# Patient Record
Sex: FEMALE | Race: WHITE | URBAN - METROPOLITAN AREA
[De-identification: names, ages, dates, MRNs, and addresses within clinical notes are randomized per-mention and may not be internally consistent; named-entity substitution may affect disease eponyms.]

---

## 2018-08-03 ENCOUNTER — INPATIENT (INPATIENT)
Facility: HOSPITAL | Age: 78
LOS: 0 days | Discharge: HOME | End: 2018-08-04
Attending: INTERNAL MEDICINE | Admitting: INTERNAL MEDICINE
Payer: MEDICARE

## 2018-08-03 VITALS — WEIGHT: 225.09 LBS | HEIGHT: 65 IN

## 2018-08-03 DIAGNOSIS — Z90.49 ACQUIRED ABSENCE OF OTHER SPECIFIED PARTS OF DIGESTIVE TRACT: Chronic | ICD-10-CM

## 2018-08-03 DIAGNOSIS — Z98.84 BARIATRIC SURGERY STATUS: Chronic | ICD-10-CM

## 2018-08-03 DIAGNOSIS — Z96.652 PRESENCE OF LEFT ARTIFICIAL KNEE JOINT: Chronic | ICD-10-CM

## 2018-08-03 LAB
ALBUMIN SERPL ELPH-MCNC: 3.4 G/DL — SIGNIFICANT CHANGE UP (ref 3.3–5)
ALP SERPL-CCNC: 77 U/L — SIGNIFICANT CHANGE UP (ref 40–120)
ALT FLD-CCNC: 22 U/L — SIGNIFICANT CHANGE UP (ref 12–78)
ANION GAP SERPL CALC-SCNC: 7 MMOL/L — SIGNIFICANT CHANGE UP (ref 5–17)
APTT BLD: 30.5 SEC — SIGNIFICANT CHANGE UP (ref 27.5–37.4)
AST SERPL-CCNC: 20 U/L — SIGNIFICANT CHANGE UP (ref 15–37)
BASOPHILS # BLD AUTO: 0.02 K/UL — SIGNIFICANT CHANGE UP (ref 0–0.2)
BASOPHILS NFR BLD AUTO: 0.3 % — SIGNIFICANT CHANGE UP (ref 0–2)
BILIRUB SERPL-MCNC: 0.4 MG/DL — SIGNIFICANT CHANGE UP (ref 0.2–1.2)
BUN SERPL-MCNC: 16 MG/DL — SIGNIFICANT CHANGE UP (ref 7–23)
CALCIUM SERPL-MCNC: 8.8 MG/DL — SIGNIFICANT CHANGE UP (ref 8.5–10.1)
CHLORIDE SERPL-SCNC: 107 MMOL/L — SIGNIFICANT CHANGE UP (ref 96–108)
CO2 SERPL-SCNC: 27 MMOL/L — SIGNIFICANT CHANGE UP (ref 22–31)
CREAT SERPL-MCNC: 1.07 MG/DL — SIGNIFICANT CHANGE UP (ref 0.5–1.3)
EOSINOPHIL # BLD AUTO: 0.07 K/UL — SIGNIFICANT CHANGE UP (ref 0–0.5)
EOSINOPHIL NFR BLD AUTO: 1 % — SIGNIFICANT CHANGE UP (ref 0–6)
GLUCOSE SERPL-MCNC: 97 MG/DL — SIGNIFICANT CHANGE UP (ref 70–99)
HCT VFR BLD CALC: 30.7 % — LOW (ref 34.5–45)
HGB BLD-MCNC: 10.3 G/DL — LOW (ref 11.5–15.5)
IMM GRANULOCYTES NFR BLD AUTO: 0.7 % — SIGNIFICANT CHANGE UP (ref 0–1.5)
INR BLD: 1.03 RATIO — SIGNIFICANT CHANGE UP (ref 0.88–1.16)
LYMPHOCYTES # BLD AUTO: 1.27 K/UL — SIGNIFICANT CHANGE UP (ref 1–3.3)
LYMPHOCYTES # BLD AUTO: 19 % — SIGNIFICANT CHANGE UP (ref 13–44)
MCHC RBC-ENTMCNC: 32.9 PG — SIGNIFICANT CHANGE UP (ref 27–34)
MCHC RBC-ENTMCNC: 33.6 GM/DL — SIGNIFICANT CHANGE UP (ref 32–36)
MCV RBC AUTO: 98.1 FL — SIGNIFICANT CHANGE UP (ref 80–100)
MONOCYTES # BLD AUTO: 0.76 K/UL — SIGNIFICANT CHANGE UP (ref 0–0.9)
MONOCYTES NFR BLD AUTO: 11.3 % — SIGNIFICANT CHANGE UP (ref 2–14)
NEUTROPHILS # BLD AUTO: 4.53 K/UL — SIGNIFICANT CHANGE UP (ref 1.8–7.4)
NEUTROPHILS NFR BLD AUTO: 67.7 % — SIGNIFICANT CHANGE UP (ref 43–77)
NRBC # BLD: 0 /100 WBCS — SIGNIFICANT CHANGE UP (ref 0–0)
NT-PROBNP SERPL-SCNC: 223 PG/ML — SIGNIFICANT CHANGE UP (ref 0–450)
PLATELET # BLD AUTO: 150 K/UL — SIGNIFICANT CHANGE UP (ref 150–400)
POTASSIUM SERPL-MCNC: 3.7 MMOL/L — SIGNIFICANT CHANGE UP (ref 3.5–5.3)
POTASSIUM SERPL-SCNC: 3.7 MMOL/L — SIGNIFICANT CHANGE UP (ref 3.5–5.3)
PROT SERPL-MCNC: 6.9 GM/DL — SIGNIFICANT CHANGE UP (ref 6–8.3)
PROTHROM AB SERPL-ACNC: 11.1 SEC — SIGNIFICANT CHANGE UP (ref 9.8–12.7)
RBC # BLD: 3.13 M/UL — LOW (ref 3.8–5.2)
RBC # FLD: 14.2 % — SIGNIFICANT CHANGE UP (ref 10.3–14.5)
SODIUM SERPL-SCNC: 141 MMOL/L — SIGNIFICANT CHANGE UP (ref 135–145)
TROPONIN I SERPL-MCNC: <0.015 NG/ML — SIGNIFICANT CHANGE UP (ref 0.01–0.04)
WBC # BLD: 6.7 K/UL — SIGNIFICANT CHANGE UP (ref 3.8–10.5)
WBC # FLD AUTO: 6.7 K/UL — SIGNIFICANT CHANGE UP (ref 3.8–10.5)

## 2018-08-03 PROCEDURE — 99285 EMERGENCY DEPT VISIT HI MDM: CPT

## 2018-08-03 PROCEDURE — 73110 X-RAY EXAM OF WRIST: CPT | Mod: 26,LT

## 2018-08-03 PROCEDURE — 93010 ELECTROCARDIOGRAM REPORT: CPT

## 2018-08-03 PROCEDURE — 70450 CT HEAD/BRAIN W/O DYE: CPT | Mod: 26

## 2018-08-03 PROCEDURE — 71045 X-RAY EXAM CHEST 1 VIEW: CPT | Mod: 26

## 2018-08-03 PROCEDURE — 72170 X-RAY EXAM OF PELVIS: CPT | Mod: 26

## 2018-08-03 PROCEDURE — 72125 CT NECK SPINE W/O DYE: CPT | Mod: 26

## 2018-08-03 PROCEDURE — 93306 TTE W/DOPPLER COMPLETE: CPT | Mod: 26

## 2018-08-03 RX ORDER — QUETIAPINE FUMARATE 200 MG/1
50 TABLET, FILM COATED ORAL AT BEDTIME
Qty: 0 | Refills: 0 | Status: DISCONTINUED | OUTPATIENT
Start: 2018-08-03 | End: 2018-08-04

## 2018-08-03 RX ORDER — IBUPROFEN 200 MG
600 TABLET ORAL ONCE
Qty: 0 | Refills: 0 | Status: COMPLETED | OUTPATIENT
Start: 2018-08-03 | End: 2018-08-03

## 2018-08-03 RX ORDER — PANTOPRAZOLE SODIUM 20 MG/1
40 TABLET, DELAYED RELEASE ORAL
Qty: 0 | Refills: 0 | Status: DISCONTINUED | OUTPATIENT
Start: 2018-08-03 | End: 2018-08-04

## 2018-08-03 RX ORDER — VERAPAMIL HCL 240 MG
240 CAPSULE, EXTENDED RELEASE PELLETS 24 HR ORAL DAILY
Qty: 0 | Refills: 0 | Status: DISCONTINUED | OUTPATIENT
Start: 2018-08-03 | End: 2018-08-04

## 2018-08-03 RX ORDER — SENNA PLUS 8.6 MG/1
2 TABLET ORAL AT BEDTIME
Qty: 0 | Refills: 0 | Status: DISCONTINUED | OUTPATIENT
Start: 2018-08-03 | End: 2018-08-04

## 2018-08-03 RX ORDER — IBUPROFEN 200 MG
600 TABLET ORAL EVERY 6 HOURS
Qty: 0 | Refills: 0 | Status: DISCONTINUED | OUTPATIENT
Start: 2018-08-03 | End: 2018-08-04

## 2018-08-03 RX ORDER — LANOLIN ALCOHOL/MO/W.PET/CERES
5 CREAM (GRAM) TOPICAL AT BEDTIME
Qty: 0 | Refills: 0 | Status: DISCONTINUED | OUTPATIENT
Start: 2018-08-03 | End: 2018-08-04

## 2018-08-03 RX ORDER — ACETAMINOPHEN 500 MG
650 TABLET ORAL EVERY 6 HOURS
Qty: 0 | Refills: 0 | Status: DISCONTINUED | OUTPATIENT
Start: 2018-08-03 | End: 2018-08-04

## 2018-08-03 RX ORDER — OXYBUTYNIN CHLORIDE 5 MG
5 TABLET ORAL
Qty: 0 | Refills: 0 | Status: DISCONTINUED | OUTPATIENT
Start: 2018-08-03 | End: 2018-08-04

## 2018-08-03 RX ORDER — HEPARIN SODIUM 5000 [USP'U]/ML
5000 INJECTION INTRAVENOUS; SUBCUTANEOUS EVERY 8 HOURS
Qty: 0 | Refills: 0 | Status: DISCONTINUED | OUTPATIENT
Start: 2018-08-03 | End: 2018-08-04

## 2018-08-03 RX ORDER — ONDANSETRON 8 MG/1
4 TABLET, FILM COATED ORAL EVERY 6 HOURS
Qty: 0 | Refills: 0 | Status: DISCONTINUED | OUTPATIENT
Start: 2018-08-03 | End: 2018-08-04

## 2018-08-03 RX ORDER — LOSARTAN POTASSIUM 100 MG/1
25 TABLET, FILM COATED ORAL DAILY
Qty: 0 | Refills: 0 | Status: DISCONTINUED | OUTPATIENT
Start: 2018-08-03 | End: 2018-08-04

## 2018-08-03 RX ORDER — ATORVASTATIN CALCIUM 80 MG/1
20 TABLET, FILM COATED ORAL AT BEDTIME
Qty: 0 | Refills: 0 | Status: DISCONTINUED | OUTPATIENT
Start: 2018-08-03 | End: 2018-08-04

## 2018-08-03 RX ORDER — SODIUM CHLORIDE 9 MG/ML
250 INJECTION INTRAMUSCULAR; INTRAVENOUS; SUBCUTANEOUS ONCE
Qty: 0 | Refills: 0 | Status: COMPLETED | OUTPATIENT
Start: 2018-08-03 | End: 2018-08-03

## 2018-08-03 RX ORDER — HYDROCHLOROTHIAZIDE 25 MG
12.5 TABLET ORAL DAILY
Qty: 0 | Refills: 0 | Status: DISCONTINUED | OUTPATIENT
Start: 2018-08-03 | End: 2018-08-04

## 2018-08-03 RX ADMIN — HEPARIN SODIUM 5000 UNIT(S): 5000 INJECTION INTRAVENOUS; SUBCUTANEOUS at 22:05

## 2018-08-03 RX ADMIN — HEPARIN SODIUM 5000 UNIT(S): 5000 INJECTION INTRAVENOUS; SUBCUTANEOUS at 14:52

## 2018-08-03 RX ADMIN — ATORVASTATIN CALCIUM 20 MILLIGRAM(S): 80 TABLET, FILM COATED ORAL at 22:05

## 2018-08-03 RX ADMIN — SODIUM CHLORIDE 500 MILLILITER(S): 9 INJECTION INTRAMUSCULAR; INTRAVENOUS; SUBCUTANEOUS at 10:15

## 2018-08-03 RX ADMIN — Medication 5 MILLIGRAM(S): at 22:05

## 2018-08-03 RX ADMIN — Medication 600 MILLIGRAM(S): at 12:39

## 2018-08-03 RX ADMIN — SODIUM CHLORIDE 250 MILLILITER(S): 9 INJECTION INTRAMUSCULAR; INTRAVENOUS; SUBCUTANEOUS at 10:52

## 2018-08-03 RX ADMIN — QUETIAPINE FUMARATE 50 MILLIGRAM(S): 200 TABLET, FILM COATED ORAL at 22:05

## 2018-08-03 RX ADMIN — PANTOPRAZOLE SODIUM 40 MILLIGRAM(S): 20 TABLET, DELAYED RELEASE ORAL at 22:05

## 2018-08-03 RX ADMIN — Medication 5 MILLIGRAM(S): at 17:57

## 2018-08-03 RX ADMIN — Medication 600 MILLIGRAM(S): at 11:48

## 2018-08-03 NOTE — H&P ADULT - NSHPPHYSICALEXAM_GEN_ALL_CORE
Vital Signs Last 24 Hrs  T(C): 36.7 (03 Aug 2018 09:36), Max: 36.7 (03 Aug 2018 09:36)  T(F): 98 (03 Aug 2018 09:36), Max: 98 (03 Aug 2018 09:36)  HR: 78 (03 Aug 2018 09:36) (78 - 78)  BP: 153/79 (03 Aug 2018 09:36) (153/79 - 153/79)  BP(mean): --  RR: 18 (03 Aug 2018 09:36) (18 - 18)  SpO2: 98% (03 Aug 2018 09:36) (98% - 98%)    PHYSICAL EXAM:    Constitutional: NAD, awake and alert, well-developed  HEENT: PERR, EOMI, Normal Hearing, MMM  Neck: Soft and supple  Respiratory: Breath sounds are clear bilaterally, No wheezing, rales or rhonchi  Cardiovascular: S1 and S2, regular rate and rhythm, no Murmurs, gallops or rubs  Gastrointestinal: Bowel Sounds present, soft, nontender, nondistended, no guarding, no rebound  Extremities: No peripheral edema  Neurological: A/O x 3, no focal deficits in my limited exam  Skin: bruises b/l knees, left arm.

## 2018-08-03 NOTE — ED ADULT NURSE NOTE - OBJECTIVE STATEMENT
pt states left wrist pain s/p fall. found by  on the ground, per  he heard a thump and found her on the ground approx 2 seconds later. pt states she hit the left side of her head. denies CP, palpitations, SOB or diff breathing.

## 2018-08-03 NOTE — ED ADULT NURSE NOTE - NSIMPLEMENTINTERV_GEN_ALL_ED
Implemented All Fall with Harm Risk Interventions:  [place] to call system. Call bell, personal items and telephone within reach. Instruct patient to call for assistance. Room bathroom lighting operational. Non-slip footwear when patient is off stretcher. Physically safe environment: no spills, clutter or unnecessary equipment. Stretcher in lowest position, wheels locked, appropriate side rails in place. Provide visual cue, wrist band, yellow gown, etc. Monitor gait and stability. Monitor for mental status changes and reorient to person, place, and time. Review medications for side effects contributing to fall risk. Reinforce activity limits and safety measures with patient and family. Provide visual clues: red socks.

## 2018-08-03 NOTE — H&P ADULT - HISTORY OF PRESENT ILLNESS
77 year old woman with PMHx of GERD, HTN, HLD, mood disorder nos, presents with episode of syncope.  Per pt history this morning she was reaching up to turn off ceiling fan when all of a sudden she blacked out.  Her  found her on the floor.  Pt does not recall events of fall as she blacked out.  She states this has happened once in the past.  She admits to having episodes of dizziness with sudden movement.  No changes to meds recently.  Complaining of left wrist pain.  Denies fever, had some nausea but now resolved, no vomiting, abd pain or changes in urination.    In ED: Found to have wrist fx on left.

## 2018-08-03 NOTE — ED ADULT NURSE NOTE - CHIEF COMPLAINT QUOTE
pt fell at 0100 , pt looked up to shut off a fan and then was suddenly on the floor. Pt + LOC for less than 30 seconds. Pt doesn't recall the fall but has ecchymosis on the left side of the face, ecchymosis on left knee and pain in left wrist

## 2018-08-03 NOTE — ED PROVIDER NOTE - CARE PLAN
Principal Discharge DX:	Syncope and collapse  Secondary Diagnosis:	Other closed intra-articular fracture of distal end of right radius, initial encounter

## 2018-08-03 NOTE — ED ADULT NURSE NOTE - CHPI ED NUR SYMPTOMS NEG
no tingling/no confusion/no fever/no weakness/no abrasion/no bleeding/no numbness/no vomiting/no deformity

## 2018-08-03 NOTE — ED PROVIDER NOTE - OBJECTIVE STATEMENT
78 y/o female with a PMHx of  presents to the ED concerning possible wrist fx. Pt states she thinks she fell. As per pt's , he heard her fall and when he went to see what had happened he found her conscious. Pt walks with a cane at baseline. +LOC +wrist pain. Denies CP, palpitations. Pt states this has happened before once, years ago. Pt states that the last time this happened she was standing outside the car door and there was ice and she doesn't remember what happened after that. Non-smoker. 76 y/o female with a PMHx of GERD, HTN, HLD presents to the ED concerning possible wrist fx. Pt states she thinks she fell. As per pt's , he heard her fall and when he went to see what had happened he found her conscious. Pt walks with a cane at baseline. She states that she was turning off her fans and when she turned around to turn the second fan off she passed out and fell. +syncope +wrist pain. Denies CP, palpitations. Pt states this has happened before once, years ago. Pt states that the last time this happened she was standing outside the car door and there was ice and she doesn't remember what happened after that. Non-smoker.

## 2018-08-03 NOTE — H&P ADULT - PMH
GERD (gastroesophageal reflux disease)    HLD (hyperlipidemia)    HTN (hypertension)    Mood disorder

## 2018-08-03 NOTE — H&P ADULT - NSHPLABSRESULTS_GEN_ALL_CORE
CARDIAC MARKERS ( 03 Aug 2018 10:59 )  <0.015 ng/mL / x     / x     / x     / x                                10.3   6.70  )-----------( 150      ( 03 Aug 2018 09:39 )             30.7     08-03    141  |  107  |  16  ----------------------------<  97  3.7   |  27  |  1.07    Ca    8.8      03 Aug 2018 09:39    TPro  6.9  /  Alb  3.4  /  TBili  0.4  /  DBili  x   /  AST  20  /  ALT  22  /  AlkPhos  77  08-03    CAPILLARY BLOOD GLUCOSE        LIVER FUNCTIONS - ( 03 Aug 2018 09:39 )  Alb: 3.4 g/dL / Pro: 6.9 gm/dL / ALK PHOS: 77 U/L / ALT: 22 U/L / AST: 20 U/L / GGT: x           PT/INR - ( 03 Aug 2018 09:39 )   PT: 11.1 sec;   INR: 1.03 ratio         PTT - ( 03 Aug 2018 09:39 )  PTT:30.5 sec      < from: CT Head No Cont (08.03.18 @ 10:00) >      IMPRESSION:    No acute intracranial pathology or hemorrhage. No acute calvarial   fracture.    < end of copied text >    < from: CT Cervical Spine No Cont (08.03.18 @ 10:33) >      IMPRESSION:   No vertebral fracture is recognized.          < end of copied text >

## 2018-08-03 NOTE — H&P ADULT - ASSESSMENT
77 year old woman who presents with syncope and fall.    Fall: Secondary to syncope: Unclear etiology - cardiac vs neuro.  -monitor on tele  -echo eval  -orthostatics  -CT head and cervical spine without acute pathology  -trop, bnp neg.    -AM folate, b12, TSH, UA  -cardio and neuro eval  -PT    Wrist fx:  -ortho consult  -pain management    Mood d/o:  -resume seroquel.  -we do not have viibryd 40mg here, pt will need to take own med.      HTN/HLD:  -resume home meds losartan, verapamil, hctz, statin    Anemia:  -monitor     DVT ppx:  -heparin subc

## 2018-08-03 NOTE — ED PROVIDER NOTE - CLINICAL SIGNS
Balbina calling states patient has been having periods where her blood sugar drops, usually in the morning after breakfast, and she sweats profusely, becomes very weak to the point of incontinence, and is very weak for the rest of the day.    Balbina states they can bring her back with crackers, glucose tablets and peanut butter; this happens 2-3/week for last 4-5 weeks.    Balbina states patient has Diabetic Retinopathy.    Balbina requests appointment with Dr White as soon as possible.    's first available is 8/28/17; Balbina requests earlier appointment.    Please return call to discuss.      
Patient's daughter calling with concern of low blood sugars.    States \"Dr. White knows about this and said to call if getting worse\".    Does not have glucometer, so they have not checked an actual reading, unsure of BP/P too.    Daughter states symptomatic in morning: shaky, sweats, slight confusion, if really \"bad\" patient incontinent of urine.    States will eat balanced diet and ~ 2 hours after eating same symptoms, corrected with glucose tabs, crackers, milk, etc.    Patient will ONLY see you, scheduled 7/25/17. Anything in meantime?      
Patient's daughter updated on below.    Agreed to checking BS and BP/pulse.    States  has BP cuff and will monitor.     Diabetic supplies sent to pharmacy.    UA and labs ordered. Will have completed prior to appt.    If worsening, agreed to another provider or ER,verbalized understanding of all above.  
Very important to get a meter and record what her BS is during these episodes to confirm cause. And check BP too and pulse if can. Check UA as well as CBC, CMP in meantime and if worsens, recommend immediate eval (w other provider if I full or ER if confused)  
Brudzinski's sign negative/Kernig's sign negative

## 2018-08-03 NOTE — ED PROVIDER NOTE - PHYSICAL EXAMINATION
No posterior cspine ttp.  No midline C/T/LS ttp.  No facial ttp.  No chest wall ttp.  No hip instability or ttp/FROM/nv intact, moves all extremities with no pain, except L wrist TTP, decreased ROM strongf rp no scafoid TTP.

## 2018-08-03 NOTE — ED PROVIDER NOTE - GASTROINTESTINAL, MLM
Abdomen soft, non-tender, no guarding. Abdomen soft, non-tender, no guarding. No rebound, no masses.

## 2018-08-04 ENCOUNTER — TRANSCRIPTION ENCOUNTER (OUTPATIENT)
Age: 78
End: 2018-08-04

## 2018-08-04 VITALS
OXYGEN SATURATION: 92 % | HEART RATE: 104 BPM | SYSTOLIC BLOOD PRESSURE: 147 MMHG | TEMPERATURE: 98 F | DIASTOLIC BLOOD PRESSURE: 80 MMHG | RESPIRATION RATE: 16 BRPM

## 2018-08-04 DIAGNOSIS — R55 SYNCOPE AND COLLAPSE: ICD-10-CM

## 2018-08-04 LAB
FOLATE SERPL-MCNC: >20 NG/ML — SIGNIFICANT CHANGE UP
TSH SERPL-MCNC: 1.9 UU/ML — SIGNIFICANT CHANGE UP (ref 0.34–4.82)
VIT B12 SERPL-MCNC: 543 PG/ML — SIGNIFICANT CHANGE UP (ref 232–1245)

## 2018-08-04 PROCEDURE — 99222 1ST HOSP IP/OBS MODERATE 55: CPT

## 2018-08-04 RX ADMIN — Medication 5 MILLIGRAM(S): at 06:10

## 2018-08-04 RX ADMIN — HEPARIN SODIUM 5000 UNIT(S): 5000 INJECTION INTRAVENOUS; SUBCUTANEOUS at 06:10

## 2018-08-04 RX ADMIN — Medication 240 MILLIGRAM(S): at 06:10

## 2018-08-04 RX ADMIN — Medication 12.5 MILLIGRAM(S): at 06:10

## 2018-08-04 RX ADMIN — LOSARTAN POTASSIUM 25 MILLIGRAM(S): 100 TABLET, FILM COATED ORAL at 06:10

## 2018-08-04 RX ADMIN — HEPARIN SODIUM 5000 UNIT(S): 5000 INJECTION INTRAVENOUS; SUBCUTANEOUS at 13:26

## 2018-08-04 NOTE — DISCHARGE NOTE ADULT - MEDICATION SUMMARY - MEDICATIONS TO TAKE
I will START or STAY ON the medications listed below when I get home from the hospital:    losartan 25 mg oral tablet  -- 1 tab(s) by mouth once a day  -- Indication: For Hypertension    verapamil 240 mg/24 hours oral capsule, extended release  -- 1 cap(s) by mouth once a day  -- Indication: For Hypertension    Viibryd 40 mg oral tablet  -- 1 tab(s) by mouth once a day  -- Indication: For resume home med    atorvastatin 20 mg oral tablet  -- 1 tab(s) by mouth once a day  -- Indication: For resume home med    SEROquel 50 mg oral tablet  -- 1 tab(s) by mouth once a day (at bedtime)  -- Indication: For resume home med    raNITIdine 150 mg oral capsule  -- 1 cap(s) by mouth 2 times a day  -- Indication: For resume home med    tolterodine 2 mg oral capsule, extended release  -- 1 cap(s) by mouth once a day  -- Indication: For resume home med

## 2018-08-04 NOTE — PROGRESS NOTE ADULT - ASSESSMENT
Syncope- unclear etiology so far.  2 D echo did not reveal any siginificant cardiac abnormalities that could cause the syncope.  No evidence of orthostasis.  f/u with Dr Crawley as outpt.     Stress test  2015- no ischemia.  HTN -continue current meds.    Hyperlipidemia- continue statin.    Other medical issues- Management per primary team.   Thank you for allowing me to participate in the care of this patient. Please feel free to contact me with any questions.

## 2018-08-04 NOTE — DISCHARGE NOTE ADULT - CARE PROVIDER_API CALL
Ned Mann (MD), Internal Medicine  [address] [place] [address]  Phone: [phone]  Fax: [phone]    your neurologist and psychiatrist,   Phone: (   )    -  Fax: (   )    -    Michael Crowe), Orthopaedic Surgery; Surgery of the Hand  [address] [place] [address]  Phone: [phone]  Fax: [phone]

## 2018-08-04 NOTE — CONSULT NOTE ADULT - SUBJECTIVE AND OBJECTIVE BOX
77 yr old F presents to ED s/p syncopal episode at home earlier today with complaints of Left wrist pain. Ortho consult placed for recommendations on her left wrist.   Notes pain over the ulnar side. No previous wrist injuries. Using it without difficulty. Able to move wrist with only subtle pain. No parethesias. Otherwise well. No other injuries at the time of the fall. No other areas of pain.    PAST MEDICAL & SURGICAL HISTORY:  Mood disorder  HLD (hyperlipidemia)  HTN (hypertension)  GERD (gastroesophageal reflux disease)  History of total knee replacement, left  History of cholecystectomy  H/O gastric bypass    MEDICATIONS  (STANDING):  atorvastatin 20 milliGRAM(s) Oral at bedtime  heparin  Injectable 5000 Unit(s) SubCutaneous every 8 hours  hydrochlorothiazide 12.5 milliGRAM(s) Oral daily  losartan 25 milliGRAM(s) Oral daily  oxybutynin 5 milliGRAM(s) Oral two times a day  pantoprazole    Tablet 40 milliGRAM(s) Oral before breakfast  QUEtiapine 50 milliGRAM(s) Oral at bedtime  verapamil  milliGRAM(s) Oral daily    Allergies    Fosamax (Unknown)  Originally Entered as [Moderate Rash] reaction to [SURGICAL TAPE] (Unknown)  Vitamin K (Other (Severe))    Intolerances                            10.3   6.70  )-----------( 150      ( 03 Aug 2018 09:39 )             30.7     03 Aug 2018 09:39    141    |  107    |  16     ----------------------------<  97     3.7     |  27     |  1.07     Ca    8.8        03 Aug 2018 09:39    TPro  6.9    /  Alb  3.4    /  TBili  0.4    /  DBili  x      /  AST  20     /  ALT  22     /  AlkPhos  77     03 Aug 2018 09:39    PT/INR - ( 03 Aug 2018 09:39 )   PT: 11.1 sec;   INR: 1.03 ratio         PTT - ( 03 Aug 2018 09:39 )  PTT:30.5 sec  Vital Signs Last 24 Hrs  T(C): 36.7 (08-03-18 @ 09:36), Max: 36.7 (08-03-18 @ 09:36)  T(F): 98 (08-03-18 @ 09:36), Max: 98 (08-03-18 @ 09:36)  HR: 72 (08-03-18 @ 14:50) (72 - 78)  BP: 155/75 (08-03-18 @ 14:50) (153/79 - 155/75)  BP(mean): --  RR: 18 (08-03-18 @ 14:50) (18 - 18)  SpO2: 98% (08-03-18 @ 14:50) (98% - 98%)    Imaging: XR L Wrist: Negative for fracture per radiology.   Appears to be a potential triquetral fracture, non displaced.     Physical Exam  General: NAD, Alert, Awake and oriented  LUE: No open areas.  Faint volar eccyhmosis over distal radius.  TTP over the ulnar aspect of carpal row.   No focal TTP about the distal radius on exam.   Able to make a fist without pain.  Radial, median, ulnar, and musculocutaneous nerve function intact.   SILT  2+ radial pulse  Brisk capillary refill.
Patient is a 77y old  Female who presents with a chief complaint of Syncope/Fall.     HPI:  77 year old woman with PMHx of GERD, HTN, HLD, mood disorder nos, presents with episode of syncope.  Per pt history this morning she was reaching up to turn off ceiling fan when all of a sudden she blacked out.  Her  found her on the floor.  Pt does not recall events of fall as she blacked out.  She states this has happened once in the past.  She admits to having episodes of dizziness with sudden movement.  No changes to meds recently.  Complaining of left wrist pain.  Denies fever, had some nausea but now resolved, no vomiting, abd pain or changes in urination.    In ED: Found to have wrist fx on left.     no CP or pressure.  This has been the only syncope since long time.    SHe had one many yrs ago.        PAST MEDICAL & SURGICAL HISTORY:  Mood disorder  HLD (hyperlipidemia)  HTN (hypertension)  GERD (gastroesophageal reflux disease)  History of total knee replacement, left  History of cholecystectomy  H/O gastric bypass      MEDICATIONS  (STANDING):  atorvastatin 20 milliGRAM(s) Oral at bedtime  heparin  Injectable 5000 Unit(s) SubCutaneous every 8 hours  hydrochlorothiazide 12.5 milliGRAM(s) Oral daily  losartan 25 milliGRAM(s) Oral daily  oxybutynin 5 milliGRAM(s) Oral two times a day  pantoprazole    Tablet 40 milliGRAM(s) Oral before breakfast  QUEtiapine 50 milliGRAM(s) Oral at bedtime  verapamil  milliGRAM(s) Oral daily    MEDICATIONS  (PRN):  acetaminophen   Tablet 650 milliGRAM(s) Oral every 6 hours PRN For Temp greater than 38.5 C (101.3 F)  acetaminophen   Tablet. 650 milliGRAM(s) Oral every 6 hours PRN Mild Pain (1 - 3)  ibuprofen  Tablet 600 milliGRAM(s) Oral every 6 hours PRN moderate pain  ondansetron Injectable 4 milliGRAM(s) IV Push every 6 hours PRN Nausea  senna 2 Tablet(s) Oral at bedtime PRN Constipation      FAMILY HISTORY:  No pertinent family history in first degree relatives      SOCIAL HISTORY:  .no smoking in recent past    REVIEW OF SYSTEMS:  CONSTITUTIONAL:    No fatigue, malaise, lethargy.  No fever or chills.  HEENT:  Eyes:  No visual changes.     ENT:  No epistaxis.  No sinus pain.    RESPIRATORY:  No cough.  No wheeze.  No hemoptysis.  No shortness of breath.  CARDIOVASCULAR:  No chest pains.  No palpitations. No shortness of breath, No orthopnea or PND. c/o synocpe  GASTROINTESTINAL:  No abdominal pain.  No nausea or vomiting.    GENITOURINARY:    No hematuria.    MUSCULOSKELETAL:  No musculoskeletal pain.  No joint swelling.  No arthritis.  NEUROLOGICAL:  No tingling or numbness or weakness.  PSYCHIATRIC:  No confusion  SKIN:  No rashes.    ENDOCRINE:  No unexplained weight loss.  No polydipsia.   HEMATOLOGIC:  No anemia.  No prolonged or excessive bleeding.   ALLERGIC AND IMMUNOLOGIC:  No pruritus.          Vital Signs Last 24 Hrs  T(C): 36.7 (03 Aug 2018 09:36), Max: 36.7 (03 Aug 2018 09:36)  T(F): 98 (03 Aug 2018 09:36), Max: 98 (03 Aug 2018 09:36)  HR: 72 (03 Aug 2018 14:50) (72 - 78)  BP: 155/75 (03 Aug 2018 14:50) (153/79 - 155/75)  BP(mean): --  RR: 18 (03 Aug 2018 14:50) (18 - 18)  SpO2: 98% (03 Aug 2018 14:50) (98% - 98%)    PHYSICAL EXAM-    Constitutional: The patient appears to be normal, well developed, well nourished and alert and oriented to time, place and person. The patient does not appear acutely ill.     Head: Head is normocephalic and atraumatic.      Neck: No jugular venous distention. No audible carotid bruits. There are strong carotid pulses bilaterally. No JVD.     Cardiovascular: Regular rate and rhythm without S3, S4. No murmurs or rubs are appreciated.      Respiratory: Breath sounds are normal. No rales. No wheezing.    Abdomen: Soft, nontender, nondistended with positive bowel sounds.      Extremity: No tenderness. No  pitting edema     Neurologic: The patient is alert and oriented.      Skin: No rash, no obvious lesions noted.      Psychiatric: The patient appears to be emotionally stable.      INTERPRETATION OF TELEMETRY: sinus ryhtm, PACS    ECG: Sinus rythm , l axis, no ST T changes.  Q waves in iii and avf    I&O's Detail      LABS:                        10.3   6.70  )-----------( 150      ( 03 Aug 2018 09:39 )             30.7     08-03    141  |  107  |  16  ----------------------------<  97  3.7   |  27  |  1.07    Ca    8.8      03 Aug 2018 09:39    TPro  6.9  /  Alb  3.4  /  TBili  0.4  /  DBili  x   /  AST  20  /  ALT  22  /  AlkPhos  77  08-03    CARDIAC MARKERS ( 03 Aug 2018 14:24 )  <0.015 ng/mL / x     / x     / x     / x      CARDIAC MARKERS ( 03 Aug 2018 10:59 )  <0.015 ng/mL / x     / x     / x     / x      CARDIAC MARKERS ( 03 Aug 2018 09:39 )  <0.015 ng/mL / x     / x     / x     / x          PT/INR - ( 03 Aug 2018 09:39 )   PT: 11.1 sec;   INR: 1.03 ratio         PTT - ( 03 Aug 2018 09:39 )  PTT:30.5 sec    I&O's Summary    BNPSerum Pro-Brain Natriuretic Peptide: 223 pg/mL (08-03 @ 09:39)    RADIOLOGY & ADDITIONAL STUDIES:
Neurology Consult requested by:   Patient is a 77y old  Female who presents with a chief complaint of Syncope/Fall (03 Aug 2018 12:26)     HPI:  77 year old woman with PMHx of GERD, HTN, HLD, mood disorder nos, presents with episode of syncope.  Per pt history this morning she was reaching up to turn off ceiling fan when all of a sudden she blacked out. found herself on the floor, does not recall falling.  Her  found her on the floor.   She states this has happened once in the past after an ankle injury years ago.  No recent illness, intermittent stabbing headaches, right temple. Complaining of left wrist pain.  Denies fever, had some nausea but now resolved, no vomiting, abd pain or changes in urination.    In ED: Found to have wrist fx on left. (03 Aug 2018 12:26)      PAST MEDICAL & SURGICAL HISTORY:  Mood disorder  HLD (hyperlipidemia)  HTN (hypertension)  GERD (gastroesophageal reflux disease)  History of total knee replacement, left  History of cholecystectomy  H/O gastric bypass    FAMILY HISTORY:  No pertinent family history in first degree relatives    Social Hx:  Nonsmoker, no drug or alcohol use  Medications and Allergies ReviewedMEDICATIONS  (STANDING):  atorvastatin 20 milliGRAM(s) Oral at bedtime  heparin  Injectable 5000 Unit(s) SubCutaneous every 8 hours  hydrochlorothiazide 12.5 milliGRAM(s) Oral daily  losartan 25 milliGRAM(s) Oral daily  melatonin 5 milliGRAM(s) Oral at bedtime  oxybutynin 5 milliGRAM(s) Oral two times a day  pantoprazole    Tablet 40 milliGRAM(s) Oral before breakfast  QUEtiapine 50 milliGRAM(s) Oral at bedtime  verapamil  milliGRAM(s) Oral daily     ROS: Pertinent positives in HPI, all other ROS were reviewed and are negative.      Examination:   Vital Signs Last 24 Hrs  T(C): 36.8 (04 Aug 2018 05:29), Max: 36.8 (03 Aug 2018 16:55)  T(F): 98.2 (04 Aug 2018 05:29), Max: 98.3 (03 Aug 2018 16:55)  HR: 68 (04 Aug 2018 05:29) (68 - 77)  BP: 129/59 (04 Aug 2018 05:29) (105/59 - 155/75)  BP(mean): --  RR: 16 (04 Aug 2018 05:29) (16 - 18)  SpO2: 96% (04 Aug 2018 05:29) (96% - 100%)  General: Cooperative, NAD   NECK: supple, no masses  ENT: Normal hearing   Vascular : no carotid bruits,   Lungs: CTAB  Chest: RRR, no murmurs  Extremities: nontender, no edema  Musculoskeletal: no adventitious movements, + left knee stiffness  Skin: no rash    Neurological Examination:  NIHSS:  MS: AOx3. Appropriately interactive, normal affect. Speech fluent w/o paraphasic error, repetition, naming intact   CN: VFFTC, PERLL, EOMI, V1-3 sensation intact, face symmetric, hearing intact, palate elevates symmetrically, tongue midline, SCM equal bilaterally  Motor: normal bulk and tone, no tremor, rigidity or bradykinesia.  5/5 all over   Sens: Intact to light touch.    Reflexes: 2/4 all over, downgoing toes b/l  Coord:  No dysmetria, KAYLEE intact   Gait: Cannot test    Labs: Reviewed  Imaging:   < from: CT Head No Cont (08.03.18 @ 10:00) >  FINDINGS:      There is no loss of the gray-white matter distinction to indicate acute   territorial infarct. No acute intracranial hemorrhage.    There is no hydrocephalus, mass effect or midline shift.    Nonspecific CSF filled empty sella, likely second to.    No extra-axial collection.    The visualized orbits are unremarkable.    The calvarium is intact.    The visualized paranasal sinuses and mastoid air cells are clear.    IMPRESSION:    No acute intracranial pathology or hemorrhage. No acute calvarial   fracture.    < end of copied text >

## 2018-08-04 NOTE — DISCHARGE NOTE ADULT - HOSPITAL COURSE
Reason for Admission: Syncope/Fall	  History of Present Illness: 	  77 year old woman with PMHx of GERD, HTN, HLD, mood disorder nos, presents with episode of syncope.  Per pt history this morning she was reaching up to turn off ceiling fan when all of a sudden she blacked out.  Her  found her on the floor.  Pt does not recall events of fall as she blacked out.  She states this has happened once in the past.  She admits to having episodes of dizziness with sudden movement.  No changes to meds recently.  Complaining of left wrist pain.  Denies fever, had some nausea but now resolved, no vomiting, abd pain or changes in urination.    In ED: Found to have Triquetral fx.    8/4: Pt seen by ortho, wrist splinted.  Pt feeling well, ruled out for ACS, arrythmias, CVA and other acute neuro and cardiac abnormalities.  Pt doing well with PT.  She is HD stable, afebrile, no fever, chills, n, v.  She needs close outpatient follow up for adjustment of her meds.        REVIEW OF SYSTEMS: All other review of systems is negative unless indicated above.      Physical Exam:  Vital Signs Last 24 Hrs T(C): 36.9 (04 Aug 2018 10:20), Max: 36.9 (04 Aug 2018 10:20) T(F): 98.5 (04 Aug 2018 10:20), Max: 98.5 (04 Aug 2018 10:20) HR: 74 (04 Aug 2018 10:20) (68 - 77) BP: 104/66 (04 Aug 2018 10:20) (104/66 - 141/70) BP(mean): -- RR: 16 (04 Aug 2018 10:20) (16 - 16) SpO2: 96% (04 Aug 2018 10:20) (96% - 100%)   PHYSICAL EXAM:  Constitutional: NAD, awake and alert, well-developed  HEENT: PERR, EOMI, Normal Hearing, MMM  Neck: Soft and supple  Respiratory: Breath sounds are clear bilaterally, No wheezing, rales or rhonchi  Cardiovascular: S1 and S2, regular rate and rhythm, no Murmurs, gallops or rubs  Gastrointestinal: Bowel Sounds present, soft, nontender, nondistended, no guarding, no rebound  Extremities: No peripheral edema  Neurological: A/O x 3, no focal deficits in my limited exam Skin: bruises b/l knees, left arm.	    meds/labs: Reviewed.  Assessment and Plan:  77 year old woman who presents with syncope and fall.    Fall: Secondary to syncope: Unclear etiology: possible BPPV vs dehydration/dizziness/hypotension.  Told pt to stop HCTZ.  No tele events. Echo unremarkable. CT head and cervical spine without acute pathology. trop, bnp neg.  Pt is not orthostatic. TSH WNL.  No infectious process.  Per neuro not CVA episode. Will need outpatient pcp follow up.  PT eval pt doing well.    Left Triquetral Fx: resume splint and follow up with dr. Crowe.    Mood d/o: resume seroquel, viibryd 40mg.  Needs close outpatient follow up with neurologist and psychiatrist.  Pt made aware.     HTN/HLD: BP borderline low, told pt to stop HCTZ and continue rest of meds.  She will need close outpatient monitoring of BP. c/w  losartan, verapamil, statin    Anemia: outpatient monitoring.    Attending Statement: 40 minutes spent on total encounter

## 2018-08-04 NOTE — PROGRESS NOTE ADULT - SUBJECTIVE AND OBJECTIVE BOX
Patient is a 77y old  Female who presents with a chief complaint of Syncope/Fall.     HPI:  77 year old woman with PMHx of GERD, HTN, HLD, mood disorder nos, presents with episode of syncope.  Per pt history this morning she was reaching up to turn off ceiling fan when all of a sudden she blacked out.  Her  found her on the floor.  Pt does not recall events of fall as she blacked out.  She states this has happened once in the past.  She admits to having episodes of dizziness with sudden movement.  No changes to meds recently.  Complaining of left wrist pain.  Denies fever, had some nausea but now resolved, no vomiting, abd pain or changes in urination.    In ED: Found to have wrist fx on left.     no CP or pressure.  This has been the only syncope since long time.    SHe had one many yrs ago.    8/4- pt since admission denies any more dizziness or presyncope with ambulation. No overnight events.         PAST MEDICAL & SURGICAL HISTORY:  Mood disorder  HLD (hyperlipidemia)  HTN (hypertension)  GERD (gastroesophageal reflux disease)  History of total knee replacement, left  History of cholecystectomy  H/O gastric bypass      MEDICATIONS  (STANDING):  atorvastatin 20 milliGRAM(s) Oral at bedtime  heparin  Injectable 5000 Unit(s) SubCutaneous every 8 hours  hydrochlorothiazide 12.5 milliGRAM(s) Oral daily  losartan 25 milliGRAM(s) Oral daily  oxybutynin 5 milliGRAM(s) Oral two times a day  pantoprazole    Tablet 40 milliGRAM(s) Oral before breakfast  QUEtiapine 50 milliGRAM(s) Oral at bedtime  verapamil  milliGRAM(s) Oral daily    MEDICATIONS  (PRN):  acetaminophen   Tablet 650 milliGRAM(s) Oral every 6 hours PRN For Temp greater than 38.5 C (101.3 F)  acetaminophen   Tablet. 650 milliGRAM(s) Oral every 6 hours PRN Mild Pain (1 - 3)  ibuprofen  Tablet 600 milliGRAM(s) Oral every 6 hours PRN moderate pain  ondansetron Injectable 4 milliGRAM(s) IV Push every 6 hours PRN Nausea  senna 2 Tablet(s) Oral at bedtime PRN Constipation      FAMILY HISTORY:  No pertinent family history in first degree relatives      SOCIAL HISTORY:  .no smoking in recent past    REVIEW OF SYSTEMS:  CONSTITUTIONAL:    No fatigue, malaise, lethargy.  No fever or chills.  HEENT:  Eyes:  No visual changes.     ENT:  No epistaxis.  No sinus pain.    RESPIRATORY:  No cough.  No wheeze.  No hemoptysis.  No shortness of breath.  CARDIOVASCULAR:  No chest pains.  No palpitations. No shortness of breath, No orthopnea or PND. c/o synocpe  GASTROINTESTINAL:  No abdominal pain.  No nausea or vomiting.    GENITOURINARY:    No hematuria.    MUSCULOSKELETAL:  No musculoskeletal pain.  No joint swelling.  No arthritis.  NEUROLOGICAL:  No tingling or numbness or weakness.  PSYCHIATRIC:  No confusion  SKIN:  No rashes.    ENDOCRINE:  No unexplained weight loss.  No polydipsia.   HEMATOLOGIC:  No anemia.  No prolonged or excessive bleeding.   ALLERGIC AND IMMUNOLOGIC:  No pruritus.          Vital Signs Last 24 Hrs  T(C): 36.7 (03 Aug 2018 09:36), Max: 36.7 (03 Aug 2018 09:36)  T(F): 98 (03 Aug 2018 09:36), Max: 98 (03 Aug 2018 09:36)  HR: 72 (03 Aug 2018 14:50) (72 - 78)  BP: 155/75 (03 Aug 2018 14:50) (153/79 - 155/75)  BP(mean): --  RR: 18 (03 Aug 2018 14:50) (18 - 18)  SpO2: 98% (03 Aug 2018 14:50) (98% - 98%)    PHYSICAL EXAM-    Constitutional: The patient appears to be normal, well developed, well nourished and alert and oriented to time, place and person. The patient does not appear acutely ill.     Head: Head is normocephalic and atraumatic.      Neck: No jugular venous distention. No audible carotid bruits. There are strong carotid pulses bilaterally. No JVD.     Cardiovascular: Regular rate and rhythm without S3, S4. No murmurs or rubs are appreciated.      Respiratory: Breath sounds are normal. No rales. No wheezing.    Abdomen: Soft, nontender, nondistended with positive bowel sounds.      Extremity: No tenderness. No  pitting edema     Neurologic: The patient is alert and oriented.      Skin: No rash, no obvious lesions noted.      Psychiatric: The patient appears to be emotionally stable.      INTERPRETATION OF TELEMETRY: sinus ryhtm, PACS    ECG: Sinus rythm , l axis, no ST T changes.  Q waves in iii and avf    I&O's Detail      LABS:                        10.3   6.70  )-----------( 150      ( 03 Aug 2018 09:39 )             30.7     08-03    141  |  107  |  16  ----------------------------<  97  3.7   |  27  |  1.07    Ca    8.8      03 Aug 2018 09:39    TPro  6.9  /  Alb  3.4  /  TBili  0.4  /  DBili  x   /  AST  20  /  ALT  22  /  AlkPhos  77  08-03    CARDIAC MARKERS ( 03 Aug 2018 14:24 )  <0.015 ng/mL / x     / x     / x     / x      CARDIAC MARKERS ( 03 Aug 2018 10:59 )  <0.015 ng/mL / x     / x     / x     / x      CARDIAC MARKERS ( 03 Aug 2018 09:39 )  <0.015 ng/mL / x     / x     / x     / x          PT/INR - ( 03 Aug 2018 09:39 )   PT: 11.1 sec;   INR: 1.03 ratio         PTT - ( 03 Aug 2018 09:39 )  PTT:30.5 sec    I&O's Summary    BNPSerum Pro-Brain Natriuretic Peptide: 223 pg/mL (08-03 @ 09:39)    RADIOLOGY & ADDITIONAL STUDIES:  < from: Transthoracic Echocardiogram (08.03.18 @ 13:46) >     EXAM:  2D ECHOCARDIOGRAM AD         PROCEDURE DATE:  08/03/2018        INTERPRETATION:  Transthoracic Echocardiography Report (TTE)     Demographics     Patient name        MARIAELENA ERVIN   Age           77 year(s)     Med Rec #           [id number]         Gender        Female     Account #           [id number]           Date of Birth 1940     Interpreting        Igor Garcia     Room Number   0003   Physician     Referring Physician JENISE HEAD       Sonographer   Yvette Lopes                                                   CHELSEY     Date of study       08/03/2018 01:32                       PM     Height              64.96 in          Weight        227.08 pounds    Type of Study:     TTE procedure: 2D echocardiogram AD     BP: 120/70 mmHg     Study Location: EDTechnical Quality: Fair    Indications   1) R55 - Syncope and collapse    M-Mode Measurements (cm)     LVEDd: 5.06 cm            LVESd: 2.93 cm   IVSEd: 1.13 cm   LVPWd: 1.35 cm            AO Root Dimension: 3.4cm                             ACS: 1.9 cm                             LA: 4.7 cm    Doppler Measurements:                                    MV Peak E-Wave: 86.4 cm/s   TR Velocity:231 cm/s           MV Peak A-Wave: 127 cm/s   TR Gradient:21.3444 mmHg       MV E/A Ratio: 0.68 %   Estimated RAP:10 mmHg          MV Peak Gradient: 2.99 mmHg   RVSP:34 mmHg     Findings     Mitral Valve   Fibrocalcific changes noted to the mitral valve leaflets with preserved   leaflet excursion.   Mild mitral annular calcification is present.   Mild (1+) mitral regurgitation is present.   EA reversal of the mitral inflow consistent with reduced compliance of   the   left ventricle.     Aortic Valve   Fibrocalcific changes noted to the Aortic valve leaflets with preserved   leaflet excursion.     Tricuspid Valve   Normal appearing tricuspid valve structure and function. Trace tricuspid   valve regurgitation is present.     Pulmonic Valve   Normal appearing pulmonic valve structure and function.     Left Atrium   The left atrium is mildly dilated.     Left Ventricle   The left ventricle is normal in size, wall thickness, wall motion and   contractility.   Estimated left ventricular ejection fraction is 55-60 %.     Right Atrium   Normal appearing right atrium.     Right Ventricle   Normal appearing right ventricle structure and function.     Pericardial Effusion   No evidence of pericardial effusion.     Pleural Effusion   No evidence of pleural effusion.     Impression     Summary     The left ventricle is normal in size, wall thickness, wall motion and   contractility.   Estimated left ventricular ejection fraction is 55-60 %.   Fibrocalcific changes noted to the Aortic valve leaflets with preserved   leaflet excursion.   Fibrocalcific changes noted to the mitral valve leaflets with preserved   leaflet excursion.   Mild mitral annular calcification is present.   Mild (1+) mitral regurgitation is present.   EA reversal of the mitral inflow consistent with reduced compliance of   the   left ventricle.     Signature     ----------------------------------------------------------------   Electronically signed by Che GarciaInterpreting physician)   on 08/03/2018 08:05 PM   ----------------------------------------------------------------    < end of copied text >

## 2018-08-04 NOTE — DISCHARGE NOTE ADULT - PROVIDER TOKENS
TOKEN:'[id number]',FREE:[LAST:[your neurologist and psychiatrist],PHONE:[(   )    -],FAX:[(   )    -]],TOKEN:'[id number]'

## 2018-08-04 NOTE — DISCHARGE NOTE ADULT - PLAN OF CARE
resolution of symptoms. Stop your hydrochlorothiazide.  Follow up with your PCP within 1 week for BP evaluation and adjustment of your meds.  follow up with your neurologist for adjustment of your neuro meds.  Your symptoms could be related to low blood pressure, and or vertigo.  You need close outpatient follow up. splint and rest follow up with dr. marshall in 1 week.

## 2018-08-04 NOTE — DISCHARGE NOTE ADULT - PATIENT PORTAL LINK FT
You can access the [facility] Patient Portal, offered by [facility], by registering with the following website: [email or web address]

## 2018-08-04 NOTE — CONSULT NOTE ADULT - ASSESSMENT
77F with L Triquetral Fx s/p syncopal episode/fall    No operative intervention required  No other acute orthopedic intervention required  Patient placed into splint   Instructed to be NWB L UE  Elevation/ice prn  FU with Dr. Crowe in office in 7-10 days for continued management
Syncope- unclear etiology so far.  Check 2 D echo and orthostatic Bp readings.  Pt admits good po intake in recent past.  no arrythmias on tele so far.    Stress test one yr ago reportedly normal by pt.    HTN -continue current meds.    Hyperlipidemia- continue statin.    Other medical issues- Management per primary team.   Thank you for allowing me to participate in the care of this patient. Please feel free to contact me with any questions.
77 year old woman s/p syncopal event, non focal exam, Ct of head showed no acute changes. Doubt seizure, ischemic event, r/o cardiac cause.

## 2018-08-04 NOTE — PHYSICAL THERAPY INITIAL EVALUATION ADULT - GENERAL OBSERVATIONS, REHAB EVAL
Patient received in bathroom, ambulating with quad cane. L wrist wrapped in ace.  Patient c/o pain in L wrist at 4/10.

## 2018-08-04 NOTE — DISCHARGE NOTE ADULT - CARE PLAN
Principal Discharge DX:	Syncope and collapse  Goal:	resolution of symptoms.  Assessment and plan of treatment:	Stop your hydrochlorothiazide.  Follow up with your PCP within 1 week for BP evaluation and adjustment of your meds.  follow up with your neurologist for adjustment of your neuro meds.  Your symptoms could be related to low blood pressure, and or vertigo.  You need close outpatient follow up.  Secondary Diagnosis:	Wrist fracture  Goal:	splint and rest  Assessment and plan of treatment:	follow up with dr. marshall in 1 week.

## 2018-08-04 NOTE — PHYSICAL THERAPY INITIAL EVALUATION ADULT - STANDING BALANCE: DYNAMIC, REHAB EVAL
w/ QC.  Advised to use RW for maximal safety when able to WB through L wrist.  Platform RW not needed at this time./good minus

## 2018-08-04 NOTE — PHYSICAL THERAPY INITIAL EVALUATION ADULT - PERTINENT HX OF CURRENT PROBLEM, REHAB EVAL
76 yo F admitted  with episode of syncope.  Per pt history this morning she was reaching up to turn off ceiling fan when all of a sudden she blacked out. found herself on the floor, does not recall falling.  Complaining of left wrist pain.  X-rays(+) for L Triquetral Fx

## 2018-08-04 NOTE — PHYSICAL THERAPY INITIAL EVALUATION ADULT - ACTIVE RANGE OF MOTION EXAMINATION, REHAB EVAL
L wrist not testeed/bilateral upper extremity Active ROM was WFL (within functional limits)/bilateral  lower extremity Active ROM was WFL (within functional limits)

## 2018-08-09 DIAGNOSIS — I10 ESSENTIAL (PRIMARY) HYPERTENSION: ICD-10-CM

## 2018-08-09 DIAGNOSIS — D64.9 ANEMIA, UNSPECIFIED: ICD-10-CM

## 2018-08-09 DIAGNOSIS — S62.112A DISPLACED FRACTURE OF TRIQUETRUM [CUNEIFORM] BONE, LEFT WRIST, INITIAL ENCOUNTER FOR CLOSED FRACTURE: ICD-10-CM

## 2018-08-09 DIAGNOSIS — E78.5 HYPERLIPIDEMIA, UNSPECIFIED: ICD-10-CM

## 2018-08-09 DIAGNOSIS — F39 UNSPECIFIED MOOD [AFFECTIVE] DISORDER: ICD-10-CM

## 2018-08-09 DIAGNOSIS — Z79.899 OTHER LONG TERM (CURRENT) DRUG THERAPY: ICD-10-CM
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
## 2018-08-09 DIAGNOSIS — R55 SYNCOPE AND COLLAPSE: ICD-10-CM

## 2018-08-09 DIAGNOSIS — Z88.8 ALLERGY STATUS TO OTHER DRUGS, MEDICAMENTS AND BIOLOGICAL SUBSTANCES: ICD-10-CM
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
## 2018-08-09 DIAGNOSIS — K21.9 GASTRO-ESOPHAGEAL REFLUX DISEASE WITHOUT ESOPHAGITIS: ICD-10-CM

## 2018-08-17 ENCOUNTER — TRANSCRIPTION ENCOUNTER (OUTPATIENT)
Age: 78
End: 2018-08-17

## 2019-04-16 ENCOUNTER — TRANSCRIPTION ENCOUNTER (OUTPATIENT)
Age: 79
End: 2019-04-16

## 2020-08-31 ENCOUNTER — INPATIENT (INPATIENT)
Facility: HOSPITAL | Age: 80
LOS: 1 days | Discharge: NURSING, REHAB OR LONG-TERM CARE | DRG: 552 | End: 2020-09-02
Attending: HOSPITALIST | Admitting: HOSPITALIST
Payer: MEDICARE

## 2020-08-31 VITALS
HEIGHT: 68 IN | WEIGHT: 220.02 LBS | RESPIRATION RATE: 20 BRPM | SYSTOLIC BLOOD PRESSURE: 136 MMHG | DIASTOLIC BLOOD PRESSURE: 69 MMHG | TEMPERATURE: 98 F | HEART RATE: 65 BPM

## 2020-08-31 DIAGNOSIS — G95.20 UNSPECIFIED CORD COMPRESSION: ICD-10-CM
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
## 2020-08-31 DIAGNOSIS — Z98.84 BARIATRIC SURGERY STATUS: Chronic | ICD-10-CM

## 2020-08-31 DIAGNOSIS — Z90.49 ACQUIRED ABSENCE OF OTHER SPECIFIED PARTS OF DIGESTIVE TRACT: Chronic | ICD-10-CM

## 2020-08-31 DIAGNOSIS — Z88.8 ALLERGY STATUS TO OTHER DRUGS, MEDICAMENTS AND BIOLOGICAL SUBSTANCES: ICD-10-CM

## 2020-08-31 DIAGNOSIS — Z96.652 PRESENCE OF LEFT ARTIFICIAL KNEE JOINT: Chronic | ICD-10-CM

## 2020-08-31 PROBLEM — E78.5 HYPERLIPIDEMIA, UNSPECIFIED: Chronic | Status: ACTIVE | Noted: 2018-08-03

## 2020-08-31 PROBLEM — K21.9 GASTRO-ESOPHAGEAL REFLUX DISEASE WITHOUT ESOPHAGITIS: Chronic | Status: ACTIVE | Noted: 2018-08-03

## 2020-08-31 PROBLEM — I10 ESSENTIAL (PRIMARY) HYPERTENSION: Chronic | Status: ACTIVE | Noted: 2018-08-03

## 2020-08-31 PROBLEM — F39 UNSPECIFIED MOOD [AFFECTIVE] DISORDER: Chronic | Status: ACTIVE | Noted: 2018-08-03

## 2020-08-31 LAB
ALBUMIN SERPL ELPH-MCNC: 3.7 G/DL — SIGNIFICANT CHANGE UP (ref 3.3–5)
ALP SERPL-CCNC: 77 U/L — SIGNIFICANT CHANGE UP (ref 40–120)
ALT FLD-CCNC: 16 U/L — SIGNIFICANT CHANGE UP (ref 12–78)
ANION GAP SERPL CALC-SCNC: 6 MMOL/L — SIGNIFICANT CHANGE UP (ref 5–17)
AST SERPL-CCNC: 19 U/L — SIGNIFICANT CHANGE UP (ref 15–37)
BASOPHILS # BLD AUTO: 0.02 K/UL — SIGNIFICANT CHANGE UP (ref 0–0.2)
BASOPHILS NFR BLD AUTO: 0.3 % — SIGNIFICANT CHANGE UP (ref 0–2)
BILIRUB SERPL-MCNC: 0.5 MG/DL — SIGNIFICANT CHANGE UP (ref 0.2–1.2)
BUN SERPL-MCNC: 12 MG/DL — SIGNIFICANT CHANGE UP (ref 7–23)
CALCIUM SERPL-MCNC: 8.9 MG/DL — SIGNIFICANT CHANGE UP (ref 8.5–10.1)
CHLORIDE SERPL-SCNC: 107 MMOL/L — SIGNIFICANT CHANGE UP (ref 96–108)
CK SERPL-CCNC: 119 U/L — SIGNIFICANT CHANGE UP (ref 26–192)
CO2 SERPL-SCNC: 27 MMOL/L — SIGNIFICANT CHANGE UP (ref 22–31)
CREAT SERPL-MCNC: 0.99 MG/DL — SIGNIFICANT CHANGE UP (ref 0.5–1.3)
EOSINOPHIL # BLD AUTO: 0.08 K/UL — SIGNIFICANT CHANGE UP (ref 0–0.5)
EOSINOPHIL NFR BLD AUTO: 1.2 % — SIGNIFICANT CHANGE UP (ref 0–6)
GLUCOSE SERPL-MCNC: 91 MG/DL — SIGNIFICANT CHANGE UP (ref 70–99)
HCT VFR BLD CALC: 33.4 % — LOW (ref 34.5–45)
HGB BLD-MCNC: 10.7 G/DL — LOW (ref 11.5–15.5)
IMM GRANULOCYTES NFR BLD AUTO: 0.5 % — SIGNIFICANT CHANGE UP (ref 0–1.5)
LYMPHOCYTES # BLD AUTO: 0.7 K/UL — LOW (ref 1–3.3)
LYMPHOCYTES # BLD AUTO: 10.7 % — LOW (ref 13–44)
MAGNESIUM SERPL-MCNC: 2.1 MG/DL — SIGNIFICANT CHANGE UP (ref 1.6–2.6)
MCHC RBC-ENTMCNC: 30 PG — SIGNIFICANT CHANGE UP (ref 27–34)
MCHC RBC-ENTMCNC: 32 GM/DL — SIGNIFICANT CHANGE UP (ref 32–36)
MCV RBC AUTO: 93.6 FL — SIGNIFICANT CHANGE UP (ref 80–100)
MONOCYTES # BLD AUTO: 0.41 K/UL — SIGNIFICANT CHANGE UP (ref 0–0.9)
MONOCYTES NFR BLD AUTO: 6.3 % — SIGNIFICANT CHANGE UP (ref 2–14)
NEUTROPHILS # BLD AUTO: 5.28 K/UL — SIGNIFICANT CHANGE UP (ref 1.8–7.4)
NEUTROPHILS NFR BLD AUTO: 81 % — HIGH (ref 43–77)
PLATELET # BLD AUTO: 125 K/UL — LOW (ref 150–400)
POTASSIUM SERPL-MCNC: 4.5 MMOL/L — SIGNIFICANT CHANGE UP (ref 3.5–5.3)
POTASSIUM SERPL-SCNC: 4.5 MMOL/L — SIGNIFICANT CHANGE UP (ref 3.5–5.3)
PROT SERPL-MCNC: 7.3 GM/DL — SIGNIFICANT CHANGE UP (ref 6–8.3)
RBC # BLD: 3.57 M/UL — LOW (ref 3.8–5.2)
RBC # FLD: 14.3 % — SIGNIFICANT CHANGE UP (ref 10.3–14.5)
SARS-COV-2 RNA SPEC QL NAA+PROBE: SIGNIFICANT CHANGE UP
SODIUM SERPL-SCNC: 140 MMOL/L — SIGNIFICANT CHANGE UP (ref 135–145)
WBC # BLD: 6.52 K/UL — SIGNIFICANT CHANGE UP (ref 3.8–10.5)
WBC # FLD AUTO: 6.52 K/UL — SIGNIFICANT CHANGE UP (ref 3.8–10.5)

## 2020-08-31 PROCEDURE — 97116 GAIT TRAINING THERAPY: CPT | Mod: GP

## 2020-08-31 PROCEDURE — 85610 PROTHROMBIN TIME: CPT

## 2020-08-31 PROCEDURE — 72125 CT NECK SPINE W/O DYE: CPT | Mod: 26

## 2020-08-31 PROCEDURE — 86901 BLOOD TYPING SEROLOGIC RH(D): CPT

## 2020-08-31 PROCEDURE — 86900 BLOOD TYPING SEROLOGIC ABO: CPT

## 2020-08-31 PROCEDURE — 86850 RBC ANTIBODY SCREEN: CPT

## 2020-08-31 PROCEDURE — 97162 PT EVAL MOD COMPLEX 30 MIN: CPT | Mod: GP

## 2020-08-31 PROCEDURE — 36415 COLL VENOUS BLD VENIPUNCTURE: CPT

## 2020-08-31 PROCEDURE — 93010 ELECTROCARDIOGRAM REPORT: CPT

## 2020-08-31 PROCEDURE — 70450 CT HEAD/BRAIN W/O DYE: CPT | Mod: 26

## 2020-08-31 PROCEDURE — 85730 THROMBOPLASTIN TIME PARTIAL: CPT

## 2020-08-31 PROCEDURE — 99223 1ST HOSP IP/OBS HIGH 75: CPT

## 2020-08-31 RX ORDER — TOLTERODINE TARTRATE 1 MG/1
1 TABLET, FILM COATED ORAL
Qty: 0 | Refills: 0 | DISCHARGE

## 2020-08-31 RX ORDER — OXYBUTYNIN CHLORIDE 5 MG
10 TABLET ORAL
Refills: 0 | Status: DISCONTINUED | OUTPATIENT
Start: 2020-08-31 | End: 2020-09-02

## 2020-08-31 RX ORDER — RANITIDINE HYDROCHLORIDE 150 MG/1
1 TABLET, FILM COATED ORAL
Qty: 0 | Refills: 0 | DISCHARGE

## 2020-08-31 RX ORDER — FOLIC ACID 0.8 MG
1 TABLET ORAL DAILY
Refills: 0 | Status: DISCONTINUED | OUTPATIENT
Start: 2020-08-31 | End: 2020-09-02

## 2020-08-31 RX ORDER — LOSARTAN POTASSIUM 100 MG/1
1 TABLET, FILM COATED ORAL
Qty: 0 | Refills: 0 | DISCHARGE

## 2020-08-31 RX ORDER — VERAPAMIL HCL 240 MG
240 CAPSULE, EXTENDED RELEASE PELLETS 24 HR ORAL DAILY
Refills: 0 | Status: DISCONTINUED | OUTPATIENT
Start: 2020-08-31 | End: 2020-09-02

## 2020-08-31 RX ORDER — LOSARTAN POTASSIUM 100 MG/1
25 TABLET, FILM COATED ORAL DAILY
Refills: 0 | Status: DISCONTINUED | OUTPATIENT
Start: 2020-08-31 | End: 2020-09-02

## 2020-08-31 RX ORDER — TRAZODONE HCL 50 MG
200 TABLET ORAL AT BEDTIME
Refills: 0 | Status: DISCONTINUED | OUTPATIENT
Start: 2020-08-31 | End: 2020-09-02

## 2020-08-31 RX ORDER — BUPROPION HYDROCHLORIDE 150 MG/1
300 TABLET, EXTENDED RELEASE ORAL DAILY
Refills: 0 | Status: DISCONTINUED | OUTPATIENT
Start: 2020-08-31 | End: 2020-09-02

## 2020-08-31 RX ORDER — ATORVASTATIN CALCIUM 80 MG/1
20 TABLET, FILM COATED ORAL AT BEDTIME
Refills: 0 | Status: DISCONTINUED | OUTPATIENT
Start: 2020-08-31 | End: 2020-09-02

## 2020-08-31 RX ORDER — HEPARIN SODIUM 5000 [USP'U]/ML
5000 INJECTION INTRAVENOUS; SUBCUTANEOUS EVERY 12 HOURS
Refills: 0 | Status: DISCONTINUED | OUTPATIENT
Start: 2020-08-31 | End: 2020-09-02

## 2020-08-31 RX ORDER — PANTOPRAZOLE SODIUM 20 MG/1
40 TABLET, DELAYED RELEASE ORAL
Refills: 0 | Status: DISCONTINUED | OUTPATIENT
Start: 2020-08-31 | End: 2020-09-02

## 2020-08-31 RX ORDER — QUETIAPINE FUMARATE 200 MG/1
1 TABLET, FILM COATED ORAL
Qty: 0 | Refills: 0 | DISCHARGE

## 2020-08-31 RX ORDER — ACETAMINOPHEN 500 MG
650 TABLET ORAL EVERY 6 HOURS
Refills: 0 | Status: DISCONTINUED | OUTPATIENT
Start: 2020-08-31 | End: 2020-09-02

## 2020-08-31 RX ADMIN — ATORVASTATIN CALCIUM 20 MILLIGRAM(S): 80 TABLET, FILM COATED ORAL at 22:45

## 2020-08-31 RX ADMIN — Medication 200 MILLIGRAM(S): at 22:46

## 2020-08-31 RX ADMIN — HEPARIN SODIUM 5000 UNIT(S): 5000 INJECTION INTRAVENOUS; SUBCUTANEOUS at 22:45

## 2020-08-31 RX ADMIN — Medication 10 MILLIGRAM(S): at 22:46

## 2020-08-31 NOTE — ED ADULT TRIAGE NOTE - CHIEF COMPLAINT QUOTE
pt p/w c/o neck pain s/p fall in while trying to get in to the tub.  Pt c/o neck pain s/p fall.  Neuro alert called as per MD Enrique.  Pt denies chest pain, extremity pain, back pain, abdominal pain, ams.  pt at baseline mental status upon arrival.

## 2020-08-31 NOTE — ED ADULT NURSE NOTE - CAS DISCH ACCOMP BY
po fluids offered/wait time explained/warm blanket provided/plan of care explained/darkened lights/repositioned Transport

## 2020-08-31 NOTE — ED PROVIDER NOTE - NS ED ROS FT
Review of Systems:  	•	CONSTITUTIONAL: no fever  	•	SKIN: no rash  	•	RESPIRATORY: no shortness of breath  	•	CARDIAC: no chest pain  	•	GI:  no abd pain, no nausea, no vomiting, no diarrhea  	•	GENITO-URINARY:  no dysuria  	•	MUSCULOSKELETAL:  +neck and back pain  	•	NEUROLOGIC: no weakness +lightheadedness, numbness on left fingertips  	•	ALLERGY: no rhinorrhea  	•	PSYSCHIATRIC: appropriate concern about symptoms

## 2020-08-31 NOTE — ED PROVIDER NOTE - OBJECTIVE STATEMENT
Pertinent HPI/PMH/PSH/FHx/SHx and Review of Systems contained within  HPI: 79 year old female Patient p/w CC neck and back pain s/p fall  x today, new onset. Today around 10:30-11am, pt slipped and fell getting out of her bath tub. Pt hit the back of her head and her back on the fall. No LOC. Pt now c/o neck and back pain, pain is a dull ache 8/10, that has been constant since the fall. No dizziness before the fall but states now she feels some lightheadedness Pt also c/o some numbness/tingling from her left wrist to her left fingertips, which has been happening since before her fall today. Pt does have a hx of carpal tunnel surgery, states that she has had intermittent numbness in her fingertips since the surgery. PCP: Pooja  PMH/PSH relevant for: GERD, HTN, HLD, Mood disorder, Chronic neck pain, Chronic neuropathy LLE and left hand  ROS negative for: fever, Chest pain, SOB, Nausea, vomiting, diarrhea, abdominal pain, dysuria    FamilyHx and SocialHx not otherwise contributory Pertinent HPI/PMH/PSH/FHx/SHx and Review of Systems contained within  HPI: 79 year old female Patient p/w CC neck  pain s/p slip & fall. has chronic neck pain but now worse. has chronic L hand numbness which pt attributes to carpal tunnel syndrome. Today around 10:30-11am, pt slipped and fell getting out of her bath tub & wasn't using her walker. Pt hit the back of her head and her back on the fall. No LOC. Pt now c/o neck and back pain, pain is a dull ache 8/10, that has been constant since the fall. No dizziness before the fall but states now she feels some lightheadedness Pt does have a hx of carpal tunnel surgery, states that she has had intermittent numbness in her fingertips since the surgery. PCP: Pooja  PMH/PSH relevant for: GERD, HTN, HLD, Mood disorder, Chronic neck pain, Chronic neuropathy LLE and left hand  ROS negative for: fever, Chest pain, SOB, Nausea, vomiting, diarrhea, abdominal pain, dysuria    FamilyHx and SocialHx not otherwise contributory

## 2020-08-31 NOTE — ED PROVIDER NOTE - PHYSICAL EXAMINATION
*GEN: No acute distress, well appearing   *HEAD: Normocephalic, Atraumatic  *EYES/NOSE: b/l Pupils symmetric & Reactive to ligth, EOMI b/l  *THROAT: airway patent, moist mucous membranes  *NECK: Neck supple  *PULMONARY: No Respiratory distress, symmetric b/l chest rise  *CARDIAC: s1s2, regular rhythm   *ABDOMEN:  Non Tender, Non Distended, soft, no guarding, no rebound, no masses   *BACK: no CVA tenderness, No midline vertebral tenderness to palpation   *EXTREMITIES: symmetric pulses, 2+ DP & radial pulses, no cyanosis, no edema   *SKIN: no rash, no bruising   *NEUROLOGIC: CN 2-12 intact, normal finger to nose & heel to shin; no dysdiadochokinesis; equal & normal strength & sensation in b/l UE/LE; full active & passive ROM in all extremities,  no pronator drift, normal patellar reflex, normal gait, romberg sign negative, unsteady gait with walker  *PSYCH: appropriate concern about symptoms, pleasant

## 2020-08-31 NOTE — ED PROVIDER NOTE - PROGRESS NOTE DETAILS
jj: paged Dr Aguilar of spine sx jj: will admit pt to dr quiros, sign out pt to dr mack pending labs & neurosx consult d/w , spine surgeon on call, no acute findings on ct cervical spine per his review of images, and no change from prior ct cervical spine. no NUS issue. MD REY

## 2020-08-31 NOTE — ED PROVIDER NOTE - CLINICAL SUMMARY MEDICAL DECISION MAKING FREE TEXT BOX
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 Will get spine evaluation and admission for PT/rehab, pt lives alone with 82 year old

## 2020-08-31 NOTE — H&P ADULT - NSICDXPASTMEDICALHX_GEN_ALL_CORE_FT
PAST MEDICAL HISTORY:  GERD (gastroesophageal reflux disease)     HLD (hyperlipidemia)     HTN (hypertension)     Mood disorder

## 2020-08-31 NOTE — H&P ADULT - NSICDXPASTSURGICALHX_GEN_ALL_CORE_FT
PAST SURGICAL HISTORY:  H/O gastric bypass     History of cholecystectomy     History of total knee replacement, left

## 2020-08-31 NOTE — H&P ADULT - NSHPPHYSICALEXAM_GEN_ALL_CORE
PHYSICAL EXAM:    General: elderly female in no acute distress  Eyes: PERRLA, EOMI; conjunctiva and sclera clear  Head: Normocephalic; atraumatic  ENMT: No nasal discharge; airway clear  Neck: in C-collar  Respiratory: No wheezes, rales or rhonchi  Cardiovascular: S1, S2 reg  Gastrointestinal: Soft abd, NT, + BSs  Genitourinary: No costovertebral angle tenderness  Extremities: No clubbing, cyanosis or edema  Vascular: Peripheral pulses palpable 2+ bilaterally  Neurological: Alert and oriented x4  Skin: Warm and dry.   Musculoskeletal: Normal tone, without deformities  Psychiatric: Cooperative and appropriate

## 2020-08-31 NOTE — H&P ADULT - HISTORY OF PRESENT ILLNESS
79 y.o. female with PMHx of HTN, HLD, Clark's esophagus/GERD, Mood Dx, hx of syncope p/w mechanical fall this am. Pt had diarrhea this am as she is on liquid diet in preparation for colonoscopy this Wednesday At the moment c/o discomfort with neck collar and mild HA.

## 2020-08-31 NOTE — H&P ADULT - ASSESSMENT
79 y.o. female with PMHx of HTN, HLD, Clark's esophagus/GERD, Mood Dx, hx of syncope p/w mechanical fall this am. Pt had diarrhea this am as she is on liquid diet in preparation for colonoscopy this Wednesday At the moment c/o discomfort with neck collar and mild HA.    1. Mechanical fall with new C-spine cord impingement - NS eval, doubt any surgical intervention unless progressing    2. HTN -cont current meds    3. HLD - statin    4. Clark's esophagus/GERD - on PPI    5. Mood Dx - cont home meds    6. VTE proph - UFH    SW and PT eval, ? disposition  Ambulates with walker at home.

## 2020-08-31 NOTE — H&P ADULT - NSHPREVIEWOFSYSTEMS_GEN_ALL_CORE
loose BM this am, constipation prior, weakness, ambulates with walker  LLE numbness due to neuropathy - chronic

## 2020-08-31 NOTE — ED PROVIDER NOTE - CARE PLAN
Principal Discharge DX:	Spinal cord compression  Secondary Diagnosis:	Arthritis of neck  Secondary Diagnosis:	Unsteady gait when walking

## 2020-09-01 LAB
SARS-COV-2 IGG SERPL QL IA: NEGATIVE — SIGNIFICANT CHANGE UP
SARS-COV-2 IGM SERPL IA-ACNC: 0.08 INDEX — SIGNIFICANT CHANGE UP

## 2020-09-01 PROCEDURE — 99233 SBSQ HOSP IP/OBS HIGH 50: CPT

## 2020-09-01 RX ADMIN — BUPROPION HYDROCHLORIDE 300 MILLIGRAM(S): 150 TABLET, EXTENDED RELEASE ORAL at 10:13

## 2020-09-01 RX ADMIN — HEPARIN SODIUM 5000 UNIT(S): 5000 INJECTION INTRAVENOUS; SUBCUTANEOUS at 10:13

## 2020-09-01 RX ADMIN — Medication 1 MILLIGRAM(S): at 10:13

## 2020-09-01 RX ADMIN — Medication 10 MILLIGRAM(S): at 10:13

## 2020-09-01 RX ADMIN — ATORVASTATIN CALCIUM 20 MILLIGRAM(S): 80 TABLET, FILM COATED ORAL at 21:23

## 2020-09-01 RX ADMIN — LOSARTAN POTASSIUM 25 MILLIGRAM(S): 100 TABLET, FILM COATED ORAL at 10:13

## 2020-09-01 RX ADMIN — Medication 10 MILLIGRAM(S): at 21:23

## 2020-09-01 RX ADMIN — PANTOPRAZOLE SODIUM 40 MILLIGRAM(S): 20 TABLET, DELAYED RELEASE ORAL at 06:34

## 2020-09-01 RX ADMIN — Medication 200 MILLIGRAM(S): at 21:23

## 2020-09-01 RX ADMIN — Medication 240 MILLIGRAM(S): at 10:13

## 2020-09-01 RX ADMIN — HEPARIN SODIUM 5000 UNIT(S): 5000 INJECTION INTRAVENOUS; SUBCUTANEOUS at 21:23

## 2020-09-01 NOTE — PHYSICAL THERAPY INITIAL EVALUATION ADULT - PERTINENT HX OF CURRENT PROBLEM, REHAB EVAL
Pt p/w mechanical fall, fell in the bath tub, Pt had diarrhea yesterday as she is on liquid diet in preparation for colonoscopy this Wednesday , feels weak, ortho spine input noted. c-collar removed, CT neck: Multilevel degeneration and spondylosis at C5-C6 through C7-T1 with narrowing of the LEFT C5-6 facet osteophytic hypertrophy. Degenerative cord impingement is seen at C5-6 and C6-7.

## 2020-09-01 NOTE — CONSULT NOTE ADULT - ASSESSMENT
A/P: neck pain s/p fall - stable  d/w Dr. Proctor - CT images reviewed remotely by both Dr. Richmond and Dr. Aguilar and compared directly to prior images from 2018  1. Multilevel degenerative changes with minimal stenosis - no indication for surgical intervention  2. May discontinue cervical collar  3. Cleared from spine standpoint for PT/WBAT, pain control as needed  4. Please recontact if needed

## 2020-09-01 NOTE — PHYSICAL THERAPY INITIAL EVALUATION ADULT - CRITERIA FOR SKILLED THERAPEUTIC INTERVENTIONS
functional limitations in following categories/anticipated equipment needs at discharge/predicted duration of therapy intervention/risk reduction/prevention/impairments found/rehab potential/therapy frequency/anticipated discharge recommendation

## 2020-09-01 NOTE — PHYSICAL THERAPY INITIAL EVALUATION ADULT - LEVEL OF INDEPENDENCE: SIT/STAND, REHAB EVAL
I called and spoke with Candance, Rashmi's mother.  I informed her that her tacrolimus level was 9.9, goal 8-10.  No medication changes at this time.  Next level will be drawn at her clinic visit next week.    Candance also discussed the feeding clinic that they have been researching.  I reassured her that Rashmi is doing well and the feeding clinic is not an urgent matter.  She indicated that this wasn't a good time for their family to pursue the feeding clinic.  Plan to discuss more during her clinic visit next week.  
moderate assist (50% patients effort)

## 2020-09-01 NOTE — CONSULT NOTE ADULT - SUBJECTIVE AND OBJECTIVE BOX
[facility]                                                           Orthopedic Spine Consultation    CHIEF COMPLAINT: neck pain s/p fall    HPI: Pleasant 78 yo female, s/p reported syncopal fall. Consult done this morning at 0940. Patient states this am she slipped getting out of tub and fell striking her head and neck. 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 Currently wearing a cervical collar for precaution. Reported diarrhea as on liquid diet in prep for colonoscopy scheduled for Wednesday. She denies neck or back pain this morning.    Pain (0-10): 0    PAST MEDICAL & SURGICAL HISTORY:  Mood disorder  HLD (hyperlipidemia)  HTN (hypertension)  GERD (gastroesophageal reflux disease)  History of total knee replacement, left  History of cholecystectomy  H/O gastric bypass      MEDICATIONS  (STANDING):  atorvastatin 20 milliGRAM(s) Oral at bedtime  buPROPion XL . 300 milliGRAM(s) Oral daily  folic acid 1 milliGRAM(s) Oral daily  heparin   Injectable 5000 Unit(s) SubCutaneous every 12 hours  losartan 25 milliGRAM(s) Oral daily  oxybutynin 10 milliGRAM(s) Oral two times a day  pantoprazole    Tablet 40 milliGRAM(s) Oral before breakfast  traZODone 200 milliGRAM(s) Oral at bedtime  verapamil  milliGRAM(s) Oral daily    MEDICATIONS  (PRN):  acetaminophen   Tablet .. 650 milliGRAM(s) Oral every 6 hours PRN Mild Pain (1 - 3)      Allergies    Fosamax (Unknown)  Originally Entered as [Moderate Rash] reaction to [SURGICAL TAPE] (Unknown)  Vitamin K (Other (Severe))    Intolerances        FAMILY HISTORY:  No pertinent family history in first degree relatives      SOCIAL HISTORY:    REVIEW OF SYSTEMS:    CONSTITUTIONAL: No fever, weight loss, or fatigue  HEENT: Normal extraoccular movements,   NECK: See HPI  RESPIRATORY: No cough, wheezing, chills or hemoptysis; No shortness of breath  CARDIOVASCULAR: No chest pain, palpitations, dizziness, or leg swelling  GASTROINTESTINAL: No abdominal or epigastric pain. No nausea, vomiting, or hematemesis; No diarrhea or constipation. No melena or hematochezia.  GENITOURINARY: No dysuria, frequency, hematuria, or incontinence  NEUROLOGICAL: See HPI  SKIN: No itching, burning, rashes, or lesions   LYMPH NODES: No enlarged glands  ENDOCRINE: No heat or cold intolerance; No hair loss  MUSCULOSKELETAL: See HPI  PSYCHIATRIC: No depression, anxiety, mood swings, or difficulty sleeping  HEME/LYMPH: No easy bruising, or bleeding gums      Vital Signs Last 24 Hrs  T(C): 36.7 (01 Sep 2020 07:58), Max: 36.9 (31 Aug 2020 19:38)  T(F): 98.1 (01 Sep 2020 07:58), Max: 98.5 (31 Aug 2020 19:38)  HR: 56 (01 Sep 2020 07:58) (56 - 65)  BP: 130/51 (01 Sep 2020 07:58) (130/51 - 141/64)  BP(mean): 96 (31 Aug 2020 17:55) (96 - 96)  RR: 18 (01 Sep 2020 07:58) (17 - 20)  SpO2: 98% (01 Sep 2020 07:58) (97% - 100%)  I&O's Detail      LABS:                        10.7   6.52  )-----------( 125      ( 31 Aug 2020 14:05 )             33.4     08-31    140  |  107  |  12  ----------------------------<  91  4.5   |  27  |  0.99    Ca    8.9      31 Aug 2020 14:05  Mg     2.1     08-31    TPro  7.3  /  Alb  3.7  /  TBili  0.5  /  DBili  x   /  AST  19  /  ALT  16  /  AlkPhos  77  08-31    PT/INR - ( 31 Aug 2020 14:58 )   PT: 12.5 sec;   INR: 1.08 ratio         PTT - ( 31 Aug 2020 14:58 )  PTT:33.0 sec      RADIOLOGY & ADDITIONAL STUDIES:    EXAM:  CT CERVICAL SPINE                          EXAM:  CT BRAIN                            PROCEDURE DATE:  08/31/2020          INTERPRETATION:  CT head without IV contrast    CLINICAL INFORMATION:  Fall   Intracranial hemorrhage.    TECHNIQUE: Contiguous axial 5 mm sections were obtained through the head. Sagittal and coronal 2-D reformatted images were also obtained.   This scan was performed using automatic exposure control (radiation dose reduction software) to obtain a diagnostic image quality scan with patient dose as low as reasonably achievable.    FINDINGS:   CT dated 8/3/18 available for review.    The brain demonstrates mild periventricular white matter ischemia.   No acute cerebral cortical infarct is seen.  No intracranial hemorrhage is found.  No mass effect is found in the brain.    The ventricles, sulci and basal cisterns are enlarged to a degree appropriate for the patient's age.    The orbits are unremarkable.  The paranasal sinuses are clear.  The nasal cavity appears intact.  The nasopharynx is symmetric.  The central skull base, petrous temporal bones and calvarium remain intact.      IMPRESSION:   Mild periventricular white matter ischemia.        CT cervical spine without IV contrast    CLINICAL INFORMATION: Fracture, trauma, neck pain.  Neck pain, spinal stenosis, spondylosis. fall head trauma    TECHNIQUE:  Contiguous axial 2.0 mm sections were obtained through the cervical spine using a single helical acquisition.  Additional 2 mm sagittal and coronal reformatted reconstructions of the spine were obtained.  These additional reformatted images were used to evaluate the spine for alignment, vertebral fractures and the integrity of the posterior elements.   This scan was performed using automatic exposure control (radiation dose reduction software) to obtain a diagnostic image quality scan with patient dose as low as reasonably achievable.    FINDINGS:   CT dated 08/03/2018 available for review    Cervical vertebral body heights are maintained. No vertebral fracture is seen. No destructive bone lesion is found.  Alignment is preserved.  Facet joints appear intact and aligned.    Cervical intervertebral disc spaces again demonstrate disc degeneration and spondylosis at C5-C6 through C7-T1 with loss of disc height and associated degenerative endplate changes. There is narrowing of the LEFT C5-6 facet osteophytic hypertrophy. Degenerative cord impingement is seen at C5-6 and C6-7.    The skull base appears intact.  No neck mass is recognized.  Paraspinal soft tissues appear intact. Visualized lymph nodes appear to be within physiologic size limits.      IMPRESSION:   No vertebral fracture is recognized.  Multilevel degeneration and spondylosis at C5-C6 through C7-T1 with narrowing of the LEFT C5-6 facet osteophytic hypertrophy. Degenerative cord impingement is seen at C5-6 and C6-7.        YUN MNOREAL M.D., ATTENDING RADIOLOGIST  This document has been electronically signed. Aug 31 2020  1:27PM        PHYSICAL EXAM:  Constitutional: NAD, well-groomed, well-developed  HEENT: PERRLA, EOMI, Normal Hearing, MMM  Respiratory: CTAB  Gastrointestinal: BS+, soft, NT/ND  Extremities:   Vascular: 2+ peripheral pulses  Psychiatric: Normal mood, normal affect  Skin: No rashes  Spinal: no tenderness on palpation of spinous processes or paraspinal muscles cervical, thoracic, or lumbar  Cervical ROM: wnl  Lumbar ROM: wnl  Neurological: A/O x 3              Sensation: [ ] intact to light touch  [x] decreased: b/l LEs, left fingers         Motor exam: [x]          [x] Upper extremity    Delt      Bicp       Tri      Wrist ext  Wrst Flex       Digit Ext Digit Flex                               R         5/5       5/5        5/5       5/5          5/5            5/5       5/5          5/5                               L          5/5       5/5        5/5       5/5          5/5            5/5       5/5          5/5         [x] Lower ext.     Hip Flx    Quad   Hamstrg     TA       EHL      GS                          R        5/5        5/5        5/5          5/5      5/5      5/5                              L         5/5        5/5        5/5          5/5      5/5      5/5                                                            [x] Vascular: intact           Tension Signs: none          Long Tract Findings: none

## 2020-09-01 NOTE — PHYSICAL THERAPY INITIAL EVALUATION ADULT - DIAGNOSIS, PT EVAL
S/p Mechanical fall at home, Degenerative cord impingement is seen at C5-6 and C6-7. Ortho spine input noted. c-collar removed, Colonoscopy planned for 1/10

## 2020-09-01 NOTE — PROGRESS NOTE ADULT - SUBJECTIVE AND OBJECTIVE BOX
Outpatient Providers	Dr. Mann       History of Present Illness:  Reason for Admission: Mechanical fall  History of Present Illness:   79 y.o. female with PMHx of HTN, HLD, Clark's esophagus/GERD, Mood Dx, hx of syncope p/w mechanical fall this am. Pt had diarrhea this am as she is on liquid diet in preparation for colonoscopy this Wednesday At the moment c/o discomfort with neck collar and mild HA.    9/1 - denies neck pain, numbness, weakness after collar was removed.     	  ROS:   All 10 systems reviewed and found to be negative with the exception of what has been described above.    Vital Signs Last 24 Hrs  T(C): 36.7 (01 Sep 2020 07:58), Max: 36.9 (31 Aug 2020 19:38)  T(F): 98.1 (01 Sep 2020 07:58), Max: 98.5 (31 Aug 2020 19:38)  HR: 56 (01 Sep 2020 07:58) (56 - 65)  BP: 130/51 (01 Sep 2020 07:58) (130/51 - 141/64)  BP(mean): 96 (31 Aug 2020 17:55) (96 - 96)  RR: 18 (01 Sep 2020 07:58) (17 - 18)  SpO2: 98% (01 Sep 2020 07:58) (97% - 100%)    GEN: lying in bed, NAD  HEENT:   NC/AT, pupils equal and reactive, EOMI  CV:  +S1, +S2, RRR  RESP:   lungs clear to auscultation bilaterally, no wheeze, rales, rhonchi   BREAST:  not examined  GI:  abdomen soft, non-tender, non-distended, normoactive BS  RECTAL:  not examined  :  not examined  MSK:   normal muscle tone  EXT:  no edema  NEURO:  AAOX3, no focal neurological deficits  SKIN:  no rashes                              10.7   6.52  )-----------( 125      ( 31 Aug 2020 14:05 )             33.4     08-31    140  |  107  |  12  ----------------------------<  91  4.5   |  27  |  0.99    Ca    8.9      31 Aug 2020 14:05  Mg     2.1     08-31    TPro  7.3  /  Alb  3.7  /  TBili  0.5  /  DBili  x   /  AST  19  /  ALT  16  /  AlkPhos  77  08-31    CARDIAC MARKERS ( 31 Aug 2020 14:05 )  x     / x     / 119 U/L / x     / x          LIVER FUNCTIONS - ( 31 Aug 2020 14:05 )  Alb: 3.7 g/dL / Pro: 7.3 gm/dL / ALK PHOS: 77 U/L / ALT: 16 U/L / AST: 19 U/L / GGT: x           PT/INR - ( 31 Aug 2020 14:58 )   PT: 12.5 sec;   INR: 1.08 ratio         PTT - ( 31 Aug 2020 14:58 )  PTT:33.0 sec    Radiology:   CT:    31-Aug-2020 13:18, CT Head No Cont  CT Head No Cont: EXAM:  CT CERVICAL SPINE                        	  	EXAM:  CT BRAIN                        	  	  	PROCEDURE DATE:  08/31/2020    	  	  	  	INTERPRETATION:  CT head without IV contrast  	  	CLINICAL INFORMATION:  Fall   Intracranial hemorrhage.  	  	TECHNIQUE: Contiguous axial 5 mm sections were obtained through the head. Sagittal and coronal 2-D reformatted images were also obtained.   This scan was performed using automatic exposure control (radiation dose reduction software) to obtain a diagnostic image quality scan with patient dose as low as reasonably achievable.  	  	FINDINGS:   CT dated 8/3/18 available for review.  	  	The brain demonstrates mild periventricular white matter ischemia.   No acute cerebral cortical infarct is seen.  No intracranial hemorrhage is found.  No mass effect is found in the brain.  	  	The ventricles, sulci and basal cisterns are enlarged to a degree appropriate for the patient's age.  	  	The orbits are unremarkable.  The paranasal sinuses are clear.  The nasal cavity appears intact.  The nasopharynx is symmetric.  The central skull base, petrous temporal bones and calvarium remain intact.  	  	  	IMPRESSION:   Mild periventricular white matter ischemia.  	  	  	  	CT cervical spine without IV contrast  	  	CLINICAL INFORMATION:Fracture, trauma, neck pain.  Neck pain, spinal stenosis, spondylosis. fall head trauma  	  	TECHNIQUE:  Contiguous axial 2.0 mm sections were obtained through the cervical spine using a single helical acquisition.  Additional 2 mm sagittal and coronalreformatted reconstructions of the spine were obtained.  These additional reformatted images were used to evaluate the spine for alignment, vertebral fractures and the integrity of the the posterior elements.   This scan was performed using automaticexposure control (radiation dose reduction software) to obtain a diagnostic image quality scan with patient dose as low as reasonably achievable.  	  	FINDINGS:   CT dated 08/03/2018 available for review  	  	Cervical vertebral body heights are maintained. No vertebral fracture is seen. No destructive bone lesion is found.  Alignment is preserved.  Facet joints appear intact and aligned.  	  	Cervical intervertebral disc spaces again demonstrate disc degeneration and spondylosis at C5-C6 through C7-T1 with loss of disc height and associated degenerative endplate changes. There is narrowing of the LEFT C5-6 facet osteophytic hypertrophy. Degenerative cord impingement is seen at C5-6 and C6-7.  	  	The skull base appears intact.  No neck mass is recognized.  Paraspinal soft tissues appear intact. Visualized lymph nodes appear to be within physiologic size limits.  	  	  	IMPRESSION:   No vertebral fracture is recognized.  Multilevel degeneration and spondylosis at C5-C6 through C7-T1 with narrowing ofthe LEFT C5-6 facet osteophytic hypertrophy. Degenerative cord impingement is seen at C5-6 and C6-7.  	  	  	  	  	  	  	  	  	  	  	YUN MONREAL M.D., ATTENDING RADIOLOGIST  	This document has been electronically signed. Aug 31 2020  1:27PM    31-Aug-2020 13:29, CT Cervical Spine No Cont  CT Cervical Spine No Cont: EXAM:  CT CERVICAL SPINE                        	  	EXAM:  CT BRAIN                        	  	  	PROCEDURE DATE:  08/31/2020    	  	  	  	INTERPRETATION:  CT head without IV contrast  	  	CLINICAL INFORMATION:  Fall   Intracranial hemorrhage.  	  	TECHNIQUE: Contiguous axial 5 mm sections were obtained through the head. Sagittal and coronal 2-D reformatted images were also obtained.   This scan was performed using automatic exposure control (radiation dose reduction software) to obtain a diagnostic image quality scan with patient dose as low as reasonably achievable.  	  	FINDINGS:   CT dated 8/3/18 available for review.  	  	The brain demonstrates mild periventricular white matter ischemia.   No acute cerebral cortical infarct is seen.  No intracranial hemorrhage is found.  No mass effect is found in the brain.  	  	The ventricles, sulci and basal cisterns are enlarged to a degree appropriate for the patient's age.  	  	The orbits are unremarkable.  The paranasal sinuses are clear.  The nasal cavity appears intact.  The nasopharynx is symmetric.  The central skull base, petrous temporal bones and calvarium remain intact.  	  	  	IMPRESSION:   Mild periventricular white matter ischemia.  	  	  	  	CT cervical spine without IV contrast  	  	CLINICAL INFORMATION:Fracture, trauma, neck pain.  Neck pain, spinal stenosis, spondylosis. fall head trauma  	  	TECHNIQUE:  Contiguous axial 2.0 mm sections were obtained through the cervical spine using a single helical acquisition.  Additional 2 mm sagittal and coronalreformatted reconstructions of the spine were obtained.  These additional reformatted images were used to evaluate the spine for alignment, vertebral fractures and the integrity of the the posterior elements.   This scan was performed using automaticexposure control (radiation dose reduction software) to obtain a diagnostic image quality scan with patient dose as low as reasonably achievable.  	  	FINDINGS:   CT dated 08/03/2018 available for review  	  	Cervical vertebral body heights are maintained. No vertebral fracture is seen. No destructive bone lesion is found.  Alignment is preserved.  Facet joints appear intact and aligned.  	  	Cervical intervertebral disc spaces again demonstrate disc degeneration and spondylosis at C5-C6 through C7-T1 with loss of disc height and associated degenerative endplate changes. There is narrowing of the LEFT C5-6 facet osteophytic hypertrophy. Degenerative cord impingement is seen at C5-6 and C6-7.  	  	The skull base appears intact.  No neck mass is recognized.  Paraspinal soft tissues appear intact. Visualized lymph nodes appear to be within physiologic size limits.  	  	  	IMPRESSION:   No vertebral fracture is recognized.  Multilevel degeneration and spondylosis at C5-C6 through C7-T1 with narrowing ofthe LEFT C5-6 facet osteophytic hypertrophy. Degenerative cord impingement is seen at C5-6 and C6-7.  	  	  	  	  	  	  	  	  	  	  	YUN MONREAL M.D., ATTENDING RADIOLOGIST  	This document has been electronically signed. Aug 31 2020  1:27PM  X-Ray, Fluoroscopy:    31-Aug-2020 13:18, CT Head No Cont  PACS Image: Image(s) Available    31-Aug-2020 13:29, CT Cervical Spine No Cont  PACS Image: Image(s) Available    Assessment and Plan:     79 y.o. female with PMHx of HTN, HLD, Clark's esophagus/GERD, Mood Dx, hx of syncope p/w mechanical fall this am. Pt had diarrhea this am as she is on liquid diet in preparation for colonoscopy this Wednesday At the moment c/o discomfort with neck collar and mild HA.    1. Mechanical fall with new C-spine cord impingement   - ortho spine input noted. c-collar removed  - no need for nsx eval at this time as pt cleared by ortho spine  - PT eval     2. HTN -cont current meds    3. HLD - statin    4. Clark's esophagus/GERD - on PPI    5. Mood Dx - cont home meds    6. VTE proph - UFH    SW and PT eval, ? disposition  Ambulates with walker at home. may need rehab

## 2020-09-02 ENCOUNTER — TRANSCRIPTION ENCOUNTER (OUTPATIENT)
Age: 80
End: 2020-09-02

## 2020-09-02 VITALS
HEART RATE: 68 BPM | DIASTOLIC BLOOD PRESSURE: 75 MMHG | SYSTOLIC BLOOD PRESSURE: 125 MMHG | TEMPERATURE: 98 F | RESPIRATION RATE: 18 BRPM | OXYGEN SATURATION: 100 %

## 2020-09-02 PROCEDURE — 99239 HOSP IP/OBS DSCHRG MGMT >30: CPT

## 2020-09-02 RX ORDER — ACETAMINOPHEN 500 MG
2 TABLET ORAL
Qty: 0 | Refills: 0 | DISCHARGE
Start: 2020-09-02

## 2020-09-02 RX ORDER — BUPROPION HYDROCHLORIDE 150 MG/1
100 TABLET, EXTENDED RELEASE ORAL DAILY
Refills: 0 | Status: DISCONTINUED | OUTPATIENT
Start: 2020-09-03 | End: 2020-09-02

## 2020-09-02 RX ORDER — VERAPAMIL HCL 240 MG
1 CAPSULE, EXTENDED RELEASE PELLETS 24 HR ORAL
Qty: 0 | Refills: 0 | DISCHARGE

## 2020-09-02 RX ORDER — METOPROLOL SUCCINATE AND HYDROCHLOROTHIAZIDE 100; 12.5 MG/1; MG/1
1 TABLET, FILM COATED ORAL
Qty: 0 | Refills: 0 | DISCHARGE

## 2020-09-02 RX ADMIN — PANTOPRAZOLE SODIUM 40 MILLIGRAM(S): 20 TABLET, DELAYED RELEASE ORAL at 05:44

## 2020-09-02 RX ADMIN — Medication 240 MILLIGRAM(S): at 10:03

## 2020-09-02 RX ADMIN — HEPARIN SODIUM 5000 UNIT(S): 5000 INJECTION INTRAVENOUS; SUBCUTANEOUS at 10:02

## 2020-09-02 RX ADMIN — BUPROPION HYDROCHLORIDE 300 MILLIGRAM(S): 150 TABLET, EXTENDED RELEASE ORAL at 10:03

## 2020-09-02 RX ADMIN — Medication 1 MILLIGRAM(S): at 10:03

## 2020-09-02 RX ADMIN — LOSARTAN POTASSIUM 25 MILLIGRAM(S): 100 TABLET, FILM COATED ORAL at 10:03

## 2020-09-02 RX ADMIN — Medication 10 MILLIGRAM(S): at 10:03

## 2020-09-02 NOTE — DISCHARGE NOTE NURSING/CASE MANAGEMENT/SOCIAL WORK - PATIENT PORTAL LINK FT
You can access the FollowMyHealth Patient Portal offered by [facility] by registering at the following website: [email or web address]. By joining [facility]’s FollowMyHealth portal, you will also be able to view your health information using other applications (apps) compatible with our system.

## 2020-09-02 NOTE — DISCHARGE NOTE PROVIDER - NSDCMRMEDTOKEN_GEN_ALL_CORE_FT
atorvastatin 20 mg oral tablet: 1 tab(s) orally once a day  buPROPion 100 mg oral tablet: 1 tab(s) orally once a day  Dutoprol 25 mg-12.5 mg oral tablet, extended release: 1 tab(s) orally once a day  folic acid 1 mg oral tablet: 1 tab(s) orally once a day  omeprazole 20 mg oral delayed release capsule: 1 cap(s) orally once a day  Rexulti 2 mg oral tablet: 1 tab(s) orally once a day  tolterodine 2 mg oral capsule, extended release: 2 cap(s) orally 2 times a day  traZODone 100 mg oral tablet: 2 tab(s) orally once a day (at bedtime)  ***Patient states they also take viibryd 40 mg daily***  valsartan 40 mg oral tablet: 1 tab(s) orally once a day  verapamil 240 mg/24 hours oral capsule, extended release: 1 cap(s) orally once a day  verapamil 240 mg/24 hours oral tablet, extended release: 1 tab(s) orally once a day  Viibryd 40 mg oral tablet: 1 tab(s) orally once a day acetaminophen 325 mg oral tablet: 2 tab(s) orally every 6 hours, As needed, Mild Pain (1 - 3)  atorvastatin 20 mg oral tablet: 1 tab(s) orally once a day  buPROPion 100 mg oral tablet: 1 tab(s) orally once a day  Dutoprol 25 mg-12.5 mg oral tablet, extended release: 1 tab(s) orally once a day  folic acid 1 mg oral tablet: 1 tab(s) orally once a day  omeprazole 20 mg oral delayed release capsule: 1 cap(s) orally once a day  Rexulti 2 mg oral tablet: 1 tab(s) orally once a day  tolterodine 2 mg oral capsule, extended release: 2 cap(s) orally 2 times a day  traZODone 100 mg oral tablet: 2 tab(s) orally once a day (at bedtime)  ***Patient states they also take viibryd 40 mg daily***  valsartan 40 mg oral tablet: 1 tab(s) orally once a day  verapamil 240 mg/24 hours oral capsule, extended release: 1 cap(s) orally once a day  verapamil 240 mg/24 hours oral tablet, extended release: 1 tab(s) orally once a day  Viibryd 40 mg oral tablet: 1 tab(s) orally once a day acetaminophen 325 mg oral tablet: 2 tab(s) orally every 6 hours, As needed, Mild Pain (1 - 3)  atorvastatin 20 mg oral tablet: 1 tab(s) orally once a day  buPROPion 100 mg oral tablet: 1 tab(s) orally once a day  folic acid 1 mg oral tablet: 1 tab(s) orally once a day  omeprazole 20 mg oral delayed release capsule: 1 cap(s) orally once a day  Rexulti 2 mg oral tablet: 1 tab(s) orally once a day  tolterodine 2 mg oral capsule, extended release: 2 cap(s) orally 2 times a day  traZODone 100 mg oral tablet: 2 tab(s) orally once a day (at bedtime)  ***Patient states they also take viibryd 40 mg daily***  valsartan 40 mg oral tablet: 1 tab(s) orally once a day  verapamil 240 mg/24 hours oral capsule, extended release: 1 cap(s) orally once a day  Viibryd 40 mg oral tablet: 1 tab(s) orally once a day

## 2020-09-02 NOTE — DISCHARGE NOTE PROVIDER - CARE PROVIDER_API CALL
Ned Mann)  Internal Medicine  [address] [place] [address]  Phone: [phone]  Fax: [phone]  Follow Up Time:     Olegario Aguilar  ORTHOPAEDIC SURGERY  [address] [place] [address]  Phone: [phone]  Fax: [phone]  Follow Up Time:

## 2020-09-02 NOTE — DISCHARGE NOTE PROVIDER - HOSPITAL COURSE
79 y.o. female with PMHx of HTN, HLD, Clark's esophagus/GERD, Mood Dx, hx of syncope p/w mechanical fall this am. Pt had diarrhea this am as she is on liquid diet in preparation for colonoscopy this Wednesday.        CT scan showing multilevel degenerative changes with minimal stenosis - no indication for surgical intervention as per ortho spine. Patient's c-collar removed 9/1, and cleared from spine standpoint for PT. Patient stable for discharge to rehab facility. Outpatient follow up with PCP and ortho.        1. Mechanical fall with new C-spine cord impingement     - ortho spine input noted. c-collar removed    - no need for nsx eval at this time as pt cleared by ortho spine    - PT eval - rehab recommended        2. HTN -cont current meds        3. HLD - statin        4. Clark's esophagus/GERD - on PPI        5. Mood Dx - cont home meds        6. VTE proph - UFH 79 y.o. female with PMHx of HTN, HLD, Clark's esophagus/GERD, Mood Dx, hx of syncope p/w mechanical fall this am. Pt had diarrhea this am as she is on liquid diet in preparation for colonoscopy this Wednesday.        CT scan showing multilevel degenerative changes with minimal stenosis - no indication for surgical intervention as per ortho spine. Patient's c-collar removed 9/1, and cleared from spine standpoint for PT. Patient stable for discharge to rehab facility. Outpatient follow up with PCP and ortho.        1. Mechanical fall with new C-spine cord impingement     - ortho spine input noted. c-collar removed    - no need for nsx eval at this time as pt cleared by ortho spine    - PT eval - rehab recommended        2. HTN -cont current meds        3. HLD - statin        4. Clark's esophagus/GERD - on PPI        5. Mood Dx - cont home meds            patient seen and examined with PA student Dee Cornell was physically present for the key portions of the evaluation and management (E/M) service provided.  I agree with the above history, physical, and plan which I have reviewed and edited where appropriate.    - stable for dc to rehab today.  neck pain has resolved and no new complaints.        total time sepnt 35 mins 79 y.o. female with PMHx of HTN, HLD, Clark's esophagus/GERD, Mood Dx, hx of syncope p/w mechanical fall this am. Pt had diarrhea this am as she is on liquid diet in preparation for colonoscopy this Wednesday.        CT scan showing multilevel degenerative changes with minimal stenosis - no indication for surgical intervention as per ortho spine. Patient's c-collar removed 9/1, and cleared from spine standpoint for PT. Patient stable for discharge to rehab facility. Outpatient follow up with PCP and ortho.        1. Mechanical fall with new C-spine cord impingement     - ortho spine input noted. c-collar removed    - no need for nsx eval at this time as pt cleared by ortho spine    - PT eval - rehab recommended        2. HTN - will continue ARB and verapamil upon dc.  given low normal BP will hold off dutoprol upon discharge.   resume at rehab if bP increases         3. HLD - statin        4. Clark's esophagus/GERD - on PPI        5. Mood Dx - cont home meds            patient seen and examined with PA student Dee Cornell was physically present for the key portions of the evaluation and management (E/M) service provided.  I agree with the above history, physical, and plan which I have reviewed and edited where appropriate.    - stable for dc to rehab today.  neck pain has resolved and no new complaints.        total time sepnt 35 mins

## 2020-09-02 NOTE — DISCHARGE NOTE PROVIDER - NSDCPNSUBOBJ_GEN_ALL_CORE
79 y.o. female with PMHx of HTN, HLD, Clark's esophagus/GERD, Mood Dx, hx of syncope p/w mechanical fall this am. Pt had diarrhea this am as she is on liquid diet in preparation for colonoscopy this Wednesday At the moment c/o discomfort with neck collar and mild HA.        9/2 - denies neck pain, feeling well        	    ROS:   All 10 systems reviewed and found to be negative with the exception of what has been described above.        ICU Vital Signs Last 24 Hrs    T(C): 36.4 (02 Sep 2020 08:07), Max: 36.8 (01 Sep 2020 23:50)    T(F): 97.5 (02 Sep 2020 08:07), Max: 98.3 (01 Sep 2020 23:50)    HR: 68 (02 Sep 2020 08:07) (61 - 68)    BP: 125/75 (02 Sep 2020 08:07) (102/55 - 126/72)    BP(mean): --    ABP: --    ABP(mean): --    RR: 18 (02 Sep 2020 08:07) (17 - 18)    SpO2: 100% (02 Sep 2020 08:07) (97% - 100%)            GEN: lying in bed, NAD    HEENT:   NC/AT, pupils equal and reactive, EOMI    CV:  +S1, +S2, RRR    RESP:   lungs clear to auscultation bilaterally, no wheeze, rales, rhonchi     BREAST:  not examined    GI:  abdomen soft, non-tender, non-distended, normoactive BS    RECTAL:  not examined    :  not examined    MSK:   normal muscle tone    EXT:  no edema    NEURO:  AAOX3, no focal neurological deficits    SKIN:  no rashes            < from: CT Head No Cont (08.31.20 @ 13:18) >        IMPRESSION:   Mild periventricular white matter ischemia.        < end of copied text >        < from: CT Cervical Spine No Cont (08.31.20 @ 13:29) >        IMPRESSION:   No vertebral fracture is recognized.  Multilevel degeneration and spondylosis at C5-C6 through C7-T1 with narrowing ofthe LEFT C5-6 facet osteophytic hypertrophy. Degenerative cord impingement is seen at C5-6 and C6-7.        < end of copied text >                                            10.7     6.52  )-----------( 125      ( 31 Aug 2020 14:05 )               33.4         08-31        140  |  107  |  12    ----------------------------<  91    4.5   |  27  |  0.99        Ca    8.9      31 Aug 2020 14:05    Mg     2.1     08-31        TPro  7.3  /  Alb  3.7  /  TBili  0.5  /  DBili  x   /  AST  19  /  ALT  16  /  AlkPhos  77  08-31        CARDIAC MARKERS ( 31 Aug 2020 14:05 )    x     / x     / 119 U/L / x     / x                LIVER FUNCTIONS - ( 31 Aug 2020 14:05 )    Alb: 3.7 g/dL / Pro: 7.3 gm/dL / ALK PHOS: 77 U/L / ALT: 16 U/L / AST: 19 U/L / GGT: x               PT/INR - ( 31 Aug 2020 14:58 )   PT: 12.5 sec;   INR: 1.08 ratio               PTT - ( 31 Aug 2020 14:58 )  PTT:33.0 sec        		    	    	    	        Assessment and Plan:         79 y.o. female with PMHx of HTN, HLD, Clark's esophagus/GERD, Mood Dx, hx of syncope p/w mechanical fall this am. Pt had diarrhea this am as she is on liquid diet in preparation for colonoscopy this Wednesday At the moment c/o discomfort with neck collar and mild HA.        1. Mechanical fall with new C-spine cord impingement     - ortho spine input noted. c-collar removed    - no need for nsx eval at this time as pt cleared by ortho spine    - PT eval - rehab recommended        2. HTN -cont current meds        3. HLD - statin        4. Clark's esophagus/GERD - on PPI        5. Mood Dx - cont home meds        6. VTE proph - UFH 79 y.o. female with PMHx of HTN, HLD, Clark's esophagus/GERD, Mood Dx, hx of syncope p/w mechanical fall this am. Pt had diarrhea this am as she is on liquid diet in preparation for colonoscopy this Wednesday At the moment c/o discomfort with neck collar and mild HA.        9/2 - denies neck pain, feeling well        	    ROS:   All 10 systems reviewed and found to be negative with the exception of what has been described above.        ICU Vital Signs Last 24 Hrs    T(C): 36.4 (02 Sep 2020 08:07), Max: 36.8 (01 Sep 2020 23:50)    T(F): 97.5 (02 Sep 2020 08:07), Max: 98.3 (01 Sep 2020 23:50)    HR: 68 (02 Sep 2020 08:07) (61 - 68)    BP: 125/75 (02 Sep 2020 08:07) (102/55 - 126/72)    BP(mean): --    ABP: --    ABP(mean): --    RR: 18 (02 Sep 2020 08:07) (17 - 18)    SpO2: 100% (02 Sep 2020 08:07) (97% - 100%)            GEN: lying in bed, NAD    HEENT:   NC/AT, pupils equal and reactive, EOMI    CV:  +S1, +S2, RRR    RESP:   lungs clear to auscultation bilaterally, no wheeze, rales, rhonchi     BREAST:  not examined    GI:  abdomen soft, non-tender, non-distended, normoactive BS    RECTAL:  not examined    :  not examined    MSK:   normal muscle tone    EXT:  no edema    NEURO:  AAOX3, no focal neurological deficits    SKIN:  no rashes            < from: CT Head No Cont (08.31.20 @ 13:18) >        IMPRESSION:   Mild periventricular white matter ischemia.        < end of copied text >        < from: CT Cervical Spine No Cont (08.31.20 @ 13:29) >        IMPRESSION:   No vertebral fracture is recognized.  Multilevel degeneration and spondylosis at C5-C6 through C7-T1 with narrowing ofthe LEFT C5-6 facet osteophytic hypertrophy. Degenerative cord impingement is seen at C5-6 and C6-7.        < end of copied text >                                            10.7     6.52  )-----------( 125      ( 31 Aug 2020 14:05 )               33.4         08-31        140  |  107  |  12    ----------------------------<  91    4.5   |  27  |  0.99        Ca    8.9      31 Aug 2020 14:05    Mg     2.1     08-31        TPro  7.3  /  Alb  3.7  /  TBili  0.5  /  DBili  x   /  AST  19  /  ALT  16  /  AlkPhos  77  08-31        CARDIAC MARKERS ( 31 Aug 2020 14:05 )    x     / x     / 119 U/L / x     / x                LIVER FUNCTIONS - ( 31 Aug 2020 14:05 )    Alb: 3.7 g/dL / Pro: 7.3 gm/dL / ALK PHOS: 77 U/L / ALT: 16 U/L / AST: 19 U/L / GGT: x               PT/INR - ( 31 Aug 2020 14:58 )   PT: 12.5 sec;   INR: 1.08 ratio               PTT - ( 31 Aug 2020 14:58 )  PTT:33.0 sec        		    	    	    	        Assessment and Plan:         79 y.o. female with PMHx of HTN, HLD, Clark's esophagus/GERD, Mood Dx, hx of syncope p/w mechanical fall this am. Pt had diarrhea this am as she is on liquid diet in preparation for colonoscopy this Wednesday At the moment c/o discomfort with neck collar and mild HA.        1. Mechanical fall with new C-spine cord impingement     - ortho spine input noted. c-collar removed    - no need for nsx eval at this time as pt cleared by ortho spine    - PT eval - rehab recommended        2. HTN -cont current meds        3. HLD - statin        4. Clark's esophagus/GERD - on PPI        5. Mood Dx - cont home meds        6. VTE proph - [facility]        patient seen and examined with PA student Dee Cornell was physically present for the key portions of the evaluation and management (E/M) service provided.  I agree with the above history, physical, and plan which I have reviewed and edited where appropriate.    - see dc summary for details

## 2020-09-02 NOTE — DISCHARGE NOTE PROVIDER - NSDCCPCAREPLAN_GEN_ALL_CORE_FT
PRINCIPAL DISCHARGE DIAGNOSIS  Diagnosis: Spinal cord compression  Assessment and Plan of Treatment: Discharge to rehab center. F/u outpatient ortho.      SECONDARY DISCHARGE DIAGNOSES  Diagnosis: Unsteady gait when walking  Assessment and Plan of Treatment: Discharge to rehab center for PT.

## 2020-09-10 DIAGNOSIS — I10 ESSENTIAL (PRIMARY) HYPERTENSION: ICD-10-CM

## 2020-09-10 DIAGNOSIS — Z98.84 BARIATRIC SURGERY STATUS: ICD-10-CM

## 2020-09-10 DIAGNOSIS — F39 UNSPECIFIED MOOD [AFFECTIVE] DISORDER: ICD-10-CM
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
## 2020-09-10 DIAGNOSIS — G62.9 POLYNEUROPATHY, UNSPECIFIED: ICD-10-CM

## 2020-09-10 DIAGNOSIS — K22.70 BARRETT'S ESOPHAGUS WITHOUT DYSPLASIA: ICD-10-CM

## 2020-09-10 DIAGNOSIS — R26.81 UNSTEADINESS ON FEET: ICD-10-CM

## 2020-09-10 DIAGNOSIS — G56.02 CARPAL TUNNEL SYNDROME, LEFT UPPER LIMB: ICD-10-CM

## 2020-09-10 DIAGNOSIS — M47.12 OTHER SPONDYLOSIS WITH MYELOPATHY, CERVICAL REGION: ICD-10-CM

## 2020-09-10 DIAGNOSIS — Z90.49 ACQUIRED ABSENCE OF OTHER SPECIFIED PARTS OF DIGESTIVE TRACT: ICD-10-CM

## 2020-09-10 DIAGNOSIS — Z88.8 ALLERGY STATUS TO OTHER DRUGS, MEDICAMENTS AND BIOLOGICAL SUBSTANCES: ICD-10-CM
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
## 2020-09-10 DIAGNOSIS — E78.5 HYPERLIPIDEMIA, UNSPECIFIED: ICD-10-CM

## 2020-09-10 DIAGNOSIS — M50.022 CERVICAL DISC DISORDER AT C5-C6 LEVEL WITH MYELOPATHY: ICD-10-CM
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
## 2020-09-10 DIAGNOSIS — Z96.652 PRESENCE OF LEFT ARTIFICIAL KNEE JOINT: ICD-10-CM

## 2020-09-10 DIAGNOSIS — K21.9 GASTRO-ESOPHAGEAL REFLUX DISEASE WITHOUT ESOPHAGITIS: ICD-10-CM

## 2021-01-12 ENCOUNTER — EMERGENCY (EMERGENCY)
Facility: HOSPITAL | Age: 81
LOS: 0 days | Discharge: HOME | End: 2021-01-12
Attending: EMERGENCY MEDICINE
Payer: MEDICARE

## 2021-01-12 VITALS
RESPIRATION RATE: 18 BRPM | SYSTOLIC BLOOD PRESSURE: 106 MMHG | OXYGEN SATURATION: 100 % | DIASTOLIC BLOOD PRESSURE: 50 MMHG | HEART RATE: 60 BPM | TEMPERATURE: 98 F

## 2021-01-12 VITALS
OXYGEN SATURATION: 100 % | SYSTOLIC BLOOD PRESSURE: 134 MMHG | DIASTOLIC BLOOD PRESSURE: 64 MMHG | WEIGHT: 210.1 LBS | TEMPERATURE: 98 F | RESPIRATION RATE: 20 BRPM | HEIGHT: 68 IN | HEART RATE: 56 BPM

## 2021-01-12 DIAGNOSIS — K21.9 GASTRO-ESOPHAGEAL REFLUX DISEASE WITHOUT ESOPHAGITIS: ICD-10-CM

## 2021-01-12 DIAGNOSIS — Z91.048 OTHER NONMEDICINAL SUBSTANCE ALLERGY STATUS: ICD-10-CM

## 2021-01-12 DIAGNOSIS — E78.5 HYPERLIPIDEMIA, UNSPECIFIED: ICD-10-CM

## 2021-01-12 DIAGNOSIS — R44.1 VISUAL HALLUCINATIONS: ICD-10-CM

## 2021-01-12 DIAGNOSIS — Z79.899 OTHER LONG TERM (CURRENT) DRUG THERAPY: ICD-10-CM

## 2021-01-12 DIAGNOSIS — Z90.49 ACQUIRED ABSENCE OF OTHER SPECIFIED PARTS OF DIGESTIVE TRACT: Chronic | ICD-10-CM

## 2021-01-12 DIAGNOSIS — Z96.652 PRESENCE OF LEFT ARTIFICIAL KNEE JOINT: ICD-10-CM

## 2021-01-12 DIAGNOSIS — R40.4 TRANSIENT ALTERATION OF AWARENESS: ICD-10-CM

## 2021-01-12 DIAGNOSIS — Z98.84 BARIATRIC SURGERY STATUS: ICD-10-CM

## 2021-01-12 DIAGNOSIS — F39 UNSPECIFIED MOOD [AFFECTIVE] DISORDER: ICD-10-CM

## 2021-01-12 DIAGNOSIS — Z90.49 ACQUIRED ABSENCE OF OTHER SPECIFIED PARTS OF DIGESTIVE TRACT: ICD-10-CM

## 2021-01-12 DIAGNOSIS — Z98.84 BARIATRIC SURGERY STATUS: Chronic | ICD-10-CM

## 2021-01-12 DIAGNOSIS — I10 ESSENTIAL (PRIMARY) HYPERTENSION: ICD-10-CM

## 2021-01-12 DIAGNOSIS — Z96.652 PRESENCE OF LEFT ARTIFICIAL KNEE JOINT: Chronic | ICD-10-CM

## 2021-01-12 DIAGNOSIS — Z88.8 ALLERGY STATUS TO OTHER DRUGS, MEDICAMENTS AND BIOLOGICAL SUBSTANCES STATUS: ICD-10-CM

## 2021-01-12 LAB
ALBUMIN SERPL ELPH-MCNC: 3.8 G/DL — SIGNIFICANT CHANGE UP (ref 3.3–5)
ALP SERPL-CCNC: 78 U/L — SIGNIFICANT CHANGE UP (ref 40–120)
ALT FLD-CCNC: 16 U/L — SIGNIFICANT CHANGE UP (ref 12–78)
ANION GAP SERPL CALC-SCNC: 6 MMOL/L — SIGNIFICANT CHANGE UP (ref 5–17)
APPEARANCE UR: CLEAR — SIGNIFICANT CHANGE UP
APTT BLD: 31.4 SEC — SIGNIFICANT CHANGE UP (ref 27.5–35.5)
AST SERPL-CCNC: 22 U/L — SIGNIFICANT CHANGE UP (ref 15–37)
BASOPHILS # BLD AUTO: 0.03 K/UL — SIGNIFICANT CHANGE UP (ref 0–0.2)
BASOPHILS NFR BLD AUTO: 0.6 % — SIGNIFICANT CHANGE UP (ref 0–2)
BILIRUB SERPL-MCNC: 0.5 MG/DL — SIGNIFICANT CHANGE UP (ref 0.2–1.2)
BILIRUB UR-MCNC: NEGATIVE — SIGNIFICANT CHANGE UP
BUN SERPL-MCNC: 13 MG/DL — SIGNIFICANT CHANGE UP (ref 7–23)
CALCIUM SERPL-MCNC: 9.2 MG/DL — SIGNIFICANT CHANGE UP (ref 8.5–10.1)
CHLORIDE SERPL-SCNC: 106 MMOL/L — SIGNIFICANT CHANGE UP (ref 96–108)
CO2 SERPL-SCNC: 28 MMOL/L — SIGNIFICANT CHANGE UP (ref 22–31)
COLOR SPEC: YELLOW — SIGNIFICANT CHANGE UP
CREAT SERPL-MCNC: 1.01 MG/DL — SIGNIFICANT CHANGE UP (ref 0.5–1.3)
DIFF PNL FLD: NEGATIVE — SIGNIFICANT CHANGE UP
EOSINOPHIL # BLD AUTO: 0.09 K/UL — SIGNIFICANT CHANGE UP (ref 0–0.5)
EOSINOPHIL NFR BLD AUTO: 1.8 % — SIGNIFICANT CHANGE UP (ref 0–6)
ETHANOL SERPL-MCNC: <10 MG/DL — SIGNIFICANT CHANGE UP (ref 0–10)
GLUCOSE SERPL-MCNC: 76 MG/DL — SIGNIFICANT CHANGE UP (ref 70–99)
GLUCOSE UR QL: NEGATIVE MG/DL — SIGNIFICANT CHANGE UP
HCT VFR BLD CALC: 33.4 % — LOW (ref 34.5–45)
HGB BLD-MCNC: 10.3 G/DL — LOW (ref 11.5–15.5)
IMM GRANULOCYTES NFR BLD AUTO: 0.4 % — SIGNIFICANT CHANGE UP (ref 0–1.5)
INR BLD: 1.07 RATIO — SIGNIFICANT CHANGE UP (ref 0.88–1.16)
KETONES UR-MCNC: NEGATIVE — SIGNIFICANT CHANGE UP
LEUKOCYTE ESTERASE UR-ACNC: NEGATIVE — SIGNIFICANT CHANGE UP
LYMPHOCYTES # BLD AUTO: 1.41 K/UL — SIGNIFICANT CHANGE UP (ref 1–3.3)
LYMPHOCYTES # BLD AUTO: 28.5 % — SIGNIFICANT CHANGE UP (ref 13–44)
MCHC RBC-ENTMCNC: 29 PG — SIGNIFICANT CHANGE UP (ref 27–34)
MCHC RBC-ENTMCNC: 30.8 GM/DL — LOW (ref 32–36)
MCV RBC AUTO: 94.1 FL — SIGNIFICANT CHANGE UP (ref 80–100)
MONOCYTES # BLD AUTO: 0.46 K/UL — SIGNIFICANT CHANGE UP (ref 0–0.9)
MONOCYTES NFR BLD AUTO: 9.3 % — SIGNIFICANT CHANGE UP (ref 2–14)
NEUTROPHILS # BLD AUTO: 2.94 K/UL — SIGNIFICANT CHANGE UP (ref 1.8–7.4)
NEUTROPHILS NFR BLD AUTO: 59.4 % — SIGNIFICANT CHANGE UP (ref 43–77)
NITRITE UR-MCNC: NEGATIVE — SIGNIFICANT CHANGE UP
PH UR: 6 — SIGNIFICANT CHANGE UP (ref 5–8)
PLATELET # BLD AUTO: 163 K/UL — SIGNIFICANT CHANGE UP (ref 150–400)
POTASSIUM SERPL-MCNC: 3.8 MMOL/L — SIGNIFICANT CHANGE UP (ref 3.5–5.3)
POTASSIUM SERPL-SCNC: 3.8 MMOL/L — SIGNIFICANT CHANGE UP (ref 3.5–5.3)
PROT SERPL-MCNC: 7.2 GM/DL — SIGNIFICANT CHANGE UP (ref 6–8.3)
PROT UR-MCNC: NEGATIVE MG/DL — SIGNIFICANT CHANGE UP
PROTHROM AB SERPL-ACNC: 12.4 SEC — SIGNIFICANT CHANGE UP (ref 10.6–13.6)
RBC # BLD: 3.55 M/UL — LOW (ref 3.8–5.2)
RBC # FLD: 14.5 % — SIGNIFICANT CHANGE UP (ref 10.3–14.5)
SODIUM SERPL-SCNC: 140 MMOL/L — SIGNIFICANT CHANGE UP (ref 135–145)
SP GR SPEC: 1.01 — SIGNIFICANT CHANGE UP (ref 1.01–1.02)
TROPONIN I SERPL-MCNC: <0.015 NG/ML — SIGNIFICANT CHANGE UP (ref 0.01–0.04)
UROBILINOGEN FLD QL: 1 MG/DL
WBC # BLD: 4.95 K/UL — SIGNIFICANT CHANGE UP (ref 3.8–10.5)
WBC # FLD AUTO: 4.95 K/UL — SIGNIFICANT CHANGE UP (ref 3.8–10.5)

## 2021-01-12 PROCEDURE — 84484 ASSAY OF TROPONIN QUANT: CPT

## 2021-01-12 PROCEDURE — 80307 DRUG TEST PRSMV CHEM ANLYZR: CPT

## 2021-01-12 PROCEDURE — 93010 ELECTROCARDIOGRAM REPORT: CPT

## 2021-01-12 PROCEDURE — 99284 EMERGENCY DEPT VISIT MOD MDM: CPT | Mod: 25

## 2021-01-12 PROCEDURE — 36415 COLL VENOUS BLD VENIPUNCTURE: CPT

## 2021-01-12 PROCEDURE — 85610 PROTHROMBIN TIME: CPT

## 2021-01-12 PROCEDURE — 93005 ELECTROCARDIOGRAM TRACING: CPT

## 2021-01-12 PROCEDURE — 80053 COMPREHEN METABOLIC PANEL: CPT

## 2021-01-12 PROCEDURE — 99285 EMERGENCY DEPT VISIT HI MDM: CPT

## 2021-01-12 PROCEDURE — 81003 URINALYSIS AUTO W/O SCOPE: CPT

## 2021-01-12 PROCEDURE — 85730 THROMBOPLASTIN TIME PARTIAL: CPT

## 2021-01-12 PROCEDURE — 70450 CT HEAD/BRAIN W/O DYE: CPT

## 2021-01-12 PROCEDURE — 85025 COMPLETE CBC W/AUTO DIFF WBC: CPT

## 2021-01-12 PROCEDURE — 70450 CT HEAD/BRAIN W/O DYE: CPT | Mod: 26

## 2021-01-12 NOTE — ED ADULT NURSE REASSESSMENT NOTE - NS ED NURSE REASSESS COMMENT FT1
ED MD Medel spoke to family and family aware of POC to dc and follow up with outpatient neuro and PCP, pt. states she wants to be dc. VSS, ED MD spoke with  and approved dc.

## 2021-01-12 NOTE — ED ADULT NURSE NOTE - HPI (INCLUDE ILLNESS QUALITY, SEVERITY, DURATION, TIMING, CONTEXT, MODIFYING FACTORS, ASSOCIATED SIGNS AND SYMPTOMS)
patient states that she has been having hallucinations each of the past two nights. pt denies alcohol or illicit drug use. Alert and oriented x3. Denies fevers, headaches. no SI/HI

## 2021-01-12 NOTE — ED PROVIDER NOTE - NSFOLLOWUPINSTRUCTIONS_ED_ALL_ED_FT
Please return should you develop hallucinations, fevers, pain or numbness or weakness.  Please follow up with your neurologist promptly for re-evaluation.

## 2021-01-12 NOTE — ED PROVIDER NOTE - CLINICAL SUMMARY MEDICAL DECISION MAKING FREE TEXT BOX
Pt totally lucid and oriented on my examination.  Denies fevers or chills.  labs unremarkable.  Infectious w/u unremarkable.  no focal deficits to suspect CVA.  Most consistent with delirium given this is reportedly waxing and waning, but no e/o UTI at this time.  Very unlikely to be psychiatric given no prior history and given patient's advanced age.  No depressive symptoms presently.  Dispo pending labs, imaging and reassessment.

## 2021-01-12 NOTE — ED PROVIDER NOTE - PATIENT PORTAL LINK FT
You can access the FollowMyHealth Patient Portal offered by [facility] by registering at the following website: [email or web address]. By joining [employer]’s FollowMyHealth portal, you will also be able to view your health information using other applications (apps) compatible with our system.

## 2021-01-12 NOTE — ED PROVIDER NOTE - OBJECTIVE STATEMENT
80 y F pmh of HTN, HL, Gerd presenting with hallucinations worsening over last few weeks per patient reports.  Pt states she occasionally sees things that aren't there.  No prior psychiatric history.  No SI/HI.  Told to come to ER for eval by Dr. Ventura, patient's neurologist.  Denies f/c/n/v/d/abd pain/dysuria.  No active hallucinations presently.  Lives at home with .

## 2021-01-12 NOTE — ED PROVIDER NOTE - PROGRESS NOTE DETAILS
Discussed case with Dr. Chapin of neurology and he states patient stable for outpatient f/u.  CT unremarkable.  UA unremarkable.  CXR unremarkable.  Pt lucid and completely oriented throughout entirety of ED stay.  AVSS.  Would like to go home.  I discussed case with patient's  who is comfortable with d/c home in light of increase in covid cases.  Pt has had symptoms ongoing for weeks and does have some underlying dementia.  Pt denies any symptoms presently.  Again, discussed inpatient vs outpatient f/u and patient comfortable with outpatient f/u.  Understands risks.  Understands indications to return.  D/c home with strict return precautions and prompt outpatient f/u.

## 2021-01-12 NOTE — ED ADULT TRIAGE NOTE - CHIEF COMPLAINT QUOTE
Sent in by Dr. Mann for altered mental status for 2 days.  States she feels fine but is seeing things then they disappear.  Alert and oriented x3.  Denies fevers, headaches.

## 2021-12-21 NOTE — H&P ADULT - NSHPREVIEWOFSYSTEMS_GEN_ALL_CORE
REVIEW OF SYSTEMS: All other review of systems is negative unless indicated above.
[facility]  Cardiology  [address], [address]  [place] [address]  Phone: [phone]  Fax: [phone]

## 2021-12-23 NOTE — ED ADULT NURSE NOTE - NSFALLRSKPASTHIST_ED_ALL_ED
Jorge Pal  GI referral  Received: Today  Esdras Camacho Nurse Msg Pool  Cc: P Gastro Consult/Referral Msg Pool    FYI   A Service To Gastroenterology order was placed for this patient. Our attempts to reach the patient by phone have been unsuccessful. A letter was mailed to patient's home address.       Thanks,   GI Insurance Rep/Scheduling Team        yes

## 2022-08-08 ENCOUNTER — EMERGENCY (EMERGENCY)
Facility: HOSPITAL | Age: 82
LOS: 0 days | Discharge: HOME | End: 2022-08-08
Attending: EMERGENCY MEDICINE
Payer: MEDICARE

## 2022-08-08 VITALS
HEART RATE: 78 BPM | TEMPERATURE: 98 F | SYSTOLIC BLOOD PRESSURE: 138 MMHG | RESPIRATION RATE: 18 BRPM | OXYGEN SATURATION: 94 % | WEIGHT: 214.95 LBS | DIASTOLIC BLOOD PRESSURE: 74 MMHG | HEIGHT: 68 IN

## 2022-08-08 VITALS
HEART RATE: 75 BPM | TEMPERATURE: 98 F | OXYGEN SATURATION: 100 % | DIASTOLIC BLOOD PRESSURE: 65 MMHG | RESPIRATION RATE: 16 BRPM | SYSTOLIC BLOOD PRESSURE: 135 MMHG

## 2022-08-08 DIAGNOSIS — F39 UNSPECIFIED MOOD [AFFECTIVE] DISORDER: ICD-10-CM

## 2022-08-08 DIAGNOSIS — Z90.49 ACQUIRED ABSENCE OF OTHER SPECIFIED PARTS OF DIGESTIVE TRACT: Chronic | ICD-10-CM

## 2022-08-08 DIAGNOSIS — Z90.49 ACQUIRED ABSENCE OF OTHER SPECIFIED PARTS OF DIGESTIVE TRACT: ICD-10-CM

## 2022-08-08 DIAGNOSIS — S60.221A CONTUSION OF RIGHT HAND, INITIAL ENCOUNTER: ICD-10-CM
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
## 2022-08-08 DIAGNOSIS — Z98.84 BARIATRIC SURGERY STATUS: Chronic | ICD-10-CM

## 2022-08-08 DIAGNOSIS — K21.9 GASTRO-ESOPHAGEAL REFLUX DISEASE WITHOUT ESOPHAGITIS: ICD-10-CM
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
## 2022-08-08 DIAGNOSIS — S00.83XA CONTUSION OF OTHER PART OF HEAD, INITIAL ENCOUNTER: ICD-10-CM

## 2022-08-08 DIAGNOSIS — I10 ESSENTIAL (PRIMARY) HYPERTENSION: ICD-10-CM

## 2022-08-08 DIAGNOSIS — Z98.84 BARIATRIC SURGERY STATUS: ICD-10-CM

## 2022-08-08 DIAGNOSIS — Z91.048 OTHER NONMEDICINAL SUBSTANCE ALLERGY STATUS: ICD-10-CM

## 2022-08-08 DIAGNOSIS — Z96.652 PRESENCE OF LEFT ARTIFICIAL KNEE JOINT: ICD-10-CM

## 2022-08-08 DIAGNOSIS — Z88.8 ALLERGY STATUS TO OTHER DRUGS, MEDICAMENTS AND BIOLOGICAL SUBSTANCES STATUS: ICD-10-CM

## 2022-08-08 DIAGNOSIS — S42.202A UNSPECIFIED FRACTURE OF UPPER END OF LEFT HUMERUS, INITIAL ENCOUNTER FOR CLOSED FRACTURE: ICD-10-CM

## 2022-08-08 DIAGNOSIS — M79.621 PAIN IN RIGHT UPPER ARM: ICD-10-CM

## 2022-08-08 DIAGNOSIS — S62.612A DISPLACED FRACTURE OF PROXIMAL PHALANX OF RIGHT MIDDLE FINGER, INITIAL ENCOUNTER FOR CLOSED FRACTURE: ICD-10-CM

## 2022-08-08 DIAGNOSIS — Z96.652 PRESENCE OF LEFT ARTIFICIAL KNEE JOINT: Chronic | ICD-10-CM

## 2022-08-08 DIAGNOSIS — E78.5 HYPERLIPIDEMIA, UNSPECIFIED: ICD-10-CM

## 2022-08-08 DIAGNOSIS — M25.512 PAIN IN LEFT SHOULDER: ICD-10-CM

## 2022-08-08 DIAGNOSIS — S09.90XA UNSPECIFIED INJURY OF HEAD, INITIAL ENCOUNTER: ICD-10-CM

## 2022-08-08 LAB
ALBUMIN SERPL ELPH-MCNC: 3.7 G/DL — SIGNIFICANT CHANGE UP (ref 3.3–5)
ALP SERPL-CCNC: 86 U/L — SIGNIFICANT CHANGE UP (ref 40–120)
ALT FLD-CCNC: 12 U/L — SIGNIFICANT CHANGE UP (ref 12–78)
ANION GAP SERPL CALC-SCNC: 5 MMOL/L — SIGNIFICANT CHANGE UP (ref 5–17)
APTT BLD: 31.9 SEC — SIGNIFICANT CHANGE UP (ref 27.5–35.5)
AST SERPL-CCNC: 16 U/L — SIGNIFICANT CHANGE UP (ref 15–37)
BASOPHILS # BLD AUTO: 0.03 K/UL — SIGNIFICANT CHANGE UP (ref 0–0.2)
BASOPHILS NFR BLD AUTO: 0.3 % — SIGNIFICANT CHANGE UP (ref 0–2)
BILIRUB SERPL-MCNC: 0.4 MG/DL — SIGNIFICANT CHANGE UP (ref 0.2–1.2)
BUN SERPL-MCNC: 14 MG/DL — SIGNIFICANT CHANGE UP (ref 7–23)
CALCIUM SERPL-MCNC: 9.2 MG/DL — SIGNIFICANT CHANGE UP (ref 8.5–10.1)
CHLORIDE SERPL-SCNC: 109 MMOL/L — HIGH (ref 96–108)
CO2 SERPL-SCNC: 27 MMOL/L — SIGNIFICANT CHANGE UP (ref 22–31)
CREAT SERPL-MCNC: 1.01 MG/DL — SIGNIFICANT CHANGE UP (ref 0.5–1.3)
EGFR: 57 ML/MIN/1.73M2 — LOW
EOSINOPHIL # BLD AUTO: 0.05 K/UL — SIGNIFICANT CHANGE UP (ref 0–0.5)
EOSINOPHIL NFR BLD AUTO: 0.4 % — SIGNIFICANT CHANGE UP (ref 0–6)
GLUCOSE SERPL-MCNC: 111 MG/DL — HIGH (ref 70–99)
HCT VFR BLD CALC: 31.8 % — LOW (ref 34.5–45)
HGB BLD-MCNC: 10 G/DL — LOW (ref 11.5–15.5)
IMM GRANULOCYTES NFR BLD AUTO: 1 % — SIGNIFICANT CHANGE UP (ref 0–1.5)
INR BLD: 1.09 RATIO — SIGNIFICANT CHANGE UP (ref 0.88–1.16)
LYMPHOCYTES # BLD AUTO: 0.91 K/UL — LOW (ref 1–3.3)
LYMPHOCYTES # BLD AUTO: 8.1 % — LOW (ref 13–44)
MCHC RBC-ENTMCNC: 29.6 PG — SIGNIFICANT CHANGE UP (ref 27–34)
MCHC RBC-ENTMCNC: 31.4 GM/DL — LOW (ref 32–36)
MCV RBC AUTO: 94.1 FL — SIGNIFICANT CHANGE UP (ref 80–100)
MONOCYTES # BLD AUTO: 0.51 K/UL — SIGNIFICANT CHANGE UP (ref 0–0.9)
MONOCYTES NFR BLD AUTO: 4.5 % — SIGNIFICANT CHANGE UP (ref 2–14)
NEUTROPHILS # BLD AUTO: 9.68 K/UL — HIGH (ref 1.8–7.4)
NEUTROPHILS NFR BLD AUTO: 85.7 % — HIGH (ref 43–77)
PLATELET # BLD AUTO: 189 K/UL — SIGNIFICANT CHANGE UP (ref 150–400)
POTASSIUM SERPL-MCNC: 4.2 MMOL/L — SIGNIFICANT CHANGE UP (ref 3.5–5.3)
POTASSIUM SERPL-SCNC: 4.2 MMOL/L — SIGNIFICANT CHANGE UP (ref 3.5–5.3)
PROT SERPL-MCNC: 6.9 GM/DL — SIGNIFICANT CHANGE UP (ref 6–8.3)
PROTHROM AB SERPL-ACNC: 12.7 SEC — SIGNIFICANT CHANGE UP (ref 10.5–13.4)
RBC # BLD: 3.38 M/UL — LOW (ref 3.8–5.2)
RBC # FLD: 15.1 % — HIGH (ref 10.3–14.5)
SODIUM SERPL-SCNC: 141 MMOL/L — SIGNIFICANT CHANGE UP (ref 135–145)
TROPONIN I, HIGH SENSITIVITY RESULT: 4.1 NG/L — SIGNIFICANT CHANGE UP
WBC # BLD: 11.29 K/UL — HIGH (ref 3.8–10.5)
WBC # FLD AUTO: 11.29 K/UL — HIGH (ref 3.8–10.5)

## 2022-08-08 PROCEDURE — 73030 X-RAY EXAM OF SHOULDER: CPT | Mod: LT

## 2022-08-08 PROCEDURE — 85025 COMPLETE CBC W/AUTO DIFF WBC: CPT

## 2022-08-08 PROCEDURE — 73060 X-RAY EXAM OF HUMERUS: CPT | Mod: 26,LT

## 2022-08-08 PROCEDURE — 72170 X-RAY EXAM OF PELVIS: CPT | Mod: 26

## 2022-08-08 PROCEDURE — 99284 EMERGENCY DEPT VISIT MOD MDM: CPT

## 2022-08-08 PROCEDURE — 85610 PROTHROMBIN TIME: CPT

## 2022-08-08 PROCEDURE — 70450 CT HEAD/BRAIN W/O DYE: CPT | Mod: 26,MA

## 2022-08-08 PROCEDURE — 99284 EMERGENCY DEPT VISIT MOD MDM: CPT | Mod: 25

## 2022-08-08 PROCEDURE — 73030 X-RAY EXAM OF SHOULDER: CPT | Mod: 26,LT,76

## 2022-08-08 PROCEDURE — 84484 ASSAY OF TROPONIN QUANT: CPT

## 2022-08-08 PROCEDURE — 73130 X-RAY EXAM OF HAND: CPT | Mod: RT

## 2022-08-08 PROCEDURE — 72170 X-RAY EXAM OF PELVIS: CPT

## 2022-08-08 PROCEDURE — 85730 THROMBOPLASTIN TIME PARTIAL: CPT

## 2022-08-08 PROCEDURE — 72125 CT NECK SPINE W/O DYE: CPT | Mod: MA

## 2022-08-08 PROCEDURE — 80053 COMPREHEN METABOLIC PANEL: CPT

## 2022-08-08 PROCEDURE — 70450 CT HEAD/BRAIN W/O DYE: CPT | Mod: MA

## 2022-08-08 PROCEDURE — 36415 COLL VENOUS BLD VENIPUNCTURE: CPT

## 2022-08-08 PROCEDURE — 73060 X-RAY EXAM OF HUMERUS: CPT | Mod: LT

## 2022-08-08 PROCEDURE — 73130 X-RAY EXAM OF HAND: CPT | Mod: 26,RT

## 2022-08-08 PROCEDURE — 72125 CT NECK SPINE W/O DYE: CPT | Mod: 26,MA

## 2022-08-08 NOTE — CONSULT NOTE ADULT - SUBJECTIVE AND OBJECTIVE BOX
Orthopedic Hand    Patient is a 81yFemale RHD who presents to  ED w/ a c/o of L shoulder pain after MF at home. Patient was not using walker and fell over while playing with pet, denies preceding CP/SOB/headache/confusion/dizziness/palitations/weakness/fatigue. Endorses Head trauma w/o LOC. States ability to walk immediately following the injury. Denies any numbness or tingling. Denies having any other pain elsewhere. History of L proximal humerus fx in the past treated by Dr Martinez conservatively. Hx of L TKA. No other orthopedic concerns at this time.    GERD (gastroesophageal reflux disease)    HTN (hypertension)    HLD (hyperlipidemia)    Mood disorder            Fosamax (Unknown)  Originally Entered as [Moderate Rash] reaction to [SURGICAL TAPE] (Unknown)  Vitamin K (Other (Severe))      PHYSICAL EXAM:  T(C): 36.9 (08-08-22 @ 10:44), Max: 36.9 (08-08-22 @ 10:44)  HR: 78 (08-08-22 @ 10:44) (78 - 78)  BP: 138/74 (08-08-22 @ 10:44) (138/74 - 138/74)  RR: 18 (08-08-22 @ 10:44) (18 - 18)  SpO2: 94% (08-08-22 @ 10:44) (94% - 94%)    Gen: NAD, Resting comfortably    LEFT Upper Extremity:   Skin intact, no erythema, ecchymosis, edema, or gross deformity  TTP over the bony prominences of the proximal humerus  Painless passive/active ROM of the elbow/wrist/hand/fingers  C5-T1 SILT  Motor grossly intact throughout axillary/musculocutaneous/radial/median/ulnar/AIN/PIN nerves  + radial pulse  Compartments soft and compressible    RIGHT Upper Extremity:   Ecchymosis at the base of 2nd and 3rd digit, TTP in this area  Pain w/ passive/active ROM of the 2nd and 3rd MCP  C5-T1 SILT  Motor grossly intact throughout axillary/musculocutaneous/radial/median/ulnar/AIN/PIN nerves  + radial pulse  Compartments soft and compressible      Secondary Survey:   No TTP over bony prominences, SILT, palpable pulses, full/painless A/PROM, compartments soft. No TTP over spinous processes or paraspinal muscles at C/T/L spine. No palpable step off. No other injuries or complaints.      Procedure:  Sling LUE  A well padded finger splint was applied to the 3rd digit with miguel ángel taping to the 2nd digit.       A/P: 81F w/ L proximal humerus fx and R hand 3rd digit proximal phalanx base fx      Images show located mildly displaced prox humerus fx L shoulder and mildly displaced fx at base of the R hand 3rd digit proximal phlanx  No immediate need for ortho surgical interventionAnalgesia prn  NWB BUE   Ice and elevate as tolerated  Orthopedically stable for discharge w/ plan to FU outpatient w/ Dr Pacheco, call office for apt.

## 2022-08-08 NOTE — ED PROVIDER NOTE - CHPI ED SYMPTOMS POS
+ecchymosis R forehead, +R shoulder pain +ecchymosis R forehead, +R shoulder pain, +R arm pain +ecchymosis R forehead, +L shoulder pain, +R arm pain/PAIN

## 2022-08-08 NOTE — ED ADULT NURSE NOTE - OBJECTIVE STATEMENT
Pt presents to ER BIBA from home for evaluation s/p witnessed mechanical fall. no LOC. endorses pain to R shoulder and arm, ecchymosis noted to R forehead. pt denies anticoagulants. no focal neuro deficits. pt brought directly to CT scan.

## 2022-08-08 NOTE — ED PROVIDER NOTE - SKIN, MLM
Skin normal color for race, warm, dry and intact. No evidence of rash. Skin normal color for race, warm, dry and intact. No evidence of rash.  +Ecchymosis to left forehead & R hand especially digits 3 and 4

## 2022-08-08 NOTE — ED PROVIDER NOTE - CARE PROVIDER_API CALL
Alcides Pacheco)  Orthopaedic Surgery; Surgery of the Hand  [address] [place] [address]  Phone: [phone]  Fax: [phone]  Follow Up Time: 1-3 Days

## 2022-08-08 NOTE — ED PROVIDER NOTE - CARE PLAN
1 Principal Discharge DX:	Closed fracture of proximal end of left humerus, initial encounter  Secondary Diagnosis:	Head injury, closed, without LOC, initial encounter  Secondary Diagnosis:	Contusion of right hand, initial encounter

## 2022-08-08 NOTE — ED PROVIDER NOTE - MUSCULOSKELETAL, MLM
Spine appears normal, supple w/o pain pelvis non tend, stable, pt cant  L shoulder because of pain pos tender L shoulder proximal L arm moves other 3 extremities b/l hips non tend LUE distal motor sensory intact R hand mild ecchymosis especially digits 3 and 4.. Spine appears normal, supple w/o pain.  Pelvis nontender, stable.  Pt can't move L shoulder because of pain, + tender L shoulder & proximal L arm;  moves other 3 extremities well. B/L hips nontender.  LUE distal motor/sensory intact; R hand mild ecchymosis especially digits 3 and 4 with + mild TTP, digits move well

## 2022-08-08 NOTE — ED PROVIDER NOTE - NEUROLOGICAL, MLM
Alert and oriented, no focal deficits, no motor or sensory deficits. Alert and oriented, no focal deficits, no motor or sensory deficits.  CNs intact, normal speech

## 2022-08-08 NOTE — ED ADULT TRIAGE NOTE - CHIEF COMPLAINT QUOTE
Pt brought in by ambulance s/p witnessed fall at home. . Pt complains of right shoulder and arm pain. + ecchymosis to right forehead. No LOC. No AC . Neuro alert called at 1043 by Dr. Ramirez.

## 2022-08-08 NOTE — ED PROVIDER NOTE - PATIENT PORTAL LINK FT
You can access the FollowMyHealth Patient Portal offered by [facility] by registering at the following website: [email or web address]. By joining [employer]’s FollowMyHealth portal, you will also be able to view your health information using other applications (apps) compatible with our system.

## 2022-08-08 NOTE — ED ADULT NURSE NOTE - NSIMPLEMENTINTERV_GEN_ALL_ED
Implemented All Fall with Harm Risk Interventions:  [place] to call system. Call bell, personal items and telephone within reach. Instruct patient to call for assistance. Room bathroom lighting operational. Non-slip footwear when patient is off stretcher. Physically safe environment: no spills, clutter or unnecessary equipment. Stretcher in lowest position, wheels locked, appropriate side rails in place. Provide visual cue, wrist band, yellow gown, etc. Monitor gait and stability. Monitor for mental status changes and reorient to person, place, and time. Review medications for side effects contributing to fall risk. Reinforce activity limits and safety measures with patient and family. Provide visual clues: red socks.

## 2022-08-08 NOTE — ED PROVIDER NOTE - ENMT, MLM
Airway patent, Nasal mucosa clear. Mouth with slightly dry mucosa. Throat has no vesicles, no oropharyngeal exudates and uvula is midline, no clinical evidence of facial injury, billingsley sounds negative. Airway patent, Nasal mucosa clear. Mouth with slightly dry mucosa. Throat has no vesicles, no oropharyngeal exudates and uvula is midline, no clinical evidence of facial injury, billingsley's negative.

## 2022-08-08 NOTE — ED PROVIDER NOTE - CARDIAC, MLM
Normal rate, regular rhythm.  Heart sounds S1, S2.  No murmurs, rubs or gallops. Normal rate, regular rhythm.  Heart sounds S1, S2.  No murmurs, rubs or gallops.  Normal radial pulses.

## 2022-08-08 NOTE — ED PROVIDER NOTE - CONSTITUTIONAL, MLM
normal... femaleElderly white, awake, alert, oriented to person, place, time/situation and in no apparent distress. Elderly white female, awake, alert, oriented to person, place, time/situation and in no apparent distress.

## 2022-08-08 NOTE — ED PROVIDER NOTE - PROGRESS NOTE DETAILS
JUNIOR Ramirez MD:  Case signed over to Dr. Randolph: f/u Hand Ortho consult, expect D/C home for outpt Ortho management. JUNIOR Ramirez MD:  Dr. Pacheco aware of ED consult. ortho cleared pt for outpt follow up with Junior.

## 2022-08-08 NOTE — ED PROVIDER NOTE - OBJECTIVE STATEMENT
82 y/o female with a PMHx of presents to the ED brought in by ambulance s/p witnessed fall at home. Pt leaned over to separate cats fighting when she fell forward hit head on floor Pt complains of right shoulder and arm pain. +ecchymosis to right forehead. No LOC, no neck pain, no HA. Neuro alert called at 1043. PCP: Dr. Mazariegos. 82 y/o female with a PMHx of HTN, HLD, GERD,presents to the ED brought in by ambulance s/p witnessed fall at home. Pt leaned over to separate cats fighting when she lost balance & fell forward hitting her head upon the floor Pt complains of left shoulder and upper arm pain. +ecchymosis to left forehead. No LOC, no neck pain, no HA. Neuro alert called at 1043. PCP: Dr. Miranda.

## 2022-08-08 NOTE — ED PROVIDER NOTE - NSFOLLOWUPINSTRUCTIONS_ED_ALL_ED_FT
No weight bearing with arms.     See Junior in the Office.     Seek immediate medical assistance for any new or worsening symptoms. If you have issues obtaining follow up, please call: [phone] or [phone]  to obtain a doctor or specialist who takes your insurance in your area.     Use a sling for comfort.

## 2022-08-08 NOTE — ED PROVIDER NOTE - EYES, MLM
Clear bilaterally, pupils equal, round and reactive to light, negative raccons Clear bilaterally, pupils equal, round and reactive to light, negative raccoons

## 2022-08-08 NOTE — ED PROVIDER NOTE - CLINICAL SUMMARY MEDICAL DECISION MAKING FREE TEXT BOX
80 y/o female with a PMHx of presents to the ED brought in by ambulance s/p witnessed fall at home. Pt leaned over to separate cats fighting when she fell forward hit head on floor Pt complains of right shoulder and arm pain. +ecchymosis to right forehead.  Neuro intact, + L forehead ccchymosis, + TTP L shoulder/prox L upper arm with poor AROM d/t pain; + ecchymosis R digits #3,4.    Plan: Neuro Alert: CT head, c-spine, XRs pelvis, L shoulder/humerus, R hand.  Ortho consult.  Observe, reassess. 82 y/o female presents to the ED brought in by ambulance s/p witnessed fall at home. Pt leaned over to separate cats fighting when she fell lost balance & fell forward hitting head upon floor.  Pt complains of left shoulder and arm pain. +ecchymosis to left forehead.  Neuro intact, + TTP L shoulder/prox L upper arm with poor AROM d/t pain; + ecchymosis R digits #3,4.    Plan: Neuro Alert: CT head, c-spine, XRs pelvis, L shoulder/humerus, R hand.  Ortho consult.  Observe, reassess.

## 2022-09-09 NOTE — ED ADULT TRIAGE NOTE - NS ED NOTE AC HIGH RISK COUNTRIES
Lab Results   Component Value Date    HGBA1C 9 1 (H) 07/16/2022       No results for input(s): POCGLU in the last 72 hours      Blood Sugar Average: Last 72 hrs:       -continue home Lantus 20 units q h s   -continue lispro 5 units with meals  -sliding scale insulin and Accu-Cheks with meals and at bedtime  -carb controlled diet level 2 No
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
## 2023-02-06 NOTE — PATIENT PROFILE ADULT - NSASFALLWHENOCCURRED_GEN_A_NUR
[FreeTextEntry1] : F/u  exam is performed. Recommend  to do a blood test today, further management will depend on the blood test results\par HTN is well controlled, recommend  to continue  labetalol, olmesartan/amlodipine/hctz\par Hyperlipidemia continue lipitor, further management will depend on the blood test results\par F/u with GI, screening colonoscopy\par Do a thyroid u/s to f/u on a thyroid nodule\par Continue vitamin D. \par Bring report  of the mammogram\par  RTC to f/u in 2 wks. Patient is verbalized understanding\par 
this admission

## 2023-06-09 DIAGNOSIS — F41.9 ANXIETY DISORDER, UNSPECIFIED: ICD-10-CM

## 2023-06-09 DIAGNOSIS — F32.A ANXIETY DISORDER, UNSPECIFIED: ICD-10-CM

## 2023-06-09 DIAGNOSIS — E78.5 HYPERLIPIDEMIA, UNSPECIFIED: ICD-10-CM

## 2023-06-09 DIAGNOSIS — I10 ESSENTIAL (PRIMARY) HYPERTENSION: ICD-10-CM

## 2023-06-09 DIAGNOSIS — G20 PARKINSON'S DISEASE: ICD-10-CM

## 2023-06-14 ENCOUNTER — OUTPATIENT (OUTPATIENT)
Dept: OUTPATIENT SERVICES | Facility: HOSPITAL | Age: 83
LOS: 1 days | Discharge: HOME | End: 2023-06-14

## 2023-06-14 DIAGNOSIS — Z96.652 PRESENCE OF LEFT ARTIFICIAL KNEE JOINT: Chronic | ICD-10-CM

## 2023-06-14 DIAGNOSIS — D64.9 ANEMIA, UNSPECIFIED: ICD-10-CM

## 2023-06-14 DIAGNOSIS — Z98.84 BARIATRIC SURGERY STATUS: Chronic | ICD-10-CM

## 2023-06-14 DIAGNOSIS — Z90.49 ACQUIRED ABSENCE OF OTHER SPECIFIED PARTS OF DIGESTIVE TRACT: Chronic | ICD-10-CM

## 2023-06-15 ENCOUNTER — RESULT REVIEW (OUTPATIENT)
Age: 83
End: 2023-06-15

## 2023-06-15 ENCOUNTER — APPOINTMENT (OUTPATIENT)
Dept: HEMATOLOGY ONCOLOGY | Facility: CLINIC | Age: 83
End: 2023-06-15
Payer: MEDICARE

## 2023-06-15 VITALS
HEIGHT: 62.6 IN | RESPIRATION RATE: 18 BRPM | HEART RATE: 68 BPM | BODY MASS INDEX: 33.91 KG/M2 | OXYGEN SATURATION: 97 % | SYSTOLIC BLOOD PRESSURE: 100 MMHG | DIASTOLIC BLOOD PRESSURE: 70 MMHG | TEMPERATURE: 97.7 F | WEIGHT: 189 LBS

## 2023-06-15 DIAGNOSIS — Z86.39 PERSONAL HISTORY OF OTHER ENDOCRINE, NUTRITIONAL AND METABOLIC DISEASE: ICD-10-CM

## 2023-06-15 DIAGNOSIS — Z82.49 FAMILY HISTORY OF ISCHEMIC HEART DISEASE AND OTHER DISEASES OF THE CIRCULATORY SYSTEM: ICD-10-CM

## 2023-06-15 DIAGNOSIS — Z78.9 OTHER SPECIFIED HEALTH STATUS: ICD-10-CM

## 2023-06-15 DIAGNOSIS — Z82.3 FAMILY HISTORY OF STROKE: ICD-10-CM

## 2023-06-15 DIAGNOSIS — Z82.0 FAMILY HISTORY OF EPILEPSY AND OTHER DISEASES OF THE NERVOUS SYSTEM: ICD-10-CM

## 2023-06-15 DIAGNOSIS — Z91.81 HISTORY OF FALLING: ICD-10-CM

## 2023-06-15 DIAGNOSIS — O26.20 PREGNANCY CARE FOR PATIENT WITH RECURRENT PREGNANCY LOSS, UNSPECIFIED TRIMESTER: ICD-10-CM

## 2023-06-15 DIAGNOSIS — Z99.89 DEPENDENCE ON OTHER ENABLING MACHINES AND DEVICES: ICD-10-CM

## 2023-06-15 DIAGNOSIS — K22.70 BARRETT'S ESOPHAGUS W/OUT DYSPLASIA: ICD-10-CM

## 2023-06-15 DIAGNOSIS — Z82.5 FAMILY HISTORY OF ASTHMA AND OTHER CHRONIC LOWER RESPIRATORY DISEASES: ICD-10-CM

## 2023-06-15 DIAGNOSIS — S42.302A UNSPECIFIED FRACTURE OF SHAFT OF HUMERUS, LEFT ARM, INITIAL ENCOUNTER FOR CLOSED FRACTURE: ICD-10-CM

## 2023-06-15 DIAGNOSIS — J38.1 POLYP OF VOCAL CORD AND LARYNX: ICD-10-CM

## 2023-06-15 DIAGNOSIS — Z87.891 PERSONAL HISTORY OF NICOTINE DEPENDENCE: ICD-10-CM

## 2023-06-15 DIAGNOSIS — M19.90 UNSPECIFIED OSTEOARTHRITIS, UNSPECIFIED SITE: ICD-10-CM

## 2023-06-15 DIAGNOSIS — S82.839A OTHER FRACTURE OF UPPER AND LOWER END OF UNSPECIFIED FIBULA, INITIAL ENCOUNTER FOR CLOSED FRACTURE: ICD-10-CM

## 2023-06-15 DIAGNOSIS — Z87.442 PERSONAL HISTORY OF URINARY CALCULI: ICD-10-CM

## 2023-06-15 LAB
BASOPHILS # BLD AUTO: 0.04 K/UL — SIGNIFICANT CHANGE UP (ref 0–0.2)
BASOPHILS NFR BLD AUTO: 0.4 % — SIGNIFICANT CHANGE UP (ref 0–2)
EOSINOPHIL # BLD AUTO: 0.13 K/UL — SIGNIFICANT CHANGE UP (ref 0–0.5)
EOSINOPHIL NFR BLD AUTO: 1.4 % — SIGNIFICANT CHANGE UP (ref 0–6)
ERYTHROCYTE [SEDIMENTATION RATE] IN BLOOD: 12 MM/HR — SIGNIFICANT CHANGE UP (ref 0–20)
FERRITIN SERPL-MCNC: 34 NG/ML — SIGNIFICANT CHANGE UP (ref 15–150)
FOLATE SERPL-MCNC: 7.9 NG/ML — SIGNIFICANT CHANGE UP
HAPTOGLOB SERPL-MCNC: 251 MG/DL — HIGH (ref 34–200)
HCT VFR BLD CALC: 33.3 % — LOW (ref 34.5–45)
HGB BLD-MCNC: 10.6 G/DL — LOW (ref 11.5–15.5)
IGA FLD-MCNC: 187 MG/DL — SIGNIFICANT CHANGE UP (ref 84–499)
IGG FLD-MCNC: 852 MG/DL — SIGNIFICANT CHANGE UP (ref 610–1660)
IGM SERPL-MCNC: 180 MG/DL — SIGNIFICANT CHANGE UP (ref 35–242)
IMM GRANULOCYTES NFR BLD AUTO: 0.6 % — SIGNIFICANT CHANGE UP (ref 0–0.9)
IRON SATN MFR SERPL: 15 % — SIGNIFICANT CHANGE UP (ref 14–50)
IRON SATN MFR SERPL: 44 UG/DL — SIGNIFICANT CHANGE UP (ref 30–160)
KAPPA LC SER QL IFE: 3.42 MG/DL — HIGH (ref 0.33–1.94)
KAPPA/LAMBDA FREE LIGHT CHAIN RATIO, SERUM: 1.67 RATIO — HIGH (ref 0.26–1.65)
LAMBDA LC SER QL IFE: 2.05 MG/DL — SIGNIFICANT CHANGE UP (ref 0.57–2.63)
LYMPHOCYTES # BLD AUTO: 1.47 K/UL — SIGNIFICANT CHANGE UP (ref 1–3.3)
LYMPHOCYTES # BLD AUTO: 15.5 % — SIGNIFICANT CHANGE UP (ref 13–44)
MCHC RBC-ENTMCNC: 30 PG — SIGNIFICANT CHANGE UP (ref 27–34)
MCHC RBC-ENTMCNC: 31.8 GM/DL — LOW (ref 32–36)
MCV RBC AUTO: 94.3 FL — SIGNIFICANT CHANGE UP (ref 80–100)
MONOCYTES # BLD AUTO: 0.65 K/UL — SIGNIFICANT CHANGE UP (ref 0–0.9)
MONOCYTES NFR BLD AUTO: 6.8 % — SIGNIFICANT CHANGE UP (ref 2–14)
NEUTROPHILS # BLD AUTO: 7.14 K/UL — SIGNIFICANT CHANGE UP (ref 1.8–7.4)
NEUTROPHILS NFR BLD AUTO: 75.3 % — SIGNIFICANT CHANGE UP (ref 43–77)
NRBC # BLD: 0 /100 WBCS — SIGNIFICANT CHANGE UP (ref 0–0)
PLATELET # BLD AUTO: 212 K/UL — SIGNIFICANT CHANGE UP (ref 150–400)
PROT SERPL-MCNC: 6.8 G/DL — SIGNIFICANT CHANGE UP (ref 6–8.3)
PROT SERPL-MCNC: 6.8 G/DL — SIGNIFICANT CHANGE UP (ref 6–8.3)
RBC # BLD: 3.53 M/UL — LOW (ref 3.8–5.2)
RBC # FLD: 14.6 % — HIGH (ref 10.3–14.5)
TIBC SERPL-MCNC: 286 UG/DL — SIGNIFICANT CHANGE UP (ref 220–430)
UIBC SERPL-MCNC: 242 UG/DL — SIGNIFICANT CHANGE UP (ref 110–370)
VIT B12 SERPL-MCNC: 341 PG/ML — SIGNIFICANT CHANGE UP (ref 232–1245)
WBC # BLD: 9.49 K/UL — SIGNIFICANT CHANGE UP (ref 3.8–10.5)
WBC # FLD AUTO: 9.49 K/UL — SIGNIFICANT CHANGE UP (ref 3.8–10.5)

## 2023-06-15 PROCEDURE — 99204 OFFICE O/P NEW MOD 45 MIN: CPT

## 2023-06-15 RX ORDER — LEVOTHYROXINE SODIUM 0.05 MG/1
50 TABLET ORAL
Refills: 0 | Status: ACTIVE | COMMUNITY

## 2023-06-15 RX ORDER — CARBIDOPA AND LEVODOPA 25; 100 MG/1; MG/1
25-100 TABLET ORAL 4 TIMES DAILY
Refills: 0 | Status: ACTIVE | COMMUNITY

## 2023-06-15 RX ORDER — BREXPIPRAZOLE 3 MG/1
3 TABLET ORAL
Refills: 0 | Status: ACTIVE | COMMUNITY

## 2023-06-15 RX ORDER — ATORVASTATIN CALCIUM 40 MG/1
40 TABLET, FILM COATED ORAL
Refills: 0 | Status: ACTIVE | COMMUNITY

## 2023-06-15 RX ORDER — VERAPAMIL HYDROCHLORIDE 240 MG/1
240 CAPSULE, DELAYED RELEASE PELLETS ORAL
Refills: 0 | Status: ACTIVE | COMMUNITY

## 2023-06-15 RX ORDER — GUAIFENESIN 1200 MG/1
TABLET, EXTENDED RELEASE ORAL
Refills: 0 | Status: ACTIVE | COMMUNITY

## 2023-06-15 RX ORDER — CLONAZEPAM 0.5 MG/1
0.5 TABLET ORAL
Refills: 0 | Status: ACTIVE | COMMUNITY

## 2023-06-15 RX ORDER — OMEPRAZOLE 20 MG/1
20 CAPSULE, DELAYED RELEASE ORAL
Refills: 0 | Status: ACTIVE | COMMUNITY

## 2023-06-15 RX ORDER — VILAZODONE HYDROCHLORIDE 40 MG/1
40 TABLET, FILM COATED ORAL
Refills: 0 | Status: ACTIVE | COMMUNITY

## 2023-06-15 RX ORDER — LOSARTAN POTASSIUM 25 MG/1
25 TABLET, FILM COATED ORAL
Refills: 0 | Status: ACTIVE | COMMUNITY

## 2023-06-15 RX ORDER — HYDROCHLOROTHIAZIDE 12.5 MG/1
12.5 TABLET ORAL
Refills: 0 | Status: ACTIVE | COMMUNITY

## 2023-06-15 RX ORDER — BUPROPION HYDROCHLORIDE 100 MG/1
100 TABLET, FILM COATED, EXTENDED RELEASE ORAL
Refills: 0 | Status: ACTIVE | COMMUNITY

## 2023-06-15 RX ORDER — DONEPEZIL HYDROCHLORIDE 10 MG/1
10 TABLET ORAL
Refills: 0 | Status: ACTIVE | COMMUNITY

## 2023-06-15 RX ORDER — METOPROLOL SUCCINATE 25 MG/1
25 TABLET, EXTENDED RELEASE ORAL
Refills: 0 | Status: ACTIVE | COMMUNITY

## 2023-06-15 RX ORDER — FERROUS SULFATE 137(45) MG
142 (45 FE) TABLET, EXTENDED RELEASE ORAL
Refills: 0 | Status: DISCONTINUED | COMMUNITY
End: 2023-06-15

## 2023-06-15 RX ORDER — UBIDECARENONE/VIT E ACET 100MG-5
CAPSULE ORAL
Refills: 0 | Status: ACTIVE | COMMUNITY

## 2023-06-15 RX ORDER — TOLTERODINE TARTARATE 2 MG/1
2 TABLET ORAL
Refills: 0 | Status: ACTIVE | COMMUNITY

## 2023-06-15 NOTE — PHYSICAL EXAM
[Normal] : affect appropriate [de-identified] : Elderly, overweight woman, frail appearing [de-identified] : anicteric [de-identified] : weakness, decreased ROM [de-identified] : scattered bruises, no rashes [de-identified] : bradykinesia, else grossly normal

## 2023-06-15 NOTE — ASSESSMENT
[FreeTextEntry1] : Patient is an 82 y.o. with a multifactorial chronic anemia. \par Baseline Hgb was in 10 range, was referred to our office as a recent result in May was down to 8.4gm/dl. \par Today she is back to her baseline range with Hgb of 10.6.  \par Not sure if she really had a worsening of her anemia in May or if that was a lab error, however at least she is back to her baseline now. \par Her anemia is multifactorial with components of iron deficiency, B12 deficiency, anemia of CRI, and anemia of chronic disease. \par 1. Iron deficiency - Iron stores are not terrible, but she may benefit from having her Ferritin closer to 100 and TSAT >20%.  Since she cannot tolerate PO iron we discussed option of trying IV iron x 1 dose.  They would like to think about this. \par 2. B12 deficiency - advised to start on OTC methylcobalamin at least 1000mcg SL.  \par 3. 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 \par 4. Anemia of chronic disease - component of anemia can be attributed to inflammation from her arthritis and other chronic diseases. \par 5. For completeness sake will check MM labs and a haptoglobin level to r/o hemolysis. \par 6. GI eval - patient's last colonoscopy was ~ 2017 but prep was poor.  Was rescheduled to have done Fall 2020 but while taking prep she got dehydrated and had a fall - colonoscopy was canceled.  No plans to reschedule at this time. Was seeing. \par Will call patient once all labs result and review plan.  At this point they are leaning towards taking B12 and repeating labs in 3 months.  If Hgb worse then try IV iron at that time. \par \par Idalia Miranda, PCP\par  Dr. Cox (GI)\par

## 2023-06-15 NOTE — RESULTS/DATA
[FreeTextEntry1] : WBC: 9.49,  Hgb: 10.6,  Hct: 33.3,  MCV: 94.3,  Plts: 212\par Ferritin, TSAT, ESR, B12, Folate, SPEP, SIFE, EPO, Hapto: pending

## 2023-06-15 NOTE — HISTORY OF PRESENT ILLNESS
[de-identified] : JONNATHAN FERRELL is an 82 y.o. F with a PMH significant for Anxiety/Depression, Parkinsons, HTN, HLD, GERD, gastric bypass, and CRI, who has been referred to our office for an evaluation and management of an anemia. \par \par 3/16/22 - B12: 206\par 1/19/23 - WBC: 7.2,  Hgb: 10.4,  Hct: 32.1,  MCV: 92,  Plts: 219,  creat: 1.19,  serum iron: 44\par 5/24/23 - WBC: 5.6,  Hgb:   8.4,  Hct: 25.9,  MCV: 95,  Plts: 192,  creat: 1.22,  Ferritin: 47,  TSAT: 12%,  serum iron: 34\par Calculated creat clear = 54.89\par \par Review of labs in [facility] show that patient has had a chronic anemia with Hgb in 10 range since 8/2018.  Last iron studies 1/10/18 - Ferritin: 21,  TSAT: 16%\par \par Reports after labs results in May she was stated on oral iron daily  -  reports he only gave it to her for a few days as she did not tolerate as it - it gave her diarrhea. \par Overall the patient does admit to feeling more tired since the beginning of this year.  Has been napping more.  \par Was not aware that B12 level was ever low - not taking any pills or has ever gotten any injections for this. 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  Feels like a tight stocking on - up to knee. \par She did have a blood transfusion after giving birth to her son Vincent ~ 60 years ago after a vaginal delivery.  \par Patient has had 7 pregnancies - 3 miscarriages, 4 children.  States she bruises very easily. \par Has no other bleeding or clotting issues, and no family hx of bleeding or clotting disorders.

## 2023-06-15 NOTE — REVIEW OF SYSTEMS
[Patient Intake Form Reviewed] : Patient intake form was reviewed [Fatigue] : fatigue [Palpitations] : palpitations [Lower Ext Edema] : lower extremity edema [Diarrhea: Grade 0] : Diarrhea: Grade 0 [Muscle Weakness] : muscle weakness [Easy Bruising] : a tendency for easy bruising [Difficulty Walking] : difficulty walking [Negative] : Allergic/Immunologic [FreeTextEntry4] : Hoarseness [FreeTextEntry7] : GERD, indigestion [FreeTextEntry8] : dribbling [FreeTextEntry9] : Unsteady gait, Hx frequent falls

## 2023-06-16 DIAGNOSIS — Z94.84 STEM CELLS TRANSPLANT STATUS: ICD-10-CM

## 2023-06-16 DIAGNOSIS — E53.8 DEFICIENCY OF OTHER SPECIFIED B GROUP VITAMINS: ICD-10-CM

## 2023-06-16 DIAGNOSIS — D50.9 IRON DEFICIENCY ANEMIA, UNSPECIFIED: ICD-10-CM

## 2023-06-16 DIAGNOSIS — N18.31 CHRONIC KIDNEY DISEASE, STAGE 3A: ICD-10-CM

## 2023-06-16 LAB — EPO SERPL-MCNC: 14.7 MIU/ML — SIGNIFICANT CHANGE UP (ref 2.6–18.5)

## 2023-06-17 LAB
% ALBUMIN: 58.1 % — SIGNIFICANT CHANGE UP
% ALPHA 1: 4.6 % — SIGNIFICANT CHANGE UP
% ALPHA 2: 11.8 % — SIGNIFICANT CHANGE UP
% BETA: 11.5 % — SIGNIFICANT CHANGE UP
% GAMMA: 14 % — SIGNIFICANT CHANGE UP
ALBUMIN SERPL ELPH-MCNC: 4 G/DL — SIGNIFICANT CHANGE UP (ref 3.6–5.5)
ALBUMIN/GLOB SERPL ELPH: 1.4 RATIO — SIGNIFICANT CHANGE UP
ALPHA1 GLOB SERPL ELPH-MCNC: 0.3 G/DL — SIGNIFICANT CHANGE UP (ref 0.1–0.4)
ALPHA2 GLOB SERPL ELPH-MCNC: 0.8 G/DL — SIGNIFICANT CHANGE UP (ref 0.5–1)
B-GLOBULIN SERPL ELPH-MCNC: 0.8 G/DL — SIGNIFICANT CHANGE UP (ref 0.5–1)
GAMMA GLOBULIN: 1 G/DL — SIGNIFICANT CHANGE UP (ref 0.6–1.6)
INTERPRETATION SERPL IFE-IMP: SIGNIFICANT CHANGE UP
PROT PATTERN SERPL ELPH-IMP: SIGNIFICANT CHANGE UP

## 2023-06-19 ENCOUNTER — NON-APPOINTMENT (OUTPATIENT)
Age: 83
End: 2023-06-19

## 2023-09-12 ENCOUNTER — OUTPATIENT (OUTPATIENT)
Dept: OUTPATIENT SERVICES | Facility: HOSPITAL | Age: 83
LOS: 1 days | Discharge: HOME | End: 2023-09-12

## 2023-09-12 DIAGNOSIS — Z96.652 PRESENCE OF LEFT ARTIFICIAL KNEE JOINT: Chronic | ICD-10-CM

## 2023-09-12 DIAGNOSIS — Z90.49 ACQUIRED ABSENCE OF OTHER SPECIFIED PARTS OF DIGESTIVE TRACT: Chronic | ICD-10-CM

## 2023-09-12 DIAGNOSIS — D64.9 ANEMIA, UNSPECIFIED: ICD-10-CM

## 2023-09-12 DIAGNOSIS — Z98.84 BARIATRIC SURGERY STATUS: Chronic | ICD-10-CM

## 2023-09-14 ENCOUNTER — APPOINTMENT (OUTPATIENT)
Dept: HEMATOLOGY ONCOLOGY | Facility: CLINIC | Age: 83
End: 2023-09-14

## 2023-10-03 ENCOUNTER — APPOINTMENT (OUTPATIENT)
Dept: HEMATOLOGY ONCOLOGY | Facility: CLINIC | Age: 83
End: 2023-10-03
Payer: MEDICARE

## 2023-10-03 ENCOUNTER — RESULT REVIEW (OUTPATIENT)
Age: 83
End: 2023-10-03

## 2023-10-03 VITALS
RESPIRATION RATE: 18 BRPM | SYSTOLIC BLOOD PRESSURE: 103 MMHG | BODY MASS INDEX: 31.4 KG/M2 | DIASTOLIC BLOOD PRESSURE: 64 MMHG | WEIGHT: 175 LBS | HEART RATE: 89 BPM | OXYGEN SATURATION: 97 % | TEMPERATURE: 97.8 F

## 2023-10-03 DIAGNOSIS — N18.31 CHRONIC KIDNEY DISEASE, STAGE 3A: ICD-10-CM

## 2023-10-03 DIAGNOSIS — E53.8 DEFICIENCY OF OTHER SPECIFIED B GROUP VITAMINS: ICD-10-CM

## 2023-10-03 DIAGNOSIS — Z98.84 BARIATRIC SURGERY STATUS: ICD-10-CM

## 2023-10-03 DIAGNOSIS — D50.9 IRON DEFICIENCY ANEMIA, UNSPECIFIED: ICD-10-CM

## 2023-10-03 LAB
BASOPHILS # BLD AUTO: 0.03 K/UL — SIGNIFICANT CHANGE UP (ref 0–0.2)
BASOPHILS NFR BLD AUTO: 0.4 % — SIGNIFICANT CHANGE UP (ref 0–2)
EOSINOPHIL # BLD AUTO: 0.05 K/UL — SIGNIFICANT CHANGE UP (ref 0–0.5)
EOSINOPHIL NFR BLD AUTO: 0.6 % — SIGNIFICANT CHANGE UP (ref 0–6)
HCT VFR BLD CALC: 28.4 % — LOW (ref 34.5–45)
HGB BLD-MCNC: 8.8 G/DL — LOW (ref 11.5–15.5)
IMM GRANULOCYTES NFR BLD AUTO: 0.7 % — SIGNIFICANT CHANGE UP (ref 0–0.9)
LYMPHOCYTES # BLD AUTO: 1.69 K/UL — SIGNIFICANT CHANGE UP (ref 1–3.3)
LYMPHOCYTES # BLD AUTO: 19.8 % — SIGNIFICANT CHANGE UP (ref 13–44)
MCHC RBC-ENTMCNC: 30.2 PG — SIGNIFICANT CHANGE UP (ref 27–34)
MCHC RBC-ENTMCNC: 31 GM/DL — LOW (ref 32–36)
MCV RBC AUTO: 97.6 FL — SIGNIFICANT CHANGE UP (ref 80–100)
MONOCYTES # BLD AUTO: 0.5 K/UL — SIGNIFICANT CHANGE UP (ref 0–0.9)
MONOCYTES NFR BLD AUTO: 5.9 % — SIGNIFICANT CHANGE UP (ref 2–14)
NEUTROPHILS # BLD AUTO: 6.19 K/UL — SIGNIFICANT CHANGE UP (ref 1.8–7.4)
NEUTROPHILS NFR BLD AUTO: 72.6 % — SIGNIFICANT CHANGE UP (ref 43–77)
NRBC # BLD: 0 /100 WBCS — SIGNIFICANT CHANGE UP (ref 0–0)
PLATELET # BLD AUTO: 235 K/UL — SIGNIFICANT CHANGE UP (ref 150–400)
RBC # BLD: 2.91 M/UL — LOW (ref 3.8–5.2)
RBC # FLD: 15.6 % — HIGH (ref 10.3–14.5)
WBC # BLD: 8.52 K/UL — SIGNIFICANT CHANGE UP (ref 3.8–10.5)
WBC # FLD AUTO: 8.52 K/UL — SIGNIFICANT CHANGE UP (ref 3.8–10.5)

## 2023-10-03 PROCEDURE — 99213 OFFICE O/P EST LOW 20 MIN: CPT

## 2023-10-04 LAB
ERYTHROCYTE [SEDIMENTATION RATE] IN BLOOD: 31 MM/HR — HIGH (ref 0–20)
FERRITIN SERPL-MCNC: 64 NG/ML — SIGNIFICANT CHANGE UP (ref 13–330)
FOLATE SERPL-MCNC: >20 NG/ML — SIGNIFICANT CHANGE UP
IRON SATN MFR SERPL: 12 % — LOW (ref 14–50)
IRON SATN MFR SERPL: 30 UG/DL — SIGNIFICANT CHANGE UP (ref 30–160)
TIBC SERPL-MCNC: 243 UG/DL — SIGNIFICANT CHANGE UP (ref 220–430)
UIBC SERPL-MCNC: 213 UG/DL — SIGNIFICANT CHANGE UP (ref 110–370)
VIT B12 SERPL-MCNC: >2000 PG/ML — HIGH (ref 232–1245)

## 2023-10-09 ENCOUNTER — APPOINTMENT (OUTPATIENT)
Dept: INFUSION THERAPY | Facility: CLINIC | Age: 83
End: 2023-10-09

## 2023-10-09 VITALS
HEART RATE: 90 BPM | BODY MASS INDEX: 31.98 KG/M2 | RESPIRATION RATE: 17 BRPM | WEIGHT: 178.25 LBS | OXYGEN SATURATION: 97 % | TEMPERATURE: 98.3 F | SYSTOLIC BLOOD PRESSURE: 115 MMHG | DIASTOLIC BLOOD PRESSURE: 75 MMHG

## 2023-11-09 ENCOUNTER — APPOINTMENT (OUTPATIENT)
Dept: HEMATOLOGY ONCOLOGY | Facility: CLINIC | Age: 83
End: 2023-11-09
Payer: MEDICARE

## 2023-11-09 ENCOUNTER — RESULT REVIEW (OUTPATIENT)
Age: 83
End: 2023-11-09

## 2023-11-09 VITALS
DIASTOLIC BLOOD PRESSURE: 69 MMHG | HEART RATE: 86 BPM | RESPIRATION RATE: 18 BRPM | SYSTOLIC BLOOD PRESSURE: 101 MMHG | OXYGEN SATURATION: 98 %

## 2023-11-09 DIAGNOSIS — D64.9 ANEMIA, UNSPECIFIED: ICD-10-CM

## 2023-11-09 DIAGNOSIS — Z98.84 BARIATRIC SURGERY STATUS: ICD-10-CM

## 2023-11-09 DIAGNOSIS — D50.9 IRON DEFICIENCY ANEMIA, UNSPECIFIED: ICD-10-CM

## 2023-11-09 DIAGNOSIS — R63.4 ABNORMAL WEIGHT LOSS: ICD-10-CM

## 2023-11-09 DIAGNOSIS — N18.31 CHRONIC KIDNEY DISEASE, STAGE 3A: ICD-10-CM

## 2023-11-09 DIAGNOSIS — K21.9 GASTRO-ESOPHAGEAL REFLUX DISEASE W/OUT ESOPHAGITIS: ICD-10-CM

## 2023-11-09 LAB
BASOPHILS # BLD AUTO: 0.03 K/UL — SIGNIFICANT CHANGE UP (ref 0–0.2)
BASOPHILS # BLD AUTO: 0.03 K/UL — SIGNIFICANT CHANGE UP (ref 0–0.2)
BASOPHILS NFR BLD AUTO: 0.4 % — SIGNIFICANT CHANGE UP (ref 0–2)
BASOPHILS NFR BLD AUTO: 0.4 % — SIGNIFICANT CHANGE UP (ref 0–2)
EOSINOPHIL # BLD AUTO: 0.07 K/UL — SIGNIFICANT CHANGE UP (ref 0–0.5)
EOSINOPHIL # BLD AUTO: 0.07 K/UL — SIGNIFICANT CHANGE UP (ref 0–0.5)
EOSINOPHIL NFR BLD AUTO: 0.9 % — SIGNIFICANT CHANGE UP (ref 0–6)
EOSINOPHIL NFR BLD AUTO: 0.9 % — SIGNIFICANT CHANGE UP (ref 0–6)
HCT VFR BLD CALC: 29.3 % — LOW (ref 34.5–45)
HCT VFR BLD CALC: 29.3 % — LOW (ref 34.5–45)
HGB BLD-MCNC: 9.3 G/DL — LOW (ref 11.5–15.5)
HGB BLD-MCNC: 9.3 G/DL — LOW (ref 11.5–15.5)
IMM GRANULOCYTES NFR BLD AUTO: 0.8 % — SIGNIFICANT CHANGE UP (ref 0–0.9)
IMM GRANULOCYTES NFR BLD AUTO: 0.8 % — SIGNIFICANT CHANGE UP (ref 0–0.9)
LYMPHOCYTES # BLD AUTO: 1.99 K/UL — SIGNIFICANT CHANGE UP (ref 1–3.3)
LYMPHOCYTES # BLD AUTO: 1.99 K/UL — SIGNIFICANT CHANGE UP (ref 1–3.3)
LYMPHOCYTES # BLD AUTO: 25.8 % — SIGNIFICANT CHANGE UP (ref 13–44)
LYMPHOCYTES # BLD AUTO: 25.8 % — SIGNIFICANT CHANGE UP (ref 13–44)
MCHC RBC-ENTMCNC: 31.2 PG — SIGNIFICANT CHANGE UP (ref 27–34)
MCHC RBC-ENTMCNC: 31.2 PG — SIGNIFICANT CHANGE UP (ref 27–34)
MCHC RBC-ENTMCNC: 31.7 GM/DL — LOW (ref 32–36)
MCHC RBC-ENTMCNC: 31.7 GM/DL — LOW (ref 32–36)
MCV RBC AUTO: 98.3 FL — SIGNIFICANT CHANGE UP (ref 80–100)
MCV RBC AUTO: 98.3 FL — SIGNIFICANT CHANGE UP (ref 80–100)
MONOCYTES # BLD AUTO: 0.55 K/UL — SIGNIFICANT CHANGE UP (ref 0–0.9)
MONOCYTES # BLD AUTO: 0.55 K/UL — SIGNIFICANT CHANGE UP (ref 0–0.9)
MONOCYTES NFR BLD AUTO: 7.1 % — SIGNIFICANT CHANGE UP (ref 2–14)
MONOCYTES NFR BLD AUTO: 7.1 % — SIGNIFICANT CHANGE UP (ref 2–14)
NEUTROPHILS # BLD AUTO: 5.01 K/UL — SIGNIFICANT CHANGE UP (ref 1.8–7.4)
NEUTROPHILS # BLD AUTO: 5.01 K/UL — SIGNIFICANT CHANGE UP (ref 1.8–7.4)
NEUTROPHILS NFR BLD AUTO: 65 % — SIGNIFICANT CHANGE UP (ref 43–77)
NEUTROPHILS NFR BLD AUTO: 65 % — SIGNIFICANT CHANGE UP (ref 43–77)
NRBC # BLD: 0 /100 WBCS — SIGNIFICANT CHANGE UP (ref 0–0)
NRBC # BLD: 0 /100 WBCS — SIGNIFICANT CHANGE UP (ref 0–0)
PLATELET # BLD AUTO: 245 K/UL — SIGNIFICANT CHANGE UP (ref 150–400)
PLATELET # BLD AUTO: 245 K/UL — SIGNIFICANT CHANGE UP (ref 150–400)
RBC # BLD: 2.98 M/UL — LOW (ref 3.8–5.2)
RBC # BLD: 2.98 M/UL — LOW (ref 3.8–5.2)
RBC # FLD: 16.2 % — HIGH (ref 10.3–14.5)
RBC # FLD: 16.2 % — HIGH (ref 10.3–14.5)
WBC # BLD: 7.71 K/UL — SIGNIFICANT CHANGE UP (ref 3.8–10.5)
WBC # BLD: 7.71 K/UL — SIGNIFICANT CHANGE UP (ref 3.8–10.5)
WBC # FLD AUTO: 7.71 K/UL — SIGNIFICANT CHANGE UP (ref 3.8–10.5)
WBC # FLD AUTO: 7.71 K/UL — SIGNIFICANT CHANGE UP (ref 3.8–10.5)

## 2023-11-09 PROCEDURE — 99214 OFFICE O/P EST MOD 30 MIN: CPT

## 2023-11-10 PROBLEM — D64.9 ANEMIA: Status: ACTIVE | Noted: 2023-06-09

## 2023-11-10 PROBLEM — Z98.84 H/O GASTRIC BYPASS: Status: ACTIVE | Noted: 2023-06-15

## 2023-11-10 PROBLEM — R63.4 WEIGHT LOSS: Status: ACTIVE | Noted: 2023-11-09

## 2023-11-10 PROBLEM — D50.9 IRON DEFICIENCY ANEMIA: Status: ACTIVE | Noted: 2023-06-09

## 2023-11-10 PROBLEM — N18.31 STAGE 3A CHRONIC KIDNEY DISEASE: Status: ACTIVE | Noted: 2023-06-09

## 2023-11-10 PROBLEM — K21.9 GERD (GASTROESOPHAGEAL REFLUX DISEASE): Status: ACTIVE | Noted: 2023-06-09

## 2023-11-10 LAB
ERYTHROCYTE [SEDIMENTATION RATE] IN BLOOD: 35 MM/HR — HIGH (ref 0–20)
ERYTHROCYTE [SEDIMENTATION RATE] IN BLOOD: 35 MM/HR — HIGH (ref 0–20)
FERRITIN SERPL-MCNC: 239 NG/ML — SIGNIFICANT CHANGE UP (ref 13–330)
FERRITIN SERPL-MCNC: 239 NG/ML — SIGNIFICANT CHANGE UP (ref 13–330)
FOLATE SERPL-MCNC: >20 NG/ML — SIGNIFICANT CHANGE UP
FOLATE SERPL-MCNC: >20 NG/ML — SIGNIFICANT CHANGE UP
IRON SATN MFR SERPL: 16 % — SIGNIFICANT CHANGE UP (ref 14–50)
IRON SATN MFR SERPL: 16 % — SIGNIFICANT CHANGE UP (ref 14–50)
IRON SATN MFR SERPL: 27 UG/DL — LOW (ref 30–160)
IRON SATN MFR SERPL: 27 UG/DL — LOW (ref 30–160)
TIBC SERPL-MCNC: 171 UG/DL — LOW (ref 220–430)
TIBC SERPL-MCNC: 171 UG/DL — LOW (ref 220–430)
UIBC SERPL-MCNC: 144 UG/DL — SIGNIFICANT CHANGE UP (ref 110–370)
UIBC SERPL-MCNC: 144 UG/DL — SIGNIFICANT CHANGE UP (ref 110–370)
VIT B12 SERPL-MCNC: >2000 PG/ML — HIGH (ref 232–1245)
VIT B12 SERPL-MCNC: >2000 PG/ML — HIGH (ref 232–1245)

## 2023-11-13 DIAGNOSIS — R63.4 ABNORMAL WEIGHT LOSS: ICD-10-CM

## 2023-11-13 DIAGNOSIS — K21.9 GASTRO-ESOPHAGEAL REFLUX DISEASE WITHOUT ESOPHAGITIS: ICD-10-CM

## 2023-11-21 ENCOUNTER — OUTPATIENT (OUTPATIENT)
Dept: OUTPATIENT SERVICES | Facility: HOSPITAL | Age: 83
LOS: 1 days | End: 2023-11-21
Payer: MEDICARE

## 2023-11-21 ENCOUNTER — APPOINTMENT (OUTPATIENT)
Dept: CT IMAGING | Facility: CLINIC | Age: 83
End: 2023-11-21
Payer: MEDICARE

## 2023-11-21 DIAGNOSIS — Z90.49 ACQUIRED ABSENCE OF OTHER SPECIFIED PARTS OF DIGESTIVE TRACT: Chronic | ICD-10-CM

## 2023-11-21 DIAGNOSIS — Z96.652 PRESENCE OF LEFT ARTIFICIAL KNEE JOINT: Chronic | ICD-10-CM

## 2023-11-21 DIAGNOSIS — Z98.84 BARIATRIC SURGERY STATUS: Chronic | ICD-10-CM

## 2023-11-21 DIAGNOSIS — D64.9 ANEMIA, UNSPECIFIED: ICD-10-CM

## 2023-11-21 DIAGNOSIS — R63.4 ABNORMAL WEIGHT LOSS: ICD-10-CM

## 2023-11-21 PROCEDURE — 71250 CT THORAX DX C-: CPT | Mod: 26,MH

## 2023-11-21 PROCEDURE — 71250 CT THORAX DX C-: CPT

## 2023-11-21 PROCEDURE — 74176 CT ABD & PELVIS W/O CONTRAST: CPT | Mod: 26,MH

## 2023-11-21 PROCEDURE — 74176 CT ABD & PELVIS W/O CONTRAST: CPT

## 2023-11-22 ENCOUNTER — NON-APPOINTMENT (OUTPATIENT)
Age: 83
End: 2023-11-22

## 2023-11-22 ENCOUNTER — OUTPATIENT (OUTPATIENT)
Dept: OUTPATIENT SERVICES | Facility: HOSPITAL | Age: 83
LOS: 1 days | Discharge: HOME | End: 2023-11-22

## 2023-11-22 DIAGNOSIS — Z90.49 ACQUIRED ABSENCE OF OTHER SPECIFIED PARTS OF DIGESTIVE TRACT: Chronic | ICD-10-CM

## 2023-11-22 DIAGNOSIS — Z98.84 BARIATRIC SURGERY STATUS: Chronic | ICD-10-CM

## 2023-11-22 DIAGNOSIS — D64.9 ANEMIA, UNSPECIFIED: ICD-10-CM

## 2023-11-22 DIAGNOSIS — Z96.652 PRESENCE OF LEFT ARTIFICIAL KNEE JOINT: Chronic | ICD-10-CM

## 2023-11-24 ENCOUNTER — INPATIENT (INPATIENT)
Facility: HOSPITAL | Age: 83
LOS: 21 days | Discharge: NURSING, REHAB OR LONG-TERM CARE | DRG: 826 | End: 2023-12-16
Attending: FAMILY MEDICINE | Admitting: HOSPITALIST
Payer: MEDICARE

## 2023-11-24 VITALS
RESPIRATION RATE: 18 BRPM | SYSTOLIC BLOOD PRESSURE: 114 MMHG | HEART RATE: 79 BPM | HEIGHT: 65 IN | DIASTOLIC BLOOD PRESSURE: 68 MMHG | OXYGEN SATURATION: 96 % | TEMPERATURE: 98 F | WEIGHT: 181 LBS

## 2023-11-24 DIAGNOSIS — Z96.652 PRESENCE OF LEFT ARTIFICIAL KNEE JOINT: Chronic | ICD-10-CM

## 2023-11-24 DIAGNOSIS — Z90.49 ACQUIRED ABSENCE OF OTHER SPECIFIED PARTS OF DIGESTIVE TRACT: Chronic | ICD-10-CM

## 2023-11-24 DIAGNOSIS — Z98.84 BARIATRIC SURGERY STATUS: Chronic | ICD-10-CM

## 2023-11-24 LAB
ALBUMIN SERPL ELPH-MCNC: 3.1 G/DL — LOW (ref 3.3–5)
ALP SERPL-CCNC: 130 U/L — HIGH (ref 40–120)
ALP SERPL-CCNC: 130 U/L — HIGH (ref 40–120)
ALP SERPL-CCNC: 131 U/L — HIGH (ref 40–120)
ALP SERPL-CCNC: 131 U/L — HIGH (ref 40–120)
ALT FLD-CCNC: 17 U/L — SIGNIFICANT CHANGE UP (ref 12–78)
ALT FLD-CCNC: 17 U/L — SIGNIFICANT CHANGE UP (ref 12–78)
ALT FLD-CCNC: 18 U/L — SIGNIFICANT CHANGE UP (ref 12–78)
ALT FLD-CCNC: 18 U/L — SIGNIFICANT CHANGE UP (ref 12–78)
ANION GAP SERPL CALC-SCNC: 5 MMOL/L — SIGNIFICANT CHANGE UP (ref 5–17)
ANION GAP SERPL CALC-SCNC: 5 MMOL/L — SIGNIFICANT CHANGE UP (ref 5–17)
APTT BLD: 27.4 SEC — SIGNIFICANT CHANGE UP (ref 24.5–35.6)
APTT BLD: 27.4 SEC — SIGNIFICANT CHANGE UP (ref 24.5–35.6)
AST SERPL-CCNC: 20 U/L — SIGNIFICANT CHANGE UP (ref 15–37)
BASOPHILS # BLD AUTO: 0.02 K/UL — SIGNIFICANT CHANGE UP (ref 0–0.2)
BASOPHILS # BLD AUTO: 0.02 K/UL — SIGNIFICANT CHANGE UP (ref 0–0.2)
BASOPHILS NFR BLD AUTO: 0.3 % — SIGNIFICANT CHANGE UP (ref 0–2)
BASOPHILS NFR BLD AUTO: 0.3 % — SIGNIFICANT CHANGE UP (ref 0–2)
BILIRUB DIRECT SERPL-MCNC: 0.4 MG/DL — HIGH (ref 0–0.3)
BILIRUB DIRECT SERPL-MCNC: 0.4 MG/DL — HIGH (ref 0–0.3)
BILIRUB INDIRECT FLD-MCNC: 0.3 MG/DL — SIGNIFICANT CHANGE UP (ref 0.2–1)
BILIRUB INDIRECT FLD-MCNC: 0.3 MG/DL — SIGNIFICANT CHANGE UP (ref 0.2–1)
BILIRUB SERPL-MCNC: 0.7 MG/DL — SIGNIFICANT CHANGE UP (ref 0.2–1.2)
BLD GP AB SCN SERPL QL: SIGNIFICANT CHANGE UP
BLD GP AB SCN SERPL QL: SIGNIFICANT CHANGE UP
BUN SERPL-MCNC: 22 MG/DL — SIGNIFICANT CHANGE UP (ref 7–23)
BUN SERPL-MCNC: 22 MG/DL — SIGNIFICANT CHANGE UP (ref 7–23)
CALCIUM SERPL-MCNC: 9.7 MG/DL — SIGNIFICANT CHANGE UP (ref 8.5–10.1)
CALCIUM SERPL-MCNC: 9.7 MG/DL — SIGNIFICANT CHANGE UP (ref 8.5–10.1)
CHLORIDE SERPL-SCNC: 107 MMOL/L — SIGNIFICANT CHANGE UP (ref 96–108)
CHLORIDE SERPL-SCNC: 107 MMOL/L — SIGNIFICANT CHANGE UP (ref 96–108)
CO2 SERPL-SCNC: 31 MMOL/L — SIGNIFICANT CHANGE UP (ref 22–31)
CO2 SERPL-SCNC: 31 MMOL/L — SIGNIFICANT CHANGE UP (ref 22–31)
CREAT SERPL-MCNC: 1.15 MG/DL — SIGNIFICANT CHANGE UP (ref 0.5–1.3)
CREAT SERPL-MCNC: 1.15 MG/DL — SIGNIFICANT CHANGE UP (ref 0.5–1.3)
EGFR: 48 ML/MIN/1.73M2 — LOW
EGFR: 48 ML/MIN/1.73M2 — LOW
EOSINOPHIL # BLD AUTO: 0.05 K/UL — SIGNIFICANT CHANGE UP (ref 0–0.5)
EOSINOPHIL # BLD AUTO: 0.05 K/UL — SIGNIFICANT CHANGE UP (ref 0–0.5)
EOSINOPHIL NFR BLD AUTO: 0.6 % — SIGNIFICANT CHANGE UP (ref 0–6)
EOSINOPHIL NFR BLD AUTO: 0.6 % — SIGNIFICANT CHANGE UP (ref 0–6)
ERYTHROCYTE [SEDIMENTATION RATE] IN BLOOD: 43 MM/HR — HIGH (ref 0–20)
ERYTHROCYTE [SEDIMENTATION RATE] IN BLOOD: 43 MM/HR — HIGH (ref 0–20)
GLUCOSE SERPL-MCNC: 94 MG/DL — SIGNIFICANT CHANGE UP (ref 70–99)
GLUCOSE SERPL-MCNC: 94 MG/DL — SIGNIFICANT CHANGE UP (ref 70–99)
HCT VFR BLD CALC: 29.7 % — LOW (ref 34.5–45)
HCT VFR BLD CALC: 29.7 % — LOW (ref 34.5–45)
HGB BLD-MCNC: 9.2 G/DL — LOW (ref 11.5–15.5)
HGB BLD-MCNC: 9.2 G/DL — LOW (ref 11.5–15.5)
IMM GRANULOCYTES NFR BLD AUTO: 0.9 % — SIGNIFICANT CHANGE UP (ref 0–0.9)
IMM GRANULOCYTES NFR BLD AUTO: 0.9 % — SIGNIFICANT CHANGE UP (ref 0–0.9)
INR BLD: 1 RATIO — SIGNIFICANT CHANGE UP (ref 0.85–1.18)
INR BLD: 1 RATIO — SIGNIFICANT CHANGE UP (ref 0.85–1.18)
INR BLD: 1.03 RATIO — SIGNIFICANT CHANGE UP (ref 0.85–1.18)
INR BLD: 1.03 RATIO — SIGNIFICANT CHANGE UP (ref 0.85–1.18)
LDH SERPL L TO P-CCNC: 234 U/L — SIGNIFICANT CHANGE UP (ref 84–241)
LDH SERPL L TO P-CCNC: 234 U/L — SIGNIFICANT CHANGE UP (ref 84–241)
LYMPHOCYTES # BLD AUTO: 1.57 K/UL — SIGNIFICANT CHANGE UP (ref 1–3.3)
LYMPHOCYTES # BLD AUTO: 1.57 K/UL — SIGNIFICANT CHANGE UP (ref 1–3.3)
LYMPHOCYTES # BLD AUTO: 20.4 % — SIGNIFICANT CHANGE UP (ref 13–44)
LYMPHOCYTES # BLD AUTO: 20.4 % — SIGNIFICANT CHANGE UP (ref 13–44)
MCHC RBC-ENTMCNC: 30.7 PG — SIGNIFICANT CHANGE UP (ref 27–34)
MCHC RBC-ENTMCNC: 30.7 PG — SIGNIFICANT CHANGE UP (ref 27–34)
MCHC RBC-ENTMCNC: 31 GM/DL — LOW (ref 32–36)
MCHC RBC-ENTMCNC: 31 GM/DL — LOW (ref 32–36)
MCV RBC AUTO: 99 FL — SIGNIFICANT CHANGE UP (ref 80–100)
MCV RBC AUTO: 99 FL — SIGNIFICANT CHANGE UP (ref 80–100)
MONOCYTES # BLD AUTO: 0.63 K/UL — SIGNIFICANT CHANGE UP (ref 0–0.9)
MONOCYTES # BLD AUTO: 0.63 K/UL — SIGNIFICANT CHANGE UP (ref 0–0.9)
MONOCYTES NFR BLD AUTO: 8.2 % — SIGNIFICANT CHANGE UP (ref 2–14)
MONOCYTES NFR BLD AUTO: 8.2 % — SIGNIFICANT CHANGE UP (ref 2–14)
NEUTROPHILS # BLD AUTO: 5.36 K/UL — SIGNIFICANT CHANGE UP (ref 1.8–7.4)
NEUTROPHILS # BLD AUTO: 5.36 K/UL — SIGNIFICANT CHANGE UP (ref 1.8–7.4)
NEUTROPHILS NFR BLD AUTO: 69.6 % — SIGNIFICANT CHANGE UP (ref 43–77)
NEUTROPHILS NFR BLD AUTO: 69.6 % — SIGNIFICANT CHANGE UP (ref 43–77)
PHOSPHATE SERPL-MCNC: 3.1 MG/DL — SIGNIFICANT CHANGE UP (ref 2.5–4.5)
PHOSPHATE SERPL-MCNC: 3.1 MG/DL — SIGNIFICANT CHANGE UP (ref 2.5–4.5)
PLATELET # BLD AUTO: 225 K/UL — SIGNIFICANT CHANGE UP (ref 150–400)
PLATELET # BLD AUTO: 225 K/UL — SIGNIFICANT CHANGE UP (ref 150–400)
POTASSIUM SERPL-MCNC: 3.9 MMOL/L — SIGNIFICANT CHANGE UP (ref 3.5–5.3)
POTASSIUM SERPL-MCNC: 3.9 MMOL/L — SIGNIFICANT CHANGE UP (ref 3.5–5.3)
POTASSIUM SERPL-SCNC: 3.9 MMOL/L — SIGNIFICANT CHANGE UP (ref 3.5–5.3)
POTASSIUM SERPL-SCNC: 3.9 MMOL/L — SIGNIFICANT CHANGE UP (ref 3.5–5.3)
PROT SERPL-MCNC: 6.6 GM/DL — SIGNIFICANT CHANGE UP (ref 6–8.3)
PROT SERPL-MCNC: 6.6 GM/DL — SIGNIFICANT CHANGE UP (ref 6–8.3)
PROT SERPL-MCNC: 6.7 G/DL — SIGNIFICANT CHANGE UP (ref 6–8.3)
PROT SERPL-MCNC: 6.7 G/DL — SIGNIFICANT CHANGE UP (ref 6–8.3)
PROT SERPL-MCNC: 6.7 GM/DL — SIGNIFICANT CHANGE UP (ref 6–8.3)
PROT SERPL-MCNC: 6.7 GM/DL — SIGNIFICANT CHANGE UP (ref 6–8.3)
PROTHROM AB SERPL-ACNC: 11.3 SEC — SIGNIFICANT CHANGE UP (ref 9.5–13)
PROTHROM AB SERPL-ACNC: 11.3 SEC — SIGNIFICANT CHANGE UP (ref 9.5–13)
PROTHROM AB SERPL-ACNC: 11.6 SEC — SIGNIFICANT CHANGE UP (ref 9.5–13)
PROTHROM AB SERPL-ACNC: 11.6 SEC — SIGNIFICANT CHANGE UP (ref 9.5–13)
RBC # BLD: 3 M/UL — LOW (ref 3.8–5.2)
RBC # BLD: 3 M/UL — LOW (ref 3.8–5.2)
RBC # FLD: 16 % — HIGH (ref 10.3–14.5)
RBC # FLD: 16 % — HIGH (ref 10.3–14.5)
SODIUM SERPL-SCNC: 143 MMOL/L — SIGNIFICANT CHANGE UP (ref 135–145)
SODIUM SERPL-SCNC: 143 MMOL/L — SIGNIFICANT CHANGE UP (ref 135–145)
WBC # BLD: 7.7 K/UL — SIGNIFICANT CHANGE UP (ref 3.8–10.5)
WBC # BLD: 7.7 K/UL — SIGNIFICANT CHANGE UP (ref 3.8–10.5)
WBC # FLD AUTO: 7.7 K/UL — SIGNIFICANT CHANGE UP (ref 3.8–10.5)
WBC # FLD AUTO: 7.7 K/UL — SIGNIFICANT CHANGE UP (ref 3.8–10.5)

## 2023-11-24 PROCEDURE — 82378 CARCINOEMBRYONIC ANTIGEN: CPT

## 2023-11-24 PROCEDURE — 70450 CT HEAD/BRAIN W/O DYE: CPT | Mod: 26,MA

## 2023-11-24 PROCEDURE — 99285 EMERGENCY DEPT VISIT HI MDM: CPT | Mod: FS

## 2023-11-24 PROCEDURE — 80048 BASIC METABOLIC PNL TOTAL CA: CPT

## 2023-11-24 PROCEDURE — 83540 ASSAY OF IRON: CPT

## 2023-11-24 PROCEDURE — 20220 BONE BIOPSY TROCAR/NDL SUPFC: CPT

## 2023-11-24 PROCEDURE — 86300 IMMUNOASSAY TUMOR CA 15-3: CPT

## 2023-11-24 PROCEDURE — 85610 PROTHROMBIN TIME: CPT

## 2023-11-24 PROCEDURE — 82728 ASSAY OF FERRITIN: CPT

## 2023-11-24 PROCEDURE — 76000 FLUOROSCOPY <1 HR PHYS/QHP: CPT

## 2023-11-24 PROCEDURE — 83550 IRON BINDING TEST: CPT

## 2023-11-24 PROCEDURE — 84100 ASSAY OF PHOSPHORUS: CPT

## 2023-11-24 PROCEDURE — 92526 ORAL FUNCTION THERAPY: CPT | Mod: GN

## 2023-11-24 PROCEDURE — 84443 ASSAY THYROID STIM HORMONE: CPT

## 2023-11-24 PROCEDURE — 86901 BLOOD TYPING SEROLOGIC RH(D): CPT

## 2023-11-24 PROCEDURE — 86334 IMMUNOFIX E-PHORESIS SERUM: CPT

## 2023-11-24 PROCEDURE — 97163 PT EVAL HIGH COMPLEX 45 MIN: CPT | Mod: GP

## 2023-11-24 PROCEDURE — 84080 ASSAY ALKALINE PHOSPHATASES: CPT

## 2023-11-24 PROCEDURE — 36430 TRANSFUSION BLD/BLD COMPNT: CPT

## 2023-11-24 PROCEDURE — 72158 MRI LUMBAR SPINE W/O & W/DYE: CPT | Mod: MA

## 2023-11-24 PROCEDURE — 94640 AIRWAY INHALATION TREATMENT: CPT

## 2023-11-24 PROCEDURE — 88311 DECALCIFY TISSUE: CPT

## 2023-11-24 PROCEDURE — C1713: CPT

## 2023-11-24 PROCEDURE — 88307 TISSUE EXAM BY PATHOLOGIST: CPT

## 2023-11-24 PROCEDURE — 86891 AUTOLOGOUS BLOOD OP SALVAGE: CPT

## 2023-11-24 PROCEDURE — 82962 GLUCOSE BLOOD TEST: CPT

## 2023-11-24 PROCEDURE — 92523 SPEECH SOUND LANG COMPREHEN: CPT | Mod: GN

## 2023-11-24 PROCEDURE — 92610 EVALUATE SWALLOWING FUNCTION: CPT | Mod: GN

## 2023-11-24 PROCEDURE — 76942 ECHO GUIDE FOR BIOPSY: CPT

## 2023-11-24 PROCEDURE — P9016: CPT

## 2023-11-24 PROCEDURE — 71045 X-RAY EXAM CHEST 1 VIEW: CPT

## 2023-11-24 PROCEDURE — 83735 ASSAY OF MAGNESIUM: CPT

## 2023-11-24 PROCEDURE — 86900 BLOOD TYPING SEROLOGIC ABO: CPT

## 2023-11-24 PROCEDURE — 72070 X-RAY EXAM THORAC SPINE 2VWS: CPT

## 2023-11-24 PROCEDURE — 81001 URINALYSIS AUTO W/SCOPE: CPT

## 2023-11-24 PROCEDURE — 88341 IMHCHEM/IMCYTCHM EA ADD ANTB: CPT

## 2023-11-24 PROCEDURE — 97162 PT EVAL MOD COMPLEX 30 MIN: CPT | Mod: GP

## 2023-11-24 PROCEDURE — 85730 THROMBOPLASTIN TIME PARTIAL: CPT

## 2023-11-24 PROCEDURE — 72100 X-RAY EXAM L-S SPINE 2/3 VWS: CPT

## 2023-11-24 PROCEDURE — 80053 COMPREHEN METABOLIC PANEL: CPT

## 2023-11-24 PROCEDURE — 88342 IMHCHEM/IMCYTCHM 1ST ANTB: CPT

## 2023-11-24 PROCEDURE — 97116 GAIT TRAINING THERAPY: CPT | Mod: GP

## 2023-11-24 PROCEDURE — 84155 ASSAY OF PROTEIN SERUM: CPT

## 2023-11-24 PROCEDURE — 92507 TX SP LANG VOICE COMM INDIV: CPT | Mod: GN

## 2023-11-24 PROCEDURE — 72156 MRI NECK SPINE W/O & W/DYE: CPT | Mod: MA

## 2023-11-24 PROCEDURE — 93010 ELECTROCARDIOGRAM REPORT: CPT

## 2023-11-24 PROCEDURE — 86301 IMMUNOASSAY TUMOR CA 19-9: CPT

## 2023-11-24 PROCEDURE — 97530 THERAPEUTIC ACTIVITIES: CPT | Mod: GP

## 2023-11-24 PROCEDURE — 36415 COLL VENOUS BLD VENIPUNCTURE: CPT

## 2023-11-24 PROCEDURE — 85025 COMPLETE CBC W/AUTO DIFF WBC: CPT

## 2023-11-24 PROCEDURE — 86923 COMPATIBILITY TEST ELECTRIC: CPT

## 2023-11-24 PROCEDURE — 88172 CYTP DX EVAL FNA 1ST EA SITE: CPT

## 2023-11-24 PROCEDURE — 87086 URINE CULTURE/COLONY COUNT: CPT

## 2023-11-24 PROCEDURE — C1889: CPT

## 2023-11-24 PROCEDURE — 76536 US EXAM OF HEAD AND NECK: CPT

## 2023-11-24 PROCEDURE — 70553 MRI BRAIN STEM W/O & W/DYE: CPT

## 2023-11-24 PROCEDURE — 97110 THERAPEUTIC EXERCISES: CPT | Mod: GP

## 2023-11-24 PROCEDURE — 86850 RBC ANTIBODY SCREEN: CPT

## 2023-11-24 PROCEDURE — A9579: CPT

## 2023-11-24 PROCEDURE — 93005 ELECTROCARDIOGRAM TRACING: CPT

## 2023-11-24 PROCEDURE — 72157 MRI CHEST SPINE W/O & W/DYE: CPT | Mod: MA

## 2023-11-24 PROCEDURE — 94760 N-INVAS EAR/PLS OXIMETRY 1: CPT

## 2023-11-24 PROCEDURE — 88305 TISSUE EXAM BY PATHOLOGIST: CPT

## 2023-11-24 PROCEDURE — 85027 COMPLETE CBC AUTOMATED: CPT

## 2023-11-24 RX ORDER — ACETAMINOPHEN 500 MG
650 TABLET ORAL EVERY 6 HOURS
Refills: 0 | Status: DISCONTINUED | OUTPATIENT
Start: 2023-11-24 | End: 2023-12-16

## 2023-11-24 NOTE — ED ADULT TRIAGE NOTE - GLASGOW COMA SCALE: SCORE, MLM
“You can access the Follow[employer] Patient Portal, offered by [facility], by registering with the following website: [email or web address]”
15

## 2023-11-24 NOTE — ED PROVIDER NOTE - NS ED ATTENDING STATEMENT MOD
This was a shared visit with the GWENDOLYN. I reviewed and verified the documentation and independently performed the documented:

## 2023-11-24 NOTE — ED ADULT TRIAGE NOTE - CHIEF COMPLAINT QUOTE
[facility] facility for abnormal ct. pt c/o back pain. denies cp/sob/n/v/d/fever/chills. pt seen for iron infusion. pmh: cadiac defibrillator, hypotension, anemia. pt poor historian.

## 2023-11-24 NOTE — ED PROVIDER NOTE - OBJECTIVE STATEMENT
82 y/o F with a PMhx of anemia presents to the ED had op w/u for wors anemia with weakness. CT and litic legions in spine with epidural extension inc weakness, difficulty ambulating, and PCP sent pt to ED for further evaluation. Pt reports weight loss over months denies any sort of active bleeding no blood in stool. Dneies CP, or sob. c/o lower back pain. Some numbness and tingling to LLE. Denies bowl or bladder incontinece or numbness consistent with saddle anesthesia. 84 y/o F with a PMhx of anemia, GERD, HTN, HLD, mood disorder, gastric bypass, and cholecystectomy presents to the ED SIB MD c/o weakness. The pt had outpatient w/u for worsening anemia with weakness. CT and XR showing lytic legions in spine with epidural extensions leading to increased weakness and difficulty ambulating. PCP sent pt to ED for further evaluation. Pt reports weight loss over months. Denies any sort of active bleeding no blood in stool. Denies CP, or SOB. C/o lower back pain. Some numbness and tingling to LLE. Denies bowl or bladder incontinence. Denies numbness consistent with saddle anesthesia.

## 2023-11-24 NOTE — ED PROVIDER NOTE - CLINICAL SUMMARY MEDICAL DECISION MAKING FREE TEXT BOX
Patient with back pain times months with recent imaging showing lytic lesions will pan scan for cancer workup patient also with failure to thrive will check labs plan for admission for possible placement. Patient with back pain times months with recent imaging showing lytic lesions and fractures; will consult spine. patient also with failure to thrive will check labs plan for admission for possible placement.

## 2023-11-24 NOTE — ED PROVIDER NOTE - PHYSICAL EXAMINATION
GEN - NAD; Chronically ill-appearing  HEAD - NC/AT    EYES - EOMI, , no scleral icterus  ENT -   mucous membranes  Dry no discharge  PULM - CTA b/l,  symmetric breath sounds  COR -  RRR, S1 S2, no murmurs  ABD - ND, NT, soft, no guarding, no rebound, no masses    EXTREMS -no edema, no deformity, warm and well perfused   SKIN -Pale appearing  NEUROLOGIC - alert, sensation nl, motor 5/5 RUE/LUE/RLE/LLE

## 2023-11-24 NOTE — ED PROVIDER NOTE - PROGRESS NOTE DETAILS
Claudia Lee for attending Dr. Briceño: case d/w Dr. Ceballos of spine. Will have resident come see pt for spine evaluation. Ortho resident evaluated pt in the ED.  Ordered MRI of cervical, thoracic and lumbar spine for further evaluation.  Hgb 9.2, Hct 29.7.  Albumin 3.1, Alk phos 131, otherwise labs normal.  Discussed case with Dr. Chapin for medical admission.  Will get Head CT.  Zaida Milton PA-C

## 2023-11-24 NOTE — CONSULT NOTE ADULT - SUBJECTIVE AND OBJECTIVE BOX
Patient is a 83y Female who presents c/o chronic lower back pain. Orthopedics was consulted secondary to abnormal CT findings of lytic lesions along the spine. Patient was accompanied by  to help elicit clinical history. Patient has been progressively decreasing in functional status for the past year. Patient denies any other pain elsewhere. Patient has been using a walker to walk for the past two months, but as of last week, patient has been only able to stand with assistance without ability to walk. Patient's history is unclear, but endorses recent diagnosis of Parkinsons, along with chronic peripheral neuropathy. Denies any HS/LOC. Denies pain/injury elsewhere. Denies any new numbness/tingling/paresthesias/weakness. Denies bowel/bladder incontinence. Denies fevers/chills. No other complaints at this time.    Allergies    Originally Entered as [Moderate Rash] reaction to [SURGICAL TAPE] (Unknown)  Vitamin K (Other (Severe))  Fosamax (Unknown)    Intolerances        PAST MEDICAL & SURGICAL HISTORY:  GERD (gastroesophageal reflux disease)    HTN (hypertension)    HLD (hyperlipidemia)    Mood disorder    H/O gastric bypass    History of cholecystectomy    History of total knee replacement, left                          9.2    7.70  )-----------( 225      ( 24 Nov 2023 19:54 )             29.7   PT/INR - ( 24 Nov 2023 19:54 )   PT: 11.6 sec;   INR: 1.03 ratio      PTT - ( 24 Nov 2023 19:54 )  PTT:27.4 sec      Vital Signs Last 24 Hrs  T(C): 36.5 (11-24-23 @ 18:03), Max: 36.5 (11-24-23 @ 18:03)  T(F): 97.7 (11-24-23 @ 18:03), Max: 97.7 (11-24-23 @ 18:03)  HR: 79 (11-24-23 @ 18:03) (79 - 79)  BP: 114/68 (11-24-23 @ 18:03) (114/68 - 114/68)  BP(mean): 80 (11-24-23 @ 18:03) (80 - 80)  RR: 18 (11-24-23 @ 18:03) (18 - 18)  SpO2: 96% (11-24-23 @ 18:03) (96% - 96%)    Physical Exam:  Gen: NAD  Spine:  Skin intact  No gross deformity  No midline TTP C/T/L/S spine  No bony step offs  No paraspinal muscle ttp/hypertonicity   Negative Straight leg raise  Negative clonus  Negative babinski  Negative argueta  + rectal tone  No saddle anesthesia    Motor:                   C5                C6              C7               C8           T1   R            5/5                5/5            5/5             5/5          5/5  L             5/5               5/5             5/5             5/5          5/5                L2             L3             L4               L5            S1  R         4/5           4/5          4/5             3/5           3/5  L          4/5          4/5           4/5             3/5           3/5    Sensory:            C5         C6         C7      C8       T1        (0=absent, 1=impaired, 2=normal, NT=not testable)  R         2            2           2        2         2  L          2            2           2        2         2               L2          L3         L4      L5       S1         (0=absent, 1=impaired, 2=normal, NT=not testable)  R         2            1           1       1        1  L          2            1           1       1         1    Imaging:   CT scan reveals multiple lytic osseous lesions highly suspicious of metastatic disease. Lesions in L5 and T11 have epidural extension. Pathological fracture of T11. Multiple hepatic lesions seen.     A/P: 83y Female with lower back pain  MR C/T/L Spine ordered  Tumor workup  Pain control  WBAT with assistive devices as needed  FU Labs/imaging  SCDs  Plan to be discussed with Dr. Ceballos, will adjust accordingly Patient is a 83y Female who presents c/o chronic lower back pain. Orthopedics was consulted secondary to abnormal CT findings of lytic lesions along the spine. Patient was accompanied by  to help elicit clinical history. Patient has been progressively decreasing in functional status for the past year. Patient denies any other pain elsewhere. Patient has been using a walker to walk for the past two months, but as of last week, patient has been only able to stand with assistance without ability to walk. Patient's history is unclear, but endorses recent diagnosis of Parkinsons, along with chronic peripheral neuropathy. Denies any HS/LOC. Denies pain/injury elsewhere. Denies any new numbness/tingling/paresthesias/weakness. Denies bowel/bladder incontinence. Denies fevers/chills. No other complaints at this time.    Allergies    Originally Entered as [Moderate Rash] reaction to [SURGICAL TAPE] (Unknown)  Vitamin K (Other (Severe))  Fosamax (Unknown)    Intolerances        PAST MEDICAL & SURGICAL HISTORY:  GERD (gastroesophageal reflux disease)    HTN (hypertension)    HLD (hyperlipidemia)    Mood disorder    H/O gastric bypass    History of cholecystectomy    History of total knee replacement, left                          9.2    7.70  )-----------( 225      ( 24 Nov 2023 19:54 )             29.7   PT/INR - ( 24 Nov 2023 19:54 )   PT: 11.6 sec;   INR: 1.03 ratio      PTT - ( 24 Nov 2023 19:54 )  PTT:27.4 sec      Vital Signs Last 24 Hrs  T(C): 36.5 (11-24-23 @ 18:03), Max: 36.5 (11-24-23 @ 18:03)  T(F): 97.7 (11-24-23 @ 18:03), Max: 97.7 (11-24-23 @ 18:03)  HR: 79 (11-24-23 @ 18:03) (79 - 79)  BP: 114/68 (11-24-23 @ 18:03) (114/68 - 114/68)  BP(mean): 80 (11-24-23 @ 18:03) (80 - 80)  RR: 18 (11-24-23 @ 18:03) (18 - 18)  SpO2: 96% (11-24-23 @ 18:03) (96% - 96%)    Physical Exam:  Gen: NAD  Spine:  Skin intact  No gross deformity  No midline TTP C/T/L/S spine  No bony step offs  No paraspinal muscle ttp/hypertonicity   Negative Straight leg raise  Negative clonus  Negative babinski  Negative argueta  + rectal tone  No saddle anesthesia    Motor:                   C5                C6              C7               C8           T1   R            5/5                5/5            5/5             5/5          5/5  L             5/5               5/5             5/5             5/5          5/5                L2             L3             L4               L5            S1  R         4/5           4/5          4/5             3/5           3/5  L          4/5          4/5           4/5             3/5           3/5    Sensory:            C5         C6         C7      C8       T1        (0=absent, 1=impaired, 2=normal, NT=not testable)  R         2            2           2        2         2  L          2            2           2        2         2               L2          L3         L4      L5       S1         (0=absent, 1=impaired, 2=normal, NT=not testable)  R         2            1           1       1        1  L          2            1           1       1         1    Imaging:   CT scan reveals multiple lytic osseous lesions highly suspicious of metastatic disease. Lesions in L5 and T11 have epidural extension. Pathological fracture of T11. Multiple hepatic lesions seen.     A/P: 83y Female with lower back pain  MR C/T/L Spine ordered  Tumor workup  Pain control  WBAT with assistive devices as needed, Recommend TLSO brace  Spine precautions  FU Labs/imaging  SCDs  Plan discussed with Dr. Ceballos, who agrees with above Patient is a 83y Female who presents c/o chronic lower back pain. Orthopedics was consulted secondary to abnormal CT findings of lytic lesions along the spine. Patient was accompanied by  to help elicit clinical history. Patient has been progressively decreasing in functional status for the past year. Patient denies any other pain elsewhere. Patient has been using a walker to walk for the past two months, but as of last week, patient has been only able to stand with assistance without ability to walk. Patient's history is unclear, but endorses recent diagnosis of Parkinsons, along with chronic peripheral neuropathy. Denies any HS/LOC. Denies pain/injury elsewhere. Denies any new numbness/tingling/paresthesias/weakness. Denies bowel/bladder incontinence. Denies fevers/chills. No other complaints at this time.    Allergies    Originally Entered as [Moderate Rash] reaction to [SURGICAL TAPE] (Unknown)  Vitamin K (Other (Severe))  Fosamax (Unknown)    Intolerances        PAST MEDICAL & SURGICAL HISTORY:  GERD (gastroesophageal reflux disease)    HTN (hypertension)    HLD (hyperlipidemia)    Mood disorder    H/O gastric bypass    History of cholecystectomy    History of total knee replacement, left                          9.2    7.70  )-----------( 225      ( 24 Nov 2023 19:54 )             29.7   PT/INR - ( 24 Nov 2023 19:54 )   PT: 11.6 sec;   INR: 1.03 ratio      PTT - ( 24 Nov 2023 19:54 )  PTT:27.4 sec      Vital Signs Last 24 Hrs  T(C): 36.5 (11-24-23 @ 18:03), Max: 36.5 (11-24-23 @ 18:03)  T(F): 97.7 (11-24-23 @ 18:03), Max: 97.7 (11-24-23 @ 18:03)  HR: 79 (11-24-23 @ 18:03) (79 - 79)  BP: 114/68 (11-24-23 @ 18:03) (114/68 - 114/68)  BP(mean): 80 (11-24-23 @ 18:03) (80 - 80)  RR: 18 (11-24-23 @ 18:03) (18 - 18)  SpO2: 96% (11-24-23 @ 18:03) (96% - 96%)    Physical Exam:  Gen: NAD  Spine:  Skin intact  No gross deformity  No midline TTP C/T/L/S spine  No bony step offs  No paraspinal muscle ttp/hypertonicity   Negative Straight leg raise  Negative clonus  Negative babinski  Negative argueta  + rectal tone  No saddle anesthesia    Motor:                   C5                C6              C7               C8           T1   R            5/5                5/5            5/5             5/5          5/5  L             5/5               5/5             5/5             5/5          5/5                L2             L3             L4               L5            S1  R         4/5           4/5          4/5             3/5           4/5  L          4/5          4/5           4/5             3/5           4/5    Sensory:            C5         C6         C7      C8       T1        (0=absent, 1=impaired, 2=normal, NT=not testable)  R         2            2           2        2         2  L          2            2           2        2         2               L2          L3         L4      L5       S1         (0=absent, 1=impaired, 2=normal, NT=not testable)  R         2            1           1       1        1  L          2            1           1       1         1    Imaging:   CT scan reveals multiple lytic osseous lesions highly suspicious of metastatic disease. Lesions in L5 and T11 have epidural extension. Pathological fracture of T11. Multiple hepatic lesions seen.     A/P: 83y Female with lower back pain  MR C/T/L Spine ordered  Tumor workup  Pain control  WBAT with assistive devices as needed, Recommend TLSO brace  Spine precautions  FU Labs/imaging  SCDs  Plan discussed with Dr. Ceballos, who agrees with above

## 2023-11-24 NOTE — ED PROVIDER NOTE - CARE PLAN
Principal Discharge DX:	Adult failure to thrive  Secondary Diagnosis:	Back pain  Secondary Diagnosis:	Pain from bone metastases  Secondary Diagnosis:	Anemia  Secondary Diagnosis:	Weakness  Secondary Diagnosis:	Unable to ambulate   1

## 2023-11-25 DIAGNOSIS — R62.7 ADULT FAILURE TO THRIVE: ICD-10-CM

## 2023-11-25 LAB
ALBUMIN SERPL ELPH-MCNC: 2.8 G/DL — LOW (ref 3.3–5)
ALBUMIN SERPL ELPH-MCNC: 2.8 G/DL — LOW (ref 3.3–5)
ALP SERPL-CCNC: 123 U/L — HIGH (ref 40–120)
ALP SERPL-CCNC: 123 U/L — HIGH (ref 40–120)
ALT FLD-CCNC: <6 U/L — LOW (ref 12–78)
ALT FLD-CCNC: <6 U/L — LOW (ref 12–78)
ANION GAP SERPL CALC-SCNC: 7 MMOL/L — SIGNIFICANT CHANGE UP (ref 5–17)
ANION GAP SERPL CALC-SCNC: 7 MMOL/L — SIGNIFICANT CHANGE UP (ref 5–17)
APPEARANCE UR: ABNORMAL
APPEARANCE UR: ABNORMAL
APTT BLD: 31.3 SEC — SIGNIFICANT CHANGE UP (ref 24.5–35.6)
APTT BLD: 31.3 SEC — SIGNIFICANT CHANGE UP (ref 24.5–35.6)
AST SERPL-CCNC: 20 U/L — SIGNIFICANT CHANGE UP (ref 15–37)
AST SERPL-CCNC: 20 U/L — SIGNIFICANT CHANGE UP (ref 15–37)
BACTERIA # UR AUTO: ABNORMAL /HPF
BACTERIA # UR AUTO: ABNORMAL /HPF
BASOPHILS # BLD AUTO: 0.02 K/UL — SIGNIFICANT CHANGE UP (ref 0–0.2)
BASOPHILS # BLD AUTO: 0.02 K/UL — SIGNIFICANT CHANGE UP (ref 0–0.2)
BASOPHILS NFR BLD AUTO: 0.3 % — SIGNIFICANT CHANGE UP (ref 0–2)
BASOPHILS NFR BLD AUTO: 0.3 % — SIGNIFICANT CHANGE UP (ref 0–2)
BILIRUB SERPL-MCNC: 0.6 MG/DL — SIGNIFICANT CHANGE UP (ref 0.2–1.2)
BILIRUB SERPL-MCNC: 0.6 MG/DL — SIGNIFICANT CHANGE UP (ref 0.2–1.2)
BILIRUB UR-MCNC: NEGATIVE — SIGNIFICANT CHANGE UP
BILIRUB UR-MCNC: NEGATIVE — SIGNIFICANT CHANGE UP
BUN SERPL-MCNC: 20 MG/DL — SIGNIFICANT CHANGE UP (ref 7–23)
BUN SERPL-MCNC: 20 MG/DL — SIGNIFICANT CHANGE UP (ref 7–23)
CALCIUM SERPL-MCNC: 9.4 MG/DL — SIGNIFICANT CHANGE UP (ref 8.5–10.1)
CALCIUM SERPL-MCNC: 9.4 MG/DL — SIGNIFICANT CHANGE UP (ref 8.5–10.1)
CAST: 2 /LPF — SIGNIFICANT CHANGE UP (ref 0–4)
CAST: 2 /LPF — SIGNIFICANT CHANGE UP (ref 0–4)
CHLORIDE SERPL-SCNC: 108 MMOL/L — SIGNIFICANT CHANGE UP (ref 96–108)
CHLORIDE SERPL-SCNC: 108 MMOL/L — SIGNIFICANT CHANGE UP (ref 96–108)
CO2 SERPL-SCNC: 28 MMOL/L — SIGNIFICANT CHANGE UP (ref 22–31)
CO2 SERPL-SCNC: 28 MMOL/L — SIGNIFICANT CHANGE UP (ref 22–31)
COLOR SPEC: YELLOW — SIGNIFICANT CHANGE UP
COLOR SPEC: YELLOW — SIGNIFICANT CHANGE UP
CREAT SERPL-MCNC: 0.86 MG/DL — SIGNIFICANT CHANGE UP (ref 0.5–1.3)
CREAT SERPL-MCNC: 0.86 MG/DL — SIGNIFICANT CHANGE UP (ref 0.5–1.3)
DIFF PNL FLD: ABNORMAL
DIFF PNL FLD: ABNORMAL
EGFR: 68 ML/MIN/1.73M2 — SIGNIFICANT CHANGE UP
EGFR: 68 ML/MIN/1.73M2 — SIGNIFICANT CHANGE UP
EOSINOPHIL # BLD AUTO: 0.06 K/UL — SIGNIFICANT CHANGE UP (ref 0–0.5)
EOSINOPHIL # BLD AUTO: 0.06 K/UL — SIGNIFICANT CHANGE UP (ref 0–0.5)
EOSINOPHIL NFR BLD AUTO: 0.8 % — SIGNIFICANT CHANGE UP (ref 0–6)
EOSINOPHIL NFR BLD AUTO: 0.8 % — SIGNIFICANT CHANGE UP (ref 0–6)
GLUCOSE SERPL-MCNC: 78 MG/DL — SIGNIFICANT CHANGE UP (ref 70–99)
GLUCOSE SERPL-MCNC: 78 MG/DL — SIGNIFICANT CHANGE UP (ref 70–99)
GLUCOSE UR QL: NEGATIVE MG/DL — SIGNIFICANT CHANGE UP
GLUCOSE UR QL: NEGATIVE MG/DL — SIGNIFICANT CHANGE UP
HCT VFR BLD CALC: 26.9 % — LOW (ref 34.5–45)
HCT VFR BLD CALC: 26.9 % — LOW (ref 34.5–45)
HGB BLD-MCNC: 8.5 G/DL — LOW (ref 11.5–15.5)
HGB BLD-MCNC: 8.5 G/DL — LOW (ref 11.5–15.5)
IMM GRANULOCYTES NFR BLD AUTO: 0.9 % — SIGNIFICANT CHANGE UP (ref 0–0.9)
IMM GRANULOCYTES NFR BLD AUTO: 0.9 % — SIGNIFICANT CHANGE UP (ref 0–0.9)
INR BLD: 1.02 RATIO — SIGNIFICANT CHANGE UP (ref 0.85–1.18)
INR BLD: 1.02 RATIO — SIGNIFICANT CHANGE UP (ref 0.85–1.18)
KETONES UR-MCNC: ABNORMAL MG/DL
KETONES UR-MCNC: ABNORMAL MG/DL
LEUKOCYTE ESTERASE UR-ACNC: ABNORMAL
LEUKOCYTE ESTERASE UR-ACNC: ABNORMAL
LYMPHOCYTES # BLD AUTO: 1.89 K/UL — SIGNIFICANT CHANGE UP (ref 1–3.3)
LYMPHOCYTES # BLD AUTO: 1.89 K/UL — SIGNIFICANT CHANGE UP (ref 1–3.3)
LYMPHOCYTES # BLD AUTO: 25 % — SIGNIFICANT CHANGE UP (ref 13–44)
LYMPHOCYTES # BLD AUTO: 25 % — SIGNIFICANT CHANGE UP (ref 13–44)
MCHC RBC-ENTMCNC: 30.7 PG — SIGNIFICANT CHANGE UP (ref 27–34)
MCHC RBC-ENTMCNC: 30.7 PG — SIGNIFICANT CHANGE UP (ref 27–34)
MCHC RBC-ENTMCNC: 31.6 GM/DL — LOW (ref 32–36)
MCHC RBC-ENTMCNC: 31.6 GM/DL — LOW (ref 32–36)
MCV RBC AUTO: 97.1 FL — SIGNIFICANT CHANGE UP (ref 80–100)
MCV RBC AUTO: 97.1 FL — SIGNIFICANT CHANGE UP (ref 80–100)
MONOCYTES # BLD AUTO: 0.57 K/UL — SIGNIFICANT CHANGE UP (ref 0–0.9)
MONOCYTES # BLD AUTO: 0.57 K/UL — SIGNIFICANT CHANGE UP (ref 0–0.9)
MONOCYTES NFR BLD AUTO: 7.5 % — SIGNIFICANT CHANGE UP (ref 2–14)
MONOCYTES NFR BLD AUTO: 7.5 % — SIGNIFICANT CHANGE UP (ref 2–14)
NEUTROPHILS # BLD AUTO: 4.95 K/UL — SIGNIFICANT CHANGE UP (ref 1.8–7.4)
NEUTROPHILS # BLD AUTO: 4.95 K/UL — SIGNIFICANT CHANGE UP (ref 1.8–7.4)
NEUTROPHILS NFR BLD AUTO: 65.5 % — SIGNIFICANT CHANGE UP (ref 43–77)
NEUTROPHILS NFR BLD AUTO: 65.5 % — SIGNIFICANT CHANGE UP (ref 43–77)
NITRITE UR-MCNC: NEGATIVE — SIGNIFICANT CHANGE UP
NITRITE UR-MCNC: NEGATIVE — SIGNIFICANT CHANGE UP
PH UR: 6.5 — SIGNIFICANT CHANGE UP (ref 5–8)
PH UR: 6.5 — SIGNIFICANT CHANGE UP (ref 5–8)
PLATELET # BLD AUTO: 226 K/UL — SIGNIFICANT CHANGE UP (ref 150–400)
PLATELET # BLD AUTO: 226 K/UL — SIGNIFICANT CHANGE UP (ref 150–400)
POTASSIUM SERPL-MCNC: 3.7 MMOL/L — SIGNIFICANT CHANGE UP (ref 3.5–5.3)
POTASSIUM SERPL-MCNC: 3.7 MMOL/L — SIGNIFICANT CHANGE UP (ref 3.5–5.3)
POTASSIUM SERPL-SCNC: 3.7 MMOL/L — SIGNIFICANT CHANGE UP (ref 3.5–5.3)
POTASSIUM SERPL-SCNC: 3.7 MMOL/L — SIGNIFICANT CHANGE UP (ref 3.5–5.3)
PROT SERPL-MCNC: 6 GM/DL — SIGNIFICANT CHANGE UP (ref 6–8.3)
PROT SERPL-MCNC: 6 GM/DL — SIGNIFICANT CHANGE UP (ref 6–8.3)
PROT SERPL-MCNC: 6.1 G/DL — SIGNIFICANT CHANGE UP (ref 6–8.3)
PROT SERPL-MCNC: 6.1 G/DL — SIGNIFICANT CHANGE UP (ref 6–8.3)
PROT UR-MCNC: SIGNIFICANT CHANGE UP MG/DL
PROT UR-MCNC: SIGNIFICANT CHANGE UP MG/DL
PROTHROM AB SERPL-ACNC: 11.5 SEC — SIGNIFICANT CHANGE UP (ref 9.5–13)
PROTHROM AB SERPL-ACNC: 11.5 SEC — SIGNIFICANT CHANGE UP (ref 9.5–13)
RBC # BLD: 2.77 M/UL — LOW (ref 3.8–5.2)
RBC # BLD: 2.77 M/UL — LOW (ref 3.8–5.2)
RBC # FLD: 15.6 % — HIGH (ref 10.3–14.5)
RBC # FLD: 15.6 % — HIGH (ref 10.3–14.5)
RBC CASTS # UR COMP ASSIST: 15 /HPF — HIGH (ref 0–4)
RBC CASTS # UR COMP ASSIST: 15 /HPF — HIGH (ref 0–4)
SODIUM SERPL-SCNC: 143 MMOL/L — SIGNIFICANT CHANGE UP (ref 135–145)
SODIUM SERPL-SCNC: 143 MMOL/L — SIGNIFICANT CHANGE UP (ref 135–145)
SP GR SPEC: 1.02 — SIGNIFICANT CHANGE UP (ref 1–1.03)
SP GR SPEC: 1.02 — SIGNIFICANT CHANGE UP (ref 1–1.03)
SQUAMOUS # UR AUTO: >36 /HPF — HIGH (ref 0–5)
SQUAMOUS # UR AUTO: >36 /HPF — HIGH (ref 0–5)
UROBILINOGEN FLD QL: 1 MG/DL — SIGNIFICANT CHANGE UP (ref 0.2–1)
UROBILINOGEN FLD QL: 1 MG/DL — SIGNIFICANT CHANGE UP (ref 0.2–1)
WBC # BLD: 7.56 K/UL — SIGNIFICANT CHANGE UP (ref 3.8–10.5)
WBC # BLD: 7.56 K/UL — SIGNIFICANT CHANGE UP (ref 3.8–10.5)
WBC # FLD AUTO: 7.56 K/UL — SIGNIFICANT CHANGE UP (ref 3.8–10.5)
WBC # FLD AUTO: 7.56 K/UL — SIGNIFICANT CHANGE UP (ref 3.8–10.5)
WBC UR QL: 202 /HPF — HIGH (ref 0–5)
WBC UR QL: 202 /HPF — HIGH (ref 0–5)

## 2023-11-25 PROCEDURE — 72156 MRI NECK SPINE W/O & W/DYE: CPT | Mod: 26

## 2023-11-25 PROCEDURE — 99223 1ST HOSP IP/OBS HIGH 75: CPT

## 2023-11-25 PROCEDURE — 72158 MRI LUMBAR SPINE W/O & W/DYE: CPT | Mod: 26

## 2023-11-25 PROCEDURE — 72157 MRI CHEST SPINE W/O & W/DYE: CPT | Mod: 26

## 2023-11-25 RX ORDER — OMEPRAZOLE 10 MG/1
1 CAPSULE, DELAYED RELEASE ORAL
Refills: 0 | DISCHARGE

## 2023-11-25 RX ORDER — METOPROLOL TARTRATE 50 MG
1 TABLET ORAL
Refills: 0 | DISCHARGE

## 2023-11-25 RX ORDER — CEFTRIAXONE 500 MG/1
1000 INJECTION, POWDER, FOR SOLUTION INTRAMUSCULAR; INTRAVENOUS EVERY 24 HOURS
Refills: 0 | Status: DISCONTINUED | OUTPATIENT
Start: 2023-11-25 | End: 2023-11-26

## 2023-11-25 RX ORDER — BUPROPION HYDROCHLORIDE 150 MG/1
1 TABLET, EXTENDED RELEASE ORAL
Qty: 0 | Refills: 0 | DISCHARGE

## 2023-11-25 RX ORDER — FOLIC ACID 0.8 MG
1 TABLET ORAL
Qty: 0 | Refills: 0 | DISCHARGE

## 2023-11-25 RX ORDER — FERROUS SULFATE 325(65) MG
325 TABLET ORAL DAILY
Refills: 0 | Status: DISCONTINUED | OUTPATIENT
Start: 2023-11-25 | End: 2023-12-16

## 2023-11-25 RX ORDER — LEVOTHYROXINE SODIUM 125 MCG
50 TABLET ORAL DAILY
Refills: 0 | Status: DISCONTINUED | OUTPATIENT
Start: 2023-11-25 | End: 2023-12-16

## 2023-11-25 RX ORDER — FERROUS SULFATE 325(65) MG
1 TABLET ORAL
Refills: 0 | DISCHARGE

## 2023-11-25 RX ORDER — TOLTERODINE TARTRATE 1 MG/1
2 TABLET, FILM COATED ORAL
Qty: 0 | Refills: 0 | DISCHARGE

## 2023-11-25 RX ORDER — ATORVASTATIN CALCIUM 80 MG/1
40 TABLET, FILM COATED ORAL AT BEDTIME
Refills: 0 | Status: DISCONTINUED | OUTPATIENT
Start: 2023-11-25 | End: 2023-12-16

## 2023-11-25 RX ORDER — DONEPEZIL HYDROCHLORIDE 10 MG/1
10 TABLET, FILM COATED ORAL AT BEDTIME
Refills: 0 | Status: DISCONTINUED | OUTPATIENT
Start: 2023-11-25 | End: 2023-12-16

## 2023-11-25 RX ORDER — VILAZODONE HYDROCHLORIDE 20 MG/1
1 TABLET, FILM COATED ORAL
Qty: 0 | Refills: 0 | DISCHARGE

## 2023-11-25 RX ORDER — BUPROPION HYDROCHLORIDE 150 MG/1
100 TABLET, EXTENDED RELEASE ORAL DAILY
Refills: 0 | Status: DISCONTINUED | OUTPATIENT
Start: 2023-11-25 | End: 2023-12-01

## 2023-11-25 RX ORDER — BREXPIPRAZOLE 0.25 MG/1
1 TABLET ORAL
Qty: 0 | Refills: 0 | DISCHARGE

## 2023-11-25 RX ORDER — PRAMIPEXOLE DIHYDROCHLORIDE 0.12 MG/1
0.12 TABLET ORAL AT BEDTIME
Refills: 0 | Status: DISCONTINUED | OUTPATIENT
Start: 2023-11-25 | End: 2023-12-16

## 2023-11-25 RX ORDER — LEVOTHYROXINE SODIUM 125 MCG
1 TABLET ORAL
Refills: 0 | DISCHARGE

## 2023-11-25 RX ORDER — VERAPAMIL HCL 240 MG
1 CAPSULE, EXTENDED RELEASE PELLETS 24 HR ORAL
Qty: 0 | Refills: 0 | DISCHARGE

## 2023-11-25 RX ORDER — CARBIDOPA AND LEVODOPA 25; 100 MG/1; MG/1
1 TABLET ORAL
Refills: 0 | DISCHARGE

## 2023-11-25 RX ORDER — METOPROLOL TARTRATE 50 MG
25 TABLET ORAL DAILY
Refills: 0 | Status: DISCONTINUED | OUTPATIENT
Start: 2023-11-25 | End: 2023-12-01

## 2023-11-25 RX ORDER — TRAZODONE HCL 50 MG
2 TABLET ORAL
Qty: 0 | Refills: 0 | DISCHARGE

## 2023-11-25 RX ORDER — OMEPRAZOLE 10 MG/1
1 CAPSULE, DELAYED RELEASE ORAL
Qty: 0 | Refills: 0 | DISCHARGE

## 2023-11-25 RX ORDER — VALSARTAN 80 MG/1
1 TABLET ORAL
Qty: 0 | Refills: 0 | DISCHARGE

## 2023-11-25 RX ORDER — ATORVASTATIN CALCIUM 80 MG/1
1 TABLET, FILM COATED ORAL
Refills: 0 | DISCHARGE

## 2023-11-25 RX ORDER — PANTOPRAZOLE SODIUM 20 MG/1
40 TABLET, DELAYED RELEASE ORAL
Refills: 0 | Status: DISCONTINUED | OUTPATIENT
Start: 2023-11-25 | End: 2023-12-16

## 2023-11-25 RX ORDER — CARBIDOPA AND LEVODOPA 25; 100 MG/1; MG/1
1 TABLET ORAL
Refills: 0 | Status: DISCONTINUED | OUTPATIENT
Start: 2023-11-25 | End: 2023-12-16

## 2023-11-25 RX ORDER — VERAPAMIL HCL 240 MG
240 CAPSULE, EXTENDED RELEASE PELLETS 24 HR ORAL DAILY
Refills: 0 | Status: DISCONTINUED | OUTPATIENT
Start: 2023-11-25 | End: 2023-12-01

## 2023-11-25 RX ORDER — BUPROPION HYDROCHLORIDE 150 MG/1
100 TABLET, EXTENDED RELEASE ORAL DAILY
Refills: 0 | Status: DISCONTINUED | OUTPATIENT
Start: 2023-11-25 | End: 2023-11-25

## 2023-11-25 RX ORDER — BUPROPION HYDROCHLORIDE 150 MG/1
1 TABLET, EXTENDED RELEASE ORAL
Refills: 0 | DISCHARGE

## 2023-11-25 RX ORDER — LOSARTAN POTASSIUM 100 MG/1
25 TABLET, FILM COATED ORAL DAILY
Refills: 0 | Status: DISCONTINUED | OUTPATIENT
Start: 2023-11-25 | End: 2023-11-30

## 2023-11-25 RX ORDER — ATORVASTATIN CALCIUM 80 MG/1
1 TABLET, FILM COATED ORAL
Qty: 0 | Refills: 0 | DISCHARGE

## 2023-11-25 RX ORDER — DONEPEZIL HYDROCHLORIDE 10 MG/1
1 TABLET, FILM COATED ORAL
Refills: 0 | DISCHARGE

## 2023-11-25 RX ORDER — INFLUENZA VIRUS VACCINE 15; 15; 15; 15 UG/.5ML; UG/.5ML; UG/.5ML; UG/.5ML
0.7 SUSPENSION INTRAMUSCULAR ONCE
Refills: 0 | Status: DISCONTINUED | OUTPATIENT
Start: 2023-11-25 | End: 2023-12-16

## 2023-11-25 RX ADMIN — Medication 325 MILLIGRAM(S): at 12:55

## 2023-11-25 RX ADMIN — CARBIDOPA AND LEVODOPA 1 TABLET(S): 25; 100 TABLET ORAL at 05:54

## 2023-11-25 RX ADMIN — Medication 25 MILLIGRAM(S): at 12:55

## 2023-11-25 RX ADMIN — LOSARTAN POTASSIUM 25 MILLIGRAM(S): 100 TABLET, FILM COATED ORAL at 12:55

## 2023-11-25 RX ADMIN — CEFTRIAXONE 1000 MILLIGRAM(S): 500 INJECTION, POWDER, FOR SOLUTION INTRAMUSCULAR; INTRAVENOUS at 22:49

## 2023-11-25 RX ADMIN — ATORVASTATIN CALCIUM 40 MILLIGRAM(S): 80 TABLET, FILM COATED ORAL at 22:49

## 2023-11-25 RX ADMIN — PANTOPRAZOLE SODIUM 40 MILLIGRAM(S): 20 TABLET, DELAYED RELEASE ORAL at 12:55

## 2023-11-25 RX ADMIN — CARBIDOPA AND LEVODOPA 1 TABLET(S): 25; 100 TABLET ORAL at 12:55

## 2023-11-25 RX ADMIN — PRAMIPEXOLE DIHYDROCHLORIDE 0.12 MILLIGRAM(S): 0.12 TABLET ORAL at 22:49

## 2023-11-25 RX ADMIN — DONEPEZIL HYDROCHLORIDE 10 MILLIGRAM(S): 10 TABLET, FILM COATED ORAL at 22:49

## 2023-11-25 RX ADMIN — Medication 650 MILLIGRAM(S): at 18:35

## 2023-11-25 RX ADMIN — CARBIDOPA AND LEVODOPA 1 TABLET(S): 25; 100 TABLET ORAL at 18:35

## 2023-11-25 RX ADMIN — Medication 50 MICROGRAM(S): at 05:54

## 2023-11-25 RX ADMIN — BUPROPION HYDROCHLORIDE 100 MILLIGRAM(S): 150 TABLET, EXTENDED RELEASE ORAL at 12:55

## 2023-11-25 NOTE — DIETITIAN INITIAL EVALUATION ADULT - PERTINENT LABORATORY DATA
11-25    143  |  108  |  20  ----------------------------<  78  3.7   |  28  |  0.86    Ca    9.4      25 Nov 2023 08:43  Phos  3.1     11-24    TPro  6.0  /  Alb  2.8<L>  /  TBili  0.6  /  DBili  x   /  AST  20  /  ALT  <6<L>  /  AlkPhos  123<H>  11-25

## 2023-11-25 NOTE — PROGRESS NOTE ADULT - SUBJECTIVE AND OBJECTIVE BOX
Chief Complaint: Back Pain    History: Patient admitted with back pain and leg weakness. According to patient her leg weakness started approximately a year ago but has gotten worse over the last 2 months. She underwent CT C/T/L/S Spine which demonstrated metastatic disease of the spine. No known primary. She completed MRI C/T/L/S Spine today.    OBJECTIVE:     Vital Signs Last 24 Hrs  T(C): 36.4 (25 Nov 2023 15:40), Max: 36.9 (25 Nov 2023 07:39)  T(F): 97.5 (25 Nov 2023 15:40), Max: 98.5 (25 Nov 2023 07:39)  HR: 75 (25 Nov 2023 15:40) (75 - 82)  BP: 105/52 (25 Nov 2023 15:40) (105/52 - 132/71)  BP(mean): 90 (25 Nov 2023 00:34) (80 - 90)  RR: 17 (25 Nov 2023 15:40) (14 - 18)  SpO2: 98% (25 Nov 2023 15:40) (96% - 100%)    Parameters below as of 25 Nov 2023 15:40  Patient On (Oxygen Delivery Method): room air        Physical Exam:         Awake and alert         HEENT - unremarkable         Neck - supple         Back: Tenderness TL region         Bilateral Upper Extremities:             Good Motor Strength             Sensation intact         Bilateral Lower Extremities             4/5 MS BLE             I&O's Detail      LABS:                        8.5    7.56  )-----------( 226      ( 25 Nov 2023 08:43 )             26.9     11-25    143  |  108  |  20  ----------------------------<  78  3.7   |  28  |  0.86    Ca    9.4      25 Nov 2023 08:43  Phos  3.1     11-24    TPro  6.0  /  Alb  2.8<L>  /  TBili  0.6  /  DBili  x   /  AST  20  /  ALT  <6<L>  /  AlkPhos  123<H>  11-25    PT/INR - ( 25 Nov 2023 08:43 )   PT: 11.5 sec;   INR: 1.02 ratio         PTT - ( 25 Nov 2023 08:43 )  PTT:31.3 sec      RADIOLOGY & ADDITIONAL STUDIES: CT/MRI C/T/L/S Spine - demonstrates diffuse metastatic disease with extensive involvement T11 which extends into the pedicle and spinal canal resulting in mild/moderate stenosis. Also significant involvement L5 vertebral body with epidural extension. Large lesion also noted in the sacrum      A/P :  83y Female with metastatic disease to the spine  -    Extended period of time was spent with the patient and the patient's  (at bedside) discussing her MRI findings and treatment options. We discussed palliative treatment as well as RT/chemo and surgery. Surgery would be extensive to address decompression and stabilization of the spine. Patient and patient's  are hesitant to consider surgery and/or other treatment options without a definitive tissue diagnosis. Therefore, will await CT guided biopsy by IR prior to considering any additional treatment. This was discussed with Dr Hills.  -   Continue pain management  -    Continue present treatment

## 2023-11-25 NOTE — CONSULT NOTE ADULT - ASSESSMENT
84 y/o F w/ PMH of HTN, Dyslipidemia, GERD, Barrette Esophagus, Parkinson's, Neuropathy, CRI, Loop recorder, IRINEO, followed outpatient, with complaints of progressively worsening B/L LE weakness, paresthesia, back pain, difficulty ambulating, and 40 lbs unintentional weight loss, was sent for CT-C/A/P-11/22/23 which revealed multiple bone mets and possible liver mets and path fracture of T11 vertebral body. She was sent by Hematologist to ED for further evaluation.     # Bone and Liver Metastasis    -progressively worsening B/L LE weakness, paresthesia, back pain, difficulty ambulating, and 40 lbs unintentional weight loss over a year.  -CT-C/A/P-11/22/23 which revealed multiple lytic osseous lesions highly suspicious for metastatic disease. Lesions in the L5 and T11 vertebral bodies have epidural extension.  Pathologic fracture T11 vertebral body. Some of the rib lesions demonstrate pathologic fractures. Multiple hepatic lesions are not characterized without intravenous contrast but nonetheless are suspicious for metastatic disease.  -CT Head-11/24/23-No acute intracranial hemorrhage or mass effect.  -MRI head & MRI Spine pending EP clearance for loop recorder compatibility  -Ortho following--> pending MRI spine  -Will need tissue Bx for diagnosis-->IR consult  -During out patient initial presentation with anemia, and renal insufficiency labs were sent to r/o MM- 6/2023- which were negative    # Normocytic anemia     - multifactorial with Hx iron deficiency, B12 deficiency, CRI, and anemia of chronic disease and now possibly due to malignancy.  - follows outpatient -->was advised IV Feraheme x 2 - received 1 infusion only and incidentally had diarrhea.  - B12 deficiency - Started on OTC methylcobalamin at least 1000mcg SL - Repeat level 10/3/23-at >2000.  - Anemia 2/2 mild CRI -->Plan to start YANET injections out patient, once iron stores repleted.  -Hgb- as of 11/25/23-8.5 g/dl  -monitor CBC  -Supportive measures; transfuse as needed if becomes symptomatic or hemodynamically unstable.   84 y/o F w/ PMH of HTN, Dyslipidemia, GERD, Barrette Esophagus, Parkinson's, Neuropathy, CRI, Loop recorder, IRINEO, followed outpatient, with complaints of progressively worsening B/L LE weakness, paresthesia, back pain, difficulty ambulating, and 40 lbs unintentional weight loss, was sent for CT-C/A/P-11/22/23 which revealed multiple bone mets and possible liver mets and path fracture of T11 vertebral body. She was sent by Hematologist to ED for further evaluation.     # Bone and Liver Metastasis    -progressively worsening B/L LE weakness, paresthesia, back pain, difficulty ambulating, and 40 lbs unintentional weight loss over a year.  -CT-C/A/P-11/22/23 which revealed multiple lytic osseous lesions highly suspicious for metastatic disease. Lesions in the L5 and T11 vertebral bodies have epidural extension.  Pathologic fracture T11 vertebral body. Some of the rib lesions demonstrate pathologic fractures. Multiple hepatic lesions are not characterized without intravenous contrast but nonetheless are suspicious for metastatic disease.  -CT Head-11/24/23-No acute intracranial hemorrhage or mass effect.  -MRI head & MRI Spine pending EP clearance for loop recorder compatibility  -Ortho following--> pending MRI spine  -Will need tissue Bx for diagnosis-->IR consult  -During out patient initial presentation with anemia, and renal insufficiency labs were sent to r/o MM- 6/2023- which were negative    # Normocytic anemia     - multifactorial with Hx iron deficiency, B12 deficiency, CRI, and anemia of chronic disease and now possibly due to malignancy.  - follows outpatient -->was advised IV Feraheme x 2 - received 1 infusion only on 10/9/23.  - B12 deficiency - Started on OTC methylcobalamin at least 1000mcg SL - Repeat level 10/3/23-at >2000.  - Anemia 2/2 mild CRI -->Plan to start YANET injections out patient, once iron stores repleted.  -Hgb- as of 11/25/23-8.5 g/dl  -monitor CBC  -Supportive measures; transfuse as needed if becomes symptomatic or hemodynamically unstable.   84 y/o F w/ PMH of HTN, Dyslipidemia, GERD, Barrette Esophagus, Parkinson's, Neuropathy, CRI, Loop recorder, IRINEO, followed outpatient, with complaints of progressively worsening B/L LE weakness, paresthesia, back pain, difficulty ambulating, and 40 lbs unintentional weight loss, was sent for CT-C/A/P-11/22/23 which revealed multiple bone mets and possible liver mets and path fracture of T11 vertebral body. She was sent by Hematologist to ED for further evaluation.     # Bone and Liver Metastasis    -progressively worsening B/L LE weakness, paresthesia, back pain, difficulty ambulating, and 40 lbs unintentional weight loss over a year.  -CT-C/A/P-11/22/23 which revealed multiple lytic osseous lesions highly suspicious for metastatic disease. Lesions in the L5 and T11 vertebral bodies have epidural extension.  Pathologic fracture T11 vertebral body. Some of the rib lesions demonstrate pathologic fractures. Multiple hepatic lesions are not characterized without intravenous contrast but nonetheless are suspicious for metastatic disease.  -CT Head without contrast-11/24/23-No acute intracranial hemorrhage or mass effect.  -MRI head & MRI Spine pending EP clearance for loop recorder compatibility  -Ortho following--> pending MRI spine  -Will send tumor markers- Ca- 19-9, CA-27-29, although non- diagnostic, help trending POD  -Will need tissue Bx for diagnosis-->IR consult  -During out patient initial presentation with anemia, and renal insufficiency labs were sent to r/o MM- 6/2023- which were negative    # Normocytic anemia     - multifactorial with Hx iron deficiency, B12 deficiency, CRI, and anemia of chronic disease and now possibly due to malignancy.  - follows outpatient -->was advised IV Feraheme x 2 - received 1 infusion only on 10/9/23.  - B12 deficiency - Started on OTC methylcobalamin at least 1000mcg SL - Repeat level 10/3/23-at >2000.  - Anemia 2/2 mild CRI -->Plan to start YANET injections out patient, once iron stores repleted.  -Hgb- as of 11/25/23-8.5 g/dl  -monitor CBC  -Supportive measures; transfuse as needed if becomes symptomatic or hemodynamically unstable.   82 y/o F w/ PMH of HTN, Dyslipidemia, GERD, Barrette Esophagus, Parkinson's, Neuropathy, CRI, Loop recorder, IRINEO, followed outpatient, with complaints of progressively worsening B/L LE weakness, paresthesia, back pain, difficulty ambulating, and 40 lbs unintentional weight loss, was sent for CT-C/A/P-11/22/23 which revealed multiple bone mets and possible liver mets and path fracture of T11 vertebral body. She was sent by Hematologist to ED for further evaluation.     # Bone and Liver Metastasis    -progressively worsening B/L LE weakness, paresthesia, back pain, difficulty ambulating, and 40 lbs unintentional weight loss over a year.  -CT-C/A/P-11/22/23 which revealed multiple lytic osseous lesions highly suspicious for metastatic disease. Lesions in the L5 and T11 vertebral bodies have epidural extension.  Pathologic fracture T11 vertebral body. Some of the rib lesions demonstrate pathologic fractures. Multiple hepatic lesions are not characterized without intravenous contrast but nonetheless are suspicious for metastatic disease.  -CT Head without contrast-11/24/23-No acute intracranial hemorrhage or mass effect.  -MRI Lumbar Spine-11/25/23-->  Bony metastases at T11,L1, L2, L4, L5 and left sacrum. Pathologic fractures of T11 and L5. Possible cord compression at T11 and diffuse thecal sac compression at L5. MRI-Thoracic/Cervical-No evidence for metastatic disease to the cervical spine or cervical spinal cord compression. Thoracic spine: Multifocal metastatic disease -A large metastasis replaces the T11 vertebral body with an associated mild to moderate pathologic compression fracture deformity and retropulsion of bone. Epidural tumor extension is noted. The compression fracture with retropulsion of bone and epidural tumor extension results in mild to moderate thoracic spinal cord compression.  -Ortho following  -Will send tumor markers- Ca- 19-9, CA-27-29, although non- diagnostic, help trend POD  -Will need tissue Bx for diagnosis-->IR consult  -During out patient initial presentation with anemia, and renal insufficiency labs were sent to r/o MM- 6/2023- which were negative    # Normocytic anemia     - multifactorial with Hx iron deficiency, B12 deficiency, CRI, and anemia of chronic disease and now possibly due to malignancy.  - follows outpatient -->was advised IV Feraheme x 2 - received 1 infusion only on 10/9/23.  - B12 deficiency - Started on OTC methylcobalamin at least 1000mcg SL - Repeat level 10/3/23-at >2000.  - Anemia 2/2 mild CRI -->Plan to start YANET injections out patient, once iron stores repleted.  -Hgb- as of 11/25/23-8.5 g/dl  -monitor CBC  -Supportive measures; transfuse as needed if becomes symptomatic or hemodynamically unstable.

## 2023-11-25 NOTE — PATIENT PROFILE ADULT - ..
Health Maintenance Due   Topic Date Due    Shingles Vaccine (1 of 2) Never done    Colorectal Cancer Screening  08/21/2015    TETANUS VACCINE  11/11/2015    Diabetes Urine Screening  12/04/2015    Abdominal Aortic Aneurysm Screening  12/20/2018    Foot Exam  11/19/2019    Pneumococcal Vaccines (Age 65+) (2 of 2 - PPSV23) 06/27/2021    Influenza Vaccine (1) 09/01/2021    Hemoglobin A1c  12/30/2021     Triggered LINKS & reconciled immunizations.  Updated Care Everywhere.  Checked for outside lab results in [facility].  Chart was reviewed as part of the PHN Attestation for 2021.    
25-Nov-2023 02:50:48

## 2023-11-25 NOTE — CONSULT NOTE ADULT - ASSESSMENT
A/P: 82 y/o F w/ PMH of HTN, dyslipidemia, GERD, parkinson's, neuropathy, anemia, loop recorder, p/w LE weakness    1. LE Weakness / paresthesias / back pain w/ possible bone mets    -CT: Multiple lytic osseous lesions suspcious for mets. Lesions in the L5 and T11 vertebral bodies have epidural extension.  Pathologic fracture T11 vertebral body. Some of the rib lesions demonstrate pathologic fractures. -  - Mgmt as per oncology.     2. ILR monitor. Will need to consult EP as to compatibility with MRI.  Device battery is depleted. No plans to reimplant. H/o Atach, ventricular triplets.     3. HTN. Cont home regimen. BP stable.     4. DVT proph. Can hold on further cardiac testing.

## 2023-11-25 NOTE — PATIENT PROFILE ADULT - FALL HARM RISK - HARM RISK INTERVENTIONS
Assistance with ambulation/Assistance OOB with selected safe patient handling equipment/Communicate Risk of Fall with Harm to all staff/Discuss with provider need for PT consult/Monitor gait and stability/Provide patient with walking aids - walker, cane, crutches/Reinforce activity limits and safety measures with patient and family/Tailored Fall Risk Interventions/Visual Cue: Yellow wristband and red socks/Bed in lowest position, wheels locked, appropriate side rails in place/Call bell, personal items and telephone in reach/Instruct patient to call for assistance before getting out of bed or chair/Non-slip footwear when patient is out of bed/[place] to call system/Physically safe environment - no spills, clutter or unnecessary equipment/Purposeful Proactive Rounding/Room/bathroom lighting operational, light cord in reach Assistance with ambulation/Assistance OOB with selected safe patient handling equipment/Communicate Risk of Fall with Harm to all staff/Discuss with provider need for PT consult/Monitor gait and stability/Provide patient with walking aids - walker, cane, crutches/Reinforce activity limits and safety measures with patient and family/Tailored Fall Risk Interventions/Visual Cue: Yellow wristband and red socks/Bed in lowest position, wheels locked, appropriate side rails in place/Call bell, personal items and telephone in reach/Instruct patient to call for assistance before getting out of bed or chair/Non-slip footwear when patient is out of bed/[place] to call system/Physically safe environment - no spills, clutter or unnecessary equipment/Purposeful Proactive Rounding/Room/bathroom lighting operational, light cord in reach Assistance with ambulation/Assistance OOB with selected safe patient handling equipment/Communicate Risk of Fall with Harm to all staff/Discuss with provider need for PT consult/Monitor gait and stability/Provide patient with walking aids - walker, cane, crutches/Reinforce activity limits and safety measures with patient and family/Tailored Fall Risk Interventions/Visual Cue: Yellow wristband and red socks/Bed in lowest position, wheels locked, appropriate side rails in place/Call bell, personal items and telephone in reach/Instruct patient to call for assistance before getting out of bed or chair/Non-slip footwear when patient is out of bed/[place] to call system/Physically safe environment - no spills, clutter or unnecessary equipment/Purposeful Proactive Rounding/Room/bathroom lighting operational, light cord in reach

## 2023-11-25 NOTE — ED ADULT NURSE NOTE - CHIEF COMPLAINT QUOTE
[facility] facility for abnormal ct. pt c/o back pain. denies cp/sob/n/v/d/fever/chills. pt seen for iron infusion. pmh: cadiac defibrillator, hypotension, anemia. pt poor historian.

## 2023-11-25 NOTE — DIETITIAN NUTRITION RISK NOTIFICATION - TREATMENT: THE FOLLOWING DIET HAS BEEN RECOMMENDED
Diet, Regular:   DASH/TLC {Sodium & Cholesterol Restricted} (DASH) (11-25-23 @ 02:56) [Active]

## 2023-11-25 NOTE — ED ADULT NURSE REASSESSMENT NOTE - NS ED NURSE REASSESS COMMENT FT1
pt received from CAPR[facility] VERGARA pt a&ox4, denies pain at this time. VSS. updated on plan of care. all needs met.

## 2023-11-25 NOTE — PROGRESS NOTE ADULT - SUBJECTIVE AND OBJECTIVE BOX
Chief Complaint: Back Pain    History: Patient admitted with back pain and leg weakness. Patient states she has had weakness for approximately one year which has worsened over the last month or two. CT demonstrated diffuse metastatic disease    OBJECTIVE:     Vital Signs Last 24 Hrs  T(C): 36.9 (25 Nov 2023 07:39), Max: 36.9 (25 Nov 2023 07:39)  T(F): 98.5 (25 Nov 2023 07:39), Max: 98.5 (25 Nov 2023 07:39)  HR: 82 (25 Nov 2023 07:39) (77 - 82)  BP: 124/67 (25 Nov 2023 07:39) (114/68 - 132/71)  BP(mean): 90 (25 Nov 2023 00:34) (80 - 90)  RR: 18 (25 Nov 2023 07:39) (14 - 18)  SpO2: 100% (25 Nov 2023 07:39) (96% - 100%)    Parameters below as of 25 Nov 2023 07:39  Patient On (Oxygen Delivery Method): room air        Physical Exam:         Awake and alert         HEENT - unremarkable         Neck - supple         Back: tenderness TL region         Bilateral Upper Extremities:             Good Motor Strength             Sensation intact         Bilateral Lower Extremities: 4/5 MS throughout the lower extremities.                 I&O's Detail      LABS:                        9.2    7.70  )-----------( 225      ( 24 Nov 2023 19:54 )             29.7     11-24    143  |  107  |  22  ----------------------------<  94  3.9   |  31  |  1.15    Ca    9.7      24 Nov 2023 21:59  Phos  3.1     11-24    TPro  6.6  /  Alb  3.1<L>  /  TBili  0.7  /  DBili  0.4<H>  /  AST  20  /  ALT  18  /  AlkPhos  131<H>  11-24    PT/INR - ( 24 Nov 2023 21:59 )   PT: 11.3 sec;   INR: 1.00 ratio         PTT - ( 24 Nov 2023 19:54 )  PTT:27.4 sec      RADIOLOGY & ADDITIONAL STUDIES: CT C/T/L/S - demonstrates diffuse metastatic disease with large lesion involving T11 with extension into the pedicle as well as lesion L5. Goof overall alignment. Significant canal compromise not appreciate.      A/P :  83y Female   -    Awaiting Cardiology input in order to proceed with MRI C/T/L/S Spine  -   Continue pain managment  -    Continue present treatment

## 2023-11-25 NOTE — H&P ADULT - HISTORY OF PRESENT ILLNESS
82 y/o F w/ PMH of HTN, dyslipidemia, GERD, parkinson's, neuropathy, anemia, loop recorder, p/w LE weakness. Patient has been having B/L LE weakness for a little less than 1 year. Symptoms have been getting progressively worse to the point where patient is unable to ambulate. Patient also has been getting worked up for anemia and approximately 40lb unintentional weight loss. She had outpatient CT C/A/P showing multiple bone mets and possible liver mets and path fracture of T11 vertebral body. Patient sent to ED for further evaluation. Patient evaluated by ortho with recommendation for MRI. Patient has a loop recorder in placed by Dr. Crawley, and patient is unsure if it is compatible with MRI. Will consult Dr. Crawley for input prior to MRI. Patient also C/o LE paresthesias and back pain.       PSH: gastric bypass, cholecystectomy, L total knee replacement, tonsillectomy     Social Hx: Former smoker - quit in 1980, denies etoh / drugs     Family Hx: Denies pertinent hx

## 2023-11-25 NOTE — H&P ADULT - NSHPOUTPATIENTPROVIDERS_GEN_ALL_CORE
PCP: Dr. Idalia Miranda  Neuro: Dr Ventura   Heme/Onc: Dr. Rani Arellano from [facility]   Cardio: Dr Crawley

## 2023-11-25 NOTE — CONSULT NOTE ADULT - SUBJECTIVE AND OBJECTIVE BOX
Cardiology Consultation    HPI: 82 y/o F w/ PMH of HTN, dyslipidemia, GERD, parkinson's, neuropathy, anemia, loop recorder, p/w LE weakness. Patient has been having B/L LE weakness for a little less than 1 year. Symptoms have been getting progressively worse to the point where patient is unable to ambulate. Patient also has been getting worked up for anemia and approximately 40lb unintentional weight loss. She had outpatient CT C/A/P showing multiple bone mets and possible liver mets and path fracture of T11 vertebral body. Patient sent to ED for further evaluation. Patient evaluated by ortho with recommendation for MRI. Patient has a loop recorder in placed by Dr. Crawley, and patient is unsure if it is compatible with MRI. Will consult Dr. Crawley for input prior to MRI. Patient also C/o LE paresthesias and back pain.     PSH: gastric bypass, cholecystectomy, L total knee replacement, tonsillectomy     Social Hx: Former smoker - quit in , denies etoh / drugs     Family Hx: Denies pertinent hx  (2023 00:04)    PAST MEDICAL & SURGICAL HISTORY:  GERD (gastroesophageal reflux disease)  HTN (hypertension)  HLD (hyperlipidemia)  Mood disorder  H/O gastric bypass  History of cholecystectomy  History of total knee replacement, left    Allergies  Originally Entered as [Moderate Rash] reaction to [SURGICAL TAPE] (Unknown)  Vitamin K (Other (Severe))  Fosamax (Unknown)    MEDICATIONS  (STANDING):  atorvastatin 40 milliGRAM(s) Oral at bedtime  buPROPion SR (12-Hour) 100 milliGRAM(s) Oral daily  carbidopa/levodopa  25/100 1 Tablet(s) Oral four times a day  donepezil 10 milliGRAM(s) Oral at bedtime  ferrous    sulfate 325 milliGRAM(s) Oral daily  influenza  Vaccine (HIGH DOSE) 0.7 milliLiter(s) IntraMuscular once  levothyroxine 50 MICROGram(s) Oral daily  losartan 25 milliGRAM(s) Oral daily  metoprolol succinate ER 25 milliGRAM(s) Oral daily  pantoprazole    Tablet 40 milliGRAM(s) Oral before breakfast  pramipexole 0.125 milliGRAM(s) Oral at bedtime  verapamil  milliGRAM(s) Oral daily    MEDICATIONS  (PRN):  acetaminophen     Tablet .. 650 milliGRAM(s) Oral every 6 hours PRN Mild Pain (1 - 3), Moderate Pain (4 - 6)    Vital Signs Last 24 Hrs  T(C): 36.9 (2023 07:39), Max: 36.9 (2023 07:39)  T(F): 98.5 (2023 07:39), Max: 98.5 (2023 07:39)  HR: 82 (:39) (77 - 82)  BP: 124/67 (2023 07:39) (114/68 - 132/71)  BP(mean): 90 (2023 00:34) (80 - 90)  RR: 18 (:39) (14 - 18)  SpO2: 100% (:39) (96% - 100%)    Parameters below as of 2023 07:39  Patient On (Oxygen Delivery Method): room air    REVIEW OF SYSTEMS:    CONSTITUTIONAL:  As per HPI.  HEENT:  Eyes:  No diplopia or blurred vision. ENT:  No earache, sore throat or runny nose.  CARDIOVASCULAR:  No pressure, squeezing, strangling, tightness, heaviness or aching about the chest, neck, axilla or epigastrium.  RESPIRATORY:  No cough, shortness of breath, PND or orthopnea.  GASTROINTESTINAL:  No nausea, vomiting or diarrhea.  GENITOURINARY:  No dysuria, frequency or urgency.  MUSCULOSKELETAL:  As per HPI.  SKIN:  No change in skin, hair or nails.  NEUROLOGIC:  No paresthesias, fasciculations, seizures or weakness.    PHYSICAL EXAMINATION:    GENERAL APPEARANCE:  Pt. is not currently dyspneic, in no distress. Pt. is alert, oriented, and pleasant.  HEENT:  Pupils are normal and react normally. No icterus. Mucous membranes well colored.  NECK:  Supple. No lymphadenopathy. Jugular venous pressure not elevated. Carotids equal.   HEART:   The cardiac impulse has a normal quality. There are no murmurs, rubs or gallops noted  CHEST:  Chest is clear to auscultation. Normal respiratory effort.  ABDOMEN:  Soft and nontender.   EXTREMITIES:  There is no edema.   SKIN:  No rash or significant lesions are noted.    I&O's Summary    LABS:                        9.2    7.70  )-----------( 225      ( 2023 19:54 )             29.7         143  |  107  |  22  ----------------------------<  94  3.9   |  31  |  1.15    Ca    9.7      2023 21:59  Phos  3.1         TPro  6.6  /  Alb  3.1<L>  /  TBili  0.7  /  DBili  0.4<H>  /  AST  20  /  ALT  18  /  AlkPhos  131<H>      LIVER FUNCTIONS - ( 2023 21:59 )  Alb: 3.1 g/dL / Pro: 6.6 gm/dL / ALK PHOS: 131 U/L / ALT: 18 U/L / AST: 20 U/L / GGT: x           PT/INR - ( 2023 21:59 )   PT: 11.3 sec;   INR: 1.00 ratio       PTT - ( 2023 19:54 )  PTT:27.4 sec    Urinalysis Basic - ( 2023 06:15 )    Color: Yellow / Appearance: Turbid / S.017 / pH: x  Gluc: x / Ketone: Trace mg/dL  / Bili: Negative / Urobili: 1.0 mg/dL   Blood: x / Protein: Trace mg/dL / Nitrite: Negative   Leuk Esterase: Large / RBC: 15 /HPF /  /HPF   Sq Epi: x / Non Sq Epi: >36 /HPF / Bacteria: Many /HPF    EKG: < from: 12 Lead ECG (21 @ 16:59) >  Sinus bradycardia with Premature atrial complexes  Possible Anterior infarct , age undetermined  Abnormal ECG    TELEMETRY: Not on tele.    CARDIAC TESTS: 2Decho - Normal LVEF 60%, mild MR/TR.  [employer]*y monitor - SR, brief atach, ventricular triplets.    RADIOLOGY & ADDITIONAL STUDIES: < from: CT Head No Cont (23 @ 23:25) >  IMPRESSION:  No acute intracranial hemorrhage or mass effect.    < end of copied text >      ASSESSMENT & PLAN:

## 2023-11-25 NOTE — DIETITIAN INITIAL EVALUATION ADULT - OTHER INFO
82 y/o F w/ PMH of HTN, dyslipidemia, GERD, parkinson's, neuropathy, anemia, loop recorder, p/w LE weakness. Patient has been having B/L LE weakness for a little less than 1 year. Symptoms have been getting progressively worse to the point where patient is unable to ambulate. Patient also has been getting worked up for anemia and approximately 40lb unintentional weight loss. She had outpatient CT C/A/P showing multiple bone mets and possible liver mets and path fracture of T11 vertebral body. Patient sent to ED for further evaluation. Patient evaluated by ortho with recommendation for MRI. Patient has a loop recorder in placed by Dr. Crawley, and patient is unsure if it is compatible with MRI. Will consult Dr. Crawley for input prior to MRI. Patient also C/o LE paresthesias and back pain. PSH: gastric bypass, cholecystectomy, L total knee replacement, tonsillectomy. Admit for FTT.     As per oncologist note CT scans suspicious of bone and liver mets. Upon RD visit this morning, reported poor appetite, however, pt was hungry for breakfast and eager to eat omelet. Currently on DASH diet, will recommend to liberalize diet to regular to maximize PO intake and encourage protein-rich foods, maximize food preferences. Bed scale wt 167# obtained by RD on 11/25, appears inaccurate. EMR wt 181# obtained by RN on 11/24, appears more accurate. Reports ? UBW, however, states she has lost 40# x last year. Wt hx 215# as per previous triage note from 8/8/22. Unintentional wt loss 34# / 15.8% x15 mo - not clin sig. Appears weak and lethargic. NFPE reveals moderate-severe muscle/fat wasting. Will add ensure plus high protein BID to optimize PO intake. See below for other recs.

## 2023-11-25 NOTE — DIETITIAN INITIAL EVALUATION ADULT - ADD RECOMMEND
1. Liberalize diet to regular to maximize PO intake and encourage protein-rich foods, maximize food preferences   2. Add ensure plus high protein BID to optimize PO intake (provides 350 kcal, 20g protein/ shake)   3. Consider adding appetite stimulant such as Remeron or Marinol 2/2 chronically poor appetite/ PO intake   4. Consider obtaining vitamin D 25OH level to assess nutriture   5. Monitor bowel movements, if no BM for >3 days, consider implementing bowel regimen.   6. Consider adding thiamine 100 mg daily 2/2 poor PO intake/ malnutrition and MVI w/ minerals daily to ensure 100% RDA met   7. Confirm goals of care regarding nutrition support   RD will continue to monitor PO intake, labs, hydration, and wt prn.

## 2023-11-25 NOTE — CONSULT NOTE ADULT - SUBJECTIVE AND OBJECTIVE BOX
HPI:  82 y/o F w/ MH of HTN, dyslipidemia, GERD, parkinson's, neuropathy, iron deficiency anemia, loop recorder, p/w LE weakness. Patient has been having B/L LE weakness for a little less than 1 year. Symptoms have been getting progressively worse to the point where patient is unable to ambulate, also with  LE paresthesias and back pain. and 40 lbs unintentional weight loss. She was sent for CT-C/A/P- which revealed multiple bone mets and possible liver mets and path fracture of T11 vertebral body.  She was sent to ED for further evaluation.     PAST MEDICAL & SURGICAL HISTORY:    GERD (gastroesophageal reflux disease)    HTN (hypertension)    HLD (hyperlipidemia)    Mood disorder    H/O gastric bypass    History of cholecystectomy    History of total knee replacement, left    Iron Deficiency Anemia    CRI    Barrette Esophagus      FAMILY HISTORY:    COPD- Brother  CAD- Sister  Stroke- Mother  Parkinson-Father    MEDICATIONS  (STANDING):    atorvastatin 40 milliGRAM(s) Oral at bedtime  buPROPion SR (12-Hour) 100 milliGRAM(s) Oral daily  carbidopa/levodopa  25/100 1 Tablet(s) Oral four times a day  donepezil 10 milliGRAM(s) Oral at bedtime  ferrous    sulfate 325 milliGRAM(s) Oral daily  influenza  Vaccine (HIGH DOSE) 0.7 milliLiter(s) IntraMuscular once  levothyroxine 50 MICROGram(s) Oral daily  losartan 25 milliGRAM(s) Oral daily  metoprolol succinate ER 25 milliGRAM(s) Oral daily  pantoprazole    Tablet 40 milliGRAM(s) Oral before breakfast  pramipexole 0.125 milliGRAM(s) Oral at bedtime  verapamil  milliGRAM(s) Oral daily    MEDICATIONS  (PRN):    acetaminophen Tablet .. 650 milliGRAM(s) Oral every 6 hours PRN Mild Pain (1 - 3), Moderate Pain (4 - 6)    ALLERGIES:    Originally Entered as [Moderate Rash] reaction to [SURGICAL TAPE] (Unknown)  Vitamin K (Other (Severe))  Fosamax (Unknown)    REVIEW OF SYSTEM:    Constitutional, Eyes, ENT, Cardiovascular, Respiratory, Gastrointestinal, Genitourinary, Musculoskeletal, Integumentary, Neurological, Psychiatric, Endocrine, Heme/Lymph and Allergic/Immunologic review of systems are otherwise negative except as noted in HPI.     VITAL SIGNS:    T(C): 36.9 (25 Nov 2023 07:39), Max: 36.9 (25 Nov 2023 07:39)  T(F): 98.5 (25 Nov 2023 07:39), Max: 98.5 (25 Nov 2023 07:39)  HR: 82 (25 Nov 2023 07:39) (77 - 82)  BP: 124/67 (25 Nov 2023 07:39) (114/68 - 132/71)  BP(mean): 90 (25 Nov 2023 00:34) (80 - 90)  RR: 18 (25 Nov 2023 07:39) (14 - 18)  SpO2: 100% (25 Nov 2023 07:39) (96% - 100%)    Parameters below as of 25 Nov 2023 07:39  Patient On (Oxygen Delivery Method): room air    PHYSICAL EXAM:    CONSTITUTIONAL : NAD  NERVOUS SYSTEM:  Alert & Oriented X  no focal deficits.   HEAD:  Atraumatic, Normocephalic  EYES: EOMI, PERRLA, conjunctiva and sclera clear  HEENT: Moist mucous membranes, Supple neck , No JVD  CHEST: Clear to auscultation bilaterally; No rales, no rhonchi, no wheezing  HEART: Regular rate and rhythm; No murmurs, no rubs or gallops  ABDOMEN: Soft, Non-tender, Non-distended; Bowel sounds present, no guarding , no peritoneal irritation   GENITOURINARY- Voiding, no suprapubic tenderness  EXTREMITIES:  2+ Peripheral Pulses, No clubbing, cyanosis, no edema  MUSCULOSKELETAL:- No muscle tenderness, Muscle tone normal, No joint tenderness, no Joint swelling,  Joint ROM –normal   SKIN-no rash, no lesion    LABS:    CBC Full  -  ( 25 Nov 2023 08:43 )  WBC Count : 7.56 K/uL  RBC Count : 2.77 M/uL  Hemoglobin : 8.5 g/dL  Hematocrit : 26.9 %  Platelet Count - Automated : 226 K/uL  Mean Cell Volume : 97.1 fl  Mean Cell Hemoglobin : 30.7 pg  Mean Cell Hemoglobin Concentration : 31.6 gm/dL  Auto Neutrophil # : 4.95 K/uL  Auto Lymphocyte # : 1.89 K/uL  Auto Monocyte # : 0.57 K/uL  Auto Eosinophil # : 0.06 K/uL  Auto Basophil # : 0.02 K/uL  Auto Neutrophil % : 65.5 %  Auto Lymphocyte % : 25.0 %  Auto Monocyte % : 7.5 %  Auto Eosinophil % : 0.8 %  Auto Basophil % : 0.3 %    11-25    143  |  108  |  20  ----------------------------<  78  3.7   |  28  |  0.86    Ca    9.4      25 Nov 2023 08:43  Phos  3.1     11-24    TPro  6.0  /  Alb  2.8<L>  /  TBili  0.6  /  DBili  x   /  AST  20  /  ALT  <6<L>  /  AlkPhos  123<H>  11-25    PT/INR - ( 25 Nov 2023 08:43 )   PT: 11.5 sec;   INR: 1.02 ratio     PTT - ( 25 Nov 2023 08:43 )  PTT:31.3 sec    Urinalysis Basic - ( 25 Nov 2023 08:43 )    Color: x / Appearance: x / SG: x / pH: x  Gluc: 78 mg/dL / Ketone: x  / Bili: x / Urobili: x   Blood: x / Protein: x / Nitrite: x   Leuk Esterase: x / RBC: x / WBC x   Sq Epi: x / Non Sq Epi: x / Bacteria: x    RADIOLOGY & ADDITIONAL STUDIES:    EXAM:  CT BRAIN    PROCEDURE DATE:  11/24/2023      INTERPRETATION:  CLINICAL INFORMATION:  Mets to liver, spine.  Unknown   primary.  R/O Brain mets, bleed.    TECHNIQUE:  Axial CT images were acquired through the head.  Intravenous contrast: None  Two-dimensional reformats were generated.    COMPARISON STUDY: CT head 8/8/2022    FINDINGS:    There is no CT evidence of acute intracranial hemorrhage, mass effect,   midline shift, or acute, large territorial infarct.  The ventricles and   sulci are prominent compatible with age-appropriate brain parenchymal   volume loss. Mild patchy periventricular white matter hypoattenuation is   nonspecific, although likely due to chronic microangiopathy. The basal   cisterns are patent. There is a partially empty sella.    The mastoid air cells and middle ear cavities are grossly clear. The   visualized paranasal sinuses are well aerated.    The calvarium and skull base are grossly intact.    IMPRESSION:  No acute intracranial hemorrhage or mass effect.    Please note that contrast-enhanced MRI of the brain is more sensitive in   the detection of intracranial metastases.      EXAM: [id number] - CT ABDOMEN AND PELVIS  -   EXAM: [id number] - CT CHEST  -     PROCEDURE DATE:  11/21/2023    INTERPRETATION:  CLINICAL INFORMATION: Anemia and 40 pound weight loss.    COMPARISON: None.    CONTRAST/COMPLICATIONS:  IV Contrast: NONE  Oral Contrast: NONE  Complications: None reported at time of study completion    PROCEDURE:  CT of the Chest, Abdomen and Pelvis was performed.  Sagittal and coronal reformats were performed.    FINDINGS:  CHEST:  LUNGS AND LARGE AIRWAYS: Patent central airways. There is atelectasis at the right lung base.  PLEURA: No pleural effusion.  VESSELS: Coronary artery calcifications and atherosclerotic calcifications within the thoracic aorta.  HEART: Heart size is normal. No pericardial effusion.  MEDIASTINUM AND CRIS: No lymphadenopathy.  CHEST WALL AND LOWER NECK: There is a loop recorder left anterior chest wall. There is a 0.8 cm right lobe thyroid nodule.    ABDOMEN AND PELVIS:  LIVER: Bilobar indeterminate hepatic lesions are nonetheless suspicious for metastatic disease. The largest lesion is in the hepatic dome measuring 4.8 cm. There is a mass interposed between the caudate lobe and left lobe of liver measuring 3.4 cm on series 2 image 68.  BILE DUCTS: Normal caliber.  GALLBLADDER: Removed.  SPLEEN: Within normal limits.  PANCREAS: Within normal limits.  ADRENALS: There is a 1.9 cm right adrenal adenoma.  KIDNEYS/URETERS: Within normal limits.    BLADDER: Within normal limits.  REPRODUCTIVE ORGANS: Uterus and adnexa within normal limits.    BOWEL: No bowel obstruction. 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 There is herniation of the gastric pouch and small amount of the jejunum distal to the gastrojejunal anastomosis into the mediastinum through the esophageal hiatus. There is also herniation of a portion of the excluded stomach through the esophageal hiatus.  PERITONEUM: No ascites.  VESSELS: Within normal limits.  RETROPERITONEUM/LYMPH NODES: No lymphadenopathy.  ABDOMINAL WALL: Within normal limits.  BONES: There is a lytic lesion with posterior cortical destruction involving the L5 vertebral body with epidural extension. There is mild loss of height of the T12 vertebral body. There is a lytic lesion and pathologic fracture through the T11 vertebral body with likely epidural extension and extension into the left pedicle. There is heterogeneous appearance of the T8-9 vertebral bodies with a lytic lesion in T9 vertebral body. There is a lytic lesion with soft tissue component in the right fourth rib with associated pathologic fracture. There is an old fracture of the left humeral neck. There is a lytic lesion in the left do sacrum. There is a lytic lesion in the right fourth transverse process as well as lytic lesion with pathologic fracture in the left 10th rib. There is a 4.6 x 3.8 cm expansile lesion in the left third rib.    IMPRESSION:  Multiple lytic osseous lesions highly suspicious for metastatic disease. Lesions in the L5 and T11 vertebral bodies have epidural extension.  Pathologic fracture T11 vertebral body. Some of the rib lesions demonstrate pathologic fractures. Please see above comments.    Multiple hepatic lesions are not characterized without intravenous contrast but nonetheless are suspicious for metastatic disease.         HPI:  82 y/o F w/ MH of HTN, dyslipidemia, GERD, parkinson's, neuropathy, iron deficiency anemia, loop recorder, p/w LE weakness. Patient has been having B/L LE weakness for a little less than 1 year. Symptoms have been getting progressively worse to the point where patient is unable to ambulate, also with  LE paresthesias and back pain. and 40 lbs unintentional weight loss. She was sent for CT-C/A/P- which revealed multiple bone mets and possible liver mets and path fracture of T11 vertebral body.  She was sent to ED for further evaluation.     11/25/23- Seen at bedside, alert, awake, in no acute distress. Reports left lower extremities with tingling and numbness, denies cauda equina symptoms. Reports difficulty ambulating even with a walker.     PAST MEDICAL & SURGICAL HISTORY:    GERD (gastroesophageal reflux disease)    HTN (hypertension)    HLD (hyperlipidemia)    Mood disorder    H/O gastric bypass    History of cholecystectomy    History of total knee replacement, left    Iron Deficiency Anemia    CRI    Barrette Esophagus      FAMILY HISTORY:    COPD- Brother  CAD- Sister  Stroke- Mother  Parkinson-Father    MEDICATIONS  (STANDING):    atorvastatin 40 milliGRAM(s) Oral at bedtime  buPROPion SR (12-Hour) 100 milliGRAM(s) Oral daily  carbidopa/levodopa  25/100 1 Tablet(s) Oral four times a day  donepezil 10 milliGRAM(s) Oral at bedtime  ferrous    sulfate 325 milliGRAM(s) Oral daily  influenza  Vaccine (HIGH DOSE) 0.7 milliLiter(s) IntraMuscular once  levothyroxine 50 MICROGram(s) Oral daily  losartan 25 milliGRAM(s) Oral daily  metoprolol succinate ER 25 milliGRAM(s) Oral daily  pantoprazole    Tablet 40 milliGRAM(s) Oral before breakfast  pramipexole 0.125 milliGRAM(s) Oral at bedtime  verapamil  milliGRAM(s) Oral daily    MEDICATIONS  (PRN):    acetaminophen Tablet .. 650 milliGRAM(s) Oral every 6 hours PRN Mild Pain (1 - 3), Moderate Pain (4 - 6)    ALLERGIES:    Originally Entered as [Moderate Rash] reaction to [SURGICAL TAPE] (Unknown)  Vitamin K (Other (Severe))  Fosamax (Unknown)    REVIEW OF SYSTEM:    Constitutional, Eyes, ENT, Cardiovascular, Respiratory, Gastrointestinal, Genitourinary, Musculoskeletal, Integumentary, Neurological, Psychiatric, Endocrine, Heme/Lymph and Allergic/Immunologic review of systems are otherwise negative except as noted in HPI.     VITAL SIGNS:    T(C): 36.9 (25 Nov 2023 07:39), Max: 36.9 (25 Nov 2023 07:39)  T(F): 98.5 (25 Nov 2023 07:39), Max: 98.5 (25 Nov 2023 07:39)  HR: 82 (25 Nov 2023 07:39) (77 - 82)  BP: 124/67 (25 Nov 2023 07:39) (114/68 - 132/71)  BP(mean): 90 (25 Nov 2023 00:34) (80 - 90)  RR: 18 (25 Nov 2023 07:39) (14 - 18)  SpO2: 100% (25 Nov 2023 07:39) (96% - 100%)    Parameters below as of 25 Nov 2023 07:39  Patient On (Oxygen Delivery Method): room air    PHYSICAL EXAM:    CONSTITUTIONAL : NAD  NERVOUS SYSTEM:  Alert & Oriented X 3, with mild weakness in left lower extremity and reported tingling.  HEAD:  Atraumatic, Normocephalic  EYES:  conjunctiva and sclera clear  HEENT: Moist mucous membranes, Supple neck , No JVD  CHEST: Clear to auscultation bilaterally  BREAST: With no palpable mass.  HEART: Regular rate and rhythm  ABDOMEN: Soft, Non-tender, Non-distended; Bowel sounds present  GENITOURINARY- Voiding, no suprapubic tenderness  EXTREMITIES:  2+ Peripheral Pulses, No clubbing, cyanosis, no edema  MUSCULOSKELETAL:- No muscle tenderness, Muscle tone normal, No joint tenderness, no Joint swelling,  Joint ROM –normal   SKIN-no rash, no lesion    LABS:    CBC Full  -  ( 25 Nov 2023 08:43 )  WBC Count : 7.56 K/uL  RBC Count : 2.77 M/uL  Hemoglobin : 8.5 g/dL  Hematocrit : 26.9 %  Platelet Count - Automated : 226 K/uL  Mean Cell Volume : 97.1 fl  Mean Cell Hemoglobin : 30.7 pg  Mean Cell Hemoglobin Concentration : 31.6 gm/dL  Auto Neutrophil # : 4.95 K/uL  Auto Lymphocyte # : 1.89 K/uL  Auto Monocyte # : 0.57 K/uL  Auto Eosinophil # : 0.06 K/uL  Auto Basophil # : 0.02 K/uL  Auto Neutrophil % : 65.5 %  Auto Lymphocyte % : 25.0 %  Auto Monocyte % : 7.5 %  Auto Eosinophil % : 0.8 %  Auto Basophil % : 0.3 %    11-25    143  |  108  |  20  ----------------------------<  78  3.7   |  28  |  0.86    Ca    9.4      25 Nov 2023 08:43  Phos  3.1     11-24    TPro  6.0  /  Alb  2.8<L>  /  TBili  0.6  /  DBili  x   /  AST  20  /  ALT  <6<L>  /  AlkPhos  123<H>  11-25    PT/INR - ( 25 Nov 2023 08:43 )   PT: 11.5 sec;   INR: 1.02 ratio     PTT - ( 25 Nov 2023 08:43 )  PTT:31.3 sec    Urinalysis Basic - ( 25 Nov 2023 08:43 )    Color: x / Appearance: x / SG: x / pH: x  Gluc: 78 mg/dL / Ketone: x  / Bili: x / Urobili: x   Blood: x / Protein: x / Nitrite: x   Leuk Esterase: x / RBC: x / WBC x   Sq Epi: x / Non Sq Epi: x / Bacteria: x    RADIOLOGY & ADDITIONAL STUDIES:    EXAM:  CT BRAIN    PROCEDURE DATE:  11/24/2023      INTERPRETATION:  CLINICAL INFORMATION:  Mets to liver, spine.  Unknown   primary.  R/O Brain mets, bleed.    TECHNIQUE:  Axial CT images were acquired through the head.  Intravenous contrast: None  Two-dimensional reformats were generated.    COMPARISON STUDY: CT head 8/8/2022    FINDINGS:    There is no CT evidence of acute intracranial hemorrhage, mass effect,   midline shift, or acute, large territorial infarct.  The ventricles and   sulci are prominent compatible with age-appropriate brain parenchymal   volume loss. Mild patchy periventricular white matter hypoattenuation is   nonspecific, although likely due to chronic microangiopathy. The basal   cisterns are patent. There is a partially empty sella.    The mastoid air cells and middle ear cavities are grossly clear. The   visualized paranasal sinuses are well aerated.    The calvarium and skull base are grossly intact.    IMPRESSION:  No acute intracranial hemorrhage or mass effect.    Please note that contrast-enhanced MRI of the brain is more sensitive in   the detection of intracranial metastases.      EXAM: [id number] - CT ABDOMEN AND PELVIS  -   EXAM: [id number] - CT CHEST  -     PROCEDURE DATE:  11/21/2023    INTERPRETATION:  CLINICAL INFORMATION: Anemia and 40 pound weight loss.    COMPARISON: None.    CONTRAST/COMPLICATIONS:  IV Contrast: NONE  Oral Contrast: NONE  Complications: None reported at time of study completion    PROCEDURE:  CT of the Chest, Abdomen and Pelvis was performed.  Sagittal and coronal reformats were performed.    FINDINGS:  CHEST:  LUNGS AND LARGE AIRWAYS: Patent central airways. There is atelectasis at the right lung base.  PLEURA: No pleural effusion.  VESSELS: Coronary artery calcifications and atherosclerotic calcifications within the thoracic aorta.  HEART: Heart size is normal. No pericardial effusion.  MEDIASTINUM AND CRIS: No lymphadenopathy.  CHEST WALL AND LOWER NECK: There is a loop recorder left anterior chest wall. There is a 0.8 cm right lobe thyroid nodule.    ABDOMEN AND PELVIS:  LIVER: Bilobar indeterminate hepatic lesions are nonetheless suspicious for metastatic disease. The largest lesion is in the hepatic dome measuring 4.8 cm. There is a mass interposed between the caudate lobe and left lobe of liver measuring 3.4 cm on series 2 image 68.  BILE DUCTS: Normal caliber.  GALLBLADDER: Removed.  SPLEEN: Within normal limits.  PANCREAS: Within normal limits.  ADRENALS: There is a 1.9 cm right adrenal adenoma.  KIDNEYS/URETERS: Within normal limits.    BLADDER: Within normal limits.  REPRODUCTIVE ORGANS: Uterus and adnexa within normal limits.    BOWEL: No bowel obstruction. 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 There is herniation of the gastric pouch and small amount of the jejunum distal to the gastrojejunal anastomosis into the mediastinum through the esophageal hiatus. There is also herniation of a portion of the excluded stomach through the esophageal hiatus.  PERITONEUM: No ascites.  VESSELS: Within normal limits.  RETROPERITONEUM/LYMPH NODES: No lymphadenopathy.  ABDOMINAL WALL: Within normal limits.  BONES: There is a lytic lesion with posterior cortical destruction involving the L5 vertebral body with epidural extension. There is mild loss of height of the T12 vertebral body. There is a lytic lesion and pathologic fracture through the T11 vertebral body with likely epidural extension and extension into the left pedicle. There is heterogeneous appearance of the T8-9 vertebral bodies with a lytic lesion in T9 vertebral body. There is a lytic lesion with soft tissue component in the right fourth rib with associated pathologic fracture. There is an old fracture of the left humeral neck. There is a lytic lesion in the left do sacrum. There is a lytic lesion in the right fourth transverse process as well as lytic lesion with pathologic fracture in the left 10th rib. There is a 4.6 x 3.8 cm expansile lesion in the left third rib.    IMPRESSION:  Multiple lytic osseous lesions highly suspicious for metastatic disease. Lesions in the L5 and T11 vertebral bodies have epidural extension.  Pathologic fracture T11 vertebral body. Some of the rib lesions demonstrate pathologic fractures. Please see above comments.    Multiple hepatic lesions are not characterized without intravenous contrast but nonetheless are suspicious for metastatic disease.         HPI:  84 y/o F w/ MH of HTN, dyslipidemia, GERD, parkinson's, neuropathy, iron deficiency anemia, loop recorder, p/w LE weakness. Patient has been having B/L LE weakness for a little less than 1 year. Symptoms have been getting progressively worse to the point where patient is unable to ambulate, also with  LE paresthesias and back pain. and 40 lbs unintentional weight loss. She was sent for CT-C/A/P- which revealed multiple bone mets and possible liver mets and path fracture of T11 vertebral body.  She was sent to ED for further evaluation.     11/25/23- Seen at bedside, along with Dr. Loera, alert, awake, in no acute distress. Reports left lower extremities with tingling and numbness, denies cauda equina symptoms. Reports difficulty ambulating even with a walker.     PAST MEDICAL & SURGICAL HISTORY:    GERD (gastroesophageal reflux disease)    HTN (hypertension)    HLD (hyperlipidemia)    Mood disorder    H/O gastric bypass    History of cholecystectomy    History of total knee replacement, left    Iron Deficiency Anemia    CRI    Barrette Esophagus      FAMILY HISTORY:    COPD- Brother  CAD- Sister  Stroke- Mother  Parkinson-Father    MEDICATIONS  (STANDING):    atorvastatin 40 milliGRAM(s) Oral at bedtime  buPROPion SR (12-Hour) 100 milliGRAM(s) Oral daily  carbidopa/levodopa  25/100 1 Tablet(s) Oral four times a day  donepezil 10 milliGRAM(s) Oral at bedtime  ferrous    sulfate 325 milliGRAM(s) Oral daily  influenza  Vaccine (HIGH DOSE) 0.7 milliLiter(s) IntraMuscular once  levothyroxine 50 MICROGram(s) Oral daily  losartan 25 milliGRAM(s) Oral daily  metoprolol succinate ER 25 milliGRAM(s) Oral daily  pantoprazole    Tablet 40 milliGRAM(s) Oral before breakfast  pramipexole 0.125 milliGRAM(s) Oral at bedtime  verapamil  milliGRAM(s) Oral daily    MEDICATIONS  (PRN):    acetaminophen Tablet .. 650 milliGRAM(s) Oral every 6 hours PRN Mild Pain (1 - 3), Moderate Pain (4 - 6)    ALLERGIES:    Originally Entered as [Moderate Rash] reaction to [SURGICAL TAPE] (Unknown)  Vitamin K (Other (Severe))  Fosamax (Unknown)    REVIEW OF SYSTEM:    Constitutional, Eyes, ENT, Cardiovascular, Respiratory, Gastrointestinal, Genitourinary, Musculoskeletal, Integumentary, Neurological, Psychiatric, Endocrine, Heme/Lymph and Allergic/Immunologic review of systems are otherwise negative except as noted in HPI.     VITAL SIGNS:    T(C): 36.9 (25 Nov 2023 07:39), Max: 36.9 (25 Nov 2023 07:39)  T(F): 98.5 (25 Nov 2023 07:39), Max: 98.5 (25 Nov 2023 07:39)  HR: 82 (25 Nov 2023 07:39) (77 - 82)  BP: 124/67 (25 Nov 2023 07:39) (114/68 - 132/71)  BP(mean): 90 (25 Nov 2023 00:34) (80 - 90)  RR: 18 (25 Nov 2023 07:39) (14 - 18)  SpO2: 100% (25 Nov 2023 07:39) (96% - 100%)    Parameters below as of 25 Nov 2023 07:39  Patient On (Oxygen Delivery Method): room air    PHYSICAL EXAM:    CONSTITUTIONAL : NAD  NERVOUS SYSTEM:  Alert & Oriented X 3, with mild weakness in left lower extremity and reported tingling.  HEAD:  Atraumatic, Normocephalic  EYES:  conjunctiva and sclera clear  HEENT: Moist mucous membranes, Supple neck , No JVD  CHEST: Clear to auscultation bilaterally  BREAST: With no palpable mass.  HEART: Regular rate and rhythm  ABDOMEN: Soft, Non-tender, Non-distended; Bowel sounds present  GENITOURINARY- Voiding, no suprapubic tenderness  EXTREMITIES:  2+ Peripheral Pulses, No clubbing, cyanosis, no edema  MUSCULOSKELETAL:- No muscle tenderness, Muscle tone normal, No joint tenderness, no Joint swelling,  Joint ROM –normal   SKIN-no rash, no lesion    LABS:    CBC Full  -  ( 25 Nov 2023 08:43 )  WBC Count : 7.56 K/uL  RBC Count : 2.77 M/uL  Hemoglobin : 8.5 g/dL  Hematocrit : 26.9 %  Platelet Count - Automated : 226 K/uL  Mean Cell Volume : 97.1 fl  Mean Cell Hemoglobin : 30.7 pg  Mean Cell Hemoglobin Concentration : 31.6 gm/dL  Auto Neutrophil # : 4.95 K/uL  Auto Lymphocyte # : 1.89 K/uL  Auto Monocyte # : 0.57 K/uL  Auto Eosinophil # : 0.06 K/uL  Auto Basophil # : 0.02 K/uL  Auto Neutrophil % : 65.5 %  Auto Lymphocyte % : 25.0 %  Auto Monocyte % : 7.5 %  Auto Eosinophil % : 0.8 %  Auto Basophil % : 0.3 %    11-25    143  |  108  |  20  ----------------------------<  78  3.7   |  28  |  0.86    Ca    9.4      25 Nov 2023 08:43  Phos  3.1     11-24    TPro  6.0  /  Alb  2.8<L>  /  TBili  0.6  /  DBili  x   /  AST  20  /  ALT  <6<L>  /  AlkPhos  123<H>  11-25    PT/INR - ( 25 Nov 2023 08:43 )   PT: 11.5 sec;   INR: 1.02 ratio     PTT - ( 25 Nov 2023 08:43 )  PTT:31.3 sec    Urinalysis Basic - ( 25 Nov 2023 08:43 )    Color: x / Appearance: x / SG: x / pH: x  Gluc: 78 mg/dL / Ketone: x  / Bili: x / Urobili: x   Blood: x / Protein: x / Nitrite: x   Leuk Esterase: x / RBC: x / WBC x   Sq Epi: x / Non Sq Epi: x / Bacteria: x    RADIOLOGY & ADDITIONAL STUDIES:    EXAM:  CT BRAIN    PROCEDURE DATE:  11/24/2023      INTERPRETATION:  CLINICAL INFORMATION:  Mets to liver, spine.  Unknown   primary.  R/O Brain mets, bleed.    TECHNIQUE:  Axial CT images were acquired through the head.  Intravenous contrast: None  Two-dimensional reformats were generated.    COMPARISON STUDY: CT head 8/8/2022    FINDINGS:    There is no CT evidence of acute intracranial hemorrhage, mass effect,   midline shift, or acute, large territorial infarct.  The ventricles and   sulci are prominent compatible with age-appropriate brain parenchymal   volume loss. Mild patchy periventricular white matter hypoattenuation is   nonspecific, although likely due to chronic microangiopathy. The basal   cisterns are patent. There is a partially empty sella.    The mastoid air cells and middle ear cavities are grossly clear. The   visualized paranasal sinuses are well aerated.    The calvarium and skull base are grossly intact.    IMPRESSION:  No acute intracranial hemorrhage or mass effect.    Please note that contrast-enhanced MRI of the brain is more sensitive in   the detection of intracranial metastases.      EXAM: [id number] - CT ABDOMEN AND PELVIS  -   EXAM: [id number] - CT CHEST  -     PROCEDURE DATE:  11/21/2023    INTERPRETATION:  CLINICAL INFORMATION: Anemia and 40 pound weight loss.    COMPARISON: None.    CONTRAST/COMPLICATIONS:  IV Contrast: NONE  Oral Contrast: NONE  Complications: None reported at time of study completion    PROCEDURE:  CT of the Chest, Abdomen and Pelvis was performed.  Sagittal and coronal reformats were performed.    FINDINGS:  CHEST:  LUNGS AND LARGE AIRWAYS: Patent central airways. There is atelectasis at the right lung base.  PLEURA: No pleural effusion.  VESSELS: Coronary artery calcifications and atherosclerotic calcifications within the thoracic aorta.  HEART: Heart size is normal. No pericardial effusion.  MEDIASTINUM AND CRIS: No lymphadenopathy.  CHEST WALL AND LOWER NECK: There is a loop recorder left anterior chest wall. There is a 0.8 cm right lobe thyroid nodule.    ABDOMEN AND PELVIS:  LIVER: Bilobar indeterminate hepatic lesions are nonetheless suspicious for metastatic disease. The largest lesion is in the hepatic dome measuring 4.8 cm. There is a mass interposed between the caudate lobe and left lobe of liver measuring 3.4 cm on series 2 image 68.  BILE DUCTS: Normal caliber.  GALLBLADDER: Removed.  SPLEEN: Within normal limits.  PANCREAS: Within normal limits.  ADRENALS: There is a 1.9 cm right adrenal adenoma.  KIDNEYS/URETERS: Within normal limits.    BLADDER: Within normal limits.  REPRODUCTIVE ORGANS: Uterus and adnexa within normal limits.    BOWEL: No bowel obstruction. 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 There is herniation of the gastric pouch and small amount of the jejunum distal to the gastrojejunal anastomosis into the mediastinum through the esophageal hiatus. There is also herniation of a portion of the excluded stomach through the esophageal hiatus.  PERITONEUM: No ascites.  VESSELS: Within normal limits.  RETROPERITONEUM/LYMPH NODES: No lymphadenopathy.  ABDOMINAL WALL: Within normal limits.  BONES: There is a lytic lesion with posterior cortical destruction involving the L5 vertebral body with epidural extension. There is mild loss of height of the T12 vertebral body. There is a lytic lesion and pathologic fracture through the T11 vertebral body with likely epidural extension and extension into the left pedicle. There is heterogeneous appearance of the T8-9 vertebral bodies with a lytic lesion in T9 vertebral body. There is a lytic lesion with soft tissue component in the right fourth rib with associated pathologic fracture. There is an old fracture of the left humeral neck. There is a lytic lesion in the left do sacrum. There is a lytic lesion in the right fourth transverse process as well as lytic lesion with pathologic fracture in the left 10th rib. There is a 4.6 x 3.8 cm expansile lesion in the left third rib.    IMPRESSION:  Multiple lytic osseous lesions highly suspicious for metastatic disease. Lesions in the L5 and T11 vertebral bodies have epidural extension.  Pathologic fracture T11 vertebral body. Some of the rib lesions demonstrate pathologic fractures. Please see above comments.    Multiple hepatic lesions are not characterized without intravenous contrast but nonetheless are suspicious for metastatic disease.         HPI:  82 y/o F w/ MH of HTN, dyslipidemia, GERD, parkinson's, neuropathy, iron deficiency anemia, loop recorder, p/w LE weakness. Patient has been having B/L LE weakness for a little less than 1 year. Symptoms have been getting progressively worse to the point where patient is unable to ambulate, also with  LE paresthesias and back pain. and 40 lbs unintentional weight loss. She was sent for CT-C/A/P- which revealed multiple bone mets and possible liver mets and path fracture of T11 vertebral body.  She was sent to ED for further evaluation.     11/25/23- Seen at bedside, along with Dr. Loera, alert, awake, in no acute distress. Reports left lower extremities with tingling and numbness, denies cauda equina symptoms. Reports difficulty ambulating even with a walker.     PAST MEDICAL & SURGICAL HISTORY:    GERD (gastroesophageal reflux disease)    HTN (hypertension)    HLD (hyperlipidemia)    Mood disorder    H/O gastric bypass    History of cholecystectomy    History of total knee replacement, left    Iron Deficiency Anemia    CRI    Barrette Esophagus      FAMILY HISTORY:    COPD- Brother  CAD- Sister  Stroke- Mother  Parkinson-Father    MEDICATIONS  (STANDING):    atorvastatin 40 milliGRAM(s) Oral at bedtime  buPROPion SR (12-Hour) 100 milliGRAM(s) Oral daily  carbidopa/levodopa  25/100 1 Tablet(s) Oral four times a day  donepezil 10 milliGRAM(s) Oral at bedtime  ferrous    sulfate 325 milliGRAM(s) Oral daily  influenza  Vaccine (HIGH DOSE) 0.7 milliLiter(s) IntraMuscular once  levothyroxine 50 MICROGram(s) Oral daily  losartan 25 milliGRAM(s) Oral daily  metoprolol succinate ER 25 milliGRAM(s) Oral daily  pantoprazole    Tablet 40 milliGRAM(s) Oral before breakfast  pramipexole 0.125 milliGRAM(s) Oral at bedtime  verapamil  milliGRAM(s) Oral daily    MEDICATIONS  (PRN):    acetaminophen Tablet .. 650 milliGRAM(s) Oral every 6 hours PRN Mild Pain (1 - 3), Moderate Pain (4 - 6)    ALLERGIES:    Originally Entered as [Moderate Rash] reaction to [SURGICAL TAPE] (Unknown)  Vitamin K (Other (Severe))  Fosamax (Unknown)    REVIEW OF SYSTEM:    Constitutional, Eyes, ENT, Cardiovascular, Respiratory, Gastrointestinal, Genitourinary, Musculoskeletal, Integumentary, Neurological, Psychiatric, Endocrine, Heme/Lymph and Allergic/Immunologic review of systems are otherwise negative except as noted in HPI.     VITAL SIGNS:    T(C): 36.9 (25 Nov 2023 07:39), Max: 36.9 (25 Nov 2023 07:39)  T(F): 98.5 (25 Nov 2023 07:39), Max: 98.5 (25 Nov 2023 07:39)  HR: 82 (25 Nov 2023 07:39) (77 - 82)  BP: 124/67 (25 Nov 2023 07:39) (114/68 - 132/71)  BP(mean): 90 (25 Nov 2023 00:34) (80 - 90)  RR: 18 (25 Nov 2023 07:39) (14 - 18)  SpO2: 100% (25 Nov 2023 07:39) (96% - 100%)    Parameters below as of 25 Nov 2023 07:39  Patient On (Oxygen Delivery Method): room air    PHYSICAL EXAM:    CONSTITUTIONAL : NAD  NERVOUS SYSTEM:  Alert & Oriented X 3, with mild weakness in left lower extremity and reported tingling.  HEAD:  Atraumatic, Normocephalic  EYES:  conjunctiva and sclera clear  HEENT: Moist mucous membranes, Supple neck , No JVD  CHEST: Clear to auscultation bilaterally  BREAST: With no palpable mass.  HEART: Regular rate and rhythm  ABDOMEN: Soft, Non-tender, Non-distended; Bowel sounds present  GENITOURINARY- Voiding, no suprapubic tenderness  EXTREMITIES:  2+ Peripheral Pulses, No clubbing, cyanosis, no edema  MUSCULOSKELETAL:- No muscle tenderness, Muscle tone normal, No joint tenderness, no Joint swelling,  Joint ROM –normal   SKIN-no rash, no lesion    LABS:    CBC Full  -  ( 25 Nov 2023 08:43 )  WBC Count : 7.56 K/uL  RBC Count : 2.77 M/uL  Hemoglobin : 8.5 g/dL  Hematocrit : 26.9 %  Platelet Count - Automated : 226 K/uL  Mean Cell Volume : 97.1 fl  Mean Cell Hemoglobin : 30.7 pg  Mean Cell Hemoglobin Concentration : 31.6 gm/dL  Auto Neutrophil # : 4.95 K/uL  Auto Lymphocyte # : 1.89 K/uL  Auto Monocyte # : 0.57 K/uL  Auto Eosinophil # : 0.06 K/uL  Auto Basophil # : 0.02 K/uL  Auto Neutrophil % : 65.5 %  Auto Lymphocyte % : 25.0 %  Auto Monocyte % : 7.5 %  Auto Eosinophil % : 0.8 %  Auto Basophil % : 0.3 %    11-25    143  |  108  |  20  ----------------------------<  78  3.7   |  28  |  0.86    Ca    9.4      25 Nov 2023 08:43  Phos  3.1     11-24    TPro  6.0  /  Alb  2.8<L>  /  TBili  0.6  /  DBili  x   /  AST  20  /  ALT  <6<L>  /  AlkPhos  123<H>  11-25    PT/INR - ( 25 Nov 2023 08:43 )   PT: 11.5 sec;   INR: 1.02 ratio     PTT - ( 25 Nov 2023 08:43 )  PTT:31.3 sec    Urinalysis Basic - ( 25 Nov 2023 08:43 )    Color: x / Appearance: x / SG: x / pH: x  Gluc: 78 mg/dL / Ketone: x  / Bili: x / Urobili: x   Blood: x / Protein: x / Nitrite: x   Leuk Esterase: x / RBC: x / WBC x   Sq Epi: x / Non Sq Epi: x / Bacteria: x    RADIOLOGY & ADDITIONAL STUDIES:    EXAM:  MR SPINE LUMBAR WAW IC       PROCEDURE DATE:  11/25/2023      INTERPRETATION:  CLINICAL INFORMATION: Metastases    ADDITIONAL CLINICAL INFORMATION: Cancer C80.1    TECHNIQUE: Multiplanar, multisequence MRI was performed of the lumbar   spine.  IV Contrast: Gadavist  8 cc administered   2 cc discarded    CORRELATION: CT chest and abdomen 11/21/2023    FINDINGS:    ALIGNMENT: The alignment is normal.  VERTEBRA: There is a mild compression fracture deformity of T12 as well   as pathological compression fracture deformities of T11 and L5. There is   diffuse marrow infiltration of the T11 and L5 vertebral bodies with   extension to the posterior elements with hypointense T1 and hyperintense   T2/STIR signal with avid enhancement compatible with metastatic disease.   The loss of the cortical margins is better seen on the prior CT. There is   ventral epidural soft tissue component at T11 indenting and possibly   compressing the spinal cord. Axialimages were not obtained at this   level. There is diffuse ventral epidural soft tissue at L5 severely   compressing the thecal sac as well as the L5 and S1 nerve roots. There   also is diffuse involvement of the left left sacrum at the S1 and S2   levels extending to the left SI joint. There is minimum epidural soft   tissue along the lateral margin of the sacral foramen at the S1-S2 level.   There are additional smaller metastases involving L1, L2 and L4. There   are focal areas of hyperintenseT1 and T2 signal within the T2 12 and L1   vertebral bodies which may represent hemangiomas.  DISC SPACES: Maintained.  CANAL: The distal cord and conus are normal in signal. Conus terminates   at L1.  VISUALIZED INTRAPELVIC/INTRA-ABDOMINAL SOFT TISSUES: Normal.  PARASPINAL SOFT TISSUES: Normal.    INDIVIDUAL LEVELS:    L1-L2: No disc herniation, spinal canal stenosis or neural foramen   narrowing.    L2-L3: No disc herniation. Mild degenerative facet disease. No spinal   canal stenosis or neuralforamen narrowing.    L3-L4: No disc herniation. Mild degenerative facet disease. No spinal   canal stenosis or neural foramen narrowing.    IMPRESSION: Bony metastases at T11,L1, L2, L4, L5 and left sacrum.   Pathologic fractures of T11 and L5. Possible cord compression at T11 and   diffuse thecal sac compression at L5.        EXAM:  MR SPINE THORACIC WAW IC     EXAM:  MR SPINE CERVICAL WAW IC      PROCEDURE DATE:  11/25/2023      INTERPRETATION:  History: 82 y/o F with a PMhx of anemia, GERD, HTN, HLD,   mood disorder, gastric bypass, and cholecystectomy presents to the ED SIB   MD c/o weakness. The pt had outpatient w/u for worsening anemia with   weakness. CT and XR showing lytic legions in spine with epidural   extensions leading to increased weakness and difficulty ambulating. PCP   sent pt to ED for further evaluation. Pt reports weight loss over months.   Denies any sort of active bleeding no blood in stool. Denies CP, or SOB.   C/o lower back pain. Some numbness and tingling to LLE. Denies bowl or   bladder incontinence. Denies numbness consistent with saddle anesthesia.   She had  outpatient CT C/A/P showing multiple bone mets and possible liver mets   and path  fracture of T11 vertebral body. Patient also C/o LE  paresthesias and back pain.    Description: A MRI of the cervical spine and a MRI of the thoracic spine   with and without gadolinium contrast were performed with multiplanar   multisequence technique.    8 cc intravenous Gadavist gadolinium contrast was administered, 2 cc   contrast was discarded.    Comparison is made to the thoracic and lumbar spine region from the chest   abdomen pelvic CT from 11/21/2023, and cervical spine CT from 08/08/2022    Cervical spine MRI:    Bulky dorsal paraspinal tumor in the upper thoracic region is partially   visualized. Please see thoracic spine MRI component of this report.    No focal cervical spine bony lesions are visualized. There is no evidence   for epidural tumor extension in the cervical spine.    Smallbroad disc bulges are present at the C4-C5, C5-C6, C6-C7 levels   with associated mild central canal stenosis. Multilevel ligamentum flavum   hypertrophy multilevel facet degenerative changes are noted.    There is no cervical spinal cord mass or syrinx. No abnormal intraspinal   enhancement is noted in the cervical spine. There is no cervical spinal   cord compression.    Thoracic spine MRI:    A large metastasis replaces the T11 vertebral body with extension into   the pedicles, with an associated mild to moderate pathologic compression   fracture deformity and retropulsion of bone. Epidural tumor extension is   noted. Tumor extension into the left greater than right neural foramina   at this levels also noted. The compression fracture withretropulsion of   bone and epidural tumor extension results in mild to moderate thoracic   spinal cord compression. A left-sided rib metastasis with soft tissue   extension of tumor is partially visualized at this level.    Additional metastases are noted involving the T9 and L2 vertebral bodies   without associated epidural tumor extension    A moderate sized hemangioma involves the T8 vertebral body with   associated increased T1 signal, increased STIR signal, and enhancement.    A 3.5 cm x 3.2cm x 3.8 cm bulky paraspinal metastasis involves the right   posterior elements at the T3 and T4 levels with extension into the   paraspinal musculature and abutment and possible involvement of the   posterior ribs at these levels. No significant intraspinal extension of   tumor at this level is appreciated.    Nonspecific bilateral pleural effusions and pleural thickening is noted.   Multiple enhancing liver lesions are partially visualized. Please see   separate chest abdomen pelvic CT report.    IMPRESSION:    Cervical spine:    No evidence for metastatic disease to the cervical spine or cervical   spinal cord compression.    Thoracic spine:    Multifocal metastatic disease is noted as described.    A large metastasis replaces the T11 vertebral body with an associated   mild to moderate pathologic compression fracture deformity and   retropulsion of bone. Epidural tumor extension is noted. The compression   fracture with retropulsion of bone and epidural tumor extension results   in mild to moderate thoracic spinal cord compression.              EXAM:  CT BRAIN    PROCEDURE DATE:  11/24/2023      INTERPRETATION:  CLINICAL INFORMATION:  Mets to liver, spine.  Unknown   primary.  R/O Brain mets, bleed.    TECHNIQUE:  Axial CT images were acquired through the head.  Intravenous contrast: None  Two-dimensional reformats were generated.    COMPARISON STUDY: CT head 8/8/2022    FINDINGS:    There is no CT evidence of acute intracranial hemorrhage, mass effect,   midline shift, or acute, large territorial infarct.  The ventricles and   sulci are prominent compatible with age-appropriate brain parenchymal   volume loss. Mild patchy periventricular white matter hypoattenuation is   nonspecific, although likely due to chronic microangiopathy. The basal   cisterns are patent. There is a partially empty sella.    The mastoid air cells and middle ear cavities are grossly clear. The   visualized paranasal sinuses are well aerated.    The calvarium and skull base are grossly intact.    IMPRESSION:  No acute intracranial hemorrhage or mass effect.    Please note that contrast-enhanced MRI of the brain is more sensitive in   the detection of intracranial metastases.      EXAM: [id number] - CT ABDOMEN AND PELVIS  -   EXAM: [id number] - CT CHEST  -     PROCEDURE DATE:  11/21/2023    INTERPRETATION:  CLINICAL INFORMATION: Anemia and 40 pound weight loss.    COMPARISON: None.    CONTRAST/COMPLICATIONS:  IV Contrast: NONE  Oral Contrast: NONE  Complications: None reported at time of study completion    PROCEDURE:  CT of the Chest, Abdomen and Pelvis was performed.  Sagittal and coronal reformats were performed.    FINDINGS:  CHEST:  LUNGS AND LARGE AIRWAYS: Patent central airways. There is atelectasis at the right lung base.  PLEURA: No pleural effusion.  VESSELS: Coronary artery calcifications and atherosclerotic calcifications within the thoracic aorta.  HEART: Heart size is normal. No pericardial effusion.  MEDIASTINUM AND CRIS: No lymphadenopathy.  CHEST WALL AND LOWER NECK: There is a loop recorder left anterior chest wall. There is a 0.8 cm right lobe thyroid nodule.    ABDOMEN AND PELVIS:  LIVER: Bilobar indeterminate hepatic lesions are nonetheless suspicious for metastatic disease. The largest lesion is in the hepatic dome measuring 4.8 cm. There is a mass interposed between the caudate lobe and left lobe of liver measuring 3.4 cm on series 2 image 68.  BILE DUCTS: Normal caliber.  GALLBLADDER: Removed.  SPLEEN: Within normal limits.  PANCREAS: Within normal limits.  ADRENALS: There is a 1.9 cm right adrenal adenoma.  KIDNEYS/URETERS: Within normal limits.    BLADDER: Within normal limits.  REPRODUCTIVE ORGANS: Uterus and adnexa within normal limits.    BOWEL: No bowel obstruction. 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 There is herniation of the gastric pouch and small amount of the jejunum distal to the gastrojejunal anastomosis into the mediastinum through the esophageal hiatus. There is also herniation of a portion of the excluded stomach through the esophageal hiatus.  PERITONEUM: No ascites.  VESSELS: Within normal limits.  RETROPERITONEUM/LYMPH NODES: No lymphadenopathy.  ABDOMINAL WALL: Within normal limits.  BONES: There is a lytic lesion with posterior cortical destruction involving the L5 vertebral body with epidural extension. There is mild loss of height of the T12 vertebral body. There is a lytic lesion and pathologic fracture through the T11 vertebral body with likely epidural extension and extension into the left pedicle. There is heterogeneous appearance of the T8-9 vertebral bodies with a lytic lesion in T9 vertebral body. There is a lytic lesion with soft tissue component in the right fourth rib with associated pathologic fracture. There is an old fracture of the left humeral neck. There is a lytic lesion in the left do sacrum. There is a lytic lesion in the right fourth transverse process as well as lytic lesion with pathologic fracture in the left 10th rib. There is a 4.6 x 3.8 cm expansile lesion in the left third rib.    IMPRESSION:  Multiple lytic osseous lesions highly suspicious for metastatic disease. Lesions in the L5 and T11 vertebral bodies have epidural extension.  Pathologic fracture T11 vertebral body. Some of the rib lesions demonstrate pathologic fractures. Please see above comments.    Multiple hepatic lesions are not characterized without intravenous contrast but nonetheless are suspicious for metastatic disease.

## 2023-11-25 NOTE — DIETITIAN INITIAL EVALUATION ADULT - PERTINENT MEDS FT
MEDICATIONS  (STANDING):  atorvastatin 40 milliGRAM(s) Oral at bedtime  buPROPion SR (12-Hour) 100 milliGRAM(s) Oral daily  carbidopa/levodopa  25/100 1 Tablet(s) Oral four times a day  donepezil 10 milliGRAM(s) Oral at bedtime  ferrous    sulfate 325 milliGRAM(s) Oral daily  influenza  Vaccine (HIGH DOSE) 0.7 milliLiter(s) IntraMuscular once  levothyroxine 50 MICROGram(s) Oral daily  losartan 25 milliGRAM(s) Oral daily  metoprolol succinate ER 25 milliGRAM(s) Oral daily  pantoprazole    Tablet 40 milliGRAM(s) Oral before breakfast  pramipexole 0.125 milliGRAM(s) Oral at bedtime  verapamil  milliGRAM(s) Oral daily    MEDICATIONS  (PRN):  acetaminophen     Tablet .. 650 milliGRAM(s) Oral every 6 hours PRN Mild Pain (1 - 3), Moderate Pain (4 - 6)

## 2023-11-25 NOTE — H&P ADULT - NSHPSOURCEINFORD_GEN_ALL_CORE
Detail Level: Simple
Add 47700 Cpt? (Important Note: In 2017 The Use Of 94361 Is Being Tracked By Cms To Determine Future Global Period Reimbursement For Global Periods): yes
Chart(s)/Patient

## 2023-11-25 NOTE — H&P ADULT - ASSESSMENT
84 y/o F w/ PMH of HTN, dyslipidemia, GERD, parkinson's, neuropathy, anemia, loop recorder, p/w LE weakness    *LE Weakness / paresthesias / back pain w/ possible bone mets    -CT: Multiple lytic osseous lesions suspcious for mets. Lesions in the L5 and T11 vertebral bodies have epidural extension.  Pathologic fracture T11 vertebral body. Some of the rib lesions demonstrate pathologic fractures. Please see above comments. Adrenal adenoma. Multiple hepatic lesions suspicious for metastatic disease.  -F/u Ortho spine  -Heme/onc consult  -PT consult  -Fall precautions     *Thyroid nodule  -F/u outpatient Endo and outpatient thyroid U/S for further evaluation     *Anemia  -F/u heme/onc     *H/o HTN / dyslipidemia / GERD / parkinson's   -C/w home meds and f/u outpatient for further management if conditions remain stable during hospitalization    *DVT ppx   -Lovenox    84 y/o F w/ PMH of HTN, dyslipidemia, GERD, parkinson's, neuropathy, anemia, loop recorder, p/w LE weakness    *LE Weakness / paresthesias / back pain w/ possible bone mets    -CT: Multiple lytic osseous lesions suspcious for mets. Lesions in the L5 and T11 vertebral bodies have epidural extension.  Pathologic fracture T11 vertebral body. Some of the rib lesions demonstrate pathologic fractures. Please see above comments. Adrenal adenoma. Multiple hepatic lesions suspicious for metastatic disease.  -F/u Ortho spine who recommends MRI (patient has a loop recorder in place, will consult Cardio Dr. Crawley for compatibility prior to MRI)  -Heme/onc consult  -PT consult  -Fall precautions     *Thyroid nodule  -F/u outpatient Endo and outpatient thyroid U/S for further evaluation     *Anemia  -F/u heme/onc     *H/o HTN / dyslipidemia / GERD / parkinson's   -C/w home meds and f/u outpatient for further management if conditions remain stable during hospitalization    *DVT ppx   -Lovenox     >75 mins spent on this encounter

## 2023-11-25 NOTE — DIETITIAN INITIAL EVALUATION ADULT - ORAL INTAKE PTA/DIET HISTORY
Lives at home w/ ,  does the cooking/grocery shopping. Reports very poor appetite, only able to eat 2-3 meals/day and consume half of each meal, consuming <50% ENN x months. States that gastric bypass x years ago is r/t lack of appetite. Reports she hasn't eaten x last 2 days, consuming 0% ENN. C/o constipation ? time frame and reports not taking any medications for it.

## 2023-11-26 LAB
CANCER AG19-9 SERPL-ACNC: 17 U/ML — SIGNIFICANT CHANGE UP
CANCER AG19-9 SERPL-ACNC: 17 U/ML — SIGNIFICANT CHANGE UP
CEA SERPL-MCNC: 9.6 NG/ML — HIGH (ref 0–3.8)
CEA SERPL-MCNC: 9.6 NG/ML — HIGH (ref 0–3.8)
CULTURE RESULTS: SIGNIFICANT CHANGE UP
CULTURE RESULTS: SIGNIFICANT CHANGE UP
SPECIMEN SOURCE: SIGNIFICANT CHANGE UP
SPECIMEN SOURCE: SIGNIFICANT CHANGE UP

## 2023-11-26 PROCEDURE — 99233 SBSQ HOSP IP/OBS HIGH 50: CPT

## 2023-11-26 RX ADMIN — CARBIDOPA AND LEVODOPA 1 TABLET(S): 25; 100 TABLET ORAL at 06:30

## 2023-11-26 RX ADMIN — Medication 50 MICROGRAM(S): at 06:31

## 2023-11-26 RX ADMIN — ATORVASTATIN CALCIUM 40 MILLIGRAM(S): 80 TABLET, FILM COATED ORAL at 21:55

## 2023-11-26 RX ADMIN — BUPROPION HYDROCHLORIDE 100 MILLIGRAM(S): 150 TABLET, EXTENDED RELEASE ORAL at 10:13

## 2023-11-26 RX ADMIN — LOSARTAN POTASSIUM 25 MILLIGRAM(S): 100 TABLET, FILM COATED ORAL at 10:13

## 2023-11-26 RX ADMIN — CEFTRIAXONE 1000 MILLIGRAM(S): 500 INJECTION, POWDER, FOR SOLUTION INTRAMUSCULAR; INTRAVENOUS at 21:55

## 2023-11-26 RX ADMIN — PRAMIPEXOLE DIHYDROCHLORIDE 0.12 MILLIGRAM(S): 0.12 TABLET ORAL at 21:55

## 2023-11-26 RX ADMIN — CARBIDOPA AND LEVODOPA 1 TABLET(S): 25; 100 TABLET ORAL at 17:41

## 2023-11-26 RX ADMIN — Medication 325 MILLIGRAM(S): at 10:13

## 2023-11-26 RX ADMIN — Medication 650 MILLIGRAM(S): at 10:13

## 2023-11-26 RX ADMIN — CARBIDOPA AND LEVODOPA 1 TABLET(S): 25; 100 TABLET ORAL at 13:25

## 2023-11-26 RX ADMIN — CARBIDOPA AND LEVODOPA 1 TABLET(S): 25; 100 TABLET ORAL at 00:37

## 2023-11-26 RX ADMIN — Medication 25 MILLIGRAM(S): at 10:13

## 2023-11-26 RX ADMIN — PANTOPRAZOLE SODIUM 40 MILLIGRAM(S): 20 TABLET, DELAYED RELEASE ORAL at 13:25

## 2023-11-26 RX ADMIN — DONEPEZIL HYDROCHLORIDE 10 MILLIGRAM(S): 10 TABLET, FILM COATED ORAL at 21:55

## 2023-11-26 NOTE — PROGRESS NOTE ADULT - SUBJECTIVE AND OBJECTIVE BOX
HOSPITALIST ATTENDING PROGRESS NOTE     Chart and meds reviewed. Patient seen and examined     CC: LE weakness    Subjective: Patient seen and evaluated.  Still with left lower extremity weakness.  Has some fecal incontinence.        All other systems and founds to be negative with exception of what has been described above.         PHYSICAL EXAM:  Vital Signs Last 24 Hrs  T(C): 36.4 (26 Nov 2023 15:43), Max: 36.6 (25 Nov 2023 23:20)  T(F): 97.6 (26 Nov 2023 15:43), Max: 97.8 (25 Nov 2023 23:20)  HR: 73 (26 Nov 2023 15:43) (69 - 73)  BP: 101/54 (26 Nov 2023 15:43) (101/54 - 126/68)  BP(mean): 86 (26 Nov 2023 08:02) (86 - 86)  RR: 17 (26 Nov 2023 15:43) (17 - 18)  SpO2: 97% (26 Nov 2023 15:43) (96% - 97%)    Parameters below as of 26 Nov 2023 15:43  Patient On (Oxygen Delivery Method): room air      Daily     Daily     GEN: NAD, +frail  HEENT: EOMI, normal hearing, moist mucous membranes  NECK : Soft and supple, no JVD  LUNG: CTABL, No wheezing, rales or rhonchi  CVS: S1S2+, RRR, no M/G/R  GI: BS+, soft, NT/ND, no guarding, no rebound  EXTREMITIES: No peripheral edema  VASCULAR: 2+ peripheral pulses  NEURO: AAOx3, grossly non-focal   MSK: 5/5 strength b/l upper extremities, 5/5 in the RLE, 4/5 in the LLE   SKIN: No rashes    HOME MEDICATIONS:  Home Medications:  atorvastatin 40 mg oral tablet: 1 tab(s) orally once a day (25 Nov 2023 02:42)  buPROPion 100 mg oral tablet: 1 tab(s) orally once a day (25 Nov 2023 02:40)  carbidopa-levodopa 25 mg-100 mg oral tablet: 1 tab(s) orally 4 times a day (25 Nov 2023 02:42)  donepezil 10 mg oral tablet: 1 tab(s) orally 2 times a day (25 Nov 2023 02:40)  ferrous sulfate 325 mg (65 mg elemental iron) oral delayed release tablet: 1 tab(s) orally once a day (25 Nov 2023 02:42)  levothyroxine 50 mcg (0.05 mg) oral capsule: 1 cap(s) orally once a day (25 Nov 2023 02:42)  losartan 25 mg oral tablet: 1 tab(s) orally once a day (25 Nov 2023 02:42)  metoprolol succinate 25 mg oral tablet, extended release: 1 tab(s) orally once a day (25 Nov 2023 02:39)  omeprazole 20 mg oral delayed release tablet: 1 tab(s) orally once a day (25 Nov 2023 02:39)  pramipexole 0.125 mg oral tablet: 1 tab(s) orally once a day (at bedtime) (25 Nov 2023 02:40)  Rexulti 3 mg oral tablet: 1 tab(s) orally once a day (at bedtime) (25 Nov 2023 02:40)  tolterodine 2 mg oral tablet: 2 tab(s) orally 2 times a day (25 Nov 2023 02:40)  verapamil 240 mg/12 hours oral tablet, extended release: 1 tab(s) orally once a day (25 Nov 2023 02:39)  vilazodone 40 mg oral tablet: 1 tab(s) orally once a day (25 Nov 2023 02:40)      MEDICATIONS  MEDICATIONS  (STANDING):  atorvastatin 40 milliGRAM(s) Oral at bedtime  buPROPion SR (12-Hour) 100 milliGRAM(s) Oral daily  carbidopa/levodopa  25/100 1 Tablet(s) Oral four times a day  cefTRIAXone Injectable. 1000 milliGRAM(s) IV Push every 24 hours  donepezil 10 milliGRAM(s) Oral at bedtime  ferrous    sulfate 325 milliGRAM(s) Oral daily  influenza  Vaccine (HIGH DOSE) 0.7 milliLiter(s) IntraMuscular once  levothyroxine 50 MICROGram(s) Oral daily  losartan 25 milliGRAM(s) Oral daily  metoprolol succinate ER 25 milliGRAM(s) Oral daily  pantoprazole    Tablet 40 milliGRAM(s) Oral before breakfast  pramipexole 0.125 milliGRAM(s) Oral at bedtime  verapamil  milliGRAM(s) Oral daily      LABS: All Labs Reviewed:                        8.5    7.56  )-----------( 226      ( 25 Nov 2023 08:43 )             26.9     11-25    143  |  108  |  20  ----------------------------<  78  3.7   |  28  |  0.86    Ca    9.4      25 Nov 2023 08:43    TPro  6.0  /  Alb  2.8<L>  /  TBili  0.6  /  DBili  x   /  AST  20  /  ALT  <6<L>  /  AlkPhos  123<H>  11-25    PT/INR - ( 25 Nov 2023 08:43 )   PT: 11.5 sec;   INR: 1.02 ratio         PTT - ( 25 Nov 2023 08:43 )  PTT:31.3 sec    Urinalysis Basic - ( 25 Nov 2023 08:43 )    Color: x / Appearance: x / SG: x / pH: x  Gluc: 78 mg/dL / Ketone: x  / Bili: x / Urobili: x   Blood: x / Protein: x / Nitrite: x   Leuk Esterase: x / RBC: x / WBC x   Sq Epi: x / Non Sq Epi: x / Bacteria: x        Culture - Urine (collected 25 Nov 2023 06:15)  Source: Clean Catch Clean Catch (Midstream)  Final Report (26 Nov 2023 15:42):    <10,000 CFU/mL Normal Urogenital Jaz    Culture - Blood (collected 24 Nov 2023 19:54)  Source: .Blood None  Preliminary Report (26 Nov 2023 01:02):    No growth at 24 hours    Culture - Blood (collected 24 Nov 2023 19:54)  Source: .Blood None  Preliminary Report (26 Nov 2023 01:02):    No growth at 24 hours      Blood Culture: 11-25 @ 06:15  Organism --  Gram Stain Blood -- Gram Stain --  Specimen Source Clean Catch Clean Catch (Midstream)  Culture-Blood --    11-24 @ 19:54  Organism --  Gram Stain Blood -- Gram Stain --  Specimen Source .Blood None  Culture-Blood --      I&O's Detail    CAPILLARY BLOOD GLUCOSE      CARDIOLOGY TESTING   EKG   ECHO       RADIOLOGY    There is no CT evidence of acute intracranial hemorrhage, mass effect, midline shift, or acute, large territorial infarct. The ventricles and sulci are prominent compatible with age-appropriate brain parenchymal volume loss. Mild patchy periventricular white matter hypoattenuation is nonspecific, although likely due to chronic microangiopathy. The basal cisterns are patent. There is a partially empty sella.    The mastoid air cells and middle ear cavities are grossly clear. The visualized paranasal sinuses are well aerated.    The calvarium and skull base are grossly intact.    IMPRESSION:  No acute intracranial hemorrhage or mass effect.    Please note that contrast-enhanced MRI of the brain is more sensitive in the detection of intracranial metastases.      EXAM: [id number] - CT ABDOMEN AND PELVIS - ORDERED BY: ERASMO PEDROZA    EXAM: [id number] - CT CHEST - ORDERED BY: ERASMO PEDROZA      PROCEDURE DATE: 11/21/2023        INTERPRETATION: CLINICAL INFORMATION: Anemia and 40 pound weight loss.    COMPARISON: None.    CONTRAST/COMPLICATIONS:  IV Contrast: NONE  Oral Contrast: NONE  Complications: None reported at time of study completion    PROCEDURE:  CT of the Chest, Abdomen and Pelvis was performed.  Sagittal and coronal reformats were performed.    FINDINGS:  CHEST:  LUNGS AND LARGE AIRWAYS: Patent central airways. There is atelectasis at the right lung base.  PLEURA: No pleural effusion.  VESSELS: Coronary artery calcifications and atherosclerotic calcifications within the thoracic aorta.  HEART: Heart size is normal. No pericardial effusion.  MEDIASTINUM AND CRIS: No lymphadenopathy.  CHEST WALL AND LOWER NECK: There is a loop recorder left anterior chest wall. There is a 0.8 cm right lobe thyroid nodule.    ABDOMEN AND PELVIS:  LIVER: Bilobar indeterminate hepatic lesions are nonetheless suspicious for metastatic disease. The largest lesion is in the hepatic dome measuring 4.8 cm. There is a mass interposed between the caudate lobe and left lobe of liver measuring 3.4 cm on series 2 image 68.  BILE DUCTS: Normal caliber.  GALLBLADDER: Removed.  SPLEEN: Within normal limits.  PANCREAS: Within normal limits.  ADRENALS: There is a 1.9 cm right adrenal adenoma.  KIDNEYS/URETERS: Within normal limits.    BLADDER: Within normal limits.  REPRODUCTIVE ORGANS: Uterus and adnexa within normal limits.    BOWEL: No bowel obstruction. 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 There is herniation of the gastric pouch and small amount of the jejunum distal to the gastrojejunal anastomosis into the mediastinum through the esophageal hiatus. There is also herniation of a portion of the excluded stomach through the esophageal hiatus.  PERITONEUM: No ascites.  VESSELS: Within normal limits.  RETROPERITONEUM/LYMPH NODES: No lymphadenopathy.  ABDOMINAL WALL: Within normal limits.  BONES: There is a lytic lesion with posterior cortical destruction involving the L5 vertebral body with epidural extension. There is mild loss of height of the T12 vertebral body. There is a lytic lesion and pathologic fracture through the T11 vertebral body with likely epidural extension and extension into the left pedicle. There is heterogeneous appearance of the T8-9 vertebral bodies with a lytic lesion in T9 vertebral body. There is a lytic lesion with soft tissue component in the right fourth rib with associated pathologic fracture. There is an old fracture of the left humeral neck. There is a lytic lesion in the left hemisacrum. There is a lytic lesion in the right fourth transverse process as well as lytic lesion with pathologic fracture in the left 10th rib. There is a 4.6 x 3.8 cm expansile lesion in the left third rib.    IMPRESSION:  Multiple lytic osseous lesions highly suspicious for metastatic disease. Lesions in the L5 and T11 vertebral bodies have epidural extension. Pathologic fracture T11 vertebral body. Some of the rib lesions demonstrate pathologic fractures. Please see above comments.    Multiple hepatic lesions are not characterized without intravenous contrast but nonetheless are suspicious for metastatic disease.    < from: MR Cervical Spine w/wo IV Cont (11.25.23 @ 11:56) >  Cervical spine MRI:    Bulky dorsal paraspinal tumor in the upper thoracic region is partially   visualized. Please see thoracic spine MRI component of this report.    No focal cervical spine bony lesions are visualized. There is no evidence   for epidural tumor extension in the cervical spine.    Smallbroad disc bulges are present at the C4-C5, C5-C6, C6-C7 levels   with associated mild central canal stenosis. Multilevel ligamentum flavum   hypertrophy multilevel facet degenerative changes are noted.    There is no cervical spinal cord mass or syrinx. No abnormal intraspinal   enhancement is noted in the cervical spine. There is no cervical spinal   cord compression.    Thoracic spine MRI:    A large metastasis replaces the T11 vertebral body with extension into   the pedicles, with an associated mild to moderate pathologic compression   fracture deformity and retropulsion of bone. Epidural tumor extension is   noted. Tumor extension into the left greater than right neural foramina   at this levels also noted. The compression fracture withretropulsion of   bone and epidural tumor extension results in mild to moderate thoracic   spinal cord compression. A left-sided rib metastasis with soft tissue   extension of tumor is partially visualized at this level.    Additional metastases are noted involving the T9 and L2 vertebral bodies   without associated epidural tumor extension    A moderate sized hemangioma involves the T8 vertebral body with   associated increased T1 signal, increased STIR signal, and enhancement.    A 3.5 cm x 3.2cm x 3.8 cm bulky paraspinal metastasis involves the right   posterior elements at the T3 and T4 levels with extension into the   paraspinal musculature and abutment and possible involvement of the   posterior ribs at these levels. No significant intraspinal extension of   tumor at this level is appreciated.    Nonspecific bilateral pleural effusions and pleural thickening is noted.   Multiple enhancing liver lesions are partially visualized. Please see   separate chest abdomen pelvic CT report.    I discussed the exam findings with the covering orthopedic clinician Dr. Selby at 1:30 PM on 11/25/2023.    IMPRESSION:    Cervical spine:    No evidence for metastatic disease to the cervical spine or cervical   spinal cord compression.    Thoracic spine:    Multifocal metastatic disease is noted as described.    A large metastasis replaces the T11 vertebral body with an associated   mild to moderate pathologic compression fracture deformity and   retropulsion of bone. Epidural tumor extension is noted. The compression   fracture with retropulsion of bone and epidural tumor extension results   in mild to moderate thoracic spinal cord compression.      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  IV Contrast: Gadavist  8 cc administered   2 cc discarded    CORRELATION: CT chest and abdomen 11/21/2023    FINDINGS:    ALIGNMENT: The alignment is normal.  VERTEBRA: There is a mild compression fracture deformity of T12 as well   as pathological compression fracture deformities of T11 and L5. There is   diffuse marrow infiltration of the T11 and L5 vertebral bodies with   extension to the posterior elements with hypointense T1 and hyperintense   T2/STIR signal with avid enhancement compatible with metastatic disease.   The loss of the cortical margins is better seen on the prior CT. There is   ventral epidural soft tissue component at T11 indenting and possibly   compressing the spinal cord. Axialimages were not obtained at this   level. There is diffuse ventral epidural soft tissue at L5 severely   compressing the thecal sac as well as the L5 and S1 nerve roots. There   also is diffuse involvement of the left left sacrum at the S1 and S2   levels extending to the left SI joint. There is minimum epidural soft   tissue along the lateral margin of the sacral foramen at the S1-S2 level.   There are additional smaller metastases involving L1, L2 and L4. There   are focal areas of hyperintenseT1 and T2 signal within the T2 12 and L1   vertebral bodies which may represent hemangiomas.  DISC SPACES: Maintained.  CANAL: The distal cord and conus are normal in signal. Conus terminates   at L1.  VISUALIZED INTRAPELVIC/INTRA-ABDOMINAL SOFT TISSUES: Normal.  PARASPINAL SOFT TISSUES: Normal.    INDIVIDUAL LEVELS:    L1-L2: No disc herniation, spinal canal stenosis or neural foramen   narrowing.    L2-L3: No disc herniation. Mild degenerative facet disease. No spinal   canal stenosis or neuralforamen narrowing.    L3-L4: No disc herniation. Mild degenerative facet disease. No spinal   canal stenosis or neural foramen narrowing.    IMPRESSION: Bony metastases at T11,L1, L2, L4, L5 and left sacrum.   Pathologic fractures of T11 and L5. Possible cord compression at T11 and   diffuse thecal sac compression at L5.    < end of copied text >

## 2023-11-26 NOTE — PROGRESS NOTE ADULT - SUBJECTIVE AND OBJECTIVE BOX
Chief Complaint: Back pain    History: Patient admitted with back and leg weakness. Patient states that her symptoms started approximately a year ago. Over the last 2 months her symptoms have become more severe. CT/MRI demonstrates diffuse metastatic disease most significant at T11 and L5.    OBJECTIVE:     Vital Signs Last 24 Hrs  T(C): 36.4 (26 Nov 2023 08:02), Max: 36.6 (25 Nov 2023 23:20)  T(F): 97.5 (26 Nov 2023 08:02), Max: 97.8 (25 Nov 2023 23:20)  HR: 72 (26 Nov 2023 08:02) (69 - 82)  BP: 126/68 (26 Nov 2023 08:02) (105/52 - 126/68)  BP(mean): 86 (26 Nov 2023 08:02) (86 - 86)  RR: 17 (26 Nov 2023 08:02) (17 - 18)  SpO2: 96% (26 Nov 2023 08:02) (96% - 98%)    Parameters below as of 26 Nov 2023 08:02  Patient On (Oxygen Delivery Method): room air        Physical Exam:         Awake and alert         HEENT - unremarkable         Neck - supple         Back: diffuse tenderness         Bilateral Upper Extremities:             Good Motor Strength             Sensation intact         Bilateral Lower Extremities             Neuro stable with persistent weakness 4/5 MS BLE    I&O's Detail      LABS:                        8.5    7.56  )-----------( 226      ( 25 Nov 2023 08:43 )             26.9     11-25    143  |  108  |  20  ----------------------------<  78  3.7   |  28  |  0.86    Ca    9.4      25 Nov 2023 08:43  Phos  3.1     11-24    TPro  6.0  /  Alb  2.8<L>  /  TBili  0.6  /  DBili  x   /  AST  20  /  ALT  <6<L>  /  AlkPhos  123<H>  11-25    PT/INR - ( 25 Nov 2023 08:43 )   PT: 11.5 sec;   INR: 1.02 ratio         PTT - ( 25 Nov 2023 08:43 )  PTT:31.3 sec      A/P :  83y Female with metastatic disease to the spine with large lesion involving T11 and L5  -    Patient and patient's family wish to proceed with CT Biopsy prior to considering any additional treatment  -   Continue pain management.   -    Continue present treatment

## 2023-11-26 NOTE — PROGRESS NOTE ADULT - ASSESSMENT
84 y/o F w/ PMH of HTN, dyslipidemia, GERD, parkinson's, neuropathy, anemia, loop recorder, p/w LE weakness    #LE Weakness / paresthesias / back pain w/ possible bone mets    -CT head: No acute intracranial hemorrhage or mass effect.  -CT: Multiple lytic osseous lesions suspicious for mets. Lesions in the L5 and T11 vertebral bodies have epidural extension.  Pathologic fracture T11 vertebral body. Some of the rib lesions demonstrate pathologic fractures. Please see above comments. Adrenal adenoma. Multiple hepatic lesions suspicious for metastatic disease.  -MRI C/s: No evidence for metastatic disease to the cervical spine or cervical spinal cord compression.  -MRI: T/S: A large metastasis replaces the T11 vertebral body with an associated mild to moderate pathologic compression fracture deformity and retropulsion of bone. Epidural tumor extension is noted. The compression fracture with retropulsion of bone and epidural tumor extension results in mild to moderate thoracic spinal cord compression.  -MRI: L/S: Bony metastases at T11,L1, L2, L4, L5 and left sacrum. Pathologic fractures of T11 and L5. Possible cord compression at T11 and diffuse thecal sac compression at L5.  -Neurochecks  -ESR: Mild mildly  -CA 19-9: Negative  -CEA: 9.6  -Will need tissue biopsy  -IR consult placed  -Fall precautions   -Fracture precautions  -Heme-onc following: Discussed with Dr. Loera   -Orthospine following: Discussed with Dr. Ceballos      #Normocytic anemia  -multifactorial with Hx iron deficiency, B12 deficiency, CRI, and anemia of chronic disease and now possibly due to malignancy.  -s/P Feraheme as outpatient  -s/p methylcobalamin   -Trend H&H    #Mild hypercalcemia  -Monitor and trend    #Elevated ALP  -Likely secondary to bony mets and hepatic mets    #Abnormal UA  -UCX: Negative  -DC ABX    #Thyroid nodule  -TSH with T4  -f/u on thryoid us    #H/o HTN / dyslipidemia / GERD / parkinson's   -C/w home meds and f/u outpatient for further management if conditions remain stable during hospitalization    *DVT ppx   -Lovenox      82 y/o F w/ PMH of HTN, dyslipidemia, GERD, parkinson's, neuropathy, anemia, loop recorder, p/w LE weakness    #LE Weakness / paresthesias / back pain w/ possible bone mets    -CT head: No acute intracranial hemorrhage or mass effect.  -CT: Multiple lytic osseous lesions suspicious for mets. Lesions in the L5 and T11 vertebral bodies have epidural extension.  Pathologic fracture T11 vertebral body. Some of the rib lesions demonstrate pathologic fractures. Please see above comments. Adrenal adenoma. Multiple hepatic lesions suspicious for metastatic disease.  -MRI C/s: No evidence for metastatic disease to the cervical spine or cervical spinal cord compression.  -MRI: T/S: A large metastasis replaces the T11 vertebral body with an associated mild to moderate pathologic compression fracture deformity and retropulsion of bone. Epidural tumor extension is noted. The compression fracture with retropulsion of bone and epidural tumor extension results in mild to moderate thoracic spinal cord compression.  -MRI: L/S: Bony metastases at T11,L1, L2, L4, L5 and left sacrum. Pathologic fractures of T11 and L5. Possible cord compression at T11 and diffuse thecal sac compression at L5.  -Neurochecks  -ESR: Mild mildly  -CA 19-9: Negative  -CEA: 9.6  -Will need tissue biopsy  -IR consult placed  -Fall precautions   -Fracture precautions  -Heme-onc following: Discussed with Dr. Loera   -Orthospine following: Discussed with Dr. Ceballos      #Normocytic anemia  -multifactorial with Hx iron deficiency, B12 deficiency, CRI, and anemia of chronic disease and now possibly due to malignancy.  -s/P Feraheme as outpatient  -s/p methylcobalamin   -Trend H&H    #Mild hypercalcemia  -Monitor and trend    #Elevated ALP  -Likely secondary to bony mets and hepatic mets    #Abnormal UA  -UCX: Negative  -DC ABX    #Thyroid nodule  -TSH with T4  -f/u on thryoid us    #H/o HTN / dyslipidemia / GERD / parkinson's   -C/w home meds and f/u outpatient for further management if conditions remain stable during hospitalization    *DVT ppx   -SCDs

## 2023-11-27 ENCOUNTER — RESULT REVIEW (OUTPATIENT)
Age: 83
End: 2023-11-27

## 2023-11-27 LAB
ALBUMIN SERPL ELPH-MCNC: 2.7 G/DL — LOW (ref 3.3–5)
ALBUMIN SERPL ELPH-MCNC: 2.7 G/DL — LOW (ref 3.3–5)
ALP SERPL-CCNC: 123 U/L — HIGH (ref 40–120)
ALP SERPL-CCNC: 123 U/L — HIGH (ref 40–120)
ALT FLD-CCNC: 8 U/L — LOW (ref 12–78)
ALT FLD-CCNC: 8 U/L — LOW (ref 12–78)
ANION GAP SERPL CALC-SCNC: 6 MMOL/L — SIGNIFICANT CHANGE UP (ref 5–17)
ANION GAP SERPL CALC-SCNC: 6 MMOL/L — SIGNIFICANT CHANGE UP (ref 5–17)
AST SERPL-CCNC: 21 U/L — SIGNIFICANT CHANGE UP (ref 15–37)
AST SERPL-CCNC: 21 U/L — SIGNIFICANT CHANGE UP (ref 15–37)
BASOPHILS # BLD AUTO: 0.02 K/UL — SIGNIFICANT CHANGE UP (ref 0–0.2)
BASOPHILS # BLD AUTO: 0.02 K/UL — SIGNIFICANT CHANGE UP (ref 0–0.2)
BASOPHILS NFR BLD AUTO: 0.3 % — SIGNIFICANT CHANGE UP (ref 0–2)
BASOPHILS NFR BLD AUTO: 0.3 % — SIGNIFICANT CHANGE UP (ref 0–2)
BILIRUB SERPL-MCNC: 0.5 MG/DL — SIGNIFICANT CHANGE UP (ref 0.2–1.2)
BILIRUB SERPL-MCNC: 0.5 MG/DL — SIGNIFICANT CHANGE UP (ref 0.2–1.2)
BUN SERPL-MCNC: 17 MG/DL — SIGNIFICANT CHANGE UP (ref 7–23)
BUN SERPL-MCNC: 17 MG/DL — SIGNIFICANT CHANGE UP (ref 7–23)
CALCIUM SERPL-MCNC: 9.1 MG/DL — SIGNIFICANT CHANGE UP (ref 8.5–10.1)
CALCIUM SERPL-MCNC: 9.1 MG/DL — SIGNIFICANT CHANGE UP (ref 8.5–10.1)
CANCER AG27-29 SERPL-ACNC: 18.2 U/ML — SIGNIFICANT CHANGE UP (ref 0–38.6)
CANCER AG27-29 SERPL-ACNC: 18.2 U/ML — SIGNIFICANT CHANGE UP (ref 0–38.6)
CHLORIDE SERPL-SCNC: 108 MMOL/L — SIGNIFICANT CHANGE UP (ref 96–108)
CHLORIDE SERPL-SCNC: 108 MMOL/L — SIGNIFICANT CHANGE UP (ref 96–108)
CO2 SERPL-SCNC: 28 MMOL/L — SIGNIFICANT CHANGE UP (ref 22–31)
CO2 SERPL-SCNC: 28 MMOL/L — SIGNIFICANT CHANGE UP (ref 22–31)
CREAT SERPL-MCNC: 0.75 MG/DL — SIGNIFICANT CHANGE UP (ref 0.5–1.3)
CREAT SERPL-MCNC: 0.75 MG/DL — SIGNIFICANT CHANGE UP (ref 0.5–1.3)
EGFR: 80 ML/MIN/1.73M2 — SIGNIFICANT CHANGE UP
EGFR: 80 ML/MIN/1.73M2 — SIGNIFICANT CHANGE UP
EOSINOPHIL # BLD AUTO: 0.06 K/UL — SIGNIFICANT CHANGE UP (ref 0–0.5)
EOSINOPHIL # BLD AUTO: 0.06 K/UL — SIGNIFICANT CHANGE UP (ref 0–0.5)
EOSINOPHIL NFR BLD AUTO: 0.8 % — SIGNIFICANT CHANGE UP (ref 0–6)
EOSINOPHIL NFR BLD AUTO: 0.8 % — SIGNIFICANT CHANGE UP (ref 0–6)
GLUCOSE SERPL-MCNC: 80 MG/DL — SIGNIFICANT CHANGE UP (ref 70–99)
GLUCOSE SERPL-MCNC: 80 MG/DL — SIGNIFICANT CHANGE UP (ref 70–99)
HCT VFR BLD CALC: 27.6 % — LOW (ref 34.5–45)
HCT VFR BLD CALC: 27.6 % — LOW (ref 34.5–45)
HGB BLD-MCNC: 8.8 G/DL — LOW (ref 11.5–15.5)
HGB BLD-MCNC: 8.8 G/DL — LOW (ref 11.5–15.5)
IMM GRANULOCYTES NFR BLD AUTO: 0.9 % — SIGNIFICANT CHANGE UP (ref 0–0.9)
IMM GRANULOCYTES NFR BLD AUTO: 0.9 % — SIGNIFICANT CHANGE UP (ref 0–0.9)
LYMPHOCYTES # BLD AUTO: 1.9 K/UL — SIGNIFICANT CHANGE UP (ref 1–3.3)
LYMPHOCYTES # BLD AUTO: 1.9 K/UL — SIGNIFICANT CHANGE UP (ref 1–3.3)
LYMPHOCYTES # BLD AUTO: 23.8 % — SIGNIFICANT CHANGE UP (ref 13–44)
LYMPHOCYTES # BLD AUTO: 23.8 % — SIGNIFICANT CHANGE UP (ref 13–44)
MCHC RBC-ENTMCNC: 30.8 PG — SIGNIFICANT CHANGE UP (ref 27–34)
MCHC RBC-ENTMCNC: 30.8 PG — SIGNIFICANT CHANGE UP (ref 27–34)
MCHC RBC-ENTMCNC: 31.9 GM/DL — LOW (ref 32–36)
MCHC RBC-ENTMCNC: 31.9 GM/DL — LOW (ref 32–36)
MCV RBC AUTO: 96.5 FL — SIGNIFICANT CHANGE UP (ref 80–100)
MCV RBC AUTO: 96.5 FL — SIGNIFICANT CHANGE UP (ref 80–100)
MONOCYTES # BLD AUTO: 0.61 K/UL — SIGNIFICANT CHANGE UP (ref 0–0.9)
MONOCYTES # BLD AUTO: 0.61 K/UL — SIGNIFICANT CHANGE UP (ref 0–0.9)
MONOCYTES NFR BLD AUTO: 7.7 % — SIGNIFICANT CHANGE UP (ref 2–14)
MONOCYTES NFR BLD AUTO: 7.7 % — SIGNIFICANT CHANGE UP (ref 2–14)
NEUTROPHILS # BLD AUTO: 5.31 K/UL — SIGNIFICANT CHANGE UP (ref 1.8–7.4)
NEUTROPHILS # BLD AUTO: 5.31 K/UL — SIGNIFICANT CHANGE UP (ref 1.8–7.4)
NEUTROPHILS NFR BLD AUTO: 66.5 % — SIGNIFICANT CHANGE UP (ref 43–77)
NEUTROPHILS NFR BLD AUTO: 66.5 % — SIGNIFICANT CHANGE UP (ref 43–77)
PLATELET # BLD AUTO: 231 K/UL — SIGNIFICANT CHANGE UP (ref 150–400)
PLATELET # BLD AUTO: 231 K/UL — SIGNIFICANT CHANGE UP (ref 150–400)
POTASSIUM SERPL-MCNC: 3.6 MMOL/L — SIGNIFICANT CHANGE UP (ref 3.5–5.3)
POTASSIUM SERPL-MCNC: 3.6 MMOL/L — SIGNIFICANT CHANGE UP (ref 3.5–5.3)
POTASSIUM SERPL-SCNC: 3.6 MMOL/L — SIGNIFICANT CHANGE UP (ref 3.5–5.3)
POTASSIUM SERPL-SCNC: 3.6 MMOL/L — SIGNIFICANT CHANGE UP (ref 3.5–5.3)
PROT SERPL-MCNC: 6 GM/DL — SIGNIFICANT CHANGE UP (ref 6–8.3)
PROT SERPL-MCNC: 6 GM/DL — SIGNIFICANT CHANGE UP (ref 6–8.3)
RBC # BLD: 2.86 M/UL — LOW (ref 3.8–5.2)
RBC # BLD: 2.86 M/UL — LOW (ref 3.8–5.2)
RBC # FLD: 15.5 % — HIGH (ref 10.3–14.5)
RBC # FLD: 15.5 % — HIGH (ref 10.3–14.5)
SODIUM SERPL-SCNC: 142 MMOL/L — SIGNIFICANT CHANGE UP (ref 135–145)
SODIUM SERPL-SCNC: 142 MMOL/L — SIGNIFICANT CHANGE UP (ref 135–145)
T4 FREE+ TSH PNL SERPL: 2.2 UU/ML — SIGNIFICANT CHANGE UP (ref 0.34–4.82)
T4 FREE+ TSH PNL SERPL: 2.2 UU/ML — SIGNIFICANT CHANGE UP (ref 0.34–4.82)
WBC # BLD: 7.97 K/UL — SIGNIFICANT CHANGE UP (ref 3.8–10.5)
WBC # BLD: 7.97 K/UL — SIGNIFICANT CHANGE UP (ref 3.8–10.5)
WBC # FLD AUTO: 7.97 K/UL — SIGNIFICANT CHANGE UP (ref 3.8–10.5)
WBC # FLD AUTO: 7.97 K/UL — SIGNIFICANT CHANGE UP (ref 3.8–10.5)

## 2023-11-27 PROCEDURE — 20220 BONE BIOPSY TROCAR/NDL SUPFC: CPT

## 2023-11-27 PROCEDURE — 76536 US EXAM OF HEAD AND NECK: CPT | Mod: 26

## 2023-11-27 PROCEDURE — 88341 IMHCHEM/IMCYTCHM EA ADD ANTB: CPT | Mod: 26

## 2023-11-27 PROCEDURE — 76942 ECHO GUIDE FOR BIOPSY: CPT | Mod: 26

## 2023-11-27 PROCEDURE — 99232 SBSQ HOSP IP/OBS MODERATE 35: CPT

## 2023-11-27 PROCEDURE — 99223 1ST HOSP IP/OBS HIGH 75: CPT

## 2023-11-27 PROCEDURE — 88342 IMHCHEM/IMCYTCHM 1ST ANTB: CPT | Mod: 26

## 2023-11-27 PROCEDURE — 99233 SBSQ HOSP IP/OBS HIGH 50: CPT

## 2023-11-27 PROCEDURE — 88307 TISSUE EXAM BY PATHOLOGIST: CPT | Mod: 26

## 2023-11-27 RX ORDER — SENNA PLUS 8.6 MG/1
2 TABLET ORAL AT BEDTIME
Refills: 0 | Status: DISCONTINUED | OUTPATIENT
Start: 2023-11-27 | End: 2023-12-16

## 2023-11-27 RX ORDER — NALOXONE HYDROCHLORIDE 4 MG/.1ML
0.4 SPRAY NASAL ONCE
Refills: 0 | Status: DISCONTINUED | OUTPATIENT
Start: 2023-11-27 | End: 2023-12-16

## 2023-11-27 RX ORDER — MORPHINE SULFATE 50 MG/1
2 CAPSULE, EXTENDED RELEASE ORAL EVERY 4 HOURS
Refills: 0 | Status: DISCONTINUED | OUTPATIENT
Start: 2023-11-27 | End: 2023-11-28

## 2023-11-27 RX ORDER — MORPHINE SULFATE 50 MG/1
4 CAPSULE, EXTENDED RELEASE ORAL EVERY 4 HOURS
Refills: 0 | Status: DISCONTINUED | OUTPATIENT
Start: 2023-11-27 | End: 2023-11-28

## 2023-11-27 RX ORDER — POLYETHYLENE GLYCOL 3350 17 G/17G
17 POWDER, FOR SOLUTION ORAL DAILY
Refills: 0 | Status: DISCONTINUED | OUTPATIENT
Start: 2023-11-27 | End: 2023-12-16

## 2023-11-27 RX ADMIN — Medication 50 MICROGRAM(S): at 06:03

## 2023-11-27 RX ADMIN — BUPROPION HYDROCHLORIDE 100 MILLIGRAM(S): 150 TABLET, EXTENDED RELEASE ORAL at 09:33

## 2023-11-27 RX ADMIN — Medication 650 MILLIGRAM(S): at 04:51

## 2023-11-27 RX ADMIN — PANTOPRAZOLE SODIUM 40 MILLIGRAM(S): 20 TABLET, DELAYED RELEASE ORAL at 09:34

## 2023-11-27 RX ADMIN — CARBIDOPA AND LEVODOPA 1 TABLET(S): 25; 100 TABLET ORAL at 17:19

## 2023-11-27 RX ADMIN — ATORVASTATIN CALCIUM 40 MILLIGRAM(S): 80 TABLET, FILM COATED ORAL at 21:12

## 2023-11-27 RX ADMIN — MORPHINE SULFATE 2 MILLIGRAM(S): 50 CAPSULE, EXTENDED RELEASE ORAL at 14:31

## 2023-11-27 RX ADMIN — CARBIDOPA AND LEVODOPA 1 TABLET(S): 25; 100 TABLET ORAL at 00:05

## 2023-11-27 RX ADMIN — MORPHINE SULFATE 2 MILLIGRAM(S): 50 CAPSULE, EXTENDED RELEASE ORAL at 10:28

## 2023-11-27 RX ADMIN — DONEPEZIL HYDROCHLORIDE 10 MILLIGRAM(S): 10 TABLET, FILM COATED ORAL at 21:13

## 2023-11-27 RX ADMIN — SENNA PLUS 2 TABLET(S): 8.6 TABLET ORAL at 21:13

## 2023-11-27 RX ADMIN — LOSARTAN POTASSIUM 25 MILLIGRAM(S): 100 TABLET, FILM COATED ORAL at 09:33

## 2023-11-27 RX ADMIN — PRAMIPEXOLE DIHYDROCHLORIDE 0.12 MILLIGRAM(S): 0.12 TABLET ORAL at 21:12

## 2023-11-27 RX ADMIN — Medication 25 MILLIGRAM(S): at 09:34

## 2023-11-27 RX ADMIN — CARBIDOPA AND LEVODOPA 1 TABLET(S): 25; 100 TABLET ORAL at 06:03

## 2023-11-27 RX ADMIN — Medication 325 MILLIGRAM(S): at 09:33

## 2023-11-27 RX ADMIN — CARBIDOPA AND LEVODOPA 1 TABLET(S): 25; 100 TABLET ORAL at 12:59

## 2023-11-27 NOTE — PROGRESS NOTE ADULT - SUBJECTIVE AND OBJECTIVE BOX
Cardiology Consultation    HPI: 82 y/o F w/ PMH of HTN, dyslipidemia, GERD, parkinson's, neuropathy, anemia, loop recorder, p/w LE weakness. Patient has been having B/L LE weakness for a little less than 1 year. Symptoms have been getting progressively worse to the point where patient is unable to ambulate. Patient also has been getting worked up for anemia and approximately 40lb unintentional weight loss. She had outpatient CT C/A/P showing multiple bone mets and possible liver mets and path fracture of T11 vertebral body. Patient sent to ED for further evaluation. Patient evaluated by ortho with recommendation for MRI. Patient has a loop recorder in placed by Dr. Crawley, and patient is unsure if it is compatible with MRI. Will consult Dr. Crawley for input prior to MRI. Patient also C/o LE paresthesias and back pain.     PSH: gastric bypass, cholecystectomy, L total knee replacement, tonsillectomy     Social Hx: Former smoker - quit in 1980, denies etoh / drugs     Family Hx: Denies pertinent hx  (25 Nov 2023 00:04)    11/27. C/o of +LBP. No CP or SOB.    PAST MEDICAL & SURGICAL HISTORY:  GERD (gastroesophageal reflux disease)  HTN (hypertension)  HLD (hyperlipidemia)  Mood disorder  H/O gastric bypass  History of cholecystectomy  History of total knee replacement, left    Allergies  Originally Entered as [Moderate Rash] reaction to [SURGICAL TAPE] (Unknown)  Vitamin K (Other (Severe))  Fosamax (Unknown)    MEDICATIONS  (STANDING):  atorvastatin 40 milliGRAM(s) Oral at bedtime  buPROPion SR (12-Hour) 100 milliGRAM(s) Oral daily  carbidopa/levodopa  25/100 1 Tablet(s) Oral four times a day  donepezil 10 milliGRAM(s) Oral at bedtime  ferrous    sulfate 325 milliGRAM(s) Oral daily  influenza  Vaccine (HIGH DOSE) 0.7 milliLiter(s) IntraMuscular once  levothyroxine 50 MICROGram(s) Oral daily  losartan 25 milliGRAM(s) Oral daily  metoprolol succinate ER 25 milliGRAM(s) Oral daily  naloxone Injectable 0.4 milliGRAM(s) IV Push once  pantoprazole    Tablet 40 milliGRAM(s) Oral before breakfast  polyethylene glycol 3350 17 Gram(s) Oral daily  pramipexole 0.125 milliGRAM(s) Oral at bedtime  senna 2 Tablet(s) Oral at bedtime  verapamil  milliGRAM(s) Oral daily    MEDICATIONS  (PRN):  acetaminophen     Tablet .. 650 milliGRAM(s) Oral every 6 hours PRN Mild Pain (1 - 3), Moderate Pain (4 - 6)  bisacodyl 5 milliGRAM(s) Oral daily PRN Constipation  morphine  - Injectable 2 milliGRAM(s) IV Push every 4 hours PRN Moderate Pain (4 - 6)  morphine  - Injectable 4 milliGRAM(s) IV Push every 4 hours PRN Severe Pain (7 - 10)    HOME MEDICATIONS:  atorvastatin 40 mg oral tablet: 1 tab(s) orally once a day (25 Nov 2023 02:42)  buPROPion 100 mg oral tablet: 1 tab(s) orally once a day (25 Nov 2023 02:40)  carbidopa-levodopa 25 mg-100 mg oral tablet: 1 tab(s) orally 4 times a day (25 Nov 2023 02:42)  donepezil 10 mg oral tablet: 1 tab(s) orally 2 times a day (25 Nov 2023 02:40)  ferrous sulfate 325 mg (65 mg elemental iron) oral delayed release tablet: 1 tab(s) orally once a day (25 Nov 2023 02:42)  levothyroxine 50 mcg (0.05 mg) oral capsule: 1 cap(s) orally once a day (25 Nov 2023 02:42)  losartan 25 mg oral tablet: 1 tab(s) orally once a day (25 Nov 2023 02:42)  metoprolol succinate 25 mg oral tablet, extended release: 1 tab(s) orally once a day (25 Nov 2023 02:39)  omeprazole 20 mg oral delayed release tablet: 1 tab(s) orally once a day (25 Nov 2023 02:39)  pramipexole 0.125 mg oral tablet: 1 tab(s) orally once a day (at bedtime) (25 Nov 2023 02:40)  Rexulti 3 mg oral tablet: 1 tab(s) orally once a day (at bedtime) (25 Nov 2023 02:40)  tolterodine 2 mg oral tablet: 2 tab(s) orally 2 times a day (25 Nov 2023 02:40)  verapamil 240 mg/12 hours oral tablet, extended release: 1 tab(s) orally once a day (25 Nov 2023 02:39)  vilazodone 40 mg oral tablet: 1 tab(s) orally once a day (25 Nov 2023 02:40)    PHYSICAL EXAMINATION:  T(C): 36.6 (27 Nov 2023 08:35), Max: 36.8 (26 Nov 2023 23:00)  T(F): 97.9 (27 Nov 2023 08:35), Max: 98.3 (26 Nov 2023 23:00)  HR: 78 (27 Nov 2023 08:35) (71 - 78)  BP: 113/62 (27 Nov 2023 08:35) (101/54 - 128/60)  RR: 18 (27 Nov 2023 08:35) (17 - 18)  SpO2: 93% (27 Nov 2023 08:35) (93% - 97%)    Parameters below as of 27 Nov 2023 08:35  Patient On (Oxygen Delivery Method): room air    GENERAL APPEARANCE:  Pt. is not currently dyspneic, in no distress. Pt. is alert, oriented, and pleasant.  HEENT:  Pupils are normal and react normally. No icterus. Mucous membranes well colored.  NECK:  Supple. No lymphadenopathy. Jugular venous pressure not elevated. Carotids equal.   HEART:   The cardiac impulse has a normal quality. There are no murmurs, rubs or gallops noted  CHEST:  Chest is clear to auscultation. Normal respiratory effort.  ABDOMEN:  Soft and nontender.   EXTREMITIES:  There is no edema.   SKIN:  No rash or significant lesions are noted.    LABS: All Labs Reviewed:                        8.8    7.97  )-----------( 231      ( 27 Nov 2023 07:31 )             27.6     11-27    142  |  108  |  17  ----------------------------<  80  3.6   |  28  |  0.75    Ca    9.1      27 Nov 2023 07:31    TPro  6.0  /  Alb  2.7<L>  /  TBili  0.5  /  DBili  x   /  AST  21  /  ALT  8<L>  /  AlkPhos  123<H>  11-27    EKG: < from: 12 Lead ECG (01.12.21 @ 16:59) >  Sinus bradycardia with Premature atrial complexes  Possible Anterior infarct , age undetermined  Abnormal ECG    TELEMETRY: Not on tele.    CARDIAC TESTS: 2Decho 8/23- Normal LVEF 60%, mild MR/TR.  Bardy monitor 8/23- SR, brief atach, ventricular triplets.    RADIOLOGY & ADDITIONAL STUDIES:     CT Head No Cont (11.24.23 @ 23:25) >  IMPRESSION:  No acute intracranial hemorrhage or mass effect.    MR Lumbar Spine w/wo IV Cont (11.25.23 @ 11:57) >  Bony metastases at T11,L1, L2, L4, L5 and left sacrum.   Pathologic fractures of T11 and L5. Possible cord compression at T11 and   diffuse thecal sac compression at L5.

## 2023-11-27 NOTE — PROGRESS NOTE ADULT - ASSESSMENT
82 y/o F w/ PMH of HTN, Dyslipidemia, GERD, Barrette Esophagus, Parkinson's, Neuropathy, CRI, Loop recorder, IRINEO, followed outpatient, with complaints of progressively worsening B/L LE weakness, paresthesia, back pain, difficulty ambulating, and 40 lbs unintentional weight loss, was sent for CT-C/A/P-11/22/23 which revealed multiple bone mets and possible liver mets and path fracture of T11 vertebral body. She was sent by Hematologist to ED for further evaluation.     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  - CT CAP imaging revealed multiple lytic osseous lesions highly suspicious for metastatic disease. Lesions in the L5 and T11 vertebral bodies have epidural extension.  Pathologic fracture T11 vertebral body. Some of the rib lesions demonstrate pathologic fractures. Multiple hepatic lesions are not characterized without intravenous contrast but nonetheless are suspicious for metastatic disease.  - CT Head with no acute intracranial hemorrhage or mass effect  - MR imaging with bony metastases at T11,L1, L2, L4, L5 and left sacrum. Pathologic fractures of T11 and L5. Possible cord compression at T11 and diffuse thecal sac compression at L5. No evidence for metastatic disease to the cervical spine or cervical spinal cord compression. Thoracic spine: Multifocal metastatic disease. A large metastasis replaces the T11 vertebral body with an associated mild to moderate pathologic compression fracture deformity and retropulsion of bone. Epidural tumor extension is noted. The compression fracture with retropulsion of bone and epidural tumor extension results in mild to moderate thoracic spinal cord compression.  - Ortho Dr Ceballos following  -  , CA27.29 normal  - CEA minimally elevated  - IR consulted for tissue bx    - RadOnc consulted for role of RT  - As per Hospitalist Dr Hills who has been speaking with Ortho ---> spine fx/disease unstable but no plan for surgical intervention at this time ---> will need this to be addressed in setting of anticipated RT  - Continue supportive measures      # Normocytic Anemia     - Hgb remains low but stable, not requiring PRBC   - Multifactorial with Hx iron deficiency, B12 deficiency, CRI, and anemia of chronic disease and now possibly due to malignancy  - Follows outpatient, received x1 iron infusion 10/09/23  - B12 deficiency ---> started methylcobalamin 1000mcg , repeat level normal / >2000  - Outpatient initial presentation with anemia, and renal insufficiency labs were sent to r/o MM 06/2023 ---> negative  - Plan was to start YANET injections outpatient, once iron stores repleted  - Continue to trend serial CBCs   - Transfuse blood as necessary, if hgb drops or patient becomes hemodynamically unstable  - Continue supportive measures    84 y/o F w/ PMH of HTN, Dyslipidemia, GERD, Barrette Esophagus, Parkinson's, Neuropathy, CRI, Loop recorder, IRINEO, followed outpatient, with complaints of progressively worsening B/L LE weakness, paresthesia, back pain, difficulty ambulating, and 40 lbs unintentional weight loss, was sent for CT-C/A/P-11/22/23 which revealed multiple bone mets and possible liver mets and path fracture of T11 vertebral body. She was sent by Hematologist to ED for further evaluation.     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  - CT CAP imaging revealed multiple lytic osseous lesions highly suspicious for metastatic disease. Lesions in the L5 and T11 vertebral bodies have epidural extension.  Pathologic fracture T11 vertebral body. Some of the rib lesions demonstrate pathologic fractures. Multiple hepatic lesions are not characterized without intravenous contrast but nonetheless are suspicious for metastatic disease.  - CT Head with no acute intracranial hemorrhage or mass effect  - MR imaging with bony metastases at T11,L1, L2, L4, L5 and left sacrum. Pathologic fractures of T11 and L5. Possible cord compression at T11 and diffuse thecal sac compression at L5. No evidence for metastatic disease to the cervical spine or cervical spinal cord compression. Thoracic spine: Multifocal metastatic disease. A large metastasis replaces the T11 vertebral body with an associated mild to moderate pathologic compression fracture deformity and retropulsion of bone. Epidural tumor extension is noted. The compression fracture with retropulsion of bone and epidural tumor extension results in mild to moderate thoracic spinal cord compression.  - Ortho Dr Ceballos following  -  , CA27.29 normal  - CEA minimally elevated  - IR consulted for tissue bx  today   - RadOnc consulted - will need RT - As per Hospitalist Dr Hills who has been speaking with Ortho ---> spine fx/disease unstable but  per surgery no plan for surgical intervention at this time -Continue to monitor closely neurologic status    # Normocytic Anemia     - Hgb remains low but stable, not requiring PRBC   - Multifactorial with Hx iron deficiency, B12 deficiency, CRI, and anemia of chronic disease and now possibly due to malignancy  - Follows outpatient, received x1 iron infusion 10/09/23  - B12 deficiency ---> started methylcobalamin 1000mcg , repeat level normal / >2000  - Outpatient initial presentation with anemia, and renal insufficiency labs were sent to r/o MM 06/2023 ---> negative  - Plan was to start YANET injections outpatient, once iron stores repleted  - Continue to trend serial CBCs

## 2023-11-27 NOTE — PROGRESS NOTE ADULT - ASSESSMENT
84 y/o F w/ PMH of HTN, dyslipidemia, GERD, parkinson's, neuropathy, anemia, loop recorder, p/w LE weakness    #LE Weakness / paresthesias / back pain w/ possible bone mets    -CT head: No acute intracranial hemorrhage or mass effect.  -CT: Multiple lytic osseous lesions suspicious for mets. Lesions in the L5 and T11 vertebral bodies have epidural extension.  Pathologic fracture T11 vertebral body. Some of the rib lesions demonstrate pathologic fractures. Please see above comments. Adrenal adenoma. Multiple hepatic lesions suspicious for metastatic disease.  -MRI C/s: No evidence for metastatic disease to the cervical spine or cervical spinal cord compression.  -MRI: T/S: A large metastasis replaces the T11 vertebral body with an associated mild to moderate pathologic compression fracture deformity and retropulsion of bone. Epidural tumor extension is noted. The compression fracture with retropulsion of bone and epidural tumor extension results in mild to moderate thoracic spinal cord compression.  -MRI: L/S: Bony metastases at T11,L1, L2, L4, L5 and left sacrum. Pathologic fractures of T11 and L5. Possible cord compression at T11 and diffuse thecal sac compression at L5.  -Neurochecks  -ESR: Mild mildly  -CA 19-9: Negative  -CEA: 9.6  -IR consult placed  -IR tissue biopsy scheduled for today 11/27, f/u results  -Fall precautions   -Fracture precautions  -Heme-onc following: Discussed with Dr. Loera   -Orthospine following: Discussed with Dr. Ceballos  -IV Morphine 2mg PRN for moderate pain  -IV morphine 4mg PRN for severe pain      #Normocytic anemia  -multifactorial with Hx iron deficiency, B12 deficiency, CRI, and anemia of chronic disease and now possibly due to malignancy.  -s/P Feraheme as outpatient  -s/p methylcobalamin   -Trend H&H    #Mild hypercalcemia  -Monitor and trend    #Elevated ALP  -Likely secondary to bony mets and hepatic mets    #Abnormal UA  -UCX: Negative  -DC ABX    #Thyroid nodule  -TSH with T4: 2.20  -f/u on thryoid us    #H/o HTN / dyslipidemia / GERD / parkinson's   -C/w home meds and f/u outpatient for further management if conditions remain stable during hospitalization    *DVT ppx   -SCDs     82 y/o F w/ PMH of HTN, dyslipidemia, GERD, parkinson's, neuropathy, anemia, loop recorder, p/w LE weakness    #LE Weakness / paresthesias / back pain w/ possible bone mets    -CT head: No acute intracranial hemorrhage or mass effect.  -CT: Multiple lytic osseous lesions suspicious for mets. Lesions in the L5 and T11 vertebral bodies have epidural extension.  Pathologic fracture T11 vertebral body. Some of the rib lesions demonstrate pathologic fractures. Please see above comments. Adrenal adenoma. Multiple hepatic lesions suspicious for metastatic disease.  -MRI C/s: No evidence for metastatic disease to the cervical spine or cervical spinal cord compression.  -MRI: T/S: A large metastasis replaces the T11 vertebral body with an associated mild to moderate pathologic compression fracture deformity and retropulsion of bone. Epidural tumor extension is noted. The compression fracture with retropulsion of bone and epidural tumor extension results in mild to moderate thoracic spinal cord compression.  -MRI: L/S: Bony metastases at T11,L1, L2, L4, L5 and left sacrum. Pathologic fractures of T11 and L5. Possible cord compression at T11 and diffuse thecal sac compression at L5.  -Neurochecks  -ESR: Mild mildly  -CA 19-9: Negative  -CEA: 9.6  -IR consult placed  -IR tissue biopsy scheduled for today 11/27, f/u results  -Fall precautions   -Fracture precautions  -Heme-onc following: Discussed with Dr. Loera   -Orthospine following: Discussed with Dr. Ceballos  -IV Morphine 2mg PRN for moderate pain  -IV morphine 4mg PRN for severe pain  -consult PT      #Normocytic anemia  -multifactorial with Hx iron deficiency, B12 deficiency, CRI, and anemia of chronic disease and now possibly due to malignancy.  -s/P Feraheme as outpatient  -s/p methylcobalamin   -Trend H&H    #Mild hypercalcemia  -Monitor and trend    #Elevated ALP  -Likely secondary to bony mets and hepatic mets    #Abnormal UA  -UCX: Negative  -DC ABX    #Thyroid nodule  -TSH with T4: 2.20  -f/u on thryoid us    #H/o HTN / dyslipidemia / GERD / parkinson's   -C/w home meds and f/u outpatient for further management if conditions remain stable during hospitalization    *DVT ppx   -SCDs

## 2023-11-27 NOTE — PROGRESS NOTE ADULT - ASSESSMENT
A/P: 84 y/o F w/ PMH of HTN, dyslipidemia, GERD, parkinson's, neuropathy, anemia, loop recorder, p/w LE weakness    1. LE Weakness / paresthesias / back pain w/ possible bone mets.  CTH negative for CVA. CT: Multiple lytic osseous lesions suspicious for mets. Lesions in the L5 and T11 vertebral bodies have epidural extension.  Pathologic fracture T11 vertebral body. Some of the rib lesions demonstrate pathologic fractures. MRI reviewed.  - Mgmt as per oncology.     2. Hx ILR monitor. Device battery is depleted. No plans to reimplant. H/o Atach, ventricular triplets.     3. HTN. Cont home regimen. BP stable.     4. DVT proph. Can hold on further cardiac testing.    Case d/w Dr. Kurtz  Will sign off, call with questions.

## 2023-11-27 NOTE — PROGRESS NOTE ADULT - SUBJECTIVE AND OBJECTIVE BOX
Chief Complaint: Back pain    History: Patient is 82 yo female admitted with severe back pain and leg weakness. Symptoms started approximately a year ago and have worsened over the last 2 months. MRI demonstrated diffuse metastatic disease to the spine with large lesion in T11 and L5. Patient has no known primary. Await CT biopsy for definitive tissue diagnosis.    OBJECTIVE:     Vital Signs Last 24 Hrs  T(C): 36.6 (27 Nov 2023 08:35), Max: 36.8 (26 Nov 2023 23:00)  T(F): 97.9 (27 Nov 2023 08:35), Max: 98.3 (26 Nov 2023 23:00)  HR: 78 (27 Nov 2023 08:35) (71 - 78)  BP: 113/62 (27 Nov 2023 08:35) (101/54 - 128/60)  BP(mean): --  RR: 18 (27 Nov 2023 08:35) (17 - 18)  SpO2: 93% (27 Nov 2023 08:35) (93% - 97%)    Parameters below as of 27 Nov 2023 08:35  Patient On (Oxygen Delivery Method): room air        Physical Exam:         Awake and alert         HEENT - unremarkable         Neck - supple         Back: tenderness at TL region.         Bilateral Upper Extremities:             Good Motor Strength             Sensation intact         Bilateral Lower Extremities             4/5 MS BLE             I&O's Detail      LABS:                        8.8    7.97  )-----------( 231      ( 27 Nov 2023 07:31 )             27.6     11-27    142  |  108  |  17  ----------------------------<  80  3.6   |  28  |  0.75    Ca    9.1      27 Nov 2023 07:31    TPro  6.0  /  Alb  2.7<L>  /  TBili  0.5  /  DBili  x   /  AST  21  /  ALT  8<L>  /  AlkPhos  123<H>  11-27        A/P :  83y Female with metastatic disease to the spine with large lesion at T11 and L5 / Pathologic fracture T11 / Mild-Moderate canal stenosis  -    It was previously discussed with the patient and the patient's  that due to the extensive metastatic involvement of the T11 vertebrae with extension into the pedicle and spinal canal surgical decompression and stabilization is recommended. There is also a large lesion at L5 but that may be able to be treated with RT/chemo depending on the tissue type. Patient's family wishes to have a definitive tissue diagnosis prior to proceeding with any additional treatment. Patient is to undergo CT Biopsy for tissue diagnosis.  -    Continue present treatment

## 2023-11-27 NOTE — CONSULT NOTE ADULT - ASSESSMENT
Pt is a 83y old Female with hx of HTN, dyslipidemia, GERD, parkinson's, neuropathy, anemia, loop recorder, p/w LE weakness. Patient has been having B/L LE weakness for a little less than 1 year. Symptoms have been getting progressively worse to the point where patient is unable to ambulate. Patient also has been getting worked up for anemia and approximately 40lb unintentional weight loss. She had outpatient CT C/A/P showing multiple bone mets and possible liver mets and path fracture of T11 vertebral body. Patient sent to ED for further evaluation. Patient evaluated by ortho with recommendation for MRI. Patient has a loop recorder in placed by Dr. Crawley, and patient is unsure if it is compatible with MRI. Will consult Dr. Crawley for input prior to MRI. Patient also C/o LE paresthesias and back pain. Palliative medicine consulted to help establish GOC and advance care planning.     1) Possible bone mets   - radiology reveiwed   - oncology consult appreciated   - Orthopedics consult appreciated   - IR biopsy on 11/27    Process of Care  --Reviewed dx/treatment problems and alignment with Goals of Care    Physical Aspects of Care  --Pain  patient appears comfortable at this time   c/w current managment    --Bowel Regimen  denies constipation    --Dyspnea  No SOB at this time  comfortable and in NAD    --Nausea Vomiting  denies    --Weakness  PT as tolerated     Psychological and Psychiatric Aspects of Care:   --Greif/Bereavment: emotional support provided  --Hx of psychiatric dx: none  -Pastoral Care Available PRN     Social Aspects of Care  -SW involved     Cultural Aspects  -Primary Language: English    Goals of Care:     We discussed Palliative Care team being a supportive team when a patient has ongoing illnesses.  We also discussed that it is not an end of life care service, but can help navigate symptoms and emotional support througout their hospital stay here.    Ethical and Legal Aspects:   NA    Capacity- pt does not appear to have insight at this time     HCP/Surrogate: Julius Bull - pt  - hCP on [facility]     Code Status- full code - will address in meeting   MOLST-  Dispo Plan-     Discussed With: Dr. Sam coordinated with attending and SW and RN     Time Spent: 90 minutes including the care, coordination and counseling of this patient, 50% of which was spent coordinating and counseling.  Pt is a 83y old Female with hx of HTN, dyslipidemia, GERD, parkinson's, neuropathy, anemia, loop recorder, p/w LE weakness. Patient has been having B/L LE weakness for a little less than 1 year. Symptoms have been getting progressively worse to the point where patient is unable to ambulate. Patient also has been getting worked up for anemia and approximately 40lb unintentional weight loss. She had outpatient CT C/A/P showing multiple bone mets and possible liver mets and path fracture of T11 vertebral body. Patient sent to ED for further evaluation. Patient evaluated by ortho with recommendation for MRI. Patient has a loop recorder in placed by Dr. Crawley, and patient is unsure if it is compatible with MRI. Will consult Dr. Crawley for input prior to MRI. Patient also C/o LE paresthesias and back pain. Palliative medicine consulted to help establish GOC and advance care planning.     1) Possible bone mets   - radiology reviewed   - oncology consult appreciated   - Orthopedics consult appreciated   - IR biopsy on 11/27    Process of Care  --Reviewed dx/treatment problems and alignment with Goals of Care    Physical Aspects of Care  --Pain  patient appears comfortable at this time   c/w current management    --Bowel Regimen  denies constipation    --Dyspnea  No SOB at this time  comfortable and in NAD    --Nausea Vomiting  denies    --Weakness  PT as tolerated     Psychological and Psychiatric Aspects of Care:   --Greif/Bereavment: emotional support provided  --Hx of psychiatric dx: none  -Pastoral Care Available PRN     Social Aspects of Care  -SW involved     Cultural Aspects  -Primary Language: English    Goals of Care:     We discussed Palliative Care team being a supportive team when a patient has ongoing illnesses.  We also discussed that it is not an end of life care service, but can help navigate symptoms and emotional support througout their hospital stay here.    Ethical and Legal Aspects:   NA    Capacity- pt does not appear to have insight at this time     HCP/Surrogate: Julius Bull - pt  - hCP on [facility]     Code Status- full code - will address in meeting   MOLST-  Dispo Plan-     Discussed With: Dr. Sam coordinated with attending and SW and RN     Time Spent: 90 minutes including the care, coordination and counseling of this patient, 50% of which was spent coordinating and counseling.

## 2023-11-27 NOTE — PROGRESS NOTE ADULT - SUBJECTIVE AND OBJECTIVE BOX
HPI:    84 y/o F w/ MH of HTN, dyslipidemia, GERD, parkinson's, neuropathy, iron deficiency anemia, loop recorder, p/w LE weakness. Patient has been having B/L LE weakness for a little less than 1 year. Symptoms have been getting progressively worse to the point where patient is unable to ambulate, also with  LE paresthesias and back pain. and 40 lbs unintentional weight loss. She was sent for CT-C/A/P- which revealed multiple bone mets and possible liver mets and path fracture of T11 vertebral body.  She was sent to ED for further evaluation.     11/25/23- Seen at bedside, along with Dr. Loera, alert, awake, in no acute distress. Reports left lower extremities with tingling and numbness, denies cauda equina symptoms. Reports difficulty ambulating even with a walker.     11/27/23: Seen at bedside with attending Dr Loera and Hospitalist Dr Hills, currently awaiting IR bx procedure hopefully today       PAST MEDICAL & SURGICAL HISTORY:    GERD (gastroesophageal reflux disease)    HTN (hypertension)    HLD (hyperlipidemia)    Mood disorder    H/O gastric bypass    History of cholecystectomy    History of total knee replacement, left    Iron Deficiency Anemia    CRI    Barrette Esophagus      FAMILY HISTORY:    COPD- Brother  CAD- Sister  Stroke- Mother  Parkinson-Father      MEDICATIONS  (STANDING):    atorvastatin 40 milliGRAM(s) Oral at bedtime  buPROPion SR (12-Hour) 100 milliGRAM(s) Oral daily  carbidopa/levodopa  25/100 1 Tablet(s) Oral four times a day  donepezil 10 milliGRAM(s) Oral at bedtime  ferrous    sulfate 325 milliGRAM(s) Oral daily  influenza  Vaccine (HIGH DOSE) 0.7 milliLiter(s) IntraMuscular once  levothyroxine 50 MICROGram(s) Oral daily  losartan 25 milliGRAM(s) Oral daily  metoprolol succinate ER 25 milliGRAM(s) Oral daily  naloxone Injectable 0.4 milliGRAM(s) IV Push once  pantoprazole    Tablet 40 milliGRAM(s) Oral before breakfast  polyethylene glycol 3350 17 Gram(s) Oral daily  pramipexole 0.125 milliGRAM(s) Oral at bedtime  senna 2 Tablet(s) Oral at bedtime  verapamil  milliGRAM(s) Oral daily      MEDICATIONS  (PRN):    acetaminophen     Tablet .. 650 milliGRAM(s) Oral every 6 hours PRN Mild Pain (1 - 3), Moderate Pain (4 - 6)  bisacodyl 5 milliGRAM(s) Oral daily PRN Constipation  morphine  - Injectable 2 milliGRAM(s) IV Push every 4 hours PRN Moderate Pain (4 - 6)  morphine  - Injectable 4 milliGRAM(s) IV Push every 4 hours PRN Severe Pain (7 - 10)      ALLERGIES:    Originally Entered as [Moderate Rash] reaction to [SURGICAL TAPE] (Unknown)  Vitamin K (Other (Severe))  Fosamax (Unknown)      REVIEW OF SYSTEMS:    Constitutional, Eyes, ENT, Cardiovascular, Respiratory, Gastrointestinal, Genitourinary, Musculoskeletal, Integumentary, Neurological, Psychiatric, Endocrine, Heme/Lymph and Allergic/Immunologic review of systems are otherwise negative except as noted in HPI.       VITAL SIGNS:    ICU Vital Signs Last 24 Hrs  T(C): 36.6 (27 Nov 2023 08:35), Max: 36.8 (26 Nov 2023 23:00)  T(F): 97.9 (27 Nov 2023 08:35), Max: 98.3 (26 Nov 2023 23:00)  HR: 78 (27 Nov 2023 08:35) (71 - 78)  BP: 113/62 (27 Nov 2023 08:35) (101/54 - 128/60)  BP(mean): --  ABP: --  ABP(mean): --  RR: 18 (27 Nov 2023 08:35) (17 - 18)  SpO2: 93% (27 Nov 2023 08:35) (93% - 97%)    O2 Parameters below as of 27 Nov 2023 08:35  Patient On (Oxygen Delivery Method): room air      PHYSICAL EXAM:    CONSTITUTIONAL : NAD  NERVOUS SYSTEM:  Alert & Oriented X 3, with mild weakness in left lower extremity and reported tingling.  HEAD:  Atraumatic, Normocephalic  EYES:  conjunctiva and sclera clear  HEENT: Moist mucous membranes, Supple neck , No JVD  CHEST: Clear to auscultation bilaterally  BREAST: With no palpable mass.  HEART: Regular rate and rhythm  ABDOMEN: Soft, Non-tender, Non-distended; Bowel sounds present  GENITOURINARY- Voiding, no suprapubic tenderness  EXTREMITIES:  2+ Peripheral Pulses, No clubbing, cyanosis, no edema  MUSCULOSKELETAL:- No muscle tenderness, Muscle tone normal, No joint tenderness, no Joint swelling,  Joint ROM –normal   SKIN-no rash, no lesion      LABS:                          8.8    7.97  )-----------( 231      ( 27 Nov 2023 07:31 )             27.6     11-27    142  |  108  |  17  ----------------------------<  80  3.6   |  28  |  0.75    Ca    9.1      27 Nov 2023 07:31    TPro  6.0  /  Alb  2.7<L>  /  TBili  0.5  /  DBili  x   /  AST  21  /  ALT  8<L>  /  AlkPhos  123<H>  11-27        RADIOLOGY & ADDITIONAL STUDIES:      EXAM:  MR SPINE LUMBAR WAW IC       PROCEDURE DATE:  11/25/2023      INTERPRETATION:  CLINICAL INFORMATION: Metastases    ADDITIONAL CLINICAL INFORMATION: Cancer C80.1    TECHNIQUE: Multiplanar, multisequence MRI was performed of the lumbar   spine.  IV Contrast: Gadavist  8 cc administered   2 cc discarded    CORRELATION: CT chest and abdomen 11/21/2023    FINDINGS:    ALIGNMENT: The alignment is normal.  VERTEBRA: There is a mild compression fracture deformity of T12 as well   as pathological compression fracture deformities of T11 and L5. There is   diffuse marrow infiltration of the T11 and L5 vertebral bodies with   extension to the posterior elements with hypointense T1 and hyperintense   T2/STIR signal with avid enhancement compatible with metastatic disease.   The loss of the cortical margins is better seen on the prior CT. There is   ventral epidural soft tissue component at T11 indenting and possibly   compressing the spinal cord. Axialimages were not obtained at this   level. There is diffuse ventral epidural soft tissue at L5 severely   compressing the thecal sac as well as the L5 and S1 nerve roots. There   also is diffuse involvement of the left left sacrum at the S1 and S2   levels extending to the left SI joint. There is minimum epidural soft   tissue along the lateral margin of the sacral foramen at the S1-S2 level.   There are additional smaller metastases involving L1, L2 and L4. There   are focal areas of hyperintenseT1 and T2 signal within the T2 12 and L1   vertebral bodies which may represent hemangiomas.  DISC SPACES: Maintained.  CANAL: The distal cord and conus are normal in signal. Conus terminates   at L1.  VISUALIZED INTRAPELVIC/INTRA-ABDOMINAL SOFT TISSUES: Normal.  PARASPINAL SOFT TISSUES: Normal.    INDIVIDUAL LEVELS:    L1-L2: No disc herniation, spinal canal stenosis or neural foramen   narrowing.    L2-L3: No disc herniation. Mild degenerative facet disease. No spinal   canal stenosis or neuralforamen narrowing.    L3-L4: No disc herniation. Mild degenerative facet disease. No spinal   canal stenosis or neural foramen narrowing.    IMPRESSION: Bony metastases at T11,L1, L2, L4, L5 and left sacrum.   Pathologic fractures of T11 and L5. Possible cord compression at T11 and   diffuse thecal sac compression at L5.          EXAM:  MR SPINE THORACIC WAW IC     EXAM:  MR SPINE CERVICAL WAW IC      PROCEDURE DATE:  11/25/2023      INTERPRETATION:  History: 84 y/o F with a PMhx of anemia, GERD, HTN, HLD,   mood disorder, gastric bypass, and cholecystectomy presents to the ED SIB   MD c/o weakness. The pt had outpatient w/u for worsening anemia with   weakness. CT and XR showing lytic legions in spine with epidural   extensions leading to increased weakness and difficulty ambulating. PCP   sent pt to ED for further evaluation. Pt reports weight loss over months.   Denies any sort of active bleeding no blood in stool. Denies CP, or SOB.   C/o lower back pain. Some numbness and tingling to LLE. Denies bowl or   bladder incontinence. Denies numbness consistent with saddle anesthesia.   She had  outpatient CT C/A/P showing multiple bone mets and possible liver mets   and path  fracture of T11 vertebral body. Patient also C/o LE  paresthesias and back pain.    Description: A MRI of the cervical spine and a MRI of the thoracic spine   with and without gadolinium contrast were performed with multiplanar   multisequence technique.    8 cc intravenous Gadavist gadolinium contrast was administered, 2 cc   contrast was discarded.    Comparison is made to the thoracic and lumbar spine region from the chest   abdomen pelvic CT from 11/21/2023, and cervical spine CT from 08/08/2022    Cervical spine MRI:    Bulky dorsal paraspinal tumor in the upper thoracic region is partially   visualized. Please see thoracic spine MRI component of this report.    No focal cervical spine bony lesions are visualized. There is no evidence   for epidural tumor extension in the cervical spine.    Smallbroad disc bulges are present at the C4-C5, C5-C6, C6-C7 levels   with associated mild central canal stenosis. Multilevel ligamentum flavum   hypertrophy multilevel facet degenerative changes are noted.    There is no cervical spinal cord mass or syrinx. No abnormal intraspinal   enhancement is noted in the cervical spine. There is no cervical spinal   cord compression.    Thoracic spine MRI:    A large metastasis replaces the T11 vertebral body with extension into   the pedicles, with an associated mild to moderate pathologic compression   fracture deformity and retropulsion of bone. Epidural tumor extension is   noted. Tumor extension into the left greater than right neural foramina   at this levels also noted. The compression fracture withretropulsion of   bone and epidural tumor extension results in mild to moderate thoracic   spinal cord compression. A left-sided rib metastasis with soft tissue   extension of tumor is partially visualized at this level.    Additional metastases are noted involving the T9 and L2 vertebral bodies   without associated epidural tumor extension    A moderate sized hemangioma involves the T8 vertebral body with   associated increased T1 signal, increased STIR signal, and enhancement.    A 3.5 cm x 3.2cm x 3.8 cm bulky paraspinal metastasis involves the right   posterior elements at the T3 and T4 levels with extension into the   paraspinal musculature and abutment and possible involvement of the   posterior ribs at these levels. No significant intraspinal extension of   tumor at this level is appreciated.    Nonspecific bilateral pleural effusions and pleural thickening is noted.   Multiple enhancing liver lesions are partially visualized. Please see   separate chest abdomen pelvic CT report.    IMPRESSION:    Cervical spine:    No evidence for metastatic disease to the cervical spine or cervical   spinal cord compression.    Thoracic spine:    Multifocal metastatic disease is noted as described.    A large metastasis replaces the T11 vertebral body with an associated   mild to moderate pathologic compression fracture deformity and   retropulsion of bone. Epidural tumor extension is noted. The compression   fracture with retropulsion of bone and epidural tumor extension results   in mild to moderate thoracic spinal cord compression.              EXAM:  CT BRAIN    PROCEDURE DATE:  11/24/2023      INTERPRETATION:  CLINICAL INFORMATION:  Mets to liver, spine.  Unknown   primary.  R/O Brain mets, bleed.    TECHNIQUE:  Axial CT images were acquired through the head.  Intravenous contrast: None  Two-dimensional reformats were generated.    COMPARISON STUDY: CT head 8/8/2022    FINDINGS:    There is no CT evidence of acute intracranial hemorrhage, mass effect,   midline shift, or acute, large territorial infarct.  The ventricles and   sulci are prominent compatible with age-appropriate brain parenchymal   volume loss. Mild patchy periventricular white matter hypoattenuation is   nonspecific, although likely due to chronic microangiopathy. The basal   cisterns are patent. There is a partially empty sella.    The mastoid air cells and middle ear cavities are grossly clear. The   visualized paranasal sinuses are well aerated.    The calvarium and skull base are grossly intact.    IMPRESSION:  No acute intracranial hemorrhage or mass effect.    Please note that contrast-enhanced MRI of the brain is more sensitive in   the detection of intracranial metastases.      EXAM: [id number] - CT ABDOMEN AND PELVIS  -   EXAM: [id number] - CT CHEST  -     PROCEDURE DATE:  11/21/2023    INTERPRETATION:  CLINICAL INFORMATION: Anemia and 40 pound weight loss.    COMPARISON: None.    CONTRAST/COMPLICATIONS:  IV Contrast: NONE  Oral Contrast: NONE  Complications: None reported at time of study completion    PROCEDURE:  CT of the Chest, Abdomen and Pelvis was performed.  Sagittal and coronal reformats were performed.    FINDINGS:  CHEST:  LUNGS AND LARGE AIRWAYS: Patent central airways. There is atelectasis at the right lung base.  PLEURA: No pleural effusion.  VESSELS: Coronary artery calcifications and atherosclerotic calcifications within the thoracic aorta.  HEART: Heart size is normal. No pericardial effusion.  MEDIASTINUM AND CRIS: No lymphadenopathy.  CHEST WALL AND LOWER NECK: There is a loop recorder left anterior chest wall. There is a 0.8 cm right lobe thyroid nodule.    ABDOMEN AND PELVIS:  LIVER: Bilobar indeterminate hepatic lesions are nonetheless suspicious for metastatic disease. The largest lesion is in the hepatic dome measuring 4.8 cm. There is a mass interposed between the caudate lobe and left lobe of liver measuring 3.4 cm on series 2 image 68.  BILE DUCTS: Normal caliber.  GALLBLADDER: Removed.  SPLEEN: Within normal limits.  PANCREAS: Within normal limits.  ADRENALS: There is a 1.9 cm right adrenal adenoma.  KIDNEYS/URETERS: Within normal limits.    BLADDER: Within normal limits.  REPRODUCTIVE ORGANS: Uterus and adnexa within normal limits.    BOWEL: No bowel obstruction. 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 There is herniation of the gastric pouch and small amount of the jejunum distal to the gastrojejunal anastomosis into the mediastinum through the esophageal hiatus. There is also herniation of a portion of the excluded stomach through the esophageal hiatus.  PERITONEUM: No ascites.  VESSELS: Within normal limits.  RETROPERITONEUM/LYMPH NODES: No lymphadenopathy.  ABDOMINAL WALL: Within normal limits.  BONES: There is a lytic lesion with posterior cortical destruction involving the L5 vertebral body with epidural extension. There is mild loss of height of the T12 vertebral body. There is a lytic lesion and pathologic fracture through the T11 vertebral body with likely epidural extension and extension into the left pedicle. There is heterogeneous appearance of the T8-9 vertebral bodies with a lytic lesion in T9 vertebral body. There is a lytic lesion with soft tissue component in the right fourth rib with associated pathologic fracture. There is an old fracture of the left humeral neck. There is a lytic lesion in the left do sacrum. There is a lytic lesion in the right fourth transverse process as well as lytic lesion with pathologic fracture in the left 10th rib. There is a 4.6 x 3.8 cm expansile lesion in the left third rib.    IMPRESSION:  Multiple lytic osseous lesions highly suspicious for metastatic disease. Lesions in the L5 and T11 vertebral bodies have epidural extension.  Pathologic fracture T11 vertebral body. Some of the rib lesions demonstrate pathologic fractures. Please see above comments.    Multiple hepatic lesions are not characterized without intravenous contrast but nonetheless are suspicious for metastatic disease.

## 2023-11-27 NOTE — PROGRESS NOTE ADULT - NS ATTEND AMEND GEN_ALL_CORE FT
Patient seen and examined . Imaging and outpatient labs reviewed.  D/w hospitalist  Plan as per A/P per A/P

## 2023-11-27 NOTE — CONSULT NOTE ADULT - SUBJECTIVE AND OBJECTIVE BOX
HPI: Pt is a 83y old Female with hx of       PAIN: ( )Yes   ( )No  Level:  Location:  Intensity:    /10  Quality:  Aggravating Factors:  Alleviating Factors:  Radiation:  Duration/Timing:  Impact on ADLs:    DYSPNEA: ( ) Yes  ( ) No  Level:    PAST MEDICAL & SURGICAL HISTORY:  GERD (gastroesophageal reflux disease)      HTN (hypertension)      HLD (hyperlipidemia)      Mood disorder      H/O gastric bypass      History of cholecystectomy      History of total knee replacement, left          SOCIAL HX:    Hx opiate tolerance ( )YES  ( )NO    Baseline ADLs  (Prior to Admission)  ( ) Independent   ( )Dependent    FAMILY HISTORY:  No pertinent family history in first degree relatives        Review of Systems:    Anxiety-  Depression-  Physical Discomfort-  Dyspnea-  Constipation-  Diarrhea-  Nausea-  Vomiting-  Anorexia-  Weight Loss-   Cough-  Secretions-  Fatigue-  Weakness-  Delirium-    All other systems reviewed and negative  Unable to obtain/Limited due to:      PHYSICAL EXAM:    Vital Signs Last 24 Hrs  T(C): 36.6 (27 Nov 2023 08:35), Max: 36.8 (26 Nov 2023 23:00)  T(F): 97.9 (27 Nov 2023 08:35), Max: 98.3 (26 Nov 2023 23:00)  HR: 78 (27 Nov 2023 08:35) (71 - 78)  BP: 113/62 (27 Nov 2023 08:35) (101/54 - 128/60)  BP(mean): --  RR: 18 (27 Nov 2023 08:35) (17 - 18)  SpO2: 93% (27 Nov 2023 08:35) (93% - 97%)    Parameters below as of 27 Nov 2023 08:35  Patient On (Oxygen Delivery Method): room air      Daily     Daily     PPSV2:   %  FAST:    General:  Mental Status:  HEENT:  Lungs:  Cardiac:  GI:  :  Ext:  Neuro:      LABS:                        8.8    7.97  )-----------( 231      ( 27 Nov 2023 07:31 )             27.6     11-27    142  |  108  |  17  ----------------------------<  80  3.6   |  28  |  0.75    Ca    9.1      27 Nov 2023 07:31    TPro  6.0  /  Alb  2.7<L>  /  TBili  0.5  /  DBili  x   /  AST  21  /  ALT  8<L>  /  AlkPhos  123<H>  11-27      Albumin: Albumin: 2.7 g/dL (11-27 @ 07:31)      Allergies    Originally Entered as [Moderate Rash] reaction to [SURGICAL TAPE] (Unknown)  Vitamin K (Other (Severe))  Fosamax (Unknown)    Intolerances      MEDICATIONS  (STANDING):  atorvastatin 40 milliGRAM(s) Oral at bedtime  buPROPion SR (12-Hour) 100 milliGRAM(s) Oral daily  carbidopa/levodopa  25/100 1 Tablet(s) Oral four times a day  donepezil 10 milliGRAM(s) Oral at bedtime  ferrous    sulfate 325 milliGRAM(s) Oral daily  influenza  Vaccine (HIGH DOSE) 0.7 milliLiter(s) IntraMuscular once  levothyroxine 50 MICROGram(s) Oral daily  losartan 25 milliGRAM(s) Oral daily  metoprolol succinate ER 25 milliGRAM(s) Oral daily  naloxone Injectable 0.4 milliGRAM(s) IV Push once  pantoprazole    Tablet 40 milliGRAM(s) Oral before breakfast  polyethylene glycol 3350 17 Gram(s) Oral daily  pramipexole 0.125 milliGRAM(s) Oral at bedtime  senna 2 Tablet(s) Oral at bedtime  verapamil  milliGRAM(s) Oral daily    MEDICATIONS  (PRN):  acetaminophen     Tablet .. 650 milliGRAM(s) Oral every 6 hours PRN Mild Pain (1 - 3), Moderate Pain (4 - 6)  bisacodyl 5 milliGRAM(s) Oral daily PRN Constipation  morphine  - Injectable 2 milliGRAM(s) IV Push every 4 hours PRN Moderate Pain (4 - 6)  morphine  - Injectable 4 milliGRAM(s) IV Push every 4 hours PRN Severe Pain (7 - 10)      RADIOLOGY/ADDITIONAL STUDIES: HPI: Pt is a 83y old Female with hx of HTN, dyslipidemia, GERD, parkinson's, neuropathy, anemia, loop recorder, p/w LE weakness. Patient has been having B/L LE weakness for a little less than 1 year. Symptoms have been getting progressively worse to the point where patient is unable to ambulate. Patient also has been getting worked up for anemia and approximately 40lb unintentional weight loss. She had outpatient CT C/A/P showing multiple bone mets and possible liver mets and path fracture of T11 vertebral body. Patient sent to ED for further evaluation. Patient evaluated by ortho with recommendation for MRI. Patient has a loop recorder in placed by Dr. Crawley, and patient is unsure if it is compatible with MRI. Will consult Dr. Crawley for input prior to MRI. Patient also C/o LE paresthesias and back pain. Palliative medicine consulted to help establish GOC and advance care planning.     11/27/2023 pt seen and examined with  at bedside, patient lethargic appears comfortable at this time just got back from IR for biopsy. GOC meeting scheduled with patient  Julius.       PAIN: ( )Yes   (x )No  comfortable at this time   DYSPNEA: ( ) Yes  ( x) No  Level:    PAST MEDICAL & SURGICAL HISTORY:  GERD (gastroesophageal reflux disease)  HTN (hypertension)  HLD (hyperlipidemia)  Mood disorder  H/O gastric bypass  History of cholecystectomy  History of total knee replacement, left    SOCIAL HX: lives at home with      Hx opiate tolerance ( )YES  (x )NO    Baseline ADLs  (Prior to Admission)  (x ) Independent   ( )Dependent  some assistance     FAMILY HISTORY:  No pertinent family history in first degree relatives    Review of Systems:    Unable to obtain/Limited due to: lethargy     PHYSICAL EXAM:    Vital Signs Last 24 Hrs  T(C): 36.6 (27 Nov 2023 08:35), Max: 36.8 (26 Nov 2023 23:00)  T(F): 97.9 (27 Nov 2023 08:35), Max: 98.3 (26 Nov 2023 23:00)  HR: 78 (27 Nov 2023 08:35) (71 - 78)  BP: 113/62 (27 Nov 2023 08:35) (101/54 - 128/60)  RR: 18 (27 Nov 2023 08:35) (17 - 18)  SpO2: 93% (27 Nov 2023 08:35) (93% - 97%)    Parameters below as of 27 Nov 2023 08:35  Patient On (Oxygen Delivery Method): room air    PPSV2:   %  FAST:    General:  Mental Status:  HEENT:  Lungs:  Cardiac:  GI:  :  Ext:  Neuro:      LABS:                        8.8    7.97  )-----------( 231      ( 27 Nov 2023 07:31 )             27.6     11-27    142  |  108  |  17  ----------------------------<  80  3.6   |  28  |  0.75    Ca    9.1      27 Nov 2023 07:31    TPro  6.0  /  Alb  2.7<L>  /  TBili  0.5  /  DBili  x   /  AST  21  /  ALT  8<L>  /  AlkPhos  123<H>  11-27      Albumin: Albumin: 2.7 g/dL (11-27 @ 07:31)      Allergies    Originally Entered as [Moderate Rash] reaction to [SURGICAL TAPE] (Unknown)  Vitamin K (Other (Severe))  Fosamax (Unknown)    Intolerances      MEDICATIONS  (STANDING):  atorvastatin 40 milliGRAM(s) Oral at bedtime  buPROPion SR (12-Hour) 100 milliGRAM(s) Oral daily  carbidopa/levodopa  25/100 1 Tablet(s) Oral four times a day  donepezil 10 milliGRAM(s) Oral at bedtime  ferrous    sulfate 325 milliGRAM(s) Oral daily  influenza  Vaccine (HIGH DOSE) 0.7 milliLiter(s) IntraMuscular once  levothyroxine 50 MICROGram(s) Oral daily  losartan 25 milliGRAM(s) Oral daily  metoprolol succinate ER 25 milliGRAM(s) Oral daily  naloxone Injectable 0.4 milliGRAM(s) IV Push once  pantoprazole    Tablet 40 milliGRAM(s) Oral before breakfast  polyethylene glycol 3350 17 Gram(s) Oral daily  pramipexole 0.125 milliGRAM(s) Oral at bedtime  senna 2 Tablet(s) Oral at bedtime  verapamil  milliGRAM(s) Oral daily    MEDICATIONS  (PRN):  acetaminophen     Tablet .. 650 milliGRAM(s) Oral every 6 hours PRN Mild Pain (1 - 3), Moderate Pain (4 - 6)  bisacodyl 5 milliGRAM(s) Oral daily PRN Constipation  morphine  - Injectable 2 milliGRAM(s) IV Push every 4 hours PRN Moderate Pain (4 - 6)  morphine  - Injectable 4 milliGRAM(s) IV Push every 4 hours PRN Severe Pain (7 - 10)      RADIOLOGY/ADDITIONAL STUDIES:    < from: US Thyroid (11.27.23 @ 12:13) >  ACC: [id number] EXAM:  US THYROID ONLY   ORDERED BY: DELFINO DAVIDSON     PROCEDURE DATE:  11/27/2023          INTERPRETATION:  CLINICAL INFORMATION: Right thyroid nodule seen on prior   chest CT.    Chest CT dated 11/21/2023 COMPARISON: None available.    TECHNIQUE: Sonography of the thyroid.    FINDINGS:  Right Lobe: 3.3 x 1.7 x 1.6 cm. The gland is mildly heterogeneous in   echotexture.    There is 1.1 x 0.8 x 0.8 cm partially cystic/isoechoic nodule in the   midpole. There are no calcifications. (TIRAD-2).    There is 0.8 x 0.7 x 0.6 cm heterogeneous predominantly hypoechoic nodule   in the midpole (TIRAD-4).    Left Lobe: 3.7 x 1.5 x 1.3 cm. The gland is mildly heterogeneous in   echotexture.    There is 3 mm colloid cyst in upper pole.    Isthmus: 5 mm.    Cervical Lymph Nodes: No enlarged or abnormal morphology cervical nodes.    IMPRESSION:    Scattered subcentimeter bilateral thyroid nodules. Consider follow-up   ultrasound in one year.    TI-RAD 4: Moderately suspicious (FNA if > 1.5 cm, Follow if > 1 cm)  __________________________________  ACR Thyroid Imaging, Reporting and Data System (TI-RADS): White Paper of   the ACR TI-RADS Committee. J Am Alberto Radiol 2017;14:587-595.    TI-RAD 1: Benign (No FNA)  TI-RAD 2: Not suspicious(No FNA)  TI-RAD 3: Mildly suspicious (FNA if > 2.5 cm, Follow if >1.5 cm)  TI-RAD 4: Moderately suspicious (FNA if > 1.5 cm, Follow if > 1 cm)  TI-RAD 5: Highly suspicious (FNA if > 1 cm, Follow if > 0.5 cm)    < from: MR Lumbar Spine w/wo IV Cont (11.25.23 @ 11:57) >    ACC: [id number] EXAM:  MR SPINE LUMBAR WAW IC   ORDERED BY: BRIANNA SELBY     PROCEDURE DATE:  11/25/2023          INTERPRETATION:  CLINICAL INFORMATION: Metastases    ADDITIONAL CLINICAL INFORMATION: Cancer C80.1    TECHNIQUE: Multiplanar, multisequence MRI was performed of the lumbar   spine.  IV Contrast: Gadavist  8 cc administered   2 cc discarded    CORRELATION: CT chest and abdomen 11/21/2023    FINDINGS:    ALIGNMENT: The alignment is normal.  VERTEBRA: There is a mild compression fracture deformity of T12 as well   as pathological compression fracture deformities of T11 and L5. There is   diffuse marrow infiltration of the T11 and L5 vertebral bodies with   extension to the posterior elements with hypointense T1 and hyperintense   T2/STIR signal with avid enhancement compatible with metastatic disease.   The loss of the cortical margins is better seen on the prior CT. There is   ventral epidural soft tissue component at T11 indenting and possibly   compressing the spinal cord. Axialimages were not obtained at this   level. There is diffuse ventral epidural soft tissue at L5 severely   compressing the thecal sac as well as the L5 and S1 nerve roots. There   also is diffuse involvement of the left left sacrum at the S1 and S2   levels extending to the left SI joint. There is minimum epidural soft   tissue along the lateral margin of the sacral foramen at the S1-S2 level.   There are additional smaller metastases involving L1, L2 and L4. There   are focal areas of hyperintenseT1 and T2 signal within the T2 12 and L1   vertebral bodies which may represent hemangiomas.  DISC SPACES: Maintained.  CANAL: The distal cord and conus are normal in signal. Conus terminates   at L1.  VISUALIZED INTRAPELVIC/INTRA-ABDOMINAL SOFT TISSUES: Normal.  PARASPINAL SOFT TISSUES: Normal.    INDIVIDUAL LEVELS:    L1-L2: No disc herniation, spinal canal stenosis or neural foramen   narrowing.    L2-L3: No disc herniation. Mild degenerative facet disease. No spinal   canal stenosis or neuralforamen narrowing.    L3-L4: No disc herniation. Mild degenerative facet disease. No spinal   canal stenosis or neural foramen narrowing.    IMPRESSION: Bony metastases at T11,L1, L2, L4, L5 and left sacrum.   Pathologic fractures of T11 and L5. Possible cord compression at T11 and   diffuse thecal sac compression at L5.    --- End of Report ---            DUTCH LUND MD; Attending Radiologist  This document has been electronically signed. Nov 25 2023  1:04PM    < end of copied text >  < from: MR Thoracic Spine w/wo IV Cont (11.25.23 @ 11:56) >    ACC: [id number] EXAM:  MR SPINE THORACIC WAW IC   ORDERED BY: BRIANNA SELBY     ACC: [id number] EXAM:  MR SPINE CERVICAL WAW IC   ORDERED BY: BRIANNA SELBY     PROCEDURE DATE:  11/25/2023          INTERPRETATION:  History: 82 y/o F with a PMhx of anemia, GERD, HTN, HLD,   mood disorder, gastric bypass, and cholecystectomy presents to the ED SIB   MD c/o weakness. The pt had outpatient w/u for worsening anemia with   weakness. CT and XR showing lytic legions in spine with epidural   extensions leading to increased weakness and difficulty ambulating. PCP   sent pt to ED for further evaluation. Pt reports weight loss over months.   Denies any sort of active bleeding no blood in stool. Denies CP, or SOB.   C/o lower back pain. Some numbness and tingling to LLE. Denies bowl or   bladder incontinence. Denies numbness consistent with saddle anesthesia.   She had  outpatient CT C/A/P showing multiple bone mets and possible liver mets   and path  fracture of T11 vertebral body. Patient also C/o LE  paresthesias and back pain.    Description: A MRI of the cervical spine and a MRI of the thoracic spine   with and without gadolinium contrast were performed with multiplanar   multisequence technique.    8 cc intravenous Gadavist gadolinium contrast was administered, 2 cc   contrast was discarded.    Comparison is made to the thoracic and lumbar spine region from the chest   abdomen pelvic CT from 11/21/2023, and cervical spine CT from 08/08/2022    Cervical spine MRI:    Bulky dorsal paraspinal tumor in the upper thoracic region is partially   visualized. Please see thoracic spine MRI component of this report.    No focal cervical spine bony lesions are visualized. There is no evidence   for epidural tumor extension in the cervical spine.    Smallbroad disc bulges are present at the C4-C5, C5-C6, C6-C7 levels   with associated mild central canal stenosis. Multilevel ligamentum flavum   hypertrophy multilevel facet degenerative changes are noted.    There is no cervical spinal cord mass or syrinx. No abnormal intraspinal   enhancement is noted in the cervical spine. There is no cervical spinal   cord compression.    Thoracic spine MRI:    A large metastasis replaces the T11 vertebral body with extension into   the pedicles, with an associated mild to moderate pathologic compression   fracture deformity and retropulsion of bone. Epidural tumor extension is   noted. Tumor extension into the left greater than right neural foramina   at this levels also noted. The compression fracture withretropulsion of   bone and epidural tumor extension results in mild to moderate thoracic   spinal cord compression. A left-sided rib metastasis with soft tissue   extension of tumor is partially visualized at this level.    Additional metastases are noted involving the T9 and L2 vertebral bodies   without associated epidural tumor extension    A moderate sized hemangioma involves the T8 vertebral body with   associated increased T1 signal, increased STIR signal, and enhancement.    A 3.5 cm x 3.2cm x 3.8 cm bulky paraspinal metastasis involves the right   posterior elements at the T3 and T4 levels with extension into the   paraspinal musculature and abutment and possible involvement of the   posterior ribs at these levels. No significant intraspinal extension of   tumor at this level is appreciated.    Nonspecific bilateral pleural effusions and pleural thickening is noted.   Multiple enhancing liver lesions are partially visualized. Please see   separate chest abdomen pelvic CT report.    I discussed the exam findings with the covering orthopedic clinician Dr. Selby at 1:30 PM on 11/25/2023.    IMPRESSION:    Cervical spine:    No evidence for metastatic disease to the cervical spine or cervical   spinal cord compression.    Thoracic spine:    Multifocal metastatic disease is noted as described.    A large metastasis replaces the T11 vertebral body with an associated   mild to moderate pathologic compression fracture deformity and   retropulsion of bone. Epidural tumor extension is noted. The compression   fracture with retropulsion of bone and epidural tumor extension results   in mild to moderate thoracic spinal cord compression.    --- End of Report ---        < end of copied text >     HPI: Pt is a 83y old Female with hx of HTN, dyslipidemia, GERD, parkinson's, neuropathy, anemia, loop recorder, p/w LE weakness. Patient has been having B/L LE weakness for a little less than 1 year. Symptoms have been getting progressively worse to the point where patient is unable to ambulate. Patient also has been getting worked up for anemia and approximately 40lb unintentional weight loss. She had outpatient CT C/A/P showing multiple bone mets and possible liver mets and path fracture of T11 vertebral body. Patient sent to ED for further evaluation. Patient evaluated by ortho with recommendation for MRI. Patient has a loop recorder in placed by Dr. Crawley, and patient is unsure if it is compatible with MRI. Will consult Dr. Crawley for input prior to MRI. Patient also C/o LE paresthesias and back pain. Palliative medicine consulted to help establish GOC and advance care planning.     11/27/2023 pt seen and examined with  at bedside, patient lethargic appears comfortable at this time just got back from IR for biopsy. GOC meeting scheduled with patient  Julius.       PAIN: ( )Yes   (x )No  comfortable at this time   DYSPNEA: ( ) Yes  ( x) No  Level:    PAST MEDICAL & SURGICAL HISTORY:  GERD (gastroesophageal reflux disease)  HTN (hypertension)  HLD (hyperlipidemia)  Mood disorder  H/O gastric bypass  History of cholecystectomy  History of total knee replacement, left    SOCIAL HX: lives at home with      Hx opiate tolerance ( )YES  (x )NO    Baseline ADLs  (Prior to Admission)  (x ) Independent   ( )Dependent  some assistance     FAMILY HISTORY:  No pertinent family history in first degree relatives    Review of Systems:    Unable to obtain/Limited due to: lethargy     PHYSICAL EXAM:    Vital Signs Last 24 Hrs  T(C): 36.6 (27 Nov 2023 08:35), Max: 36.8 (26 Nov 2023 23:00)  T(F): 97.9 (27 Nov 2023 08:35), Max: 98.3 (26 Nov 2023 23:00)  HR: 78 (27 Nov 2023 08:35) (71 - 78)  BP: 113/62 (27 Nov 2023 08:35) (101/54 - 128/60)  RR: 18 (27 Nov 2023 08:35) (17 - 18)  SpO2: 93% (27 Nov 2023 08:35) (93% - 97%)    Parameters below as of 27 Nov 2023 08:35  Patient On (Oxygen Delivery Method): room air    PPSV2: 20-30  %  FAST:    General: elderly female in bed, NAD   Mental Status: alert but confused oriented to self only   HEENT:  mmm   Lungs: diminished breath sounds   Cardiac: s1s2 +   GI: nontender, nondistended +BS   : +voiding   Ext: SANCHEZ weakness lower extremities   Neuro: weakness       LABS:                        8.8    7.97  )-----------( 231      ( 27 Nov 2023 07:31 )             27.6     11-27    142  |  108  |  17  ----------------------------<  80  3.6   |  28  |  0.75    Ca    9.1      27 Nov 2023 07:31    TPro  6.0  /  Alb  2.7<L>  /  TBili  0.5  /  DBili  x   /  AST  21  /  ALT  8<L>  /  AlkPhos  123<H>  11-27      Albumin: Albumin: 2.7 g/dL (11-27 @ 07:31)      Allergies    Originally Entered as [Moderate Rash] reaction to [SURGICAL TAPE] (Unknown)  Vitamin K (Other (Severe))  Fosamax (Unknown)    Intolerances      MEDICATIONS  (STANDING):  atorvastatin 40 milliGRAM(s) Oral at bedtime  buPROPion SR (12-Hour) 100 milliGRAM(s) Oral daily  carbidopa/levodopa  25/100 1 Tablet(s) Oral four times a day  donepezil 10 milliGRAM(s) Oral at bedtime  ferrous    sulfate 325 milliGRAM(s) Oral daily  influenza  Vaccine (HIGH DOSE) 0.7 milliLiter(s) IntraMuscular once  levothyroxine 50 MICROGram(s) Oral daily  losartan 25 milliGRAM(s) Oral daily  metoprolol succinate ER 25 milliGRAM(s) Oral daily  naloxone Injectable 0.4 milliGRAM(s) IV Push once  pantoprazole    Tablet 40 milliGRAM(s) Oral before breakfast  polyethylene glycol 3350 17 Gram(s) Oral daily  pramipexole 0.125 milliGRAM(s) Oral at bedtime  senna 2 Tablet(s) Oral at bedtime  verapamil  milliGRAM(s) Oral daily    MEDICATIONS  (PRN):  acetaminophen     Tablet .. 650 milliGRAM(s) Oral every 6 hours PRN Mild Pain (1 - 3), Moderate Pain (4 - 6)  bisacodyl 5 milliGRAM(s) Oral daily PRN Constipation  morphine  - Injectable 2 milliGRAM(s) IV Push every 4 hours PRN Moderate Pain (4 - 6)  morphine  - Injectable 4 milliGRAM(s) IV Push every 4 hours PRN Severe Pain (7 - 10)      RADIOLOGY/ADDITIONAL STUDIES:    < from: US Thyroid (11.27.23 @ 12:13) >  ACC: [id number] EXAM:  US THYROID ONLY   ORDERED BY: DELFINO DAVIDSON     PROCEDURE DATE:  11/27/2023          INTERPRETATION:  CLINICAL INFORMATION: Right thyroid nodule seen on prior   chest CT.    Chest CT dated 11/21/2023 COMPARISON: None available.    TECHNIQUE: Sonography of the thyroid.    FINDINGS:  Right Lobe: 3.3 x 1.7 x 1.6 cm. The gland is mildly heterogeneous in   echotexture.    There is 1.1 x 0.8 x 0.8 cm partially cystic/isoechoic nodule in the   midpole. There are no calcifications. (TIRAD-2).    There is 0.8 x 0.7 x 0.6 cm heterogeneous predominantly hypoechoic nodule   in the midpole (TIRAD-4).    Left Lobe: 3.7 x 1.5 x 1.3 cm. The gland is mildly heterogeneous in   echotexture.    There is 3 mm colloid cyst in upper pole.    Isthmus: 5 mm.    Cervical Lymph Nodes: No enlarged or abnormal morphology cervical nodes.    IMPRESSION:    Scattered subcentimeter bilateral thyroid nodules. Consider follow-up   ultrasound in one year.    TI-RAD 4: Moderately suspicious (FNA if > 1.5 cm, Follow if > 1 cm)  __________________________________  ACR Thyroid Imaging, Reporting and Data System (TI-RADS): White Paper of   the ACR TI-RADS Committee. J Am Alberto Radiol 2017;14:587-595.    TI-RAD 1: Benign (No FNA)  TI-RAD 2: Not suspicious(No FNA)  TI-RAD 3: Mildly suspicious (FNA if > 2.5 cm, Follow if >1.5 cm)  TI-RAD 4: Moderately suspicious (FNA if > 1.5 cm, Follow if > 1 cm)  TI-RAD 5: Highly suspicious (FNA if > 1 cm, Follow if > 0.5 cm)    < from: MR Lumbar Spine w/wo IV Cont (11.25.23 @ 11:57) >    ACC: [id number] EXAM:  MR SPINE LUMBAR WAW IC   ORDERED BY: BRIANNA SELBY     PROCEDURE DATE:  11/25/2023          INTERPRETATION:  CLINICAL INFORMATION: Metastases    ADDITIONAL CLINICAL INFORMATION: Cancer C80.1    TECHNIQUE: Multiplanar, multisequence MRI was performed of the lumbar   spine.  IV Contrast: Gadavist  8 cc administered   2 cc discarded    CORRELATION: CT chest and abdomen 11/21/2023    FINDINGS:    ALIGNMENT: The alignment is normal.  VERTEBRA: There is a mild compression fracture deformity of T12 as well   as pathological compression fracture deformities of T11 and L5. There is   diffuse marrow infiltration of the T11 and L5 vertebral bodies with   extension to the posterior elements with hypointense T1 and hyperintense   T2/STIR signal with avid enhancement compatible with metastatic disease.   The loss of the cortical margins is better seen on the prior CT. There is   ventral epidural soft tissue component at T11 indenting and possibly   compressing the spinal cord. Axialimages were not obtained at this   level. There is diffuse ventral epidural soft tissue at L5 severely   compressing the thecal sac as well as the L5 and S1 nerve roots. There   also is diffuse involvement of the left left sacrum at the S1 and S2   levels extending to the left SI joint. There is minimum epidural soft   tissue along the lateral margin of the sacral foramen at the S1-S2 level.   There are additional smaller metastases involving L1, L2 and L4. There   are focal areas of hyperintenseT1 and T2 signal within the T2 12 and L1   vertebral bodies which may represent hemangiomas.  DISC SPACES: Maintained.  CANAL: The distal cord and conus are normal in signal. Conus terminates   at L1.  VISUALIZED INTRAPELVIC/INTRA-ABDOMINAL SOFT TISSUES: Normal.  PARASPINAL SOFT TISSUES: Normal.    INDIVIDUAL LEVELS:    L1-L2: No disc herniation, spinal canal stenosis or neural foramen   narrowing.    L2-L3: No disc herniation. Mild degenerative facet disease. No spinal   canal stenosis or neuralforamen narrowing.    L3-L4: No disc herniation. Mild degenerative facet disease. No spinal   canal stenosis or neural foramen narrowing.    IMPRESSION: Bony metastases at T11,L1, L2, L4, L5 and left sacrum.   Pathologic fractures of T11 and L5. Possible cord compression at T11 and   diffuse thecal sac compression at L5.    --- End of Report ---            DUTCH LUND MD; Attending Radiologist  This document has been electronically signed. Nov 25 2023  1:04PM    < end of copied text >  < from: MR Thoracic Spine w/wo IV Cont (11.25.23 @ 11:56) >    ACC: [id number] EXAM:  MR SPINE THORACIC WAW IC   ORDERED BY: BRIANNA SELBY     ACC: [id number] EXAM:  MR SPINE [facility]   ORDERED BY: BRIANNA SELBY     PROCEDURE DATE:  11/25/2023          INTERPRETATION:  History: 84 y/o F with a PMhx of anemia, GERD, HTN, HLD,   mood disorder, gastric bypass, and cholecystectomy presents to the ED S[facility]   MD c/o weakness. The pt had outpatient w/u for worsening anemia with   weakness. CT and XR showing lytic legions in spine with epidural   extensions leading to increased weakness and difficulty ambulating. PCP   sent pt to ED for further evaluation. Pt reports weight loss over months.   Denies any sort of active bleeding no blood in stool. Denies CP, or SOB.   C/o lower back pain. Some numbness and tingling to LLE. Denies bowl or   bladder incontinence. Denies numbness consistent with saddle anesthesia.   She had  outpatient CT C/A/P showing multiple bone mets and possible liver mets   and path  fracture of T11 vertebral body. Patient also C/o LE  paresthesias and back pain.    Description: A MRI of the cervical spine and a MRI of the thoracic spine   with and without gadolinium contrast were performed with multiplanar   multisequence technique.    8 cc intravenous Gadavist gadolinium contrast was administered, 2 cc   contrast was discarded.    Comparison is made to the thoracic and lumbar spine region from the chest   abdomen pelvic CT from 11/21/2023, and cervical spine CT from 08/08/2022    Cervical spine MRI:    Bulky dorsal paraspinal tumor in the upper thoracic region is partially   visualized. Please see thoracic spine MRI component of this report.    No focal cervical spine bony lesions are visualized. There is no evidence   for epidural tumor extension in the cervical spine.    Smallbroad disc bulges are present at the C4-C5, C5-C6, C6-C7 levels   with associated mild central canal stenosis. Multilevel ligamentum flavum   hypertrophy multilevel facet degenerative changes are noted.    There is no cervical spinal cord mass or syrinx. No abnormal intraspinal   enhancement is noted in the cervical spine. There is no cervical spinal   cord compression.    Thoracic spine MRI:    A large metastasis replaces the T11 vertebral body with extension into   the pedicles, with an associated mild to moderate pathologic compression   fracture deformity and retropulsion of bone. Epidural tumor extension is   noted. Tumor extension into the left greater than right neural foramina   at this levels also noted. The compression fracture withretropulsion of   bone and epidural tumor extension results in mild to moderate thoracic   spinal cord compression. A left-sided rib metastasis with soft tissue   extension of tumor is partially visualized at this level.    Additional metastases are noted involving the T9 and L2 vertebral bodies   without associated epidural tumor extension    A moderate sized hemangioma involves the T8 vertebral body with   associated increased T1 signal, increased STIR signal, and enhancement.    A 3.5 cm x 3.2cm x 3.8 cm bulky paraspinal metastasis involves the right   posterior elements at the T3 and T4 levels with extension into the   paraspinal musculature and abutment and possible involvement of the   posterior ribs at these levels. No significant intraspinal extension of   tumor at this level is appreciated.    Nonspecific bilateral pleural effusions and pleural thickening is noted.   Multiple enhancing liver lesions are partially visualized. Please see   separate chest abdomen pelvic CT report.    I discussed the exam findings with the covering orthopedic clinician Dr. Selby at 1:30 PM on 11/25/2023.    IMPRESSION:    Cervical spine:    No evidence for metastatic disease to the cervical spine or cervical   spinal cord compression.    Thoracic spine:    Multifocal metastatic disease is noted as described.    A large metastasis replaces the T11 vertebral body with an associated   mild to moderate pathologic compression fracture deformity and   retropulsion of bone. Epidural tumor extension is noted. The compression   fracture with retropulsion of bone and epidural tumor extension results   in mild to moderate thoracic spinal cord compression.    --- End of Report ---        < end of copied text >

## 2023-11-27 NOTE — PROGRESS NOTE ADULT - SUBJECTIVE AND OBJECTIVE BOX
HOSPITALIST ATTENDING PROGRESS NOTE     Chart and meds reviewed. Patient seen and examined     CC: LE weakness    Subjective: Patient seen and evaluated.  Still with left lower extremity weakness.  Has some fecal incontinence.      11/27: Patient seen and evaluated. Still with left lower extremity weakness/tingling. Significant lower back pain, begin Morphine PRN for pain.       All other systems and founds to be negative with exception of what has been described above.         PHYSICAL EXAM:  Vital Signs Last 24 Hrs  T(C): 36.4 (26 Nov 2023 15:43), Max: 36.6 (25 Nov 2023 23:20)  T(F): 97.6 (26 Nov 2023 15:43), Max: 97.8 (25 Nov 2023 23:20)  HR: 73 (26 Nov 2023 15:43) (69 - 73)  BP: 101/54 (26 Nov 2023 15:43) (101/54 - 126/68)  BP(mean): 86 (26 Nov 2023 08:02) (86 - 86)  RR: 17 (26 Nov 2023 15:43) (17 - 18)  SpO2: 97% (26 Nov 2023 15:43) (96% - 97%)    Parameters below as of 26 Nov 2023 15:43  Patient On (Oxygen Delivery Method): room air      Daily     Daily     GEN: NAD, +frail  HEENT: EOMI, normal hearing, moist mucous membranes  NECK : Soft and supple, no JVD  LUNG: CTABL, No wheezing, rales or rhonchi  CVS: S1S2+, RRR, no M/G/R  GI: BS+, soft, NT/ND, no guarding, no rebound  EXTREMITIES: No peripheral edema  VASCULAR: 2+ peripheral pulses  NEURO: AAOx3, grossly non-focal   MSK: 5/5 strength b/l upper extremities, 5/5 in the RLE, 4/5 in the LLE   SKIN: No rashes    HOME MEDICATIONS:  Home Medications:  atorvastatin 40 mg oral tablet: 1 tab(s) orally once a day (25 Nov 2023 02:42)  buPROPion 100 mg oral tablet: 1 tab(s) orally once a day (25 Nov 2023 02:40)  carbidopa-levodopa 25 mg-100 mg oral tablet: 1 tab(s) orally 4 times a day (25 Nov 2023 02:42)  donepezil 10 mg oral tablet: 1 tab(s) orally 2 times a day (25 Nov 2023 02:40)  ferrous sulfate 325 mg (65 mg elemental iron) oral delayed release tablet: 1 tab(s) orally once a day (25 Nov 2023 02:42)  levothyroxine 50 mcg (0.05 mg) oral capsule: 1 cap(s) orally once a day (25 Nov 2023 02:42)  losartan 25 mg oral tablet: 1 tab(s) orally once a day (25 Nov 2023 02:42)  metoprolol succinate 25 mg oral tablet, extended release: 1 tab(s) orally once a day (25 Nov 2023 02:39)  omeprazole 20 mg oral delayed release tablet: 1 tab(s) orally once a day (25 Nov 2023 02:39)  pramipexole 0.125 mg oral tablet: 1 tab(s) orally once a day (at bedtime) (25 Nov 2023 02:40)  Rexulti 3 mg oral tablet: 1 tab(s) orally once a day (at bedtime) (25 Nov 2023 02:40)  tolterodine 2 mg oral tablet: 2 tab(s) orally 2 times a day (25 Nov 2023 02:40)  verapamil 240 mg/12 hours oral tablet, extended release: 1 tab(s) orally once a day (25 Nov 2023 02:39)  vilazodone 40 mg oral tablet: 1 tab(s) orally once a day (25 Nov 2023 02:40)      MEDICATIONS  MEDICATIONS  (STANDING):  atorvastatin 40 milliGRAM(s) Oral at bedtime  buPROPion SR (12-Hour) 100 milliGRAM(s) Oral daily  carbidopa/levodopa  25/100 1 Tablet(s) Oral four times a day  cefTRIAXone Injectable. 1000 milliGRAM(s) IV Push every 24 hours  donepezil 10 milliGRAM(s) Oral at bedtime  ferrous    sulfate 325 milliGRAM(s) Oral daily  influenza  Vaccine (HIGH DOSE) 0.7 milliLiter(s) IntraMuscular once  levothyroxine 50 MICROGram(s) Oral daily  losartan 25 milliGRAM(s) Oral daily  metoprolol succinate ER 25 milliGRAM(s) Oral daily  pantoprazole    Tablet 40 milliGRAM(s) Oral before breakfast  pramipexole 0.125 milliGRAM(s) Oral at bedtime  verapamil  milliGRAM(s) Oral daily      LABS: All Labs Reviewed:                             8.8    7.97  )-----------( 231      ( 27 Nov 2023 07:31 )             27.6   11-27    142  |  108  |  17  ----------------------------<  80  3.6   |  28  |  0.75    Ca    9.1      27 Nov 2023 07:31    TPro  6.0  /  Alb  2.7<L>  /  TBili  0.5  /  DBili  x   /  AST  21  /  ALT  8<L>  /  AlkPhos  123<H>  11-27             PT/INR - ( 25 Nov 2023 08:43 )   PT: 11.5 sec;   INR: 1.02 ratio         PTT - ( 25 Nov 2023 08:43 )  PTT:31.3 sec    TSH w/ FT4 reflex - 2.20 (11/27)    Urinalysis Basic - ( 25 Nov 2023 08:43 )    Color: x / Appearance: x / SG: x / pH: x  Gluc: 78 mg/dL / Ketone: x  / Bili: x / Urobili: x   Blood: x / Protein: x / Nitrite: x   Leuk Esterase: x / RBC: x / WBC x   Sq Epi: x / Non Sq Epi: x / Bacteria: x        Culture - Urine (collected 25 Nov 2023 06:15)  Source: Clean Catch Clean Catch (Midstream)  Final Report (26 Nov 2023 15:42):    <10,000 CFU/mL Normal Urogenital Jaz    Culture - Blood (collected 24 Nov 2023 19:54)  Source: .Blood None  Preliminary Report (26 Nov 2023 01:02):    No growth at 24 hours    Culture - Blood (collected 24 Nov 2023 19:54)  Source: .Blood None  Preliminary Report (26 Nov 2023 01:02):    No growth at 24 hours      Blood Culture: 11-25 @ 06:15  Organism --  Gram Stain Blood -- Gram Stain --  Specimen Source Clean Catch Clean Catch (Midstream)  Culture-Blood --    11-24 @ 19:54  Organism --  Gram Stain Blood -- Gram Stain --  Specimen Source .Blood None  Culture-Blood --      I&O's Detail    CAPILLARY BLOOD GLUCOSE      CARDIOLOGY TESTING   EKG   ECHO       RADIOLOGY    There is no CT evidence of acute intracranial hemorrhage, mass effect, midline shift, or acute, large territorial infarct. The ventricles and sulci are prominent compatible with age-appropriate brain parenchymal volume loss. Mild patchy periventricular white matter hypoattenuation is nonspecific, although likely due to chronic microangiopathy. The basal cisterns are patent. There is a partially empty sella.    The mastoid air cells and middle ear cavities are grossly clear. The visualized paranasal sinuses are well aerated.    The calvarium and skull base are grossly intact.    IMPRESSION:  No acute intracranial hemorrhage or mass effect.    Please note that contrast-enhanced MRI of the brain is more sensitive in the detection of intracranial metastases.      EXAM: [id number] - CT ABDOMEN AND PELVIS - ORDERED BY: ERASMO PEDROZA    EXAM: [id number] - CT CHEST - ORDERED BY: ERASMO PEDROZA      PROCEDURE DATE: 11/21/2023        INTERPRETATION: CLINICAL INFORMATION: Anemia and 40 pound weight loss.    COMPARISON: None.    CONTRAST/COMPLICATIONS:  IV Contrast: NONE  Oral Contrast: NONE  Complications: None reported at time of study completion    PROCEDURE:  CT of the Chest, Abdomen and Pelvis was performed.  Sagittal and coronal reformats were performed.    FINDINGS:  CHEST:  LUNGS AND LARGE AIRWAYS: Patent central airways. There is atelectasis at the right lung base.  PLEURA: No pleural effusion.  VESSELS: Coronary artery calcifications and atherosclerotic calcifications within the thoracic aorta.  HEART: Heart size is normal. No pericardial effusion.  MEDIASTINUM AND CRIS: No lymphadenopathy.  CHEST WALL AND LOWER NECK: There is a loop recorder left anterior chest wall. There is a 0.8 cm right lobe thyroid nodule.    ABDOMEN AND PELVIS:  LIVER: Bilobar indeterminate hepatic lesions are nonetheless suspicious for metastatic disease. The largest lesion is in the hepatic dome measuring 4.8 cm. There is a mass interposed between the caudate lobe and left lobe of liver measuring 3.4 cm on series 2 image 68.  BILE DUCTS: Normal caliber.  GALLBLADDER: Removed.  SPLEEN: Within normal limits.  PANCREAS: Within normal limits.  ADRENALS: There is a 1.9 cm right adrenal adenoma.  KIDNEYS/URETERS: Within normal limits.    BLADDER: Within normal limits.  REPRODUCTIVE ORGANS: Uterus and adnexa within normal limits.    BOWEL: No bowel obstruction. 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 There is herniation of the gastric pouch and small amount of the jejunum distal to the gastrojejunal anastomosis into the mediastinum through the esophageal hiatus. There is also herniation of a portion of the excluded stomach through the esophageal hiatus.  PERITONEUM: No ascites.  VESSELS: Within normal limits.  RETROPERITONEUM/LYMPH NODES: No lymphadenopathy.  ABDOMINAL WALL: Within normal limits.  BONES: There is a lytic lesion with posterior cortical destruction involving the L5 vertebral body with epidural extension. There is mild loss of height of the T12 vertebral body. There is a lytic lesion and pathologic fracture through the T11 vertebral body with likely epidural extension and extension into the left pedicle. There is heterogeneous appearance of the T8-9 vertebral bodies with a lytic lesion in T9 vertebral body. There is a lytic lesion with soft tissue component in the right fourth rib with associated pathologic fracture. There is an old fracture of the left humeral neck. There is a lytic lesion in the left hemisacrum. There is a lytic lesion in the right fourth transverse process as well as lytic lesion with pathologic fracture in the left 10th rib. There is a 4.6 x 3.8 cm expansile lesion in the left third rib.    IMPRESSION:  Multiple lytic osseous lesions highly suspicious for metastatic disease. Lesions in the L5 and T11 vertebral bodies have epidural extension. Pathologic fracture T11 vertebral body. Some of the rib lesions demonstrate pathologic fractures. Please see above comments.    Multiple hepatic lesions are not characterized without intravenous contrast but nonetheless are suspicious for metastatic disease.    < from: MR Cervical Spine w/wo IV Cont (11.25.23 @ 11:56) >  Cervical spine MRI:    Bulky dorsal paraspinal tumor in the upper thoracic region is partially   visualized. Please see thoracic spine MRI component of this report.    No focal cervical spine bony lesions are visualized. There is no evidence   for epidural tumor extension in the cervical spine.    Smallbroad disc bulges are present at the C4-C5, C5-C6, C6-C7 levels   with associated mild central canal stenosis. Multilevel ligamentum flavum   hypertrophy multilevel facet degenerative changes are noted.    There is no cervical spinal cord mass or syrinx. No abnormal intraspinal   enhancement is noted in the cervical spine. There is no cervical spinal   cord compression.    Thoracic spine MRI:    A large metastasis replaces the T11 vertebral body with extension into   the pedicles, with an associated mild to moderate pathologic compression   fracture deformity and retropulsion of bone. Epidural tumor extension is   noted. Tumor extension into the left greater than right neural foramina   at this levels also noted. The compression fracture withretropulsion of   bone and epidural tumor extension results in mild to moderate thoracic   spinal cord compression. A left-sided rib metastasis with soft tissue   extension of tumor is partially visualized at this level.    Additional metastases are noted involving the T9 and L2 vertebral bodies   without associated epidural tumor extension    A moderate sized hemangioma involves the T8 vertebral body with   associated increased T1 signal, increased STIR signal, and enhancement.    A 3.5 cm x 3.2cm x 3.8 cm bulky paraspinal metastasis involves the right   posterior elements at the T3 and T4 levels with extension into the   paraspinal musculature and abutment and possible involvement of the   posterior ribs at these levels. No significant intraspinal extension of   tumor at this level is appreciated.    Nonspecific bilateral pleural effusions and pleural thickening is noted.   Multiple enhancing liver lesions are partially visualized. Please see   separate chest abdomen pelvic CT report.    I discussed the exam findings with the covering orthopedic clinician Dr. Selby at 1:30 PM on 11/25/2023.    IMPRESSION:    Cervical spine:    No evidence for metastatic disease to the cervical spine or cervical   spinal cord compression.    Thoracic spine:    Multifocal metastatic disease is noted as described.    A large metastasis replaces the T11 vertebral body with an associated   mild to moderate pathologic compression fracture deformity and   retropulsion of bone. Epidural tumor extension is noted. The compression   fracture with retropulsion of bone and epidural tumor extension results   in mild to moderate thoracic spinal cord compression.      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  IV Contrast: Gadavist  8 cc administered   2 cc discarded    CORRELATION: CT chest and abdomen 11/21/2023    FINDINGS:    ALIGNMENT: The alignment is normal.  VERTEBRA: There is a mild compression fracture deformity of T12 as well   as pathological compression fracture deformities of T11 and L5. There is   diffuse marrow infiltration of the T11 and L5 vertebral bodies with   extension to the posterior elements with hypointense T1 and hyperintense   T2/STIR signal with avid enhancement compatible with metastatic disease.   The loss of the cortical margins is better seen on the prior CT. There is   ventral epidural soft tissue component at T11 indenting and possibly   compressing the spinal cord. Axialimages were not obtained at this   level. There is diffuse ventral epidural soft tissue at L5 severely   compressing the thecal sac as well as the L5 and S1 nerve roots. There   also is diffuse involvement of the left left sacrum at the S1 and S2   levels extending to the left SI joint. There is minimum epidural soft   tissue along the lateral margin of the sacral foramen at the S1-S2 level.   There are additional smaller metastases involving L1, L2 and L4. There   are focal areas of hyperintenseT1 and T2 signal within the T2 12 and L1   vertebral bodies which may represent hemangiomas.  DISC SPACES: Maintained.  CANAL: The distal cord and conus are normal in signal. Conus terminates   at L1.  VISUALIZED INTRAPELVIC/INTRA-ABDOMINAL SOFT TISSUES: Normal.  PARASPINAL SOFT TISSUES: Normal.    INDIVIDUAL LEVELS:    L1-L2: No disc herniation, spinal canal stenosis or neural foramen   narrowing.    L2-L3: No disc herniation. Mild degenerative facet disease. No spinal   canal stenosis or neuralforamen narrowing.    L3-L4: No disc herniation. Mild degenerative facet disease. No spinal   canal stenosis or neural foramen narrowing.    IMPRESSION: Bony metastases at T11,L1, L2, L4, L5 and left sacrum.   Pathologic fractures of T11 and L5. Possible cord compression at T11 and   diffuse thecal sac compression at L5.    < end of copied text >                 HOSPITALIST ATTENDING PROGRESS NOTE     Chart and meds reviewed. Patient seen and examined     CC: LE weakness    Subjective: Patient seen and evaluated.  Still with left lower extremity weakness.  Has some fecal incontinence.      11/27: Patient seen and evaluated. Still with left lower extremity weakness/tingling. Significant lower back pain, begin Morphine PRN for pain.       Vital Signs Last 24 Hrs  T(C): 36.6 (27 Nov 2023 08:35), Max: 36.8 (26 Nov 2023 23:00)  T(F): 97.9 (27 Nov 2023 08:35), Max: 98.3 (26 Nov 2023 23:00)  HR: 78 (27 Nov 2023 08:35) (71 - 78)  BP: 113/62 (27 Nov 2023 08:35) (101/54 - 128/60)  BP(mean): --  RR: 18 (27 Nov 2023 08:35) (17 - 18)  SpO2: 93% (27 Nov 2023 08:35) (93% - 97%)    Parameters below as of 27 Nov 2023 08:35  Patient On (Oxygen Delivery Method): room air    All other systems and founds to be negative with exception of what has been described above.         PHYSICAL EXAM:  Vital Signs Last 24 Hrs        Daily     Daily     GEN: NAD, +frail  HEENT: EOMI, normal hearing, moist mucous membranes  NECK : Soft and supple, no JVD  LUNG: CTABL, No wheezing, rales or rhonchi  CVS: S1S2+, RRR, no M/G/R  GI: BS+, soft, NT/ND, no guarding, no rebound  EXTREMITIES: No peripheral edema  VASCULAR: 2+ peripheral pulses  NEURO: AAOx3, grossly non-focal   MSK: 5/5 strength b/l upper extremities, 5/5 in the RLE, 4/5 in the LLE   SKIN: No rashes    HOME MEDICATIONS:  Home Medications:  atorvastatin 40 mg oral tablet: 1 tab(s) orally once a day (25 Nov 2023 02:42)  buPROPion 100 mg oral tablet: 1 tab(s) orally once a day (25 Nov 2023 02:40)  carbidopa-levodopa 25 mg-100 mg oral tablet: 1 tab(s) orally 4 times a day (25 Nov 2023 02:42)  donepezil 10 mg oral tablet: 1 tab(s) orally 2 times a day (25 Nov 2023 02:40)  ferrous sulfate 325 mg (65 mg elemental iron) oral delayed release tablet: 1 tab(s) orally once a day (25 Nov 2023 02:42)  levothyroxine 50 mcg (0.05 mg) oral capsule: 1 cap(s) orally once a day (25 Nov 2023 02:42)  losartan 25 mg oral tablet: 1 tab(s) orally once a day (25 Nov 2023 02:42)  metoprolol succinate 25 mg oral tablet, extended release: 1 tab(s) orally once a day (25 Nov 2023 02:39)  omeprazole 20 mg oral delayed release tablet: 1 tab(s) orally once a day (25 Nov 2023 02:39)  pramipexole 0.125 mg oral tablet: 1 tab(s) orally once a day (at bedtime) (25 Nov 2023 02:40)  Rexulti 3 mg oral tablet: 1 tab(s) orally once a day (at bedtime) (25 Nov 2023 02:40)  tolterodine 2 mg oral tablet: 2 tab(s) orally 2 times a day (25 Nov 2023 02:40)  verapamil 240 mg/12 hours oral tablet, extended release: 1 tab(s) orally once a day (25 Nov 2023 02:39)  vilazodone 40 mg oral tablet: 1 tab(s) orally once a day (25 Nov 2023 02:40)      MEDICATIONS  MEDICATIONS  (STANDING):  atorvastatin 40 milliGRAM(s) Oral at bedtime  buPROPion SR (12-Hour) 100 milliGRAM(s) Oral daily  carbidopa/levodopa  25/100 1 Tablet(s) Oral four times a day  cefTRIAXone Injectable. 1000 milliGRAM(s) IV Push every 24 hours  donepezil 10 milliGRAM(s) Oral at bedtime  ferrous    sulfate 325 milliGRAM(s) Oral daily  influenza  Vaccine (HIGH DOSE) 0.7 milliLiter(s) IntraMuscular once  levothyroxine 50 MICROGram(s) Oral daily  losartan 25 milliGRAM(s) Oral daily  metoprolol succinate ER 25 milliGRAM(s) Oral daily  pantoprazole    Tablet 40 milliGRAM(s) Oral before breakfast  pramipexole 0.125 milliGRAM(s) Oral at bedtime  verapamil  milliGRAM(s) Oral daily      LABS: All Labs Reviewed:                             8.8    7.97  )-----------( 231      ( 27 Nov 2023 07:31 )             27.6   11-27    142  |  108  |  17  ----------------------------<  80  3.6   |  28  |  0.75    Ca    9.1      27 Nov 2023 07:31    TPro  6.0  /  Alb  2.7<L>  /  TBili  0.5  /  DBili  x   /  AST  21  /  ALT  8<L>  /  AlkPhos  123<H>  11-27             PT/INR - ( 25 Nov 2023 08:43 )   PT: 11.5 sec;   INR: 1.02 ratio         PTT - ( 25 Nov 2023 08:43 )  PTT:31.3 sec    TSH w/ FT4 reflex - 2.20 (11/27)    Urinalysis Basic - ( 25 Nov 2023 08:43 )    Color: x / Appearance: x / SG: x / pH: x  Gluc: 78 mg/dL / Ketone: x  / Bili: x / Urobili: x   Blood: x / Protein: x / Nitrite: x   Leuk Esterase: x / RBC: x / WBC x   Sq Epi: x / Non Sq Epi: x / Bacteria: x        Culture - Urine (collected 25 Nov 2023 06:15)  Source: Clean Catch Clean Catch (Midstream)  Final Report (26 Nov 2023 15:42):    <10,000 CFU/mL Normal Urogenital Jaz    Culture - Blood (collected 24 Nov 2023 19:54)  Source: .Blood None  Preliminary Report (26 Nov 2023 01:02):    No growth at 24 hours    Culture - Blood (collected 24 Nov 2023 19:54)  Source: .Blood None  Preliminary Report (26 Nov 2023 01:02):    No growth at 24 hours      Blood Culture: 11-25 @ 06:15  Organism --  Gram Stain Blood -- Gram Stain --  Specimen Source Clean Catch Clean Catch (Midstream)  Culture-Blood --    11-24 @ 19:54  Organism --  Gram Stain Blood -- Gram Stain --  Specimen Source .Blood None  Culture-Blood --      I&O's Detail    CAPILLARY BLOOD GLUCOSE      CARDIOLOGY TESTING   EKG   ECHO       RADIOLOGY    There is no CT evidence of acute intracranial hemorrhage, mass effect, midline shift, or acute, large territorial infarct. The ventricles and sulci are prominent compatible with age-appropriate brain parenchymal volume loss. Mild patchy periventricular white matter hypoattenuation is nonspecific, although likely due to chronic microangiopathy. The basal cisterns are patent. There is a partially empty sella.    The mastoid air cells and middle ear cavities are grossly clear. The visualized paranasal sinuses are well aerated.    The calvarium and skull base are grossly intact.    IMPRESSION:  No acute intracranial hemorrhage or mass effect.    Please note that contrast-enhanced MRI of the brain is more sensitive in the detection of intracranial metastases.      EXAM: [id number] - CT ABDOMEN AND PELVIS - ORDERED BY: ERASMO PEDROZA    EXAM: [id number] - CT CHEST - ORDERED BY: ERASMO PEDROZA      PROCEDURE DATE: 11/21/2023        INTERPRETATION: CLINICAL INFORMATION: Anemia and 40 pound weight loss.    COMPARISON: None.    CONTRAST/COMPLICATIONS:  IV Contrast: NONE  Oral Contrast: NONE  Complications: None reported at time of study completion    PROCEDURE:  CT of the Chest, Abdomen and Pelvis was performed.  Sagittal and coronal reformats were performed.    FINDINGS:  CHEST:  LUNGS AND LARGE AIRWAYS: Patent central airways. There is atelectasis at the right lung base.  PLEURA: No pleural effusion.  VESSELS: Coronary artery calcifications and atherosclerotic calcifications within the thoracic aorta.  HEART: Heart size is normal. No pericardial effusion.  MEDIASTINUM AND CRIS: No lymphadenopathy.  CHEST WALL AND LOWER NECK: There is a loop recorder left anterior chest wall. There is a 0.8 cm right lobe thyroid nodule.    ABDOMEN AND PELVIS:  LIVER: Bilobar indeterminate hepatic lesions are nonetheless suspicious for metastatic disease. The largest lesion is in the hepatic dome measuring 4.8 cm. There is a mass interposed between the caudate lobe and left lobe of liver measuring 3.4 cm on series 2 image 68.  BILE DUCTS: Normal caliber.  GALLBLADDER: Removed.  SPLEEN: Within normal limits.  PANCREAS: Within normal limits.  ADRENALS: There is a 1.9 cm right adrenal adenoma.  KIDNEYS/URETERS: Within normal limits.    BLADDER: Within normal limits.  REPRODUCTIVE ORGANS: Uterus and adnexa within normal limits.    BOWEL: No bowel obstruction. 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 There is herniation of the gastric pouch and small amount of the jejunum distal to the gastrojejunal anastomosis into the mediastinum through the esophageal hiatus. There is also herniation of a portion of the excluded stomach through the esophageal hiatus.  PERITONEUM: No ascites.  VESSELS: Within normal limits.  RETROPERITONEUM/LYMPH NODES: No lymphadenopathy.  ABDOMINAL WALL: Within normal limits.  BONES: There is a lytic lesion with posterior cortical destruction involving the L5 vertebral body with epidural extension. There is mild loss of height of the T12 vertebral body. There is a lytic lesion and pathologic fracture through the T11 vertebral body with likely epidural extension and extension into the left pedicle. There is heterogeneous appearance of the T8-9 vertebral bodies with a lytic lesion in T9 vertebral body. There is a lytic lesion with soft tissue component in the right fourth rib with associated pathologic fracture. There is an old fracture of the left humeral neck. There is a lytic lesion in the left hemisacrum. There is a lytic lesion in the right fourth transverse process as well as lytic lesion with pathologic fracture in the left 10th rib. There is a 4.6 x 3.8 cm expansile lesion in the left third rib.    IMPRESSION:  Multiple lytic osseous lesions highly suspicious for metastatic disease. Lesions in the L5 and T11 vertebral bodies have epidural extension. Pathologic fracture T11 vertebral body. Some of the rib lesions demonstrate pathologic fractures. Please see above comments.    Multiple hepatic lesions are not characterized without intravenous contrast but nonetheless are suspicious for metastatic disease.    < from: MR Cervical Spine w/wo IV Cont (11.25.23 @ 11:56) >  Cervical spine MRI:    Bulky dorsal paraspinal tumor in the upper thoracic region is partially   visualized. Please see thoracic spine MRI component of this report.    No focal cervical spine bony lesions are visualized. There is no evidence   for epidural tumor extension in the cervical spine.    Smallbroad disc bulges are present at the C4-C5, C5-C6, C6-C7 levels   with associated mild central canal stenosis. Multilevel ligamentum flavum   hypertrophy multilevel facet degenerative changes are noted.    There is no cervical spinal cord mass or syrinx. No abnormal intraspinal   enhancement is noted in the cervical spine. There is no cervical spinal   cord compression.    Thoracic spine MRI:    A large metastasis replaces the T11 vertebral body with extension into   the pedicles, with an associated mild to moderate pathologic compression   fracture deformity and retropulsion of bone. Epidural tumor extension is   noted. Tumor extension into the left greater than right neural foramina   at this levels also noted. The compression fracture withretropulsion of   bone and epidural tumor extension results in mild to moderate thoracic   spinal cord compression. A left-sided rib metastasis with soft tissue   extension of tumor is partially visualized at this level.    Additional metastases are noted involving the T9 and L2 vertebral bodies   without associated epidural tumor extension    A moderate sized hemangioma involves the T8 vertebral body with   associated increased T1 signal, increased STIR signal, and enhancement.    A 3.5 cm x 3.2cm x 3.8 cm bulky paraspinal metastasis involves the right   posterior elements at the T3 and T4 levels with extension into the   paraspinal musculature and abutment and possible involvement of the   posterior ribs at these levels. No significant intraspinal extension of   tumor at this level is appreciated.    Nonspecific bilateral pleural effusions and pleural thickening is noted.   Multiple enhancing liver lesions are partially visualized. Please see   separate chest abdomen pelvic CT report.    I discussed the exam findings with the covering orthopedic clinician Dr. Selby at 1:30 PM on 11/25/2023.    IMPRESSION:    Cervical spine:    No evidence for metastatic disease to the cervical spine or cervical   spinal cord compression.    Thoracic spine:    Multifocal metastatic disease is noted as described.    A large metastasis replaces the T11 vertebral body with an associated   mild to moderate pathologic compression fracture deformity and   retropulsion of bone. Epidural tumor extension is noted. The compression   fracture with retropulsion of bone and epidural tumor extension results   in mild to moderate thoracic spinal cord compression.      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  IV Contrast: Gadavist  8 cc administered   2 cc discarded    CORRELATION: CT chest and abdomen 11/21/2023    FINDINGS:    ALIGNMENT: The alignment is normal.  VERTEBRA: There is a mild compression fracture deformity of T12 as well   as pathological compression fracture deformities of T11 and L5. There is   diffuse marrow infiltration of the T11 and L5 vertebral bodies with   extension to the posterior elements with hypointense T1 and hyperintense   T2/STIR signal with avid enhancement compatible with metastatic disease.   The loss of the cortical margins is better seen on the prior CT. There is   ventral epidural soft tissue component at T11 indenting and possibly   compressing the spinal cord. Axialimages were not obtained at this   level. There is diffuse ventral epidural soft tissue at L5 severely   compressing the thecal sac as well as the L5 and S1 nerve roots. There   also is diffuse involvement of the left left sacrum at the S1 and S2   levels extending to the left SI joint. There is minimum epidural soft   tissue along the lateral margin of the sacral foramen at the S1-S2 level.   There are additional smaller metastases involving L1, L2 and L4. There   are focal areas of hyperintenseT1 and T2 signal within the T2 12 and L1   vertebral bodies which may represent hemangiomas.  DISC SPACES: Maintained.  CANAL: The distal cord and conus are normal in signal. Conus terminates   at L1.  VISUALIZED INTRAPELVIC/INTRA-ABDOMINAL SOFT TISSUES: Normal.  PARASPINAL SOFT TISSUES: Normal.    INDIVIDUAL LEVELS:    L1-L2: No disc herniation, spinal canal stenosis or neural foramen   narrowing.    L2-L3: No disc herniation. Mild degenerative facet disease. No spinal   canal stenosis or neuralforamen narrowing.    L3-L4: No disc herniation. Mild degenerative facet disease. No spinal   canal stenosis or neural foramen narrowing.    IMPRESSION: Bony metastases at T11,L1, L2, L4, L5 and left sacrum.   Pathologic fractures of T11 and L5. Possible cord compression at T11 and   diffuse thecal sac compression at L5.    < end of copied text >                 HOSPITALIST ATTENDING PROGRESS NOTE     Chart and meds reviewed. Patient seen and examined     CC: LE weakness    Subjective: Patient seen and evaluated.  Still with left lower extremity weakness.  Has some fecal incontinence.      11/27: Patient seen and evaluated. Still with left lower extremity weakness/tingling. Significant lower back pain, begin Morphine PRN for pain.       Vital Signs Last 24 Hrs  T(C): 36.6 (27 Nov 2023 08:35), Max: 36.8 (26 Nov 2023 23:00)  T(F): 97.9 (27 Nov 2023 08:35), Max: 98.3 (26 Nov 2023 23:00)  HR: 78 (27 Nov 2023 08:35) (71 - 78)  BP: 113/62 (27 Nov 2023 08:35) (101/54 - 128/60)  BP(mean): --  RR: 18 (27 Nov 2023 08:35) (17 - 18)  SpO2: 93% (27 Nov 2023 08:35) (93% - 97%)    Parameters below as of 27 Nov 2023 08:35  Patient On (Oxygen Delivery Method): room air    All other systems and founds to be negative with exception of what has been described above.         PHYSICAL EXAM:    GEN: NAD, +frail  HEENT: EOMI, normal hearing, moist mucous membranes  NECK : Soft and supple, no JVD  LUNG: CTABL, No wheezing, rales or rhonchi  CVS: S1S2+, RRR, no M/G/R  GI: BS+, soft, NT/ND, no guarding, no rebound  EXTREMITIES: No peripheral edema  VASCULAR: 2+ peripheral pulses  NEURO: AAOx3, grossly non-focal   MSK: 5/5 strength b/l upper extremities, 5/5 in the RLE, 4/5 in the LLE   SKIN: No rashes    HOME MEDICATIONS:  Home Medications:  atorvastatin 40 mg oral tablet: 1 tab(s) orally once a day (25 Nov 2023 02:42)  buPROPion 100 mg oral tablet: 1 tab(s) orally once a day (25 Nov 2023 02:40)  carbidopa-levodopa 25 mg-100 mg oral tablet: 1 tab(s) orally 4 times a day (25 Nov 2023 02:42)  donepezil 10 mg oral tablet: 1 tab(s) orally 2 times a day (25 Nov 2023 02:40)  ferrous sulfate 325 mg (65 mg elemental iron) oral delayed release tablet: 1 tab(s) orally once a day (25 Nov 2023 02:42)  levothyroxine 50 mcg (0.05 mg) oral capsule: 1 cap(s) orally once a day (25 Nov 2023 02:42)  losartan 25 mg oral tablet: 1 tab(s) orally once a day (25 Nov 2023 02:42)  metoprolol succinate 25 mg oral tablet, extended release: 1 tab(s) orally once a day (25 Nov 2023 02:39)  omeprazole 20 mg oral delayed release tablet: 1 tab(s) orally once a day (25 Nov 2023 02:39)  pramipexole 0.125 mg oral tablet: 1 tab(s) orally once a day (at bedtime) (25 Nov 2023 02:40)  Rexulti 3 mg oral tablet: 1 tab(s) orally once a day (at bedtime) (25 Nov 2023 02:40)  tolterodine 2 mg oral tablet: 2 tab(s) orally 2 times a day (25 Nov 2023 02:40)  verapamil 240 mg/12 hours oral tablet, extended release: 1 tab(s) orally once a day (25 Nov 2023 02:39)  vilazodone 40 mg oral tablet: 1 tab(s) orally once a day (25 Nov 2023 02:40)      MEDICATIONS  MEDICATIONS  (STANDING):  atorvastatin 40 milliGRAM(s) Oral at bedtime  buPROPion SR (12-Hour) 100 milliGRAM(s) Oral daily  carbidopa/levodopa  25/100 1 Tablet(s) Oral four times a day  cefTRIAXone Injectable. 1000 milliGRAM(s) IV Push every 24 hours  donepezil 10 milliGRAM(s) Oral at bedtime  ferrous    sulfate 325 milliGRAM(s) Oral daily  influenza  Vaccine (HIGH DOSE) 0.7 milliLiter(s) IntraMuscular once  levothyroxine 50 MICROGram(s) Oral daily  losartan 25 milliGRAM(s) Oral daily  metoprolol succinate ER 25 milliGRAM(s) Oral daily  pantoprazole    Tablet 40 milliGRAM(s) Oral before breakfast  pramipexole 0.125 milliGRAM(s) Oral at bedtime  verapamil  milliGRAM(s) Oral daily      LABS: All Labs Reviewed:                             8.8    7.97  )-----------( 231      ( 27 Nov 2023 07:31 )             27.6   11-27    142  |  108  |  17  ----------------------------<  80  3.6   |  28  |  0.75    Ca    9.1      27 Nov 2023 07:31    TPro  6.0  /  Alb  2.7<L>  /  TBili  0.5  /  DBili  x   /  AST  21  /  ALT  8<L>  /  AlkPhos  123<H>  11-27             PT/INR - ( 25 Nov 2023 08:43 )   PT: 11.5 sec;   INR: 1.02 ratio         PTT - ( 25 Nov 2023 08:43 )  PTT:31.3 sec    TSH w/ FT4 reflex - 2.20 (11/27)    Urinalysis Basic - ( 25 Nov 2023 08:43 )    Color: x / Appearance: x / SG: x / pH: x  Gluc: 78 mg/dL / Ketone: x  / Bili: x / Urobili: x   Blood: x / Protein: x / Nitrite: x   Leuk Esterase: x / RBC: x / WBC x   Sq Epi: x / Non Sq Epi: x / Bacteria: x        Culture - Urine (collected 25 Nov 2023 06:15)  Source: Clean Catch Clean Catch (Midstream)  Final Report (26 Nov 2023 15:42):    <10,000 CFU/mL Normal Urogenital Jaz    Culture - Blood (collected 24 Nov 2023 19:54)  Source: .Blood None  Preliminary Report (26 Nov 2023 01:02):    No growth at 24 hours    Culture - Blood (collected 24 Nov 2023 19:54)  Source: .Blood None  Preliminary Report (26 Nov 2023 01:02):    No growth at 24 hours      Blood Culture: 11-25 @ 06:15  Organism --  Gram Stain Blood -- Gram Stain --  Specimen Source Clean Catch Clean Catch (Midstream)  Culture-Blood --    11-24 @ 19:54  Organism --  Gram Stain Blood -- Gram Stain --  Specimen Source .Blood None  Culture-Blood --      I&O's Detail    CAPILLARY BLOOD GLUCOSE      CARDIOLOGY TESTING   EKG   ECHO       RADIOLOGY    There is no CT evidence of acute intracranial hemorrhage, mass effect, midline shift, or acute, large territorial infarct. The ventricles and sulci are prominent compatible with age-appropriate brain parenchymal volume loss. Mild patchy periventricular white matter hypoattenuation is nonspecific, although likely due to chronic microangiopathy. The basal cisterns are patent. There is a partially empty sella.    The mastoid air cells and middle ear cavities are grossly clear. The visualized paranasal sinuses are well aerated.    The calvarium and skull base are grossly intact.    IMPRESSION:  No acute intracranial hemorrhage or mass effect.    Please note that contrast-enhanced MRI of the brain is more sensitive in the detection of intracranial metastases.      EXAM: [id number] - CT ABDOMEN AND PELVIS - ORDERED BY: ERASMO PEDROZA    EXAM: [id number] - CT CHEST - ORDERED BY: ERASMO PEDROZA      PROCEDURE DATE: 11/21/2023        INTERPRETATION: CLINICAL INFORMATION: Anemia and 40 pound weight loss.    COMPARISON: None.    CONTRAST/COMPLICATIONS:  IV Contrast: NONE  Oral Contrast: NONE  Complications: None reported at time of study completion    PROCEDURE:  CT of the Chest, Abdomen and Pelvis was performed.  Sagittal and coronal reformats were performed.    FINDINGS:  CHEST:  LUNGS AND LARGE AIRWAYS: Patent central airways. There is atelectasis at the right lung base.  PLEURA: No pleural effusion.  VESSELS: Coronary artery calcifications and atherosclerotic calcifications within the thoracic aorta.  HEART: Heart size is normal. No pericardial effusion.  MEDIASTINUM AND CRIS: No lymphadenopathy.  CHEST WALL AND LOWER NECK: There is a loop recorder left anterior chest wall. There is a 0.8 cm right lobe thyroid nodule.    ABDOMEN AND PELVIS:  LIVER: Bilobar indeterminate hepatic lesions are nonetheless suspicious for metastatic disease. The largest lesion is in the hepatic dome measuring 4.8 cm. There is a mass interposed between the caudate lobe and left lobe of liver measuring 3.4 cm on series 2 image 68.  BILE DUCTS: Normal caliber.  GALLBLADDER: Removed.  SPLEEN: Within normal limits.  PANCREAS: Within normal limits.  ADRENALS: There is a 1.9 cm right adrenal adenoma.  KIDNEYS/URETERS: Within normal limits.    BLADDER: Within normal limits.  REPRODUCTIVE ORGANS: Uterus and adnexa within normal limits.    BOWEL: No bowel obstruction. 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 There is herniation of the gastric pouch and small amount of the jejunum distal to the gastrojejunal anastomosis into the mediastinum through the esophageal hiatus. There is also herniation of a portion of the excluded stomach through the esophageal hiatus.  PERITONEUM: No ascites.  VESSELS: Within normal limits.  RETROPERITONEUM/LYMPH NODES: No lymphadenopathy.  ABDOMINAL WALL: Within normal limits.  BONES: There is a lytic lesion with posterior cortical destruction involving the L5 vertebral body with epidural extension. There is mild loss of height of the T12 vertebral body. There is a lytic lesion and pathologic fracture through the T11 vertebral body with likely epidural extension and extension into the left pedicle. There is heterogeneous appearance of the T8-9 vertebral bodies with a lytic lesion in T9 vertebral body. There is a lytic lesion with soft tissue component in the right fourth rib with associated pathologic fracture. There is an old fracture of the left humeral neck. There is a lytic lesion in the left hemisacrum. There is a lytic lesion in the right fourth transverse process as well as lytic lesion with pathologic fracture in the left 10th rib. There is a 4.6 x 3.8 cm expansile lesion in the left third rib.    IMPRESSION:  Multiple lytic osseous lesions highly suspicious for metastatic disease. Lesions in the L5 and T11 vertebral bodies have epidural extension. Pathologic fracture T11 vertebral body. Some of the rib lesions demonstrate pathologic fractures. Please see above comments.    Multiple hepatic lesions are not characterized without intravenous contrast but nonetheless are suspicious for metastatic disease.    < from: MR Cervical Spine w/wo IV Cont (11.25.23 @ 11:56) >  Cervical spine MRI:    Bulky dorsal paraspinal tumor in the upper thoracic region is partially   visualized. Please see thoracic spine MRI component of this report.    No focal cervical spine bony lesions are visualized. There is no evidence   for epidural tumor extension in the cervical spine.    Smallbroad disc bulges are present at the C4-C5, C5-C6, C6-C7 levels   with associated mild central canal stenosis. Multilevel ligamentum flavum   hypertrophy multilevel facet degenerative changes are noted.    There is no cervical spinal cord mass or syrinx. No abnormal intraspinal   enhancement is noted in the cervical spine. There is no cervical spinal   cord compression.    Thoracic spine MRI:    A large metastasis replaces the T11 vertebral body with extension into   the pedicles, with an associated mild to moderate pathologic compression   fracture deformity and retropulsion of bone. Epidural tumor extension is   noted. Tumor extension into the left greater than right neural foramina   at this levels also noted. The compression fracture withretropulsion of   bone and epidural tumor extension results in mild to moderate thoracic   spinal cord compression. A left-sided rib metastasis with soft tissue   extension of tumor is partially visualized at this level.    Additional metastases are noted involving the T9 and L2 vertebral bodies   without associated epidural tumor extension    A moderate sized hemangioma involves the T8 vertebral body with   associated increased T1 signal, increased STIR signal, and enhancement.    A 3.5 cm x 3.2cm x 3.8 cm bulky paraspinal metastasis involves the right   posterior elements at the T3 and T4 levels with extension into the   paraspinal musculature and abutment and possible involvement of the   posterior ribs at these levels. No significant intraspinal extension of   tumor at this level is appreciated.    Nonspecific bilateral pleural effusions and pleural thickening is noted.   Multiple enhancing liver lesions are partially visualized. Please see   separate chest abdomen pelvic CT report.    I discussed the exam findings with the covering orthopedic clinician Dr. Selby at 1:30 PM on 11/25/2023.    IMPRESSION:    Cervical spine:    No evidence for metastatic disease to the cervical spine or cervical   spinal cord compression.    Thoracic spine:    Multifocal metastatic disease is noted as described.    A large metastasis replaces the T11 vertebral body with an associated   mild to moderate pathologic compression fracture deformity and   retropulsion of bone. Epidural tumor extension is noted. The compression   fracture with retropulsion of bone and epidural tumor extension results   in mild to moderate thoracic spinal cord compression.      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  IV Contrast: Gadavist  8 cc administered   2 cc discarded    CORRELATION: CT chest and abdomen 11/21/2023    FINDINGS:    ALIGNMENT: The alignment is normal.  VERTEBRA: There is a mild compression fracture deformity of T12 as well   as pathological compression fracture deformities of T11 and L5. There is   diffuse marrow infiltration of the T11 and L5 vertebral bodies with   extension to the posterior elements with hypointense T1 and hyperintense   T2/STIR signal with avid enhancement compatible with metastatic disease.   The loss of the cortical margins is better seen on the prior CT. There is   ventral epidural soft tissue component at T11 indenting and possibly   compressing the spinal cord. Axialimages were not obtained at this   level. There is diffuse ventral epidural soft tissue at L5 severely   compressing the thecal sac as well as the L5 and S1 nerve roots. There   also is diffuse involvement of the left left sacrum at the S1 and S2   levels extending to the left SI joint. There is minimum epidural soft   tissue along the lateral margin of the sacral foramen at the S1-S2 level.   There are additional smaller metastases involving L1, L2 and L4. There   are focal areas of hyperintenseT1 and T2 signal within the T2 12 and L1   vertebral bodies which may represent hemangiomas.  DISC SPACES: Maintained.  CANAL: The distal cord and conus are normal in signal. Conus terminates   at L1.  VISUALIZED INTRAPELVIC/INTRA-ABDOMINAL SOFT TISSUES: Normal.  PARASPINAL SOFT TISSUES: Normal.    INDIVIDUAL LEVELS:    L1-L2: No disc herniation, spinal canal stenosis or neural foramen   narrowing.    L2-L3: No disc herniation. Mild degenerative facet disease. No spinal   canal stenosis or neuralforamen narrowing.    L3-L4: No disc herniation. Mild degenerative facet disease. No spinal   canal stenosis or neural foramen narrowing.    IMPRESSION: Bony metastases at T11,L1, L2, L4, L5 and left sacrum.   Pathologic fractures of T11 and L5. Possible cord compression at T11 and   diffuse thecal sac compression at L5.    < end of copied text >

## 2023-11-28 ENCOUNTER — APPOINTMENT (OUTPATIENT)
Dept: HEMATOLOGY ONCOLOGY | Facility: CLINIC | Age: 83
End: 2023-11-28

## 2023-11-28 LAB
% ALBUMIN: 56.3 % — SIGNIFICANT CHANGE UP
% ALBUMIN: 56.3 % — SIGNIFICANT CHANGE UP
% ALPHA 1: 7.3 % — SIGNIFICANT CHANGE UP
% ALPHA 1: 7.3 % — SIGNIFICANT CHANGE UP
% ALPHA 2: 14 % — SIGNIFICANT CHANGE UP
% ALPHA 2: 14 % — SIGNIFICANT CHANGE UP
% BETA: 10.7 % — SIGNIFICANT CHANGE UP
% BETA: 10.7 % — SIGNIFICANT CHANGE UP
% GAMMA: 11.7 % — SIGNIFICANT CHANGE UP
% GAMMA: 11.7 % — SIGNIFICANT CHANGE UP
ALBUMIN SERPL ELPH-MCNC: 3.8 G/DL — SIGNIFICANT CHANGE UP (ref 3.6–5.5)
ALBUMIN SERPL ELPH-MCNC: 3.8 G/DL — SIGNIFICANT CHANGE UP (ref 3.6–5.5)
ALBUMIN/GLOB SERPL ELPH: 1.3 RATIO — SIGNIFICANT CHANGE UP
ALBUMIN/GLOB SERPL ELPH: 1.3 RATIO — SIGNIFICANT CHANGE UP
ALPHA1 GLOB SERPL ELPH-MCNC: 0.5 G/DL — HIGH (ref 0.1–0.4)
ALPHA1 GLOB SERPL ELPH-MCNC: 0.5 G/DL — HIGH (ref 0.1–0.4)
ALPHA2 GLOB SERPL ELPH-MCNC: 0.9 G/DL — SIGNIFICANT CHANGE UP (ref 0.5–1)
ALPHA2 GLOB SERPL ELPH-MCNC: 0.9 G/DL — SIGNIFICANT CHANGE UP (ref 0.5–1)
ANION GAP SERPL CALC-SCNC: 8 MMOL/L — SIGNIFICANT CHANGE UP (ref 5–17)
ANION GAP SERPL CALC-SCNC: 8 MMOL/L — SIGNIFICANT CHANGE UP (ref 5–17)
B-GLOBULIN SERPL ELPH-MCNC: 0.7 G/DL — SIGNIFICANT CHANGE UP (ref 0.5–1)
B-GLOBULIN SERPL ELPH-MCNC: 0.7 G/DL — SIGNIFICANT CHANGE UP (ref 0.5–1)
BUN SERPL-MCNC: 17 MG/DL — SIGNIFICANT CHANGE UP (ref 7–23)
BUN SERPL-MCNC: 17 MG/DL — SIGNIFICANT CHANGE UP (ref 7–23)
CALCIUM SERPL-MCNC: 9.1 MG/DL — SIGNIFICANT CHANGE UP (ref 8.5–10.1)
CALCIUM SERPL-MCNC: 9.1 MG/DL — SIGNIFICANT CHANGE UP (ref 8.5–10.1)
CHLORIDE SERPL-SCNC: 105 MMOL/L — SIGNIFICANT CHANGE UP (ref 96–108)
CHLORIDE SERPL-SCNC: 105 MMOL/L — SIGNIFICANT CHANGE UP (ref 96–108)
CO2 SERPL-SCNC: 26 MMOL/L — SIGNIFICANT CHANGE UP (ref 22–31)
CO2 SERPL-SCNC: 26 MMOL/L — SIGNIFICANT CHANGE UP (ref 22–31)
CREAT SERPL-MCNC: 0.72 MG/DL — SIGNIFICANT CHANGE UP (ref 0.5–1.3)
CREAT SERPL-MCNC: 0.72 MG/DL — SIGNIFICANT CHANGE UP (ref 0.5–1.3)
EGFR: 84 ML/MIN/1.73M2 — SIGNIFICANT CHANGE UP
EGFR: 84 ML/MIN/1.73M2 — SIGNIFICANT CHANGE UP
GAMMA GLOBULIN: 0.8 G/DL — SIGNIFICANT CHANGE UP (ref 0.6–1.6)
GAMMA GLOBULIN: 0.8 G/DL — SIGNIFICANT CHANGE UP (ref 0.6–1.6)
GLUCOSE SERPL-MCNC: 64 MG/DL — LOW (ref 70–99)
GLUCOSE SERPL-MCNC: 64 MG/DL — LOW (ref 70–99)
HCT VFR BLD CALC: 28.6 % — LOW (ref 34.5–45)
HCT VFR BLD CALC: 28.6 % — LOW (ref 34.5–45)
HGB BLD-MCNC: 9.1 G/DL — LOW (ref 11.5–15.5)
HGB BLD-MCNC: 9.1 G/DL — LOW (ref 11.5–15.5)
INTERPRETATION SERPL IFE-IMP: SIGNIFICANT CHANGE UP
INTERPRETATION SERPL IFE-IMP: SIGNIFICANT CHANGE UP
MCHC RBC-ENTMCNC: 30.4 PG — SIGNIFICANT CHANGE UP (ref 27–34)
MCHC RBC-ENTMCNC: 30.4 PG — SIGNIFICANT CHANGE UP (ref 27–34)
MCHC RBC-ENTMCNC: 31.8 GM/DL — LOW (ref 32–36)
MCHC RBC-ENTMCNC: 31.8 GM/DL — LOW (ref 32–36)
MCV RBC AUTO: 95.7 FL — SIGNIFICANT CHANGE UP (ref 80–100)
MCV RBC AUTO: 95.7 FL — SIGNIFICANT CHANGE UP (ref 80–100)
PLATELET # BLD AUTO: 238 K/UL — SIGNIFICANT CHANGE UP (ref 150–400)
PLATELET # BLD AUTO: 238 K/UL — SIGNIFICANT CHANGE UP (ref 150–400)
POTASSIUM SERPL-MCNC: 3.5 MMOL/L — SIGNIFICANT CHANGE UP (ref 3.5–5.3)
POTASSIUM SERPL-MCNC: 3.5 MMOL/L — SIGNIFICANT CHANGE UP (ref 3.5–5.3)
POTASSIUM SERPL-SCNC: 3.5 MMOL/L — SIGNIFICANT CHANGE UP (ref 3.5–5.3)
POTASSIUM SERPL-SCNC: 3.5 MMOL/L — SIGNIFICANT CHANGE UP (ref 3.5–5.3)
PROT PATTERN SERPL ELPH-IMP: SIGNIFICANT CHANGE UP
PROT PATTERN SERPL ELPH-IMP: SIGNIFICANT CHANGE UP
RBC # BLD: 2.99 M/UL — LOW (ref 3.8–5.2)
RBC # BLD: 2.99 M/UL — LOW (ref 3.8–5.2)
RBC # FLD: 15.4 % — HIGH (ref 10.3–14.5)
RBC # FLD: 15.4 % — HIGH (ref 10.3–14.5)
SODIUM SERPL-SCNC: 139 MMOL/L — SIGNIFICANT CHANGE UP (ref 135–145)
SODIUM SERPL-SCNC: 139 MMOL/L — SIGNIFICANT CHANGE UP (ref 135–145)
WBC # BLD: 8.66 K/UL — SIGNIFICANT CHANGE UP (ref 3.8–10.5)
WBC # BLD: 8.66 K/UL — SIGNIFICANT CHANGE UP (ref 3.8–10.5)
WBC # FLD AUTO: 8.66 K/UL — SIGNIFICANT CHANGE UP (ref 3.8–10.5)
WBC # FLD AUTO: 8.66 K/UL — SIGNIFICANT CHANGE UP (ref 3.8–10.5)

## 2023-11-28 PROCEDURE — 99233 SBSQ HOSP IP/OBS HIGH 50: CPT

## 2023-11-28 PROCEDURE — 99232 SBSQ HOSP IP/OBS MODERATE 35: CPT

## 2023-11-28 PROCEDURE — 72100 X-RAY EXAM L-S SPINE 2/3 VWS: CPT | Mod: 26

## 2023-11-28 PROCEDURE — 72070 X-RAY EXAM THORAC SPINE 2VWS: CPT | Mod: 26

## 2023-11-28 PROCEDURE — 99497 ADVNCD CARE PLAN 30 MIN: CPT

## 2023-11-28 PROCEDURE — 99221 1ST HOSP IP/OBS SF/LOW 40: CPT

## 2023-11-28 RX ORDER — OXYCODONE HYDROCHLORIDE 5 MG/1
5 TABLET ORAL EVERY 4 HOURS
Refills: 0 | Status: DISCONTINUED | OUTPATIENT
Start: 2023-11-28 | End: 2023-12-04

## 2023-11-28 RX ORDER — DEXAMETHASONE 0.5 MG/5ML
8 ELIXIR ORAL EVERY 6 HOURS
Refills: 0 | Status: DISCONTINUED | OUTPATIENT
Start: 2023-11-28 | End: 2023-11-28

## 2023-11-28 RX ORDER — MORPHINE SULFATE 50 MG/1
2 CAPSULE, EXTENDED RELEASE ORAL EVERY 4 HOURS
Refills: 0 | Status: DISCONTINUED | OUTPATIENT
Start: 2023-11-28 | End: 2023-11-28

## 2023-11-28 RX ORDER — DEXAMETHASONE 0.5 MG/5ML
8 ELIXIR ORAL EVERY 6 HOURS
Refills: 0 | Status: COMPLETED | OUTPATIENT
Start: 2023-11-28 | End: 2023-11-28

## 2023-11-28 RX ORDER — DEXAMETHASONE 0.5 MG/5ML
6 ELIXIR ORAL EVERY 6 HOURS
Refills: 0 | Status: COMPLETED | OUTPATIENT
Start: 2023-11-29 | End: 2023-11-29

## 2023-11-28 RX ORDER — OXYCODONE HYDROCHLORIDE 5 MG/1
10 TABLET ORAL EVERY 4 HOURS
Refills: 0 | Status: DISCONTINUED | OUTPATIENT
Start: 2023-11-28 | End: 2023-12-05

## 2023-11-28 RX ORDER — HEPARIN SODIUM 5000 [USP'U]/ML
5000 INJECTION INTRAVENOUS; SUBCUTANEOUS EVERY 8 HOURS
Refills: 0 | Status: DISCONTINUED | OUTPATIENT
Start: 2023-11-28 | End: 2023-11-30

## 2023-11-28 RX ORDER — DEXAMETHASONE 0.5 MG/5ML
2 ELIXIR ORAL EVERY 6 HOURS
Refills: 0 | Status: DISCONTINUED | OUTPATIENT
Start: 2023-12-01 | End: 2023-12-01

## 2023-11-28 RX ORDER — DEXAMETHASONE 0.5 MG/5ML
ELIXIR ORAL
Refills: 0 | Status: DISCONTINUED | OUTPATIENT
Start: 2023-11-29 | End: 2023-12-01

## 2023-11-28 RX ORDER — DEXAMETHASONE 0.5 MG/5ML
4 ELIXIR ORAL EVERY 6 HOURS
Refills: 0 | Status: COMPLETED | OUTPATIENT
Start: 2023-11-30 | End: 2023-11-30

## 2023-11-28 RX ADMIN — Medication 25 MILLIGRAM(S): at 09:20

## 2023-11-28 RX ADMIN — OXYCODONE HYDROCHLORIDE 10 MILLIGRAM(S): 5 TABLET ORAL at 17:20

## 2023-11-28 RX ADMIN — Medication 325 MILLIGRAM(S): at 09:20

## 2023-11-28 RX ADMIN — LOSARTAN POTASSIUM 25 MILLIGRAM(S): 100 TABLET, FILM COATED ORAL at 09:20

## 2023-11-28 RX ADMIN — POLYETHYLENE GLYCOL 3350 17 GRAM(S): 17 POWDER, FOR SOLUTION ORAL at 09:20

## 2023-11-28 RX ADMIN — ATORVASTATIN CALCIUM 40 MILLIGRAM(S): 80 TABLET, FILM COATED ORAL at 21:04

## 2023-11-28 RX ADMIN — Medication 101.6 MILLIGRAM(S): at 13:51

## 2023-11-28 RX ADMIN — Medication 101.6 MILLIGRAM(S): at 17:16

## 2023-11-28 RX ADMIN — CARBIDOPA AND LEVODOPA 1 TABLET(S): 25; 100 TABLET ORAL at 17:11

## 2023-11-28 RX ADMIN — OXYCODONE HYDROCHLORIDE 5 MILLIGRAM(S): 5 TABLET ORAL at 13:51

## 2023-11-28 RX ADMIN — BUPROPION HYDROCHLORIDE 100 MILLIGRAM(S): 150 TABLET, EXTENDED RELEASE ORAL at 09:20

## 2023-11-28 RX ADMIN — CARBIDOPA AND LEVODOPA 1 TABLET(S): 25; 100 TABLET ORAL at 05:14

## 2023-11-28 RX ADMIN — MORPHINE SULFATE 2 MILLIGRAM(S): 50 CAPSULE, EXTENDED RELEASE ORAL at 09:19

## 2023-11-28 RX ADMIN — Medication 50 MICROGRAM(S): at 05:15

## 2023-11-28 RX ADMIN — HEPARIN SODIUM 5000 UNIT(S): 5000 INJECTION INTRAVENOUS; SUBCUTANEOUS at 13:52

## 2023-11-28 RX ADMIN — DONEPEZIL HYDROCHLORIDE 10 MILLIGRAM(S): 10 TABLET, FILM COATED ORAL at 21:04

## 2023-11-28 RX ADMIN — CARBIDOPA AND LEVODOPA 1 TABLET(S): 25; 100 TABLET ORAL at 23:20

## 2023-11-28 RX ADMIN — Medication 101.6 MILLIGRAM(S): at 23:19

## 2023-11-28 RX ADMIN — PRAMIPEXOLE DIHYDROCHLORIDE 0.12 MILLIGRAM(S): 0.12 TABLET ORAL at 21:05

## 2023-11-28 RX ADMIN — HEPARIN SODIUM 5000 UNIT(S): 5000 INJECTION INTRAVENOUS; SUBCUTANEOUS at 21:05

## 2023-11-28 RX ADMIN — CARBIDOPA AND LEVODOPA 1 TABLET(S): 25; 100 TABLET ORAL at 11:41

## 2023-11-28 RX ADMIN — PANTOPRAZOLE SODIUM 40 MILLIGRAM(S): 20 TABLET, DELAYED RELEASE ORAL at 05:14

## 2023-11-28 RX ADMIN — SENNA PLUS 2 TABLET(S): 8.6 TABLET ORAL at 21:04

## 2023-11-28 RX ADMIN — CARBIDOPA AND LEVODOPA 1 TABLET(S): 25; 100 TABLET ORAL at 00:27

## 2023-11-28 NOTE — GOALS OF CARE CONVERSATION - ADVANCED CARE PLANNING - CONVERSATION DETAILS
HPI:  82 y/o F w/ PMH of HTN, dyslipidemia, GERD, parkinson's, neuropathy, anemia, loop recorder, p/w LE weakness. Patient has been having B/L LE weakness for a little less than 1 year. Symptoms have been getting progressively worse to the point where patient is unable to ambulate. Patient also has been getting worked up for anemia and approximately 40lb unintentional weight loss. She had outpatient CT C/A/P showing multiple bone mets and possible liver mets and path fracture of T11 vertebral body. Patient sent to ED for further evaluation. Patient evaluated by ortho with recommendation for MRI. Patient has a loop recorder in placed by Dr. Crawley, and patient is unsure if it is compatible with MRI. Will consult Dr. Crawley for input prior to MRI. Patient also C/o LE paresthesias and back pain.   (25 Nov 2023 00:04)      PERTINENT PMH REVIEWED:  [ x ] YES [ ] NO           Primary Contact:  Julius, spouse, health care agent, phone # [phone]    HCP [ x ] Surrogate [   ] Guardian [   ]    Mental Status: Pt. lacks capacity  Concerns of Depression [  ] -not identified  Anxiety [   ] -not identified  Baseline ADLs (prior to admission):  Independent [ ] moderately [ ] fully   Dependent   [ x ] moderately [ ]fully    Family Meeting attendees: GOC discussed with spouse 11/28    Anticipated Grief: Patient[  ] Family [ x ]    Caregiver [place] Assessed: Yes [ x ] No [  ]    Worship: Unknown.    Spiritual Concerns: Not identified,  available for support as needed.    Goals of Care: Comfort [  ] Rehabilitation [  ] Curative [  ] Life Prolonging [ x  ]    Previous Services: none.    ADVANCE DIRECTIVES:    -Pt lacks capacity  -HCP on [facility] names spouse Julius as primary agent  -MOLST completed to reflect DNR DNI trial NIV      Anticipated D/C Plan: to be further determined                     Summary:  Palliative team met with spouse, Julius to discuss GOC, assist with planning and provide supportive counseling.  Palliative role explained.  Emotional support provided.  Pt. lacks capacity at this time.  Prior to hospitalization, Pt resided at home with spouse.  Spouse shared he provides assistance at home.  Spouse shared Pts medical history including what he has discussed with the medical team the recent days regarding Pts current medical condition.  Spouses feelings explored.  Support provided.    We discussed Pts wishes regarding resuscitation or intubation in the event her heart stops.  Spouse shared that Pt would not want CPR or a ventilator as it would not be a quality of life to her.  We discussed that CPR would not be recommended given Pts underlying condition, which he understood.  MOLST completed to reflect DNR/DNI, trial NIV.    Plan to be further determined.  DNR/DNI, trial NIV in place.  Emotional support provided.  Spouse aware of palliative team availability.  Our team to continue to follow.

## 2023-11-28 NOTE — PROGRESS NOTE ADULT - ASSESSMENT
Pt is a 83y old Female with hx of HTN, dyslipidemia, GERD, parkinson's, neuropathy, anemia, loop recorder, p/w LE weakness. Patient has been having B/L LE weakness for a little less than 1 year. Symptoms have been getting progressively worse to the point where patient is unable to ambulate. Patient also has been getting worked up for anemia and approximately 40lb unintentional weight loss. She had outpatient CT C/A/P showing multiple bone mets and possible liver mets and path fracture of T11 vertebral body. Patient sent to ED for further evaluation. Patient evaluated by ortho with recommendation for MRI. Patient has a loop recorder in placed by Dr. Crawley, and patient is unsure if it is compatible with MRI. Will consult Dr. Crawley for input prior to MRI. Patient also C/o LE paresthesias and back pain. Palliative medicine consulted to help establish GOC and advance care planning.     1) Possible bone mets   - radiology reviewed   - oncology consult appreciated   - Orthopedics consult appreciated   - IR biopsy on 11/27    Process of Care  --Reviewed dx/treatment problems and alignment with Goals of Care    Physical Aspects of Care  --Pain  worsening pain  - Oxycodone 5mg q3hrs for moderate pain every three hours   - oxycodone 10mg for severe pain every 3 hours   - morphine 2mg IV for breakthrough pain   - explained to patient  that PO oxycodone will hopefully last longer so that symptoms are better managed - will continue to follow     --Bowel Regimen  denies constipation    --Dyspnea  No SOB at this time  comfortable and in NAD    --Nausea Vomiting  denies    --Weakness  PT as tolerated     Psychological and Psychiatric Aspects of Care:   --Greif/Bereavment: emotional support provided  --Hx of psychiatric dx: none  -Pastoral Care Available PRN     Social Aspects of Care  -SW involved     Cultural Aspects  -Primary Language: English    Goals of Care:     We discussed Palliative Care team being a supportive team when a patient has ongoing illnesses.  We also discussed that it is not an end of life care service, but can help navigate symptoms and emotional support througout their hospital stay here.    Ethical and Legal Aspects:   NA    Capacity- pt does not appear to have insight at this time     HCP/Surrogate: Julius Bull - pt  - hCP on [facility]     Code Status- DNR/I   MOLST- on chart   Dispo Plan- ongoing      Discussed With: Dr. Jenkins, and Dr. Koroma     Case coordinated with attending and SW and RN     Time Spent: 90 minutes including the care, coordination and counseling of this patient, 50% of which was spent coordinating and counseling.

## 2023-11-28 NOTE — CONSULT NOTE ADULT - ASSESSMENT
82 yo F with no history of cancer and new spine mets, worst at T11 and L5 with cord and thecal sac compression. Pt comfortable now in bed but has not tried to walk. Has sensory and motor strength and no change in bladder/bowel habits. Biopsy yesterday by Dr. Cisneros and pathology pending. Pt seen by spine surgery and awaiting recs. Advise steroids if no immediate surgery planned. Consider surgery if possible due to bulky disease. Pt cannot be discharged home for RT. Advise consideration of surgery if possible. Pt and  agree with plan. Will continue to follow and await biopsy path. Discussed with Dr. Koroma.

## 2023-11-28 NOTE — PHYSICAL THERAPY INITIAL EVALUATION ADULT - PERTINENT HX OF CURRENT PROBLEM, REHAB EVAL
11/28 per Dr Ceballos, Spine Surgeon :Due to the extensive involvement of T11 with extension into the canal with resultant spinal stenosis surgery is recommended. Surgery would include thoracotomy, anterior corpectomy T11 and anterior spinal fusion T10-T12 with cage and instrumentation. Patient also may require additional posterior stabilization and a later date. Patient and patient's  agree for the need for surgery. We will await the result of the biopsy for a definitive tissue diagnosis. Surgery is tentatively scheduled for Friday 12/1 due to OR availability. 11/28 per Dr Ceballos, Spine Surgeon: Due to the extensive involvement of T11 with extension into the canal with resultant spinal stenosis surgery is recommended. Surgery would include thoracotomy, anterior corpectomy T11 and anterior spinal fusion T10-T12 with cage and instrumentation. Patient also may require additional posterior stabilization and a later date. Patient and patient's  agree for the need for surgery. We will await the result of the biopsy for a definitive tissue diagnosis. Surgery is tentatively scheduled for Friday 12/1 due to OR availability. 84 y/o F w/ PMH of HTN, dyslipidemia, GERD, parkinson's, neuropathy, anemia, loop recorder, p/w LE weakness. Patient has been having B/L LE weakness for a little less than 1 year. Symptoms have been getting progressively worse to the point where patient is unable to ambulate. Patient also has been getting worked up for anemia and approximately 40lb unintentional weight loss. She was seen by Dr. Choudhury who ordered imaging.  She had outpatient CT C/A/P showing multiple bone mets and possible liver mets and path fracture of T11 vertebral body. 11/28 per Dr Ceballos, Spine Surgeon: Due to the extensive involvement of T11 with extension into the canal with resultant spinal stenosis surgery is recommended. Surgery would include thoracotomy, anterior corpectomy T11 and anterior spinal fusion T10-T12 with cage and instrumentation. Patient also may require additional posterior stabilization and a later date. Patient and patient's  agree for the need for surgery. We will await the result of the biopsy for a definitive tissue diagnosis. Surgery is tentatively scheduled for Friday 12/1 due to OR availability.

## 2023-11-28 NOTE — PROGRESS NOTE ADULT - ASSESSMENT
84 y/o F w/ PMH of HTN, Dyslipidemia, GERD, Barrette Esophagus, Parkinson's, Neuropathy, CRI, Loop recorder, IRINEO, followed outpatient, with complaints of progressively worsening B/L LE weakness, paresthesia, back pain, difficulty ambulating, and 40 lbs unintentional weight loss, was sent for CT CAP 11/22/23 which revealed multiple bone mets and possible liver mets and path fracture of T11 vertebral body. She was sent by Hematologist to ED for further evaluation.       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  - CT CAP imaging revealed multiple lytic osseous lesions highly suspicious for metastatic disease. Lesions in the L5 and T11 vertebral bodies have epidural extension.  Pathologic fracture T11 vertebral body. Some of the rib lesions demonstrate pathologic fractures. Multiple hepatic lesions are not characterized without intravenous contrast but nonetheless are suspicious for metastatic disease.  - CT Head with no acute intracranial hemorrhage or mass effect  - MR imaging with bony metastases at T11,L1, L2, L4, L5 and left sacrum. Pathologic fractures of T11 and L5. Possible cord compression at T11 and diffuse thecal sac compression at L5. No evidence for metastatic disease to the cervical spine or cervical spinal cord compression. Thoracic spine: Multifocal metastatic disease. A large metastasis replaces the T11 vertebral body with an associated mild to moderate pathologic compression fracture deformity and retropulsion of bone. Epidural tumor extension is noted. The compression fracture with retropulsion of bone and epidural tumor extension results in mild to moderate thoracic spinal cord compression.  - Ortho Dr Ceballos following  -  & CA27.29 normal  - CEA minimally elevated  - S/p IR rib bx 11/27/23 ----> pending path   - RadOnc consulted; will need RT if in scope of GOC ----> conversation with Ortho to determine plan  - As per Hospitalist Dr Hills who has been speaking with Ortho ---> spine fx/disease unstable but per surgery no plan for surgical intervention at this time   - Continue to monitor closely neurologic status      # Normocytic Anemia     - Hgb remains low but stable, not requiring PRBC   - Multifactorial with Hx iron deficiency, B12 deficiency, CRI, and anemia of chronic disease and now possibly due to malignancy  - Follows outpatient, received x1 iron infusion 10/09/23  - B12 deficiency ---> started methylcobalamin 1000mcg , repeat level normal / >2000  - Outpatient initial presentation with anemia, and renal insufficiency labs were sent to r/o MM 06/2023 ---> negative  - Plan was to start YANET injections outpatient, once iron stores repleted  - Continue to trend serial CBCs    82 y/o F w/ PMH of HTN, Dyslipidemia, GERD, Barrette Esophagus, Parkinson's, Neuropathy, CRI, Loop recorder, IRINEO, followed outpatient, with complaints of progressively worsening B/L LE weakness, paresthesia, back pain, difficulty ambulating, and 40 lbs unintentional weight loss, was sent for CT CAP 11/22/23 which revealed multiple bone mets and possible liver mets and path fracture of T11 vertebral body. She was sent by Hematologist to ED for further evaluation.       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  - CT CAP imaging revealed multiple lytic osseous lesions highly suspicious for metastatic disease. Lesions in the L5 and T11 vertebral bodies have epidural extension.  Pathologic fracture T11 vertebral body. Some of the rib lesions demonstrate pathologic fractures. Multiple hepatic lesions are not characterized without intravenous contrast but nonetheless are suspicious for metastatic disease.  - CT Head with no acute intracranial hemorrhage or mass effect  - MR imaging with bony metastases at T11,L1, L2, L4, L5 and left sacrum. Pathologic fractures of T11 and L5. Possible cord compression at T11 and diffuse thecal sac compression at L5. No evidence for metastatic disease to the cervical spine or cervical spinal cord compression. Thoracic spine: Multifocal metastatic disease. A large metastasis replaces the T11 vertebral body with an associated mild to moderate pathologic compression fracture deformity and retropulsion of bone. Epidural tumor extension is noted. The compression fracture with retropulsion of bone and epidural tumor extension results in mild to moderate thoracic spinal cord compression.  - Ortho Dr Ceballos following- recommended surgery for decompression and stabilization at T 11 - family wants to have diagnosis before deciding re surgery.    - S/p IR rib bx 11/27/23 ----> pending path   Serum tumor markers not helpful.    - RadOnc consulted;     -Continue to monitor closely neurologic status    If PT to start- needs clearance from spine surgery     Pain management - palliative following       # Normocytic Anemia     - Hgb remains low but stable, not requiring PRBC   - Multifactorial with Hx iron deficiency, B12 deficiency, CRI, and anemia of chronic disease and now possibly due to malignancy  - Follows outpatient, received x1 iron infusion 10/09/23  - B12 deficiency ---> started methylcobalamin 1000mcg , repeat level normal / >2000  - Outpatient initial presentation with anemia, and renal insufficiency labs were sent to r/o MM 06/2023 ---> negative  - Plan was to start YANET injections outpatient, once iron stores repleted  - Continue to trend serial CBCs

## 2023-11-28 NOTE — PROGRESS NOTE ADULT - SUBJECTIVE AND OBJECTIVE BOX
Chief Complaint: Back pain, leg weakness    History: Patient admitted to the hospital with severe back pain and leg weakness which has been present for the past 2 months. CT/MRI demonstrated diffuse metastatic disease to the spine with large lesion at T11 and L5 with epidural extension and canal compromise. Her pain continues to be severe. She underwent CT biopsy yesterday.    OBJECTIVE:     Vital Signs Last 24 Hrs  T(C): 36.6 (28 Nov 2023 08:31), Max: 36.6 (28 Nov 2023 08:31)  T(F): 97.9 (28 Nov 2023 08:31), Max: 97.9 (28 Nov 2023 08:31)  HR: 76 (28 Nov 2023 08:31) (71 - 76)  BP: 137/79 (28 Nov 2023 08:31) (119/75 - 141/69)  BP(mean): --  RR: 18 (28 Nov 2023 08:31) (17 - 18)  SpO2: 94% (28 Nov 2023 08:31) (94% - 98%)    Parameters below as of 28 Nov 2023 08:31  Patient On (Oxygen Delivery Method): room air        Physical Exam:         Awake, slightly lethargic         HEENT - unremarkable         Neck - supple         Back: Tenderness TL region         Bilateral Upper Extremities:             Good Motor Strength             Sensation intact         Bilateral Lower Extremities             Neuro without change. Still with diffuse weakness 4/5 MS             I&O's Detail      LABS:                        9.1    8.66  )-----------( 238      ( 28 Nov 2023 08:12 )             28.6     11-28    139  |  105  |  17  ----------------------------<  64<L>  3.5   |  26  |  0.72    Ca    9.1      28 Nov 2023 08:12    TPro  6.0  /  Alb  2.7<L>  /  TBili  0.5  /  DBili  x   /  AST  21  /  ALT  8<L>  /  AlkPhos  123<H>  11-27        A/P :  83y Female with metastatic disease of the spine / Large lesions T11 and L5 with epidural extension and canal compromise  -    Extended period of time was spent with the patient and the patient's  discussing her condition and treatment options. Due to the extensive involvement of T11 with extension into the canal with resultant spinal stenosis surgery is recommended. Surgery would include thoracotomy, anterior corpectomy T11 and anterior spinal fusion T10-T12 with cage and instrumentation. Patient also may require additional posterior stabilization and a later date. Patient and patient's  agree for the need for surgery. We will await the result of the biopsy for a definitive tissue diagnosis. Surgery is tentatively scheduled for Friday due to OR availability.  -    Continue pain management  -    Continue present treatment

## 2023-11-28 NOTE — PROGRESS NOTE ADULT - SUBJECTIVE AND OBJECTIVE BOX
HPI:    82 y/o F w/ MH of HTN, dyslipidemia, GERD, parkinson's, neuropathy, iron deficiency anemia, loop recorder, p/w LE weakness. Patient has been having B/L LE weakness for a little less than 1 year. Symptoms have been getting progressively worse to the point where patient is unable to ambulate, also with  LE paresthesias and back pain. and 40 lbs unintentional weight loss. She was sent for CT-C/A/P- which revealed multiple bone mets and possible liver mets and path fracture of T11 vertebral body.  She was sent to ED for further evaluation.     11/25/23- Seen at bedside, along with Dr. Loera, alert, awake, in no acute distress. Reports left lower extremities with tingling and numbness, denies cauda equina symptoms. Reports difficulty ambulating even with a walker.     11/27/23: Seen at bedside with attending Dr Loera and Hospitalist Dr Hills, currently awaiting IR bx procedure hopefully today     11/28/23: Seen at bedside with nursing staff, no acute distress, pain controlled, s/p rib Bx with IR yesterday      PAST MEDICAL & SURGICAL HISTORY:    GERD (gastroesophageal reflux disease)    HTN (hypertension)    HLD (hyperlipidemia)    Mood disorder    H/O gastric bypass    History of cholecystectomy    History of total knee replacement, left    Iron Deficiency Anemia    CRI    Barrette Esophagus      FAMILY HISTORY:    COPD- Brother  CAD- Sister  Stroke- Mother  Parkinson-Father      MEDICATIONS  (STANDING):    atorvastatin 40 milliGRAM(s) Oral at bedtime  buPROPion SR (12-Hour) 100 milliGRAM(s) Oral daily  carbidopa/levodopa  25/100 1 Tablet(s) Oral four times a day  donepezil 10 milliGRAM(s) Oral at bedtime  ferrous    sulfate 325 milliGRAM(s) Oral daily  influenza  Vaccine (HIGH DOSE) 0.7 milliLiter(s) IntraMuscular once  levothyroxine 50 MICROGram(s) Oral daily  losartan 25 milliGRAM(s) Oral daily  metoprolol succinate ER 25 milliGRAM(s) Oral daily  naloxone Injectable 0.4 milliGRAM(s) IV Push once  pantoprazole    Tablet 40 milliGRAM(s) Oral before breakfast  polyethylene glycol 3350 17 Gram(s) Oral daily  pramipexole 0.125 milliGRAM(s) Oral at bedtime  senna 2 Tablet(s) Oral at bedtime  verapamil  milliGRAM(s) Oral daily      MEDICATIONS  (PRN):    acetaminophen     Tablet .. 650 milliGRAM(s) Oral every 6 hours PRN Mild Pain (1 - 3), Moderate Pain (4 - 6)  bisacodyl 5 milliGRAM(s) Oral daily PRN Constipation  morphine  - Injectable 2 milliGRAM(s) IV Push every 4 hours PRN Moderate Pain (4 - 6)  morphine  - Injectable 4 milliGRAM(s) IV Push every 4 hours PRN Severe Pain (7 - 10)        ALLERGIES:    Originally Entered as [Moderate Rash] reaction to [SURGICAL TAPE] (Unknown)  Vitamin K (Other (Severe))  Fosamax (Unknown)      REVIEW OF SYSTEMS:    Constitutional, Eyes, ENT, Cardiovascular, Respiratory, Gastrointestinal, Genitourinary, Musculoskeletal, Integumentary, Neurological, Psychiatric, Endocrine, Heme/Lymph and Allergic/Immunologic review of systems are otherwise negative except as noted in HPI.       VITAL SIGNS:    ICU Vital Signs Last 24 Hrs  T(C): 36.6 (28 Nov 2023 08:31), Max: 36.6 (28 Nov 2023 08:31)  T(F): 97.9 (28 Nov 2023 08:31), Max: 97.9 (28 Nov 2023 08:31)  HR: 76 (28 Nov 2023 08:31) (71 - 76)  BP: 137/79 (28 Nov 2023 08:31) (119/75 - 141/69)  BP(mean): --  ABP: --  ABP(mean): --  RR: 18 (28 Nov 2023 08:31) (17 - 18)  SpO2: 94% (28 Nov 2023 08:31) (94% - 98%)    O2 Parameters below as of 28 Nov 2023 08:31  Patient On (Oxygen Delivery Method): room air        PHYSICAL EXAM:    CONSTITUTIONAL : NAD  NERVOUS SYSTEM:  Alert & Oriented X 3, with mild weakness in left lower extremity and reported tingling.  HEAD:  Atraumatic, Normocephalic  EYES:  conjunctiva and sclera clear  HEENT: Moist mucous membranes, Supple neck , No JVD  CHEST: Clear to auscultation bilaterally  BREAST: With no palpable mass.  HEART: Regular rate and rhythm  ABDOMEN: Soft, Non-tender, Non-distended; Bowel sounds present  GENITOURINARY- Voiding, no suprapubic tenderness  EXTREMITIES:  2+ Peripheral Pulses, No clubbing, cyanosis, no edema  MUSCULOSKELETAL:- No muscle tenderness, Muscle tone normal, No joint tenderness, no Joint swelling,  Joint ROM –normal   SKIN-no rash, no lesion      LABS:                                             9.1    8.66  )-----------( 238      ( 28 Nov 2023 08:12 )             28.6     11-28    139  |  105  |  17  ----------------------------<  64<L>  3.5   |  26  |  0.72    Ca    9.1      28 Nov 2023 08:12    TPro  6.0  /  Alb  2.7<L>  /  TBili  0.5  /  DBili  x   /  AST  21  /  ALT  8<L>  /  AlkPhos  123<H>  11-27          RADIOLOGY & ADDITIONAL STUDIES:      EXAM:  MR SPINE LUMBAR WAW IC       PROCEDURE DATE:  11/25/2023      INTERPRETATION:  CLINICAL INFORMATION: Metastases    ADDITIONAL CLINICAL INFORMATION: Cancer C80.1    TECHNIQUE: Multiplanar, multisequence MRI was performed of the lumbar   spine.  IV Contrast: Gadavist  8 cc administered   2 cc discarded    CORRELATION: CT chest and abdomen 11/21/2023    FINDINGS:    ALIGNMENT: The alignment is normal.  VERTEBRA: There is a mild compression fracture deformity of T12 as well   as pathological compression fracture deformities of T11 and L5. There is   diffuse marrow infiltration of the T11 and L5 vertebral bodies with   extension to the posterior elements with hypointense T1 and hyperintense   T2/STIR signal with avid enhancement compatible with metastatic disease.   The loss of the cortical margins is better seen on the prior CT. There is   ventral epidural soft tissue component at T11 indenting and possibly   compressing the spinal cord. Axialimages were not obtained at this   level. There is diffuse ventral epidural soft tissue at L5 severely   compressing the thecal sac as well as the L5 and S1 nerve roots. There   also is diffuse involvement of the left left sacrum at the S1 and S2   levels extending to the left SI joint. There is minimum epidural soft   tissue along the lateral margin of the sacral foramen at the S1-S2 level.   There are additional smaller metastases involving L1, L2 and L4. There   are focal areas of hyperintenseT1 and T2 signal within the T2 12 and L1   vertebral bodies which may represent hemangiomas.  DISC SPACES: Maintained.  CANAL: The distal cord and conus are normal in signal. Conus terminates   at L1.  VISUALIZED INTRAPELVIC/INTRA-ABDOMINAL SOFT TISSUES: Normal.  PARASPINAL SOFT TISSUES: Normal.    INDIVIDUAL LEVELS:    L1-L2: No disc herniation, spinal canal stenosis or neural foramen   narrowing.    L2-L3: No disc herniation. Mild degenerative facet disease. No spinal   canal stenosis or neuralforamen narrowing.    L3-L4: No disc herniation. Mild degenerative facet disease. No spinal   canal stenosis or neural foramen narrowing.    IMPRESSION: Bony metastases at T11,L1, L2, L4, L5 and left sacrum.   Pathologic fractures of T11 and L5. Possible cord compression at T11 and   diffuse thecal sac compression at L5.          EXAM:  MR SPINE THORACIC WAW IC     EXAM:  MR SPINE CERVICAL WAW IC      PROCEDURE DATE:  11/25/2023      INTERPRETATION:  History: 82 y/o F with a PMhx of anemia, GERD, HTN, HLD,   mood disorder, gastric bypass, and cholecystectomy presents to the ED SIB   MD c/o weakness. The pt had outpatient w/u for worsening anemia with   weakness. CT and XR showing lytic legions in spine with epidural   extensions leading to increased weakness and difficulty ambulating. PCP   sent pt to ED for further evaluation. Pt reports weight loss over months.   Denies any sort of active bleeding no blood in stool. Denies CP, or SOB.   C/o lower back pain. Some numbness and tingling to LLE. Denies bowl or   bladder incontinence. Denies numbness consistent with saddle anesthesia.   She had  outpatient CT C/A/P showing multiple bone mets and possible liver mets   and path  fracture of T11 vertebral body. Patient also C/o LE  paresthesias and back pain.    Description: A MRI of the cervical spine and a MRI of the thoracic spine   with and without gadolinium contrast were performed with multiplanar   multisequence technique.    8 cc intravenous Gadavist gadolinium contrast was administered, 2 cc   contrast was discarded.    Comparison is made to the thoracic and lumbar spine region from the chest   abdomen pelvic CT from 11/21/2023, and cervical spine CT from 08/08/2022    Cervical spine MRI:    Bulky dorsal paraspinal tumor in the upper thoracic region is partially   visualized. Please see thoracic spine MRI component of this report.    No focal cervical spine bony lesions are visualized. There is no evidence   for epidural tumor extension in the cervical spine.    Smallbroad disc bulges are present at the C4-C5, C5-C6, C6-C7 levels   with associated mild central canal stenosis. Multilevel ligamentum flavum   hypertrophy multilevel facet degenerative changes are noted.    There is no cervical spinal cord mass or syrinx. No abnormal intraspinal   enhancement is noted in the cervical spine. There is no cervical spinal   cord compression.    Thoracic spine MRI:    A large metastasis replaces the T11 vertebral body with extension into   the pedicles, with an associated mild to moderate pathologic compression   fracture deformity and retropulsion of bone. Epidural tumor extension is   noted. Tumor extension into the left greater than right neural foramina   at this levels also noted. The compression fracture withretropulsion of   bone and epidural tumor extension results in mild to moderate thoracic   spinal cord compression. A left-sided rib metastasis with soft tissue   extension of tumor is partially visualized at this level.    Additional metastases are noted involving the T9 and L2 vertebral bodies   without associated epidural tumor extension    A moderate sized hemangioma involves the T8 vertebral body with   associated increased T1 signal, increased STIR signal, and enhancement.    A 3.5 cm x 3.2cm x 3.8 cm bulky paraspinal metastasis involves the right   posterior elements at the T3 and T4 levels with extension into the   paraspinal musculature and abutment and possible involvement of the   posterior ribs at these levels. No significant intraspinal extension of   tumor at this level is appreciated.    Nonspecific bilateral pleural effusions and pleural thickening is noted.   Multiple enhancing liver lesions are partially visualized. Please see   separate chest abdomen pelvic CT report.    IMPRESSION:    Cervical spine:    No evidence for metastatic disease to the cervical spine or cervical   spinal cord compression.    Thoracic spine:    Multifocal metastatic disease is noted as described.    A large metastasis replaces the T11 vertebral body with an associated   mild to moderate pathologic compression fracture deformity and   retropulsion of bone. Epidural tumor extension is noted. The compression   fracture with retropulsion of bone and epidural tumor extension results   in mild to moderate thoracic spinal cord compression.              EXAM:  CT BRAIN    PROCEDURE DATE:  11/24/2023      INTERPRETATION:  CLINICAL INFORMATION:  Mets to liver, spine.  Unknown   primary.  R/O Brain mets, bleed.    TECHNIQUE:  Axial CT images were acquired through the head.  Intravenous contrast: None  Two-dimensional reformats were generated.    COMPARISON STUDY: CT head 8/8/2022    FINDINGS:    There is no CT evidence of acute intracranial hemorrhage, mass effect,   midline shift, or acute, large territorial infarct.  The ventricles and   sulci are prominent compatible with age-appropriate brain parenchymal   volume loss. Mild patchy periventricular white matter hypoattenuation is   nonspecific, although likely due to chronic microangiopathy. The basal   cisterns are patent. There is a partially empty sella.    The mastoid air cells and middle ear cavities are grossly clear. The   visualized paranasal sinuses are well aerated.    The calvarium and skull base are grossly intact.    IMPRESSION:  No acute intracranial hemorrhage or mass effect.    Please note that contrast-enhanced MRI of the brain is more sensitive in   the detection of intracranial metastases.      EXAM: [id number] - CT ABDOMEN AND PELVIS  -   EXAM: [id number] - CT CHEST  -     PROCEDURE DATE:  11/21/2023    INTERPRETATION:  CLINICAL INFORMATION: Anemia and 40 pound weight loss.    COMPARISON: None.    CONTRAST/COMPLICATIONS:  IV Contrast: NONE  Oral Contrast: NONE  Complications: None reported at time of study completion    PROCEDURE:  CT of the Chest, Abdomen and Pelvis was performed.  Sagittal and coronal reformats were performed.    FINDINGS:  CHEST:  LUNGS AND LARGE AIRWAYS: Patent central airways. There is atelectasis at the right lung base.  PLEURA: No pleural effusion.  VESSELS: Coronary artery calcifications and atherosclerotic calcifications within the thoracic aorta.  HEART: Heart size is normal. No pericardial effusion.  MEDIASTINUM AND CRIS: No lymphadenopathy.  CHEST WALL AND LOWER NECK: There is a loop recorder left anterior chest wall. There is a 0.8 cm right lobe thyroid nodule.    ABDOMEN AND PELVIS:  LIVER: Bilobar indeterminate hepatic lesions are nonetheless suspicious for metastatic disease. The largest lesion is in the hepatic dome measuring 4.8 cm. There is a mass interposed between the caudate lobe and left lobe of liver measuring 3.4 cm on series 2 image 68.  BILE DUCTS: Normal caliber.  GALLBLADDER: Removed.  SPLEEN: Within normal limits.  PANCREAS: Within normal limits.  ADRENALS: There is a 1.9 cm right adrenal adenoma.  KIDNEYS/URETERS: Within normal limits.    BLADDER: Within normal limits.  REPRODUCTIVE ORGANS: Uterus and adnexa within normal limits.    BOWEL: No bowel obstruction. 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 There is herniation of the gastric pouch and small amount of the jejunum distal to the gastrojejunal anastomosis into the mediastinum through the esophageal hiatus. There is also herniation of a portion of the excluded stomach through the esophageal hiatus.  PERITONEUM: No ascites.  VESSELS: Within normal limits.  RETROPERITONEUM/LYMPH NODES: No lymphadenopathy.  ABDOMINAL WALL: Within normal limits.  BONES: There is a lytic lesion with posterior cortical destruction involving the L5 vertebral body with epidural extension. There is mild loss of height of the T12 vertebral body. There is a lytic lesion and pathologic fracture through the T11 vertebral body with likely epidural extension and extension into the left pedicle. There is heterogeneous appearance of the T8-9 vertebral bodies with a lytic lesion in T9 vertebral body. There is a lytic lesion with soft tissue component in the right fourth rib with associated pathologic fracture. There is an old fracture of the left humeral neck. There is a lytic lesion in the left do sacrum. There is a lytic lesion in the right fourth transverse process as well as lytic lesion with pathologic fracture in the left 10th rib. There is a 4.6 x 3.8 cm expansile lesion in the left third rib.    IMPRESSION:  Multiple lytic osseous lesions highly suspicious for metastatic disease. Lesions in the L5 and T11 vertebral bodies have epidural extension.  Pathologic fracture T11 vertebral body. Some of the rib lesions demonstrate pathologic fractures. Please see above comments.    Multiple hepatic lesions are not characterized without intravenous contrast but nonetheless are suspicious for metastatic disease.

## 2023-11-28 NOTE — PROGRESS NOTE ADULT - CONVERSATION DETAILS
Palliative team met with spouse, Julius to discuss GOC, assist with planning and provide supportive counseling.  Palliative role explained.  Emotional support provided.  Pt. lacks capacity at this time.  Prior to hospitalization, Pt resided at home with spouse.  Spouse shared he provides assistance at home.  Spouse shared Pts medical history including what he has discussed with the medical team the recent days regarding Pts current medical condition.  Spouses feelings explored.  Support provided. Also encouraged patients  to share with there 4 children what has been happening medically so that they are able to support him also. Our team is available if he would like us to help with any of those conversations    We discussed Pts wishes regarding resuscitation or intubation in the event her heart stops.  Spouse shared that Pt would not want CPR or a ventilator as it would not be a quality of life to her.  We discussed that CPR would not be recommended given Pts underlying condition, which he understood.  MOLST completed to reflect DNR/DNI, trial NIV.    Plan to be further determined.  DNR/DNI, trial NIV in place.  Emotional support provided.  Spouse aware of palliative team availability.  Our team to continue to follow.

## 2023-11-28 NOTE — PROGRESS NOTE ADULT - SUBJECTIVE AND OBJECTIVE BOX
HPI: pt seen and examined wtih no family at bedside, patient reamins confused at this time unable to descrbie medical history. patient  at bedside, [facility] meeting held patient now DNR/I. Patient also complaining today of more pain.       PAIN: (x )Yes   ( )No  Level: severe   Location: back   moaning   - unable to further describe     DYSPNEA: ( ) Yes  (x ) No  Level:      Review of Systems:    Unable to obtain/Limited due to: lethargy       PHYSICAL EXAM:    Vital Signs Last 24 Hrs  T(C): 36.8 (28 Nov 2023 13:44), Max: 36.8 (28 Nov 2023 13:44)  T(F): 98.2 (28 Nov 2023 13:44), Max: 98.2 (28 Nov 2023 13:44)  HR: 75 (28 Nov 2023 13:44) (71 - 76)  BP: 114/76 (28 Nov 2023 13:44) (114/76 - 141/69)  RR: 18 (28 Nov 2023 13:44) (17 - 18)  SpO2: 93% (28 Nov 2023 13:44) (93% - 98%)    Parameters below as of 28 Nov 2023 13:44  Patient On (Oxygen Delivery Method): room air    PPSV2: 20-30  %  FAST:    General: elderly female in bed, NAD   Mental Status: alert but confused oriented to self only   HEENT:  mmm   Lungs: diminished breath sounds   Cardiac: s1s2 +   GI: nontender, nondistended +BS   : +voiding   Ext: SANCHEZ weakness lower extremities   Neuro: weakness       LABS:                        9.1    8.66  )-----------( 238      ( 28 Nov 2023 08:12 )             28.6     11-28    139  |  105  |  17  ----------------------------<  64<L>  3.5   |  26  |  0.72    Ca    9.1      28 Nov 2023 08:12    TPro  6.0  /  Alb  2.7<L>  /  TBili  0.5  /  DBili  x   /  AST  21  /  ALT  8<L>  /  AlkPhos  123<H>  11-27      Albumin: Albumin: 2.7 g/dL (11-27 @ 07:31)      Allergies    Originally Entered as [Moderate Rash] reaction to [SURGICAL TAPE] (Unknown)  Vitamin K (Other (Severe))  Fosamax (Unknown)    Intolerances      MEDICATIONS  (STANDING):  atorvastatin 40 milliGRAM(s) Oral at bedtime  buPROPion SR (12-Hour) 100 milliGRAM(s) Oral daily  carbidopa/levodopa  25/100 1 Tablet(s) Oral four times a day  dexAMETHasone  IVPB 8 milliGRAM(s) IV Intermittent every 6 hours  donepezil 10 milliGRAM(s) Oral at bedtime  ferrous    sulfate 325 milliGRAM(s) Oral daily  heparin   Injectable 5000 Unit(s) SubCutaneous every 8 hours  influenza  Vaccine (HIGH DOSE) 0.7 milliLiter(s) IntraMuscular once  levothyroxine 50 MICROGram(s) Oral daily  losartan 25 milliGRAM(s) Oral daily  metoprolol succinate ER 25 milliGRAM(s) Oral daily  naloxone Injectable 0.4 milliGRAM(s) IV Push once  pantoprazole    Tablet 40 milliGRAM(s) Oral before breakfast  polyethylene glycol 3350 17 Gram(s) Oral daily  pramipexole 0.125 milliGRAM(s) Oral at bedtime  senna 2 Tablet(s) Oral at bedtime  verapamil  milliGRAM(s) Oral daily    MEDICATIONS  (PRN):  acetaminophen     Tablet .. 650 milliGRAM(s) Oral every 6 hours PRN Mild Pain (1 - 3), Moderate Pain (4 - 6)  bisacodyl 5 milliGRAM(s) Oral daily PRN Constipation  morphine  - Injectable 2 milliGRAM(s) IV Push every 4 hours PRN for breakthrough  oxyCODONE    IR 10 milliGRAM(s) Oral every 4 hours PRN Severe Pain (7 - 10)  oxyCODONE    IR 5 milliGRAM(s) Oral every 4 hours PRN Moderate Pain (4 - 6)      RADIOLOGY:

## 2023-11-28 NOTE — CONSULT NOTE ADULT - SUBJECTIVE AND OBJECTIVE BOX
Patient is a 83y old  Female who presents with a chief complaint of LE weakness (28 Nov 2023 11:26)      HPI:  84 y/o F w/ PMH of HTN, dyslipidemia, GERD, parkinson's, neuropathy, anemia, loop recorder, p/w LE weakness. Patient has been having B/L LE weakness for a little less than 1 year. Symptoms have been getting progressively worse to the point where patient is unable to ambulate. Patient also has been getting worked up for anemia and approximately 40lb unintentional weight loss. She was seen by Dr. Choudhury who ordered imaging.  She had outpatient CT C/A/P showing multiple bone mets and possible liver mets and path fracture of T11 vertebral body. Patient sent to ED for further evaluation. Patient evaluated by ortho with recommendation for MRI. Patient has a loop recorder in placed by Dr. Crawley, and patient is unsure if it is compatible with MRI. Will consult Dr. Crawley for input prior to MRI. Patient also C/o LE paresthesias and back pain. MRI reveals cord compression at T11 and L5 and pt reports significant pain, worse on right lower side near buttock. Unable to walk but can move legs slightly. Using urinal in bed for urination and BMs. Alert and oriented x 3.      PSH: gastric bypass, cholecystectomy, L total knee replacement, tonsillectomy     Social Hx: Former smoker - quit in 1980, denies etoh / drugs     Family Hx: Denies pertinent hx  (25 Nov 2023 00:04)      PAST MEDICAL & SURGICAL HISTORY:  GERD (gastroesophageal reflux disease)      HTN (hypertension)      HLD (hyperlipidemia)      Mood disorder      H/O gastric bypass      History of cholecystectomy      History of total knee replacement, left          SOCIAL HISTORY: Lives in [facility] with her . Son lives in [place].     FAMILY HISTORY:  No pertinent family history in first degree relatives        MEDICATIONS  (STANDING):  atorvastatin 40 milliGRAM(s) Oral at bedtime  buPROPion SR (12-Hour) 100 milliGRAM(s) Oral daily  carbidopa/levodopa  25/100 1 Tablet(s) Oral four times a day  donepezil 10 milliGRAM(s) Oral at bedtime  ferrous    sulfate 325 milliGRAM(s) Oral daily  influenza  Vaccine (HIGH DOSE) 0.7 milliLiter(s) IntraMuscular once  levothyroxine 50 MICROGram(s) Oral daily  losartan 25 milliGRAM(s) Oral daily  metoprolol succinate ER 25 milliGRAM(s) Oral daily  naloxone Injectable 0.4 milliGRAM(s) IV Push once  pantoprazole    Tablet 40 milliGRAM(s) Oral before breakfast  polyethylene glycol 3350 17 Gram(s) Oral daily  pramipexole 0.125 milliGRAM(s) Oral at bedtime  senna 2 Tablet(s) Oral at bedtime  verapamil  milliGRAM(s) Oral daily    MEDICATIONS  (PRN):  acetaminophen     Tablet .. 650 milliGRAM(s) Oral every 6 hours PRN Mild Pain (1 - 3), Moderate Pain (4 - 6)  bisacodyl 5 milliGRAM(s) Oral daily PRN Constipation  morphine  - Injectable 2 milliGRAM(s) IV Push every 4 hours PRN Moderate Pain (4 - 6)  morphine  - Injectable 4 milliGRAM(s) IV Push every 4 hours PRN Severe Pain (7 - 10)      PHYSICAL EXAM:  Vital Signs Last 24 Hrs  T(C): 36.6 (28 Nov 2023 08:31), Max: 36.6 (28 Nov 2023 08:31)  T(F): 97.9 (28 Nov 2023 08:31), Max: 97.9 (28 Nov 2023 08:31)  HR: 76 (28 Nov 2023 08:31) (71 - 76)  BP: 137/79 (28 Nov 2023 08:31) (119/75 - 141/69)  BP(mean): --  RR: 18 (28 Nov 2023 08:31) (17 - 18)  SpO2: 94% (28 Nov 2023 08:31) (94% - 98%)    Parameters below as of 28 Nov 2023 08:31  Patient On (Oxygen Delivery Method): room air      Head: no alopecia or scars  Neck: no palpable lymphadenopathy  Lungs: Clear to ascultation bilaterally and no wheezes  Cardiac: normal S1, S2, no murmurs  Abdomen: soft, nontender with no ascites  Extremities: no peripheral edema, good pulses bilaterally  Neuro: A & O, CNs II-XII intact. Motor strength 5/5 throughout and strength 5/5 throughout. Ambulatory    LABS:  CBC Full  -  ( 28 Nov 2023 08:12 )  WBC Count : 8.66 K/uL  RBC Count : 2.99 M/uL  Hemoglobin : 9.1 g/dL  Hematocrit : 28.6 %  Platelet Count - Automated : 238 K/uL  Mean Cell Volume : 95.7 fl  Mean Cell Hemoglobin : 30.4 pg  Mean Cell Hemoglobin Concentration : 31.8 gm/dL  Auto Neutrophil # : x  Auto Lymphocyte # : x  Auto Monocyte # : x  Auto Eosinophil # : x  Auto Basophil # : x  Auto Neutrophil % : x  Auto Lymphocyte % : x  Auto Monocyte % : x  Auto Eosinophil % : x  Auto Basophil % : x    11-28    139  |  105  |  17  ----------------------------<  64<L>  3.5   |  26  |  0.72    Ca    9.1      28 Nov 2023 08:12    TPro  6.0  /  Alb  2.7<L>  /  TBili  0.5  /  DBili  x   /  AST  21  /  ALT  8<L>  /  AlkPhos  123<H>  11-27      RADIOLOGY:  < from: MR Thoracic Spine w/wo IV Cont (11.25.23 @ 11:56) >    ACC: [id number] EXAM:  MR SPINE THORACIC WAW IC   ORDERED BY: BRIANNA SELBY     ACC: [id number] EXAM:  MR SPINE CERVICAL WAW IC   ORDERED BY: BRIANNA SELBY     PROCEDURE DATE:  11/25/2023          INTERPRETATION:  History: 84 y/o F with a PMhx of anemia, GERD, HTN, HLD,   mood disorder, gastric bypass, and cholecystectomy presents to the ED SIB   MD c/o weakness. The pt had outpatient w/u for worsening anemia with   weakness. CT and XR showing lytic legions in spine with epidural   extensions leading to increased weakness and difficulty ambulating. PCP   sent pt to ED for further evaluation. Pt reports weight loss over months.   Denies any sort of active bleeding no blood in stool. Denies CP, or SOB.   C/o lower back pain. Some numbness and tingling to LLE. Denies bowl or   bladder incontinence. Denies numbness consistent with saddle anesthesia.   She had  outpatient CT C/A/P showing multiple bone mets and possible liver mets   and path  fracture of T11 vertebral body. Patient also C/o LE  paresthesias and back pain.    Description: A MRI of the cervical spine and a MRI of the thoracic spine   with and without gadolinium contrast were performed with multiplanar   multisequence technique.    8 cc intravenous Gadavist gadolinium contrast was administered, 2 cc   contrast was discarded.    Comparison is made to the thoracic and lumbar spine region from the chest   abdomen pelvic CT from 11/21/2023, and cervical spine CT from 08/08/2022    Cervical spine MRI:    Bulky dorsal paraspinal tumor in the upper thoracic region is partially   visualized. Please see thoracic spine MRI component of this report.    No focal cervical spine bony lesions are visualized. There is no evidence   for epidural tumor extension in the cervical spine.    Smallbroad disc bulges are present at the C4-C5, C5-C6, C6-C7 levels   with associated mild central canal stenosis. Multilevel ligamentum flavum   hypertrophy multilevel facet degenerative changes are noted.    There is no cervical spinal cord mass or syrinx. No abnormal intraspinal   enhancement is noted in the cervical spine. There is no cervical spinal   cord compression.    Thoracic spine MRI:    A large metastasis replaces the T11 vertebral body with extension into   the pedicles, with an associated mild to moderate pathologic compression   fracture deformity and retropulsion of bone. Epidural tumor extension is   noted. Tumor extension into the left greater than right neural foramina   at this levels also noted. The compression fracture withretropulsion of   bone and epidural tumor extension results in mild to moderate thoracic   spinal cord compression. A left-sided rib metastasis with soft tissue   extension of tumor is partially visualized at this level.    Additional metastases are noted involving the T9 and L2 vertebral bodies   without associated epidural tumor extension    A moderate sized hemangioma involves the T8 vertebral body with   associated increased T1 signal, increased STIR signal, and enhancement.    A 3.5 cm x 3.2cm x 3.8 cm bulky paraspinal metastasis involves the right   posterior elements at the T3 and T4 levels with extension into the   paraspinal musculature and abutment and possible involvement of the   posterior ribs at these levels. No significant intraspinal extension of   tumor at this level is appreciated.    Nonspecific bilateral pleural effusions and pleural thickening is noted.   Multiple enhancing liver lesions are partially visualized. Please see   separate chest abdomen pelvic CT report.    I discussed the exam findings with the covering orthopedic clinician Dr. Selby at 1:30 PM on 11/25/2023.    IMPRESSION:    Cervical spine:    No evidence for metastatic disease to the cervical spine or cervical   spinal cord compression.    Thoracic spine:    Multifocal metastatic disease is noted as described.    A large metastasis replaces the T11 vertebral body with an associated   mild to moderate pathologic compression fracture deformity and   retropulsion of bone. Epidural tumor extension is noted. The compression   fracture with retropulsion of bone and epidural tumor extension results   in mild to moderate thoracic spinal cord compression.    --- End of Report ---            CATY OVIEDO MD; Attending Radiologist  This document hasbeen electronically signed. Nov 25 2023  1:34PM    < end of copied text >  < from: MR Lumbar Spine w/wo IV Cont (11.25.23 @ 11:57) >  CC: [id number] EXAM:  MR SPINE LUMBAR WAW IC   ORDERED BY: BRIANNA SELBY     PROCEDURE DATE:  11/25/2023          INTERPRETATION:  CLINICAL INFORMATION: Metastases    ADDITIONAL CLINICAL INFORMATION: Cancer C80.1    TECHNIQUE: Multiplanar, multisequence MRI was performed of the lumbar   spine.  IV Contrast: Gadavist  8 cc administered   2 cc discarded    CORRELATION: CT chest and abdomen 11/21/2023    FINDINGS:    ALIGNMENT: The alignment is normal.  VERTEBRA: There is a mild compression fracture deformity of T12 as well   as pathological compression fracture deformities of T11 and L5. There is   diffuse marrow infiltration of the T11 and L5 vertebral bodies with   extension to the posterior elements with hypointense T1 and hyperintense   T2/STIR signal with avid enhancement compatible with metastatic disease.   The loss of the cortical margins is better seen on the prior CT. There is   ventral epidural soft tissue component at T11 indenting and possibly   compressing the spinal cord. Axialimages were not obtained at this   level. There is diffuse ventral epidural soft tissue at L5 severely   compressing the thecal sac as well as the L5 and S1 nerve roots. There   also is diffuse involvement of the left left sacrum at the S1 and S2   levels extending to the left SI joint. There is minimum epidural soft   tissue along the lateral margin of the sacral foramen at the S1-S2 level.   There are additional smaller metastases involving L1, L2 and L4. There   are focal areas of hyperintenseT1 and T2 signal within the T2 12 and L1   vertebral bodies which may represent hemangiomas.  DISC SPACES: Maintained.  CANAL: The distal cord and conus are normal in signal. Conus terminates   at L1.  VISUALIZED INTRAPELVIC/INTRA-ABDOMINAL SOFT TISSUES: Normal.  PARASPINAL SOFT TISSUES: Normal.    INDIVIDUAL LEVELS:    L1-L2: No disc herniation, spinal canal stenosis or neural foramen   narrowing.    L2-L3: No disc herniation. Mild degenerative facet disease. No spinal   canal stenosis or neuralforamen narrowing.    L3-L4: No disc herniation. Mild degenerative facet disease. No spinal   canal stenosis or neural foramen narrowing.    IMPRESSION: Bony metastases at T11,L1, L2, L4, L5 and left sacrum.   Pathologic fractures of T11 and L5. Possible cord compression at T11 and   diffuse thecal sac compression at L5.    --- End of Report ---      < end of copied text >

## 2023-11-28 NOTE — PROGRESS NOTE ADULT - SUBJECTIVE AND OBJECTIVE BOX
CC: LE weakness (28 Nov 2023 12:17)    HPI:  82 y/o F w/ PMH of HTN, dyslipidemia, GERD, parkinson's, neuropathy, anemia, loop recorder, p/w LE weakness. Patient has been having B/L LE weakness for a little less than 1 year. Symptoms have been getting progressively worse to the point where patient is unable to ambulate. Patient also has been getting worked up for anemia and approximately 40lb unintentional weight loss. She had outpatient CT C/A/P showing multiple bone mets and possible liver mets and path fracture of T11 vertebral body. Patient sent to ED for further evaluation. Patient evaluated by ortho with recommendation for MRI. Patient has a loop recorder in placed by Dr. Crawley, and patient is unsure if it is compatible with MRI. Will consult Dr. Crawley for input prior to MRI. Patient also C/o LE paresthesias and back pain.       PSH: gastric bypass, cholecystectomy, L total knee replacement, tonsillectomy     Social Hx: Former smoker - quit in 1980, denies etoh / drugs     Family Hx: Denies pertinent hx  (25 Nov 2023 00:04)    INTERVAL HPI/OVERNIGHT EVENTS:    Vital Signs Last 24 Hrs  T(C): 36.6 (28 Nov 2023 08:31), Max: 36.6 (28 Nov 2023 08:31)  T(F): 97.9 (28 Nov 2023 08:31), Max: 97.9 (28 Nov 2023 08:31)  HR: 76 (28 Nov 2023 08:31) (71 - 76)  BP: 137/79 (28 Nov 2023 08:31) (119/75 - 141/69)  BP(mean): --  RR: 18 (28 Nov 2023 08:31) (17 - 18)  SpO2: 94% (28 Nov 2023 08:31) (94% - 98%)    Parameters below as of 28 Nov 2023 08:31  Patient On (Oxygen Delivery Method): room air      I&O's Detail    REVIEW OF SYSTEMS:    CONSTITUTIONAL: No weakness, fevers or chills  EYES/ENT: No visual changes;  No vertigo or throat pain   NECK: No pain or stiffness  RESPIRATORY: No cough, wheezing, hemoptysis; No shortness of breath  CARDIOVASCULAR: No chest pain or palpitations  GASTROINTESTINAL: No abdominal or epigastric pain. No nausea, vomiting, or hematemesis; No diarrhea or constipation. No melena or hematochezia.  GENITOURINARY: No dysuria, frequency or hematuria  NEUROLOGICAL: No numbness or weakness  SKIN: No itching, burning, rashes, or lesions   All other review of systems is negative unless indicated above.  PHYSICAL EXAM:    General: in no acute distress  Eyes: PERRLA, EOMI; conjunctiva and sclera clear  Head: Normocephalic; atraumatic  ENMT: No nasal discharge; airway clear  Neck: Supple; non tender; no masses  Respiratory: No wheezes, rales or rhonchi  Cardiovascular: Regular rate and rhythm. S1 and S2 Normal; No murmurs, gallops or rubs  Gastrointestinal: Soft non-tender non-distended; Normal bowel sounds  Genitourinary: No  suprapubic  tenderness  Extremities: Normal range of motion, No clubbing, cyanosis or edema  Vascular: Peripheral pulses palpable 2+ bilaterally  Neurological: Alert and oriented x4  Skin: Warm and dry. No acute rash  Lymph Nodes: No acute cervical adenopathy  Musculoskeletal: Normal muscle tone, without deformities  Psychiatric: Cooperative and appropriate                            9.1    8.66  )-----------( 238      ( 28 Nov 2023 08:12 )             28.6     28 Nov 2023 08:12    139    |  105    |  17     ----------------------------<  64     3.5     |  26     |  0.72     Ca    9.1        28 Nov 2023 08:12    TPro  6.0    /  Alb  2.7    /  TBili  0.5    /  DBili  x      /  AST  21     /  ALT  8      /  AlkPhos  123    27 Nov 2023 07:31      CAPILLARY BLOOD GLUCOSE        LIVER FUNCTIONS - ( 27 Nov 2023 07:31 )  Alb: 2.7 g/dL / Pro: 6.0 gm/dL / ALK PHOS: 123 U/L / ALT: 8 U/L / AST: 21 U/L / GGT: x           Urinalysis Basic - ( 28 Nov 2023 08:12 )    Color: x / Appearance: x / SG: x / pH: x  Gluc: 64 mg/dL / Ketone: x  / Bili: x / Urobili: x   Blood: x / Protein: x / Nitrite: x   Leuk Esterase: x / RBC: x / WBC x   Sq Epi: x / Non Sq Epi: x / Bacteria: x        MEDICATIONS  (STANDING):  atorvastatin 40 milliGRAM(s) Oral at bedtime  buPROPion SR (12-Hour) 100 milliGRAM(s) Oral daily  carbidopa/levodopa  25/100 1 Tablet(s) Oral four times a day  dexAMETHasone  Injectable 8 milliGRAM(s) IV Push every 6 hours  donepezil 10 milliGRAM(s) Oral at bedtime  ferrous    sulfate 325 milliGRAM(s) Oral daily  influenza  Vaccine (HIGH DOSE) 0.7 milliLiter(s) IntraMuscular once  levothyroxine 50 MICROGram(s) Oral daily  losartan 25 milliGRAM(s) Oral daily  metoprolol succinate ER 25 milliGRAM(s) Oral daily  naloxone Injectable 0.4 milliGRAM(s) IV Push once  pantoprazole    Tablet 40 milliGRAM(s) Oral before breakfast  polyethylene glycol 3350 17 Gram(s) Oral daily  pramipexole 0.125 milliGRAM(s) Oral at bedtime  senna 2 Tablet(s) Oral at bedtime  verapamil  milliGRAM(s) Oral daily    MEDICATIONS  (PRN):  acetaminophen     Tablet .. 650 milliGRAM(s) Oral every 6 hours PRN Mild Pain (1 - 3), Moderate Pain (4 - 6)  bisacodyl 5 milliGRAM(s) Oral daily PRN Constipation  morphine  - Injectable 2 milliGRAM(s) IV Push every 4 hours PRN Moderate Pain (4 - 6)  morphine  - Injectable 4 milliGRAM(s) IV Push every 4 hours PRN Severe Pain (7 - 10)      RADIOLOGY & ADDITIONAL TESTS: CC: LE weakness (28 Nov 2023 12:17)    HPI:  84 y/o F w/ PMH of HTN, dyslipidemia, GERD, parkinson's, neuropathy, anemia, loop recorder, p/w LE weakness. Patient has been having B/L LE weakness for a little less than 1 year. Symptoms have been getting progressively worse to the point where patient is unable to ambulate. Patient also has been getting worked up for anemia and approximately 40lb unintentional weight loss. She had outpatient CT C/A/P showing multiple bone mets and possible liver mets and path fracture of T11 vertebral body. Patient sent to ED for further evaluation. Patient evaluated by ortho with recommendation for MRI. Patient has a loop recorder in placed by Dr. Crawley, and patient is unsure if it is compatible with MRI. Will consult Dr. Crawley for input prior to MRI. Patient also C/o LE paresthesias and back pain.     INTERVAL HPI/ OVERNIGHT EVENTS:  chart reviewed, Pt was seen and examined, c/o back pain  7/10, states that pain meds work but doesn't last long enough. Denies any new symptoms, had L>R leg numbness. Spouse at bedside, results and plan discussed     Vital Signs Last 24 Hrs  T(C): 36.6 (28 Nov 2023 08:31), Max: 36.6 (28 Nov 2023 08:31)  T(F): 97.9 (28 Nov 2023 08:31), Max: 97.9 (28 Nov 2023 08:31)  HR: 76 (28 Nov 2023 08:31) (71 - 76)  BP: 137/79 (28 Nov 2023 08:31) (119/75 - 141/69)  RR: 18 (28 Nov 2023 08:31) (17 - 18)  SpO2: 94% (28 Nov 2023 08:31) (94% - 98%)    Parameters below as of 28 Nov 2023 08:31  Patient On (Oxygen Delivery Method): room air        REVIEW OF SYSTEMS:  All other review of systems is negative unless indicated above.      PHYSICAL EXAM:  General: in no acute distress  Eyes: EOMI; conjunctiva and sclera clear  Head: Normocephalic; atraumatic  ENMT: No nasal discharge; airway clear  Respiratory: No wheezes, rales or rhonchi  Cardiovascular: Regular rate and rhythm. S1 and S2 Normal;   Gastrointestinal: Soft non-tender non-distended; Normal bowel sounds  Genitourinary: No  suprapubic  tenderness  Extremities: No edema  Neurological: Alert and oriented x 3, LLE weakness 4/5, hypokinesis and some muscle stiffness, hypophonia   Musculoskeletal: Normal muscle tone, without deformities  Psychiatric: Cooperative    LABS:                         9.1    8.66  )-----------( 238      ( 28 Nov 2023 08:12 )             28.6     28 Nov 2023 08:12    139    |  105    |  17     ----------------------------<  64     3.5     |  26     |  0.72     Ca    9.1        28 Nov 2023 08:12    TPro  6.0    /  Alb  2.7    /  TBili  0.5    /  DBili  x      /  AST  21     /  ALT  8      /  AlkPhos  123    27 Nov 2023 07:31          LIVER FUNCTIONS - ( 27 Nov 2023 07:31 )  Alb: 2.7 g/dL / Pro: 6.0 gm/dL / ALK PHOS: 123 U/L / ALT: 8 U/L / AST: 21 U/L / GGT: x           Urinalysis Basic - ( 28 Nov 2023 08:12 )  Color: x / Appearance: x / SG: x / pH: x  Gluc: 64 mg/dL / Ketone: x  / Bili: x / Urobili: x   Blood: x / Protein: x / Nitrite: x   Leuk Esterase: x / RBC: x / WBC x   Sq Epi: x / Non Sq Epi: x / Bacteria: x        MEDICATIONS  (STANDING):  atorvastatin 40 milliGRAM(s) Oral at bedtime  buPROPion SR (12-Hour) 100 milliGRAM(s) Oral daily  carbidopa/levodopa  25/100 1 Tablet(s) Oral four times a day  dexAMETHasone  IVPB 8 milliGRAM(s) IV Intermittent every 6 hours  donepezil 10 milliGRAM(s) Oral at bedtime  ferrous    sulfate 325 milliGRAM(s) Oral daily  heparin   Injectable 5000 Unit(s) SubCutaneous every 8 hours  influenza  Vaccine (HIGH DOSE) 0.7 milliLiter(s) IntraMuscular once  levothyroxine 50 MICROGram(s) Oral daily  losartan 25 milliGRAM(s) Oral daily  metoprolol succinate ER 25 milliGRAM(s) Oral daily  naloxone Injectable 0.4 milliGRAM(s) IV Push once  pantoprazole    Tablet 40 milliGRAM(s) Oral before breakfast  polyethylene glycol 3350 17 Gram(s) Oral daily  pramipexole 0.125 milliGRAM(s) Oral at bedtime  senna 2 Tablet(s) Oral at bedtime  verapamil  milliGRAM(s) Oral daily    MEDICATIONS  (PRN):  acetaminophen     Tablet .. 650 milliGRAM(s) Oral every 6 hours PRN Mild Pain (1 - 3), Moderate Pain (4 - 6)  bisacodyl 5 milliGRAM(s) Oral daily PRN Constipation  morphine  - Injectable 2 milliGRAM(s) IV Push every 4 hours PRN for breakthrough  oxyCODONE    IR 5 milliGRAM(s) Oral every 4 hours PRN Moderate Pain (4 - 6)  oxyCODONE    IR 10 milliGRAM(s) Oral every 4 hours PRN Severe Pain (7 - 10)        RADIOLOGY & ADDITIONAL TESTS:    ACC: [id number] EXAM:  MR SPINE LUMBAR WAW IC   ORDERED BY: BRIANNA TAPIA     PROCEDURE DATE:  11/25/2023          INTERPRETATION:  CLINICAL INFORMATION: Metastases    ADDITIONAL CLINICAL INFORMATION: Cancer C80.1    TECHNIQUE: Multiplanar, multisequence MRI was performed of the lumbar   spine.  IV Contrast: Gadavist  8 cc administered   2 cc discarded    CORRELATION: CT chest and abdomen 11/21/2023    FINDINGS:    ALIGNMENT: The alignment is normal.  VERTEBRA: There is a mild compression fracture deformity of T12 as well   as pathological compression fracture deformities of T11 and L5. There is   diffuse marrow infiltration of the T11 and L5 vertebral bodies with   extension to the posterior elements with hypointense T1 and hyperintense   T2/STIR signal with avid enhancement compatible with metastatic disease.   The loss of the cortical margins is better seen on the prior CT. There is   ventral epidural soft tissue component at T11 indenting and possibly   compressing the spinal cord. Axialimages were not obtained at this   level. There is diffuse ventral epidural soft tissue at L5 severely   compressing the thecal sac as well as the L5 and S1 nerve roots. There   also is diffuse involvement of the left left sacrum at the S1 and S2   levels extending to the left SI joint. There is minimum epidural soft   tissue along the lateral margin of the sacral foramen at the S1-S2 level.   There are additional smaller metastases involving L1, L2 and L4. There   are focal areas of hyperintenseT1 and T2 signal within the T2 12 and L1   vertebral bodies which may represent hemangiomas.  DISC SPACES: Maintained.  CANAL: The distal cord and conus are normal in signal. Conus terminates   at L1.  VISUALIZED INTRAPELVIC/INTRA-ABDOMINAL SOFT TISSUES: Normal.  PARASPINAL SOFT TISSUES: Normal.    INDIVIDUAL LEVELS:    L1-L2: No disc herniation, spinal canal stenosis or neural foramen   narrowing.    L2-L3: No disc herniation. Mild degenerative facet disease. No spinal   canal stenosis or neuralforamen narrowing.    L3-L4: No disc herniation. Mild degenerative facet disease. No spinal   canal stenosis or neural foramen narrowing.    IMPRESSION: Bony metastases at T11,L1, L2, L4, L5 and left sacrum.   Pathologic fractures of T11 and L5. Possible cord compression at T11 and   diffuse thecal sac compression at L5.

## 2023-11-28 NOTE — PROGRESS NOTE ADULT - ASSESSMENT
82 y/o F w/ PMH of HTN, dyslipidemia, GERD, parkinson's, neuropathy, anemia, loop recorder, p/w LE weakness    # LE Weakness / paresthesias / back pain due to metastatic Dz with T11 and L5 pathologic Fx with suspected cord compression. Unknown Primary malignancy   -CT head: No acute intracranial hemorrhage or mass effect.  -CT: Multiple lytic osseous lesions suspicious for mets. Lesions in the L5 and T11 vertebral bodies have epidural extension.  Pathologic fracture T11 vertebral body. Some of the rib lesions demonstrate pathologic fractures. Please see above comments. Adrenal adenoma. Multiple hepatic lesions suspicious for metastatic disease.  -MRI C/s: No evidence for metastatic disease to the cervical spine or cervical spinal cord compression.  -MRI: T/S: A large metastasis replaces the T11 vertebral body with an associated mild to moderate pathologic compression fracture deformity and retropulsion of bone. Epidural tumor extension is noted. The compression fracture with retropulsion of bone and epidural tumor extension results in mild to moderate thoracic spinal cord compression.  -MRI: L/S: Bony metastases at T11,L1, L2, L4, L5 and left sacrum. Pathologic fractures of T11 and L5. Possible cord compression at T11 and diffuse thecal sac compression at L5.  -Neurochecks, stable   -ESR: Mild mildly  -S/p Rib lesion Bx, f/u path report   - Start dexamethasone IV as d/w Ortho and Radiation Onc   - Change pain meds to Oxy and PRN IV meds   - may consider start gabapentin if still not controlled   - D/w Dr Coker and Dr Ceballos, plan for OR for T11  stabilization, will need to further discuss L5 Tx possible Radiation Tx? later   - D/w Heme Onc team, plan for further metastatic work up, MRI head ordered       #Normocytic anemia  -multifactorial with Hx iron deficiency, B12 deficiency, CRI, and anemia of chronic disease and now possibly due to malignancy.  -s/P Feraheme as outpatient  -s/p methylcobalamin   -Trend H&H, stable  - D/w Heme onc, no objections to dvt ppx     #Mild hypercalcemia  -Monitor and trend    #Elevated Alk phos   -Likely secondary to bony mets and hepatic mets    #Abnormal UA  -UCX: Negative UTI ruled out  -DC ABX    #Thyroid nodule  -TSH with T4: 2.20  - thryoid us: small nodules, f/u US in 1 year recommended     # HTN / dyslipidemia / GERD /   -C/w home meds:  Lipitor, Metoprolol. Verapamil, losartan       # Parkinson's DZ  supportive care  C/w Sinemet       *DVT ppx   -SCDs  - Start heparin SQ     ACP: Pt followed by palliative team, discussed plan, Pt is now DNR/DNI, MOLST signed

## 2023-11-29 PROCEDURE — 70553 MRI BRAIN STEM W/O & W/DYE: CPT | Mod: 26

## 2023-11-29 PROCEDURE — 99232 SBSQ HOSP IP/OBS MODERATE 35: CPT

## 2023-11-29 PROCEDURE — 99222 1ST HOSP IP/OBS MODERATE 55: CPT

## 2023-11-29 RX ADMIN — OXYCODONE HYDROCHLORIDE 10 MILLIGRAM(S): 5 TABLET ORAL at 21:20

## 2023-11-29 RX ADMIN — PANTOPRAZOLE SODIUM 40 MILLIGRAM(S): 20 TABLET, DELAYED RELEASE ORAL at 05:04

## 2023-11-29 RX ADMIN — CARBIDOPA AND LEVODOPA 1 TABLET(S): 25; 100 TABLET ORAL at 13:28

## 2023-11-29 RX ADMIN — Medication 25 MILLIGRAM(S): at 09:05

## 2023-11-29 RX ADMIN — Medication 240 MILLIGRAM(S): at 09:04

## 2023-11-29 RX ADMIN — Medication 6 MILLIGRAM(S): at 05:04

## 2023-11-29 RX ADMIN — SENNA PLUS 2 TABLET(S): 8.6 TABLET ORAL at 21:22

## 2023-11-29 RX ADMIN — PRAMIPEXOLE DIHYDROCHLORIDE 0.12 MILLIGRAM(S): 0.12 TABLET ORAL at 21:20

## 2023-11-29 RX ADMIN — CARBIDOPA AND LEVODOPA 1 TABLET(S): 25; 100 TABLET ORAL at 05:04

## 2023-11-29 RX ADMIN — CARBIDOPA AND LEVODOPA 1 TABLET(S): 25; 100 TABLET ORAL at 23:19

## 2023-11-29 RX ADMIN — HEPARIN SODIUM 5000 UNIT(S): 5000 INJECTION INTRAVENOUS; SUBCUTANEOUS at 05:04

## 2023-11-29 RX ADMIN — Medication 6 MILLIGRAM(S): at 13:28

## 2023-11-29 RX ADMIN — Medication 6 MILLIGRAM(S): at 17:06

## 2023-11-29 RX ADMIN — HEPARIN SODIUM 5000 UNIT(S): 5000 INJECTION INTRAVENOUS; SUBCUTANEOUS at 15:55

## 2023-11-29 RX ADMIN — HEPARIN SODIUM 5000 UNIT(S): 5000 INJECTION INTRAVENOUS; SUBCUTANEOUS at 21:19

## 2023-11-29 RX ADMIN — OXYCODONE HYDROCHLORIDE 5 MILLIGRAM(S): 5 TABLET ORAL at 05:40

## 2023-11-29 RX ADMIN — LOSARTAN POTASSIUM 25 MILLIGRAM(S): 100 TABLET, FILM COATED ORAL at 09:04

## 2023-11-29 RX ADMIN — ATORVASTATIN CALCIUM 40 MILLIGRAM(S): 80 TABLET, FILM COATED ORAL at 21:20

## 2023-11-29 RX ADMIN — CARBIDOPA AND LEVODOPA 1 TABLET(S): 25; 100 TABLET ORAL at 17:07

## 2023-11-29 RX ADMIN — Medication 50 MICROGRAM(S): at 05:04

## 2023-11-29 RX ADMIN — BUPROPION HYDROCHLORIDE 100 MILLIGRAM(S): 150 TABLET, EXTENDED RELEASE ORAL at 09:04

## 2023-11-29 RX ADMIN — OXYCODONE HYDROCHLORIDE 5 MILLIGRAM(S): 5 TABLET ORAL at 05:07

## 2023-11-29 RX ADMIN — OXYCODONE HYDROCHLORIDE 10 MILLIGRAM(S): 5 TABLET ORAL at 14:00

## 2023-11-29 RX ADMIN — DONEPEZIL HYDROCHLORIDE 10 MILLIGRAM(S): 10 TABLET, FILM COATED ORAL at 21:20

## 2023-11-29 RX ADMIN — Medication 6 MILLIGRAM(S): at 23:19

## 2023-11-29 RX ADMIN — OXYCODONE HYDROCHLORIDE 10 MILLIGRAM(S): 5 TABLET ORAL at 21:50

## 2023-11-29 NOTE — PROGRESS NOTE ADULT - ASSESSMENT
82 y/o F w/ PMH of HTN, dyslipidemia, GERD, parkinson's, neuropathy, anemia, loop recorder, p/w LE weakness    # LE Weakness / paresthesias / back pain due to metastatic Dz with T11 and L5 pathologic Fx with suspected cord compression. Unknown Primary malignancy   -CT head: No acute intracranial hemorrhage or mass effect.  -CT: Multiple lytic osseous lesions suspicious for mets. Lesions in the L5 and T11 vertebral bodies have epidural extension.  Pathologic fracture T11 vertebral body. Some of the rib lesions demonstrate pathologic fractures. Please see above comments. Adrenal adenoma. Multiple hepatic lesions suspicious for metastatic disease.  -MRI C/s: No evidence for metastatic disease to the cervical spine or cervical spinal cord compression.  -MRI: T/S: A large metastasis replaces the T11 vertebral body with an associated mild to moderate pathologic compression fracture deformity and retropulsion of bone. Epidural tumor extension is noted. The compression fracture with retropulsion of bone and epidural tumor extension results in mild to moderate thoracic spinal cord compression.  -MRI: L/S: Bony metastases at T11,L1, L2, L4, L5 and left sacrum. Pathologic fractures of T11 and L5. Possible cord compression at T11 and diffuse thecal sac compression at L5.  -Neurochecks, stable   -ESR: Mild mildly  -S/p Rib lesion Bx, f/u path report   - Start dexamethasone IV as d/w Ortho and Radiation Onc   - Change pain meds to Oxy and PRN IV meds   - may consider start gabapentin if still not controlled   - D/w Dr Coker and Dr Ceballos, plan for OR for T11  stabilization, will need to further discuss L5 Tx possible Radiation Tx? later   - D/w Heme Onc team, plan for further metastatic work up, MRI head ordered       #Normocytic anemia  -multifactorial with Hx iron deficiency, B12 deficiency, CRI, and anemia of chronic disease and now possibly due to malignancy.  -s/P Feraheme as outpatient  -s/p methylcobalamin   -Trend H&H, stable  - D/w Heme onc, no objections to dvt ppx     #Mild hypercalcemia  -Monitor and trend    #Elevated Alk phos   -Likely secondary to bony mets and hepatic mets    #Abnormal UA  -UCX: Negative UTI ruled out  -DC ABX    #Thyroid nodule  -TSH with T4: 2.20  - thryoid us: small nodules, f/u US in 1 year recommended     # HTN / dyslipidemia / GERD /   -C/w home meds:  Lipitor, Metoprolol. Verapamil, losartan       # Parkinson's DZ  supportive care  C/w Sinemet       *DVT ppx   -SCDs  - Start heparin SQ     ACP: Pt followed by palliative team, discussed plan, Pt is now DNR/DNI, MOLST signed    82 y/o F w/ PMH of HTN, dyslipidemia, GERD, parkinson's, neuropathy, anemia, loop recorder, p/w LE weakness    # LE Weakness / paresthesias / back pain due to metastatic Dz with T11 and L5 pathologic Fx with suspected cord compression. Unknown Primary malignancy   -CT head: No acute intracranial hemorrhage or mass effect.  -CT: Multiple lytic osseous lesions suspicious for mets. Lesions in the L5 and T11 vertebral bodies have epidural extension.  Pathologic fracture T11 vertebral body. Some of the rib lesions demonstrate pathologic fractures. Please see above comments. Adrenal adenoma. Multiple hepatic lesions suspicious for metastatic disease.  -MRI C/s: No evidence for metastatic disease to the cervical spine or cervical spinal cord compression.  -MRI: T/S: A large metastasis replaces the T11 vertebral body with an associated mild to moderate pathologic compression fracture deformity and retropulsion of bone. Epidural tumor extension is noted. The compression fracture with retropulsion of bone and epidural tumor extension results in mild to moderate thoracic spinal cord compression.  -MRI: L/S: Bony metastases at T11,L1, L2, L4, L5 and left sacrum. Pathologic fractures of T11 and L5. Possible cord compression at T11 and diffuse thecal sac compression at L5.  - MRI brain: no infarct or bleeding, no metastatic dz   -Neurochecks, stable   -ESR: Mild mildly  -S/p Rib lesion Bx, f/u path report   - Start dexamethasone IV  continue   - C/w PO  Oxy and PRN IV meds   - may consider start gabapentin if still not controlled   - D/w Dr Coker   - plan for OR for T11  stabilization, will need to further discuss L5 Tx possible Radiation Tx? later     #Normocytic anemia  -multifactorial with Hx iron deficiency, B12 deficiency, CRI, and anemia of chronic disease and now possibly due to malignancy.  -s/P Feraheme as outpatient  -s/p methylcobalamin   -Trend H&H, stable  - D/w Heme onc, no objections to dvt ppx     #Mild hypercalcemia  -Monitor and trend    #Elevated Alk phos   -Likely secondary to bony mets and hepatic mets    #Abnormal UA  -UCX: Negative UTI ruled out  -DC ABX    #Thyroid nodule  -TSH with T4: 2.20  - thryoid us: small nodules, f/u US in 1 year recommended     # HTN / dyslipidemia / GERD /   -C/w home meds:  Lipitor, Metoprolol. Verapamil, losartan       # Parkinson's DZ  supportive care  C/w Sinemet       *DVT ppx   -SCDs  - Start heparin SQ     ACP: Pt followed by palliative team, discussed plan, Pt is now DNR/DNI, MOLST signed

## 2023-11-29 NOTE — PROGRESS NOTE ADULT - SUBJECTIVE AND OBJECTIVE BOX
Patient is a 83y old  Female who presents with a chief complaint of LE weakness (28 Nov 2023 14:39)      HPI:  84 y/o F w/ PMH of HTN, dyslipidemia, GERD, parkinson's, neuropathy, anemia, loop recorder, p/w LE weakness. Patient has been having B/L LE weakness for a little less than 1 year. Symptoms have been getting progressively worse to the point where patient is unable to ambulate. Patient also has been getting worked up for anemia and approximately 40lb unintentional weight loss. She had outpatient CT C/A/P showing multiple bone mets and possible liver mets and path fracture of T11 vertebral body. Patient sent to ED for further evaluation. Patient evaluated by ortho with recommendation for MRI. Patient has a loop recorder in placed by Dr. Crawley, and patient is unsure if it is compatible with MRI. Will consult Dr. Crawley for input prior to MRI. Patient also C/o LE paresthesias and back pain.     Pt seen today and plan is to go to OR on Friday with Dr. Fermin for decompression of T11. She is neurologically stable.   Di      PSH: gastric bypass, cholecystectomy, L total knee replacement, tonsillectomy     Social Hx: Former smoker - quit in 1980, denies etoh / drugs     Family Hx: Denies pertinent hx  (25 Nov 2023 00:04)      PAST MEDICAL & SURGICAL HISTORY:  GERD (gastroesophageal reflux disease)      HTN (hypertension)      HLD (hyperlipidemia)      Mood disorder      H/O gastric bypass      History of cholecystectomy      History of total knee replacement, left          MEDICATIONS  (STANDING):  atorvastatin 40 milliGRAM(s) Oral at bedtime  buPROPion SR (12-Hour) 100 milliGRAM(s) Oral daily  carbidopa/levodopa  25/100 1 Tablet(s) Oral four times a day  dexAMETHasone  Injectable 6 milliGRAM(s) IV Push every 6 hours  dexAMETHasone  Injectable   IV Push   donepezil 10 milliGRAM(s) Oral at bedtime  ferrous    sulfate 325 milliGRAM(s) Oral daily  heparin   Injectable 5000 Unit(s) SubCutaneous every 8 hours  influenza  Vaccine (HIGH DOSE) 0.7 milliLiter(s) IntraMuscular once  levothyroxine 50 MICROGram(s) Oral daily  losartan 25 milliGRAM(s) Oral daily  metoprolol succinate ER 25 milliGRAM(s) Oral daily  naloxone Injectable 0.4 milliGRAM(s) IV Push once  pantoprazole    Tablet 40 milliGRAM(s) Oral before breakfast  polyethylene glycol 3350 17 Gram(s) Oral daily  pramipexole 0.125 milliGRAM(s) Oral at bedtime  senna 2 Tablet(s) Oral at bedtime  verapamil  milliGRAM(s) Oral daily    MEDICATIONS  (PRN):  acetaminophen     Tablet .. 650 milliGRAM(s) Oral every 6 hours PRN Mild Pain (1 - 3), Moderate Pain (4 - 6)  bisacodyl 5 milliGRAM(s) Oral daily PRN Constipation  morphine  - Injectable 2 milliGRAM(s) IV Push every 4 hours PRN for breakthrough  oxyCODONE    IR 5 milliGRAM(s) Oral every 4 hours PRN Moderate Pain (4 - 6)  oxyCODONE    IR 10 milliGRAM(s) Oral every 4 hours PRN Severe Pain (7 - 10)      PHYSICAL EXAM:  Vital Signs Last 24 Hrs  T(C): 35.6 (29 Nov 2023 08:28), Max: 36.8 (28 Nov 2023 13:44)  T(F): 96 (29 Nov 2023 08:28), Max: 98.2 (28 Nov 2023 13:44)  HR: 96 (29 Nov 2023 08:28) (75 - 97)  BP: 124/80 (29 Nov 2023 08:28) (111/69 - 138/86)  BP(mean): --  RR: 18 (29 Nov 2023 08:28) (17 - 18)  SpO2: 96% (29 Nov 2023 08:28) (93% - 100%)    Parameters below as of 29 Nov 2023 08:28  Patient On (Oxygen Delivery Method): room air      Head: no alopecia or scars  Neck: no palpable lymphadenopathy  Lungs: Clear to ascultation bilaterally and no wheezes  Cardiac: normal S1, S2, no murmurs  Abdomen: soft, nontender with no ascites  Extremities: no peripheral edema, good pulses bilaterally  Neuro: A & O, CNs II-XII intact. Motor strength 5/5 throughout and strength 5/5 throughout. Ambulatory    LABS:  CBC Full  -  ( 28 Nov 2023 08:12 )  WBC Count : 8.66 K/uL  RBC Count : 2.99 M/uL  Hemoglobin : 9.1 g/dL  Hematocrit : 28.6 %  Platelet Count - Automated : 238 K/uL  Mean Cell Volume : 95.7 fl  Mean Cell Hemoglobin : 30.4 pg  Mean Cell Hemoglobin Concentration : 31.8 gm/dL  Auto Neutrophil # : x  Auto Lymphocyte # : x  Auto Monocyte # : x  Auto Eosinophil # : x  Auto Basophil # : x  Auto Neutrophil % : x  Auto Lymphocyte % : x  Auto Monocyte % : x  Auto Eosinophil % : x  Auto Basophil % : x    11-28    139  |  105  |  17  ----------------------------<  64<L>  3.5   |  26  |  0.72    Ca    9.1      28 Nov 2023 08:12        RADIOLOGY:

## 2023-11-29 NOTE — PROGRESS NOTE ADULT - ASSESSMENT
A/P: 84 y/o F w/ PMH of HTN, dyslipidemia, GERD, parkinson's, neuropathy, anemia, loop recorder, p/w LE weakness    Patient is planned to have thoracotomy, anterior corpectomy T11, ASF T10-12 with cage and instr.   Currently has no decompensated cardiac medical conditions.   EKG has no significant abnormalities.  The patient has no medical history of CAD, CHF, Stroke, CKD (Cr >2), insulin dependent or uncontrolled diabetes  High risk surgery - thoracic surgery  8/31/2023: EF 60-65%, mild to mod calcification of aortic valve. Mild MR/TR  12/29/2022: Cardiac PET: small perfusion abnormality, non reversible defect  The patient thus has an RCRI score of 1 and is at 6.0% risk for major adverse cardiac event.   The patient has a CAD equivalent and is on a statin  The patient is medically optimized and no further testing is recommended at this time.      #LE Weakness / paresthesias / back pain due to metastatic Dz with T11 and L5 pathologic Fx with suspected cord compression. Unknown Primary malignancy   #Hx ILR monitor. Device battery is depleted. No plans to reimplant. H/o Atach, ventricular triplets.   #HTN. Cont home regimen. BP stable.     Case d/w Dr. Crawley   A/P: 84 y/o F w/ PMH of HTN, dyslipidemia, GERD, parkinson's, neuropathy, anemia, loop recorder, p/w LE weakness    Patient is planned to have thoracotomy, anterior corpectomy T11, ASF T10-12 with cage and instr.   Currently has no decompensated cardiac conditions.   EKG has no significant abnormalities.  The patient has no medical history of CAD, CHF, Stroke, CKD (Cr >2), insulin dependent or uncontrolled diabetes  High risk surgery - thoracic surgery  8/31/2023: EF 60-65%, mild to mod calcification of aortic valve. Mild MR/TR  12/29/2022: Cardiac PET: small perfusion abnormality, non reversible defect  The patient thus has an RCRI score of 1 and is at 6.0% risk for major adverse cardiac event.   The patient has a CAD equivalent and is on a statin  The patient is medically optimized and no further testing is recommended at this time.      #LE Weakness / paresthesias / back pain due to metastatic Dz with T11 and L5 pathologic Fx with suspected cord compression. Unknown Primary malignancy   #Hx ILR monitor. Device battery is depleted. No plans to reimplant. H/o Atach, ventricular triplets.   #HTN. Cont home regimen. BP stable.     Case d/w Dr. Crawley  Will sign off. call with questions.

## 2023-11-29 NOTE — PROGRESS NOTE ADULT - SUBJECTIVE AND OBJECTIVE BOX
CC: LE weakness (28 Nov 2023 12:17)    HPI:  84 y/o F w/ PMH of HTN, dyslipidemia, GERD, parkinson's, neuropathy, anemia, loop recorder, p/w LE weakness. Patient has been having B/L LE weakness for a little less than 1 year. Symptoms have been getting progressively worse to the point where patient is unable to ambulate. Patient also has been getting worked up for anemia and approximately 40lb unintentional weight loss. She had outpatient CT C/A/P showing multiple bone mets and possible liver mets and path fracture of T11 vertebral body. Patient sent to ED for further evaluation. Patient evaluated by ortho with recommendation for MRI. Patient has a loop recorder in placed by Dr. Crawley, and patient is unsure if it is compatible with MRI. Will consult Dr. Crawley for input prior to MRI. Patient also C/o LE paresthesias and back pain.     INTERVAL HPI/ OVERNIGHT EVENTS: Pt was seen and examined,            REVIEW OF SYSTEMS:  All other review of systems is negative unless indicated above.      PHYSICAL EXAM:  General: in no acute distress  Eyes: EOMI; conjunctiva and sclera clear  Head: Normocephalic; atraumatic  ENMT: No nasal discharge; airway clear  Respiratory: No wheezes, rales or rhonchi  Cardiovascular: Regular rate and rhythm. S1 and S2 Normal;   Gastrointestinal: Soft non-tender non-distended; Normal bowel sounds  Genitourinary: No  suprapubic  tenderness  Extremities: No edema  Neurological: Alert and oriented x 3, LLE weakness 4/5, hypokinesis and some muscle stiffness, hypophonia   Musculoskeletal: Normal muscle tone, without deformities  Psychiatric: Cooperative    LABS:                Urinalysis Basic - ( 28 Nov 2023 08:12 )    Color: x / Appearance: x / SG: x / pH: x  Gluc: 64 mg/dL / Ketone: x  / Bili: x / Urobili: x   Blood: x / Protein: x / Nitrite: x   Leuk Esterase: x / RBC: x / WBC x   Sq Epi: x / Non Sq Epi: x / Bacteria: x                            9.1    8.66  )-----------( 238      ( 28 Nov 2023 08:12 )             28.6     28 Nov 2023 08:12    139    |  105    |  17     ----------------------------<  64     3.5     |  26     |  0.72     Ca    9.1        28 Nov 2023 08:12    TPro  6.0    /  Alb  2.7    /  TBili  0.5    /  DBili  x      /  AST  21     /  ALT  8      /  AlkPhos  123    27 Nov 2023 07:31          LIVER FUNCTIONS - ( 27 Nov 2023 07:31 )  Alb: 2.7 g/dL / Pro: 6.0 gm/dL / ALK PHOS: 123 U/L / ALT: 8 U/L / AST: 21 U/L / GGT: x           Urinalysis Basic - ( 28 Nov 2023 08:12 )  Color: x / Appearance: x / SG: x / pH: x  Gluc: 64 mg/dL / Ketone: x  / Bili: x / Urobili: x   Blood: x / Protein: x / Nitrite: x   Leuk Esterase: x / RBC: x / WBC x   Sq Epi: x / Non Sq Epi: x / Bacteria: x        MEDICATIONS  (STANDING):  atorvastatin 40 milliGRAM(s) Oral at bedtime  buPROPion SR (12-Hour) 100 milliGRAM(s) Oral daily  carbidopa/levodopa  25/100 1 Tablet(s) Oral four times a day  dexAMETHasone  Injectable 6 milliGRAM(s) IV Push every 6 hours  dexAMETHasone  Injectable   IV Push   donepezil 10 milliGRAM(s) Oral at bedtime  ferrous    sulfate 325 milliGRAM(s) Oral daily  heparin   Injectable 5000 Unit(s) SubCutaneous every 8 hours  influenza  Vaccine (HIGH DOSE) 0.7 milliLiter(s) IntraMuscular once  levothyroxine 50 MICROGram(s) Oral daily  losartan 25 milliGRAM(s) Oral daily  metoprolol succinate ER 25 milliGRAM(s) Oral daily  naloxone Injectable 0.4 milliGRAM(s) IV Push once  pantoprazole    Tablet 40 milliGRAM(s) Oral before breakfast  polyethylene glycol 3350 17 Gram(s) Oral daily  pramipexole 0.125 milliGRAM(s) Oral at bedtime  senna 2 Tablet(s) Oral at bedtime  verapamil  milliGRAM(s) Oral daily    MEDICATIONS  (PRN):  acetaminophen     Tablet .. 650 milliGRAM(s) Oral every 6 hours PRN Mild Pain (1 - 3), Moderate Pain (4 - 6)  bisacodyl 5 milliGRAM(s) Oral daily PRN Constipation  morphine  - Injectable 2 milliGRAM(s) IV Push every 4 hours PRN for breakthrough  oxyCODONE    IR 10 milliGRAM(s) Oral every 4 hours PRN Severe Pain (7 - 10)  oxyCODONE    IR 5 milliGRAM(s) Oral every 4 hours PRN Moderate Pain (4 - 6)          RADIOLOGY & ADDITIONAL TESTS:    ACC: [id number] EXAM:  MR SPINE LUMBAR WAW IC   ORDERED BY: BRIANNA TAPIA     PROCEDURE DATE:  11/25/2023          INTERPRETATION:  CLINICAL INFORMATION: Metastases    ADDITIONAL CLINICAL INFORMATION: Cancer C80.1    TECHNIQUE: Multiplanar, multisequence MRI was performed of the lumbar   spine.  IV Contrast: Gadavist  8 cc administered   2 cc discarded    CORRELATION: CT chest and abdomen 11/21/2023    FINDINGS:    ALIGNMENT: The alignment is normal.  VERTEBRA: There is a mild compression fracture deformity of T12 as well   as pathological compression fracture deformities of T11 and L5. There is   diffuse marrow infiltration of the T11 and L5 vertebral bodies with   extension to the posterior elements with hypointense T1 and hyperintense   T2/STIR signal with avid enhancement compatible with metastatic disease.   The loss of the cortical margins is better seen on the prior CT. There is   ventral epidural soft tissue component at T11 indenting and possibly   compressing the spinal cord. Axialimages were not obtained at this   level. There is diffuse ventral epidural soft tissue at L5 severely   compressing the thecal sac as well as the L5 and S1 nerve roots. There   also is diffuse involvement of the left left sacrum at the S1 and S2   levels extending to the left SI joint. There is minimum epidural soft   tissue along the lateral margin of the sacral foramen at the S1-S2 level.   There are additional smaller metastases involving L1, L2 and L4. There   are focal areas of hyperintenseT1 and T2 signal within the T2 12 and L1   vertebral bodies which may represent hemangiomas.  DISC SPACES: Maintained.  CANAL: The distal cord and conus are normal in signal. Conus terminates   at L1.  VISUALIZED INTRAPELVIC/INTRA-ABDOMINAL SOFT TISSUES: Normal.  PARASPINAL SOFT TISSUES: Normal.    INDIVIDUAL LEVELS:    L1-L2: No disc herniation, spinal canal stenosis or neural foramen   narrowing.    L2-L3: No disc herniation. Mild degenerative facet disease. No spinal   canal stenosis or neuralforamen narrowing.    L3-L4: No disc herniation. Mild degenerative facet disease. No spinal   canal stenosis or neural foramen narrowing.    IMPRESSION: Bony metastases at T11,L1, L2, L4, L5 and left sacrum.   Pathologic fractures of T11 and L5. Possible cord compression at T11 and   diffuse thecal sac compression at L5.   CC: LE weakness (28 Nov 2023 12:17)    HPI:  82 y/o F w/ PMH of HTN, dyslipidemia, GERD, parkinson's, neuropathy, anemia, loop recorder, p/w LE weakness. Patient has been having B/L LE weakness for a little less than 1 year. Symptoms have been getting progressively worse to the point where patient is unable to ambulate. Patient also has been getting worked up for anemia and approximately 40lb unintentional weight loss. She had outpatient CT C/A/P showing multiple bone mets and possible liver mets and path fracture of T11 vertebral body. Patient sent to ED for further evaluation. Patient evaluated by ortho with recommendation for MRI. Patient has a loop recorder in placed by Dr. Crawley, and patient is unsure if it is compatible with MRI. Will consult Dr. Crawley for input prior to MRI. Patient also C/o LE paresthesias and back pain.     INTERVAL HPI/ OVERNIGHT EVENTS: Pt was seen and examined, c/o pain , is due for medication. Reports no significant change in her symptoms since yesterday.  Spouse at bedside  plan discussed       REVIEW OF SYSTEMS:  All other review of systems is negative unless indicated above.      PHYSICAL EXAM:  General: in no acute distress  Eyes: EOMI; conjunctiva and sclera clear  Head: Normocephalic; atraumatic  ENMT: No nasal discharge; airway clear  Respiratory: No wheezes, rales or rhonchi  Cardiovascular: Regular rate and rhythm. S1 and S2 Normal;   Gastrointestinal: Soft non-tender non-distended; Normal bowel sounds  Genitourinary: No  suprapubic  tenderness  Extremities: No edema  Neurological: Alert and oriented x 3, LLE weakness 4/5, hypokinesis and some muscle stiffness, hypophonia   Musculoskeletal: Normal muscle tone, without deformities  Psychiatric: Cooperative    LABS:                Urinalysis Basic - ( 28 Nov 2023 08:12 )    Color: x / Appearance: x / SG: x / pH: x  Gluc: 64 mg/dL / Ketone: x  / Bili: x / Urobili: x   Blood: x / Protein: x / Nitrite: x   Leuk Esterase: x / RBC: x / WBC x   Sq Epi: x / Non Sq Epi: x / Bacteria: x                            9.1    8.66  )-----------( 238      ( 28 Nov 2023 08:12 )             28.6     28 Nov 2023 08:12    139    |  105    |  17     ----------------------------<  64     3.5     |  26     |  0.72     Ca    9.1        28 Nov 2023 08:12    TPro  6.0    /  Alb  2.7    /  TBili  0.5    /  DBili  x      /  AST  21     /  ALT  8      /  AlkPhos  123    27 Nov 2023 07:31          LIVER FUNCTIONS - ( 27 Nov 2023 07:31 )  Alb: 2.7 g/dL / Pro: 6.0 gm/dL / ALK PHOS: 123 U/L / ALT: 8 U/L / AST: 21 U/L / GGT: x           Urinalysis Basic - ( 28 Nov 2023 08:12 )  Color: x / Appearance: x / SG: x / pH: x  Gluc: 64 mg/dL / Ketone: x  / Bili: x / Urobili: x   Blood: x / Protein: x / Nitrite: x   Leuk Esterase: x / RBC: x / WBC x   Sq Epi: x / Non Sq Epi: x / Bacteria: x        MEDICATIONS  (STANDING):  atorvastatin 40 milliGRAM(s) Oral at bedtime  buPROPion SR (12-Hour) 100 milliGRAM(s) Oral daily  carbidopa/levodopa  25/100 1 Tablet(s) Oral four times a day  dexAMETHasone  Injectable 6 milliGRAM(s) IV Push every 6 hours  dexAMETHasone  Injectable   IV Push   donepezil 10 milliGRAM(s) Oral at bedtime  ferrous    sulfate 325 milliGRAM(s) Oral daily  heparin   Injectable 5000 Unit(s) SubCutaneous every 8 hours  influenza  Vaccine (HIGH DOSE) 0.7 milliLiter(s) IntraMuscular once  levothyroxine 50 MICROGram(s) Oral daily  losartan 25 milliGRAM(s) Oral daily  metoprolol succinate ER 25 milliGRAM(s) Oral daily  naloxone Injectable 0.4 milliGRAM(s) IV Push once  pantoprazole    Tablet 40 milliGRAM(s) Oral before breakfast  polyethylene glycol 3350 17 Gram(s) Oral daily  pramipexole 0.125 milliGRAM(s) Oral at bedtime  senna 2 Tablet(s) Oral at bedtime  verapamil  milliGRAM(s) Oral daily    MEDICATIONS  (PRN):  acetaminophen     Tablet .. 650 milliGRAM(s) Oral every 6 hours PRN Mild Pain (1 - 3), Moderate Pain (4 - 6)  bisacodyl 5 milliGRAM(s) Oral daily PRN Constipation  morphine  - Injectable 2 milliGRAM(s) IV Push every 4 hours PRN for breakthrough  oxyCODONE    IR 10 milliGRAM(s) Oral every 4 hours PRN Severe Pain (7 - 10)  oxyCODONE    IR 5 milliGRAM(s) Oral every 4 hours PRN Moderate Pain (4 - 6)          RADIOLOGY & ADDITIONAL TESTS:    ACC: [id number] EXAM:  MR SPINE LUMBAR WAW IC   ORDERED BY: BRIANNA TAPIA     PROCEDURE DATE:  11/25/2023          INTERPRETATION:  CLINICAL INFORMATION: Metastases    ADDITIONAL CLINICAL INFORMATION: Cancer C80.1    TECHNIQUE: Multiplanar, multisequence MRI was performed of the lumbar   spine.  IV Contrast: Gadavist  8 cc administered   2 cc discarded    CORRELATION: CT chest and abdomen 11/21/2023    FINDINGS:    ALIGNMENT: The alignment is normal.  VERTEBRA: There is a mild compression fracture deformity of T12 as well   as pathological compression fracture deformities of T11 and L5. There is   diffuse marrow infiltration of the T11 and L5 vertebral bodies with   extension to the posterior elements with hypointense T1 and hyperintense   T2/STIR signal with avid enhancement compatible with metastatic disease.   The loss of the cortical margins is better seen on the prior CT. There is   ventral epidural soft tissue component at T11 indenting and possibly   compressing the spinal cord. Axialimages were not obtained at this   level. There is diffuse ventral epidural soft tissue at L5 severely   compressing the thecal sac as well as the L5 and S1 nerve roots. There   also is diffuse involvement of the left left sacrum at the S1 and S2   levels extending to the left SI joint. There is minimum epidural soft   tissue along the lateral margin of the sacral foramen at the S1-S2 level.   There are additional smaller metastases involving L1, L2 and L4. There   are focal areas of hyperintenseT1 and T2 signal within the T2 12 and L1   vertebral bodies which may represent hemangiomas.  DISC SPACES: Maintained.  CANAL: The distal cord and conus are normal in signal. Conus terminates   at L1.  VISUALIZED INTRAPELVIC/INTRA-ABDOMINAL SOFT TISSUES: Normal.  PARASPINAL SOFT TISSUES: Normal.    INDIVIDUAL LEVELS:    L1-L2: No disc herniation, spinal canal stenosis or neural foramen   narrowing.    L2-L3: No disc herniation. Mild degenerative facet disease. No spinal   canal stenosis or neuralforamen narrowing.    L3-L4: No disc herniation. Mild degenerative facet disease. No spinal   canal stenosis or neural foramen narrowing.    IMPRESSION: Bony metastases at T11,L1, L2, L4, L5 and left sacrum.   Pathologic fractures of T11 and L5. Possible cord compression at T11 and   diffuse thecal sac compression at L5.      ACC: [id number] EXAM:  MR BRAIN WAW IC   ORDERED BY: STEW CASTELLON     PROCEDURE DATE:  11/29/2023          INTERPRETATION:  CLINICAL INDICATION: Evaluate for intracranial   metastatic disease.    TECHNIQUE: Multi-planar, multi-sequence magnetic resonance imaging of the   brain was performed with and without intravenous contrast.    CONTRAST: Post-contrast MR images were obtained following infusion of 8   mL of Gadavist    COMPARISON: 11/24/2023    FINDINGS: There is no acute infarct. There is no evidence of mass or   intracranial hemorrhage. There is no abnormal enhancement within the   brain. Ventricles and sulci are normal in size and configuration for the   patient's stated age. No midline shift or other significant mass effect   is noted.Gray-white differentiation is maintained throughout. Normal   flow voids are maintained in the dominant arterial and venous structures.   There are scattered foci of hyperintense T2 signal within the subcortical   and periventricular white matter which are nonspecific but likely related   to chronic microvascular ischemic disease.    The visualized paranasal sinuses and tympanomastoid spaces are clear.   Orbits and orbital contents are unremarkable.    IMPRESSION: NO EVIDENCE OF INTRACRANIAL HEMORRHAGE, ACUTE TERRITORIAL   INFARCT OR AREA OF ABNORMAL ENHANCEMENT. NO EVIDENCE OF INTRACRANIAL   METASTATIC DISEASE.

## 2023-11-29 NOTE — PROGRESS NOTE ADULT - SUBJECTIVE AND OBJECTIVE BOX
Chief Complaint: Manuel pain, leg weakness    History: Patient admitted with severe back and leg weakness which has been present for over 2 months. CT/MRI demonstrated diffuse metastatic disease with large lesion T11 and L5  with epidural extension and canal compromise, Underwent CT biopsy earlier this week. Awaiting path report.    OBJECTIVE:     Vital Signs Last 24 Hrs  T(C): 35.6 (29 Nov 2023 08:28), Max: 36.8 (28 Nov 2023 13:44)  T(F): 96 (29 Nov 2023 08:28), Max: 98.2 (28 Nov 2023 13:44)  HR: 96 (29 Nov 2023 08:28) (75 - 97)  BP: 124/80 (29 Nov 2023 08:28) (111/69 - 138/86)  BP(mean): --  RR: 18 (29 Nov 2023 08:28) (17 - 18)  SpO2: 96% (29 Nov 2023 08:28) (93% - 100%)    Parameters below as of 29 Nov 2023 08:28  Patient On (Oxygen Delivery Method): room air        Physical Exam:         Awake and alert         HEENT - unremarkable         Neck - supple         Back: Tenderness TL region         Bilateral Upper Extremities:             Good Motor Strength             Sensation intact         Bilateral Lower Extremities            Neuro stable - diffuse weakness 4/5 MS BLE    I&O's Detail      LABS:                        9.1    8.66  )-----------( 238      ( 28 Nov 2023 08:12 )             28.6     11-28    139  |  105  |  17  ----------------------------<  64<L>  3.5   |  26  |  0.72    Ca    9.1      28 Nov 2023 08:12      A/P :  83y Female with diffuse metastatic disease with large lesion T11 and L5 with epidural extension and canal compromise  -     Due to the extensive involvement and canal compromise surgery is recommended at T11. Surgery will include thoracotomy, anterior corpectomy T11, ASF T10-12 with cage and instr. Patient may require second stage posterior procedure for additional stabilization and a later date. Surgery scheduled for Friday 12/1. Will proceed with surgery as long as patient is medically cleared.  -   Continue pain management  -    Continue present treatment

## 2023-11-29 NOTE — PROGRESS NOTE ADULT - SUBJECTIVE AND OBJECTIVE BOX
HPI: 84 y/o F w/ PMH of HTN, dyslipidemia, GERD, parkinson's, neuropathy, anemia, loop recorder, p/w LE weakness. Patient has been having B/L LE weakness for a little less than 1 year. Symptoms have been getting progressively worse to the point where patient is unable to ambulate. Patient also has been getting worked up for anemia and approximately 40lb unintentional weight loss. She had outpatient CT C/A/P showing multiple bone mets and possible liver mets and path fracture of T11 vertebral body. Patient sent to ED for further evaluation. Patient evaluated by ortho with recommendation for MRI. Patient has a loop recorder in placed by Dr. Crawley, and patient is unsure if it is compatible with MRI. Will consult Dr. Crawley for input prior to MRI. Patient also C/o LE paresthesias and back pain.     PSH: gastric bypass, cholecystectomy, L total knee replacement, tonsillectomy     Social Hx: Former smoker - quit in 1980, denies etoh / drugs     Family Hx: Denies pertinent hx  (25 Nov 2023 00:04)    11/27. C/o of +LBP. No CP or SOB.    11/29. Called back for OR clearance        PAST MEDICAL & SURGICAL HISTORY:  GERD (gastroesophageal reflux disease)  HTN (hypertension)  HLD (hyperlipidemia)  Mood disorder  H/O gastric bypass  History of cholecystectomy  History of total knee replacement, left    Allergies  Originally Entered as [Moderate Rash] reaction to [SURGICAL TAPE] (Unknown)  Vitamin K (Other (Severe))  Fosamax (Unknown)    MEDICATIONS  (STANDING):  atorvastatin 40 milliGRAM(s) Oral at bedtime  buPROPion SR (12-Hour) 100 milliGRAM(s) Oral daily  carbidopa/levodopa  25/100 1 Tablet(s) Oral four times a day  dexAMETHasone  Injectable 6 milliGRAM(s) IV Push every 6 hours  dexAMETHasone  Injectable   IV Push   donepezil 10 milliGRAM(s) Oral at bedtime  ferrous    sulfate 325 milliGRAM(s) Oral daily  heparin   Injectable 5000 Unit(s) SubCutaneous every 8 hours  influenza  Vaccine (HIGH DOSE) 0.7 milliLiter(s) IntraMuscular once  levothyroxine 50 MICROGram(s) Oral daily  losartan 25 milliGRAM(s) Oral daily  metoprolol succinate ER 25 milliGRAM(s) Oral daily  naloxone Injectable 0.4 milliGRAM(s) IV Push once  pantoprazole    Tablet 40 milliGRAM(s) Oral before breakfast  polyethylene glycol 3350 17 Gram(s) Oral daily  pramipexole 0.125 milliGRAM(s) Oral at bedtime  senna 2 Tablet(s) Oral at bedtime  verapamil  milliGRAM(s) Oral daily    MEDICATIONS  (PRN):  acetaminophen     Tablet .. 650 milliGRAM(s) Oral every 6 hours PRN Mild Pain (1 - 3), Moderate Pain (4 - 6)  bisacodyl 5 milliGRAM(s) Oral daily PRN Constipation  morphine  - Injectable 2 milliGRAM(s) IV Push every 4 hours PRN for breakthrough  oxyCODONE    IR 5 milliGRAM(s) Oral every 4 hours PRN Moderate Pain (4 - 6)  oxyCODONE    IR 10 milliGRAM(s) Oral every 4 hours PRN Severe Pain (7 - 10)    HOME MEDICATIONS:  atorvastatin 40 mg oral tablet: 1 tab(s) orally once a day (25 Nov 2023 02:42)  buPROPion 100 mg oral tablet: 1 tab(s) orally once a day (25 Nov 2023 02:40)  carbidopa-levodopa 25 mg-100 mg oral tablet: 1 tab(s) orally 4 times a day (25 Nov 2023 02:42)  donepezil 10 mg oral tablet: 1 tab(s) orally 2 times a day (25 Nov 2023 02:40)  ferrous sulfate 325 mg (65 mg elemental iron) oral delayed release tablet: 1 tab(s) orally once a day (25 Nov 2023 02:42)  levothyroxine 50 mcg (0.05 mg) oral capsule: 1 cap(s) orally once a day (25 Nov 2023 02:42)  losartan 25 mg oral tablet: 1 tab(s) orally once a day (25 Nov 2023 02:42)  metoprolol succinate 25 mg oral tablet, extended release: 1 tab(s) orally once a day (25 Nov 2023 02:39)  omeprazole 20 mg oral delayed release tablet: 1 tab(s) orally once a day (25 Nov 2023 02:39)  pramipexole 0.125 mg oral tablet: 1 tab(s) orally once a day (at bedtime) (25 Nov 2023 02:40)  Rexulti 3 mg oral tablet: 1 tab(s) orally once a day (at bedtime) (25 Nov 2023 02:40)  tolterodine 2 mg oral tablet: 2 tab(s) orally 2 times a day (25 Nov 2023 02:40)  verapamil 240 mg/12 hours oral tablet, extended release: 1 tab(s) orally once a day (25 Nov 2023 02:39)  vilazodone 40 mg oral tablet: 1 tab(s) orally once a day (25 Nov 2023 02:40)    PHYSICAL EXAMINATION:  T(C): 35.6 (29 Nov 2023 08:28), Max: 36.8 (28 Nov 2023 13:44)  T(F): 96 (29 Nov 2023 08:28), Max: 98.2 (28 Nov 2023 13:44)  HR: 96 (29 Nov 2023 08:28) (75 - 97)  BP: 124/80 (29 Nov 2023 08:28) (111/69 - 138/86)  RR: 18 (29 Nov 2023 08:28) (17 - 18)  SpO2: 96% (29 Nov 2023 08:28) (93% - 100%)    Parameters below as of 29 Nov 2023 08:28  Patient On (Oxygen Delivery Method): room air    GENERAL APPEARANCE:  Pt. is not currently dyspneic, in no distress. Pt. is alert, oriented, and pleasant.  HEENT:  Pupils are normal and react normally. No icterus. Mucous membranes well colored.  NECK:  Supple. No lymphadenopathy. Jugular venous pressure not elevated. Carotids equal.   HEART:   The cardiac impulse has a normal quality. There are no murmurs, rubs or gallops noted  CHEST:  Chest is clear to auscultation. Normal respiratory effort.  ABDOMEN:  Soft and nontender.   EXTREMITIES:  There is no edema.   SKIN:  No rash or significant lesions are noted.    LABS: All Labs Reviewed:                        9.1    8.66  )-----------( 238      ( 28 Nov 2023 08:12 )             28.6     11-28    139  |  105  |  17  ----------------------------<  64<L>  3.5   |  26  |  0.72    Ca    9.1      28 Nov 2023 08:12      ECG:   Ventricular Rate 65 BPM    Atrial Rate 65 BPM    P-R Interval 152 ms    QRS Duration 84 ms    Q-T Interval 414 ms    QTC Calculation(Bazett) 430 ms    P Axis 22 degrees    R Axis -12 degrees    T Axis 26 degrees    Diagnosis Line Normal sinus rhythm  Normal ECG  Confirmed by Piyush Busby ([id number]) on 11/26/2023 5:58:41 PM    TELEMETRY: Not on tele.    CARDIAC TESTS: 2Decho 8/23- Normal LVEF 60%, mild MR/TR.  Bardy monitor 8/23- SR, brief atach, ventricular triplets.    RADIOLOGY & ADDITIONAL STUDIES:     CT Head No Cont (11.24.23 @ 23:25) >  IMPRESSION:  No acute intracranial hemorrhage or mass effect.    MR Lumbar Spine w/wo IV Cont (11.25.23 @ 11:57) >  Bony metastases at T11,L1, L2, L4, L5 and left sacrum.   Pathologic fractures of T11 and L5. Possible cord compression at T11 and   diffuse thecal sac compression at L5.     HPI: 82 y/o F w/ PMH of HTN, dyslipidemia, GERD, parkinson's, neuropathy, anemia, loop recorder, p/w LE weakness. Patient has been having B/L LE weakness for a little less than 1 year. Symptoms have been getting progressively worse to the point where patient is unable to ambulate. Patient also has been getting worked up for anemia and approximately 40lb unintentional weight loss. She had outpatient CT C/A/P showing multiple bone mets and possible liver mets and path fracture of T11 vertebral body. Patient sent to ED for further evaluation. Patient evaluated by ortho with recommendation for MRI. Patient has a loop recorder in placed by Dr. Crawley, and patient is unsure if it is compatible with MRI. Will consult Dr. Crawley for input prior to MRI. Patient also C/o LE paresthesias and back pain.     PSH: gastric bypass, cholecystectomy, L total knee replacement, tonsillectomy     Social Hx: Former smoker - quit in 1980, denies etoh / drugs     Family Hx: Denies pertinent hx  (25 Nov 2023 00:04)    11/27. C/o of +LBP. No CP or SOB.    11/29. Called back for OR clearance  No sob or CP. Persistent back pain. limited mobility    PAST MEDICAL & SURGICAL HISTORY:  GERD (gastroesophageal reflux disease)  HTN (hypertension)  HLD (hyperlipidemia)  Mood disorder  H/O gastric bypass  History of cholecystectomy  History of total knee replacement, left    Allergies  Originally Entered as [Moderate Rash] reaction to [SURGICAL TAPE] (Unknown)  Vitamin K (Other (Severe))  Fosamax (Unknown)    MEDICATIONS  (STANDING):  atorvastatin 40 milliGRAM(s) Oral at bedtime  buPROPion SR (12-Hour) 100 milliGRAM(s) Oral daily  carbidopa/levodopa  25/100 1 Tablet(s) Oral four times a day  dexAMETHasone  Injectable 6 milliGRAM(s) IV Push every 6 hours  dexAMETHasone  Injectable   IV Push   donepezil 10 milliGRAM(s) Oral at bedtime  ferrous    sulfate 325 milliGRAM(s) Oral daily  heparin   Injectable 5000 Unit(s) SubCutaneous every 8 hours  influenza  Vaccine (HIGH DOSE) 0.7 milliLiter(s) IntraMuscular once  levothyroxine 50 MICROGram(s) Oral daily  losartan 25 milliGRAM(s) Oral daily  metoprolol succinate ER 25 milliGRAM(s) Oral daily  naloxone Injectable 0.4 milliGRAM(s) IV Push once  pantoprazole    Tablet 40 milliGRAM(s) Oral before breakfast  polyethylene glycol 3350 17 Gram(s) Oral daily  pramipexole 0.125 milliGRAM(s) Oral at bedtime  senna 2 Tablet(s) Oral at bedtime  verapamil  milliGRAM(s) Oral daily    MEDICATIONS  (PRN):  acetaminophen     Tablet .. 650 milliGRAM(s) Oral every 6 hours PRN Mild Pain (1 - 3), Moderate Pain (4 - 6)  bisacodyl 5 milliGRAM(s) Oral daily PRN Constipation  morphine  - Injectable 2 milliGRAM(s) IV Push every 4 hours PRN for breakthrough  oxyCODONE    IR 5 milliGRAM(s) Oral every 4 hours PRN Moderate Pain (4 - 6)  oxyCODONE    IR 10 milliGRAM(s) Oral every 4 hours PRN Severe Pain (7 - 10)    HOME MEDICATIONS:  atorvastatin 40 mg oral tablet: 1 tab(s) orally once a day (25 Nov 2023 02:42)  buPROPion 100 mg oral tablet: 1 tab(s) orally once a day (25 Nov 2023 02:40)  carbidopa-levodopa 25 mg-100 mg oral tablet: 1 tab(s) orally 4 times a day (25 Nov 2023 02:42)  donepezil 10 mg oral tablet: 1 tab(s) orally 2 times a day (25 Nov 2023 02:40)  ferrous sulfate 325 mg (65 mg elemental iron) oral delayed release tablet: 1 tab(s) orally once a day (25 Nov 2023 02:42)  levothyroxine 50 mcg (0.05 mg) oral capsule: 1 cap(s) orally once a day (25 Nov 2023 02:42)  losartan 25 mg oral tablet: 1 tab(s) orally once a day (25 Nov 2023 02:42)  metoprolol succinate 25 mg oral tablet, extended release: 1 tab(s) orally once a day (25 Nov 2023 02:39)  omeprazole 20 mg oral delayed release tablet: 1 tab(s) orally once a day (25 Nov 2023 02:39)  pramipexole 0.125 mg oral tablet: 1 tab(s) orally once a day (at bedtime) (25 Nov 2023 02:40)  Rexulti 3 mg oral tablet: 1 tab(s) orally once a day (at bedtime) (25 Nov 2023 02:40)  tolterodine 2 mg oral tablet: 2 tab(s) orally 2 times a day (25 Nov 2023 02:40)  verapamil 240 mg/12 hours oral tablet, extended release: 1 tab(s) orally once a day (25 Nov 2023 02:39)  vilazodone 40 mg oral tablet: 1 tab(s) orally once a day (25 Nov 2023 02:40)    PHYSICAL EXAMINATION:  T(C): 35.6 (29 Nov 2023 08:28), Max: 36.8 (28 Nov 2023 13:44)  T(F): 96 (29 Nov 2023 08:28), Max: 98.2 (28 Nov 2023 13:44)  HR: 96 (29 Nov 2023 08:28) (75 - 97)  BP: 124/80 (29 Nov 2023 08:28) (111/69 - 138/86)  RR: 18 (29 Nov 2023 08:28) (17 - 18)  SpO2: 96% (29 Nov 2023 08:28) (93% - 100%)    Parameters below as of 29 Nov 2023 08:28  Patient On (Oxygen Delivery Method): room air    GENERAL APPEARANCE:  Pt. is not currently dyspneic, in no distress. Pt. is alert, oriented, and pleasant.  HEENT:  Pupils are normal and react normally. No icterus. Mucous membranes well colored.  NECK:  Supple. No lymphadenopathy. Jugular venous pressure not elevated. Carotids equal.   HEART:   The cardiac impulse has a normal quality. There are no murmurs, rubs or gallops noted  CHEST:  Chest is clear to auscultation. Normal respiratory effort.  ABDOMEN:  Soft and nontender.   EXTREMITIES:  There is no edema.   SKIN:  No rash or significant lesions are noted.    LABS: All Labs Reviewed:                        9.1    8.66  )-----------( 238      ( 28 Nov 2023 08:12 )             28.6     11-28    139  |  105  |  17  ----------------------------<  64<L>  3.5   |  26  |  0.72    Ca    9.1      28 Nov 2023 08:12      ECG:   Ventricular Rate 65 BPM    Atrial Rate 65 BPM    P-R Interval 152 ms    QRS Duration 84 ms    Q-T Interval 414 ms    QTC Calculation(Bazett) 430 ms    P Axis 22 degrees    R Axis -12 degrees    T Axis 26 degrees    Diagnosis Line Normal sinus rhythm  Normal ECG  Confirmed by Piyush Busby ([id number]) on 11/26/2023 5:58:41 PM    TELEMETRY: Not on tele.    CARDIAC TESTS: 2Decho 8/23- Normal LVEF 60%, mild MR/TR.  Lyticsy monitor 8/23- SR, brief atach, ventricular triplets.    RADIOLOGY & ADDITIONAL STUDIES:     CT Head No Cont (11.24.23 @ 23:25) >  IMPRESSION:  No acute intracranial hemorrhage or mass effect.    MR Lumbar Spine w/wo IV Cont (11.25.23 @ 11:57) >  Bony metastases at T11,L1, L2, L4, L5 and left sacrum.   Pathologic fractures of T11 and L5. Possible cord compression at T11 and   diffuse thecal sac compression at L5.

## 2023-11-30 LAB
ALP BONE SERPL-MCNC: 26 % — SIGNIFICANT CHANGE UP (ref 14–68)
ALP BONE SERPL-MCNC: 26 % — SIGNIFICANT CHANGE UP (ref 14–68)
ALP FLD-CCNC: 119 IU/L — SIGNIFICANT CHANGE UP (ref 44–121)
ALP FLD-CCNC: 119 IU/L — SIGNIFICANT CHANGE UP (ref 44–121)
ALP INTEST CFR SERPL: 2 % — SIGNIFICANT CHANGE UP (ref 0–18)
ALP INTEST CFR SERPL: 2 % — SIGNIFICANT CHANGE UP (ref 0–18)
ALP LIVER SERPL-CCNC: 72 % — SIGNIFICANT CHANGE UP (ref 18–85)
ALP LIVER SERPL-CCNC: 72 % — SIGNIFICANT CHANGE UP (ref 18–85)
CULTURE RESULTS: SIGNIFICANT CHANGE UP
SPECIMEN SOURCE: SIGNIFICANT CHANGE UP

## 2023-11-30 PROCEDURE — 99232 SBSQ HOSP IP/OBS MODERATE 35: CPT

## 2023-11-30 RX ADMIN — CARBIDOPA AND LEVODOPA 1 TABLET(S): 25; 100 TABLET ORAL at 17:01

## 2023-11-30 RX ADMIN — Medication 4 MILLIGRAM(S): at 11:38

## 2023-11-30 RX ADMIN — PRAMIPEXOLE DIHYDROCHLORIDE 0.12 MILLIGRAM(S): 0.12 TABLET ORAL at 21:06

## 2023-11-30 RX ADMIN — OXYCODONE HYDROCHLORIDE 10 MILLIGRAM(S): 5 TABLET ORAL at 23:19

## 2023-11-30 RX ADMIN — OXYCODONE HYDROCHLORIDE 10 MILLIGRAM(S): 5 TABLET ORAL at 05:14

## 2023-11-30 RX ADMIN — Medication 50 MICROGRAM(S): at 05:14

## 2023-11-30 RX ADMIN — DONEPEZIL HYDROCHLORIDE 10 MILLIGRAM(S): 10 TABLET, FILM COATED ORAL at 21:06

## 2023-11-30 RX ADMIN — CARBIDOPA AND LEVODOPA 1 TABLET(S): 25; 100 TABLET ORAL at 11:35

## 2023-11-30 RX ADMIN — OXYCODONE HYDROCHLORIDE 10 MILLIGRAM(S): 5 TABLET ORAL at 05:45

## 2023-11-30 RX ADMIN — Medication 25 MILLIGRAM(S): at 11:34

## 2023-11-30 RX ADMIN — Medication 240 MILLIGRAM(S): at 11:33

## 2023-11-30 RX ADMIN — CARBIDOPA AND LEVODOPA 1 TABLET(S): 25; 100 TABLET ORAL at 23:18

## 2023-11-30 RX ADMIN — Medication 4 MILLIGRAM(S): at 17:01

## 2023-11-30 RX ADMIN — SENNA PLUS 2 TABLET(S): 8.6 TABLET ORAL at 21:06

## 2023-11-30 RX ADMIN — Medication 30 MILLILITER(S): at 23:38

## 2023-11-30 RX ADMIN — CARBIDOPA AND LEVODOPA 1 TABLET(S): 25; 100 TABLET ORAL at 05:14

## 2023-11-30 RX ADMIN — HEPARIN SODIUM 5000 UNIT(S): 5000 INJECTION INTRAVENOUS; SUBCUTANEOUS at 14:12

## 2023-11-30 RX ADMIN — OXYCODONE HYDROCHLORIDE 10 MILLIGRAM(S): 5 TABLET ORAL at 23:50

## 2023-11-30 RX ADMIN — PANTOPRAZOLE SODIUM 40 MILLIGRAM(S): 20 TABLET, DELAYED RELEASE ORAL at 05:13

## 2023-11-30 RX ADMIN — HEPARIN SODIUM 5000 UNIT(S): 5000 INJECTION INTRAVENOUS; SUBCUTANEOUS at 05:13

## 2023-11-30 RX ADMIN — Medication 4 MILLIGRAM(S): at 23:18

## 2023-11-30 RX ADMIN — ATORVASTATIN CALCIUM 40 MILLIGRAM(S): 80 TABLET, FILM COATED ORAL at 21:06

## 2023-11-30 RX ADMIN — BUPROPION HYDROCHLORIDE 100 MILLIGRAM(S): 150 TABLET, EXTENDED RELEASE ORAL at 10:13

## 2023-11-30 RX ADMIN — Medication 4 MILLIGRAM(S): at 05:13

## 2023-11-30 RX ADMIN — OXYCODONE HYDROCHLORIDE 10 MILLIGRAM(S): 5 TABLET ORAL at 10:30

## 2023-11-30 NOTE — PROGRESS NOTE ADULT - ASSESSMENT
84 y/o F w/ PMH of HTN, dyslipidemia, GERD, parkinson's, neuropathy, anemia, loop recorder, p/w LE weakness    # LE Weakness / paresthesias / back pain due to metastatic Dz with T11 and L5 pathologic Fx with suspected cord compression. Unknown Primary malignancy   -CT head: No acute intracranial hemorrhage or mass effect.  -CT: Multiple lytic osseous lesions suspicious for mets. Lesions in the L5 and T11 vertebral bodies have epidural extension.  Pathologic fracture T11 vertebral body. Some of the rib lesions demonstrate pathologic fractures. Please see above comments. Adrenal adenoma. Multiple hepatic lesions suspicious for metastatic disease.  -MRI C/s: No evidence for metastatic disease to the cervical spine or cervical spinal cord compression.  -MRI: T/S: A large metastasis replaces the T11 vertebral body with an associated mild to moderate pathologic compression fracture deformity and retropulsion of bone. Epidural tumor extension is noted. The compression fracture with retropulsion of bone and epidural tumor extension results in mild to moderate thoracic spinal cord compression.  -MRI: L/S: Bony metastases at T11,L1, L2, L4, L5 and left sacrum. Pathologic fractures of T11 and L5. Possible cord compression at T11 and diffuse thecal sac compression at L5.  - MRI brain: no infarct or bleeding, no metastatic dz   -Neurochecks, stable   -ESR: Mild mildly  -S/p Rib lesion Bx, f/u path report   - On  dexamethasone IV  taper, continue   - C/w PO  Oxy and PRN IV meds   - plan for OR for T11  stabilization, will need to further discuss L5 Tx possible Radiation Tx? later     #Normocytic anemia  -multifactorial with Hx iron deficiency, B12 deficiency, CRI, and anemia of chronic disease and now possibly due to malignancy.  -s/P Feraheme as outpatient  -s/p methylcobalamin   -Trend H&H, stable  - D/w Heme onc, no objections to dvt ppx     #Mild hypercalcemia  -Monitor and trend    #Elevated Alk phos   -Likely secondary to bony mets and hepatic mets    #Abnormal UA  -UCX: Negative UTI ruled out  -DC ABX    #Thyroid nodule  -TSH with T4: 2.20  - thryoid us: small nodules, f/u US in 1 year recommended     # HTN / dyslipidemia / GERD /   -C/w home meds:  Lipitor, Metoprolol. Verapamil, losartan       # Parkinson's DZ  supportive care  C/w Sinemet       *DVT ppx   -SCDs  - Start heparin SQ     ACP: Pt followed by palliative team, discussed plan, Pt is now DNR/DNI, MOLST signed     Dispo; LAbs in am. Pt is medically optimized for OR. Patient  has an RCRI score of 1 and is at 6.0% risk for major adverse cardiac event.   cleared by cardio

## 2023-11-30 NOTE — PROGRESS NOTE ADULT - SUBJECTIVE AND OBJECTIVE BOX
Chief Complaint: Back pain, leg weakness    History: Patient admitted to the hospital with severe back pain and leg weakness which has been present for over 2 months. CT/MRI demonstrated diffuse metastatic disease with large lesion in T11 and L5 with epidural extension and canal compromise.     OBJECTIVE:     Vital Signs Last 24 Hrs  T(C): 36.3 (30 Nov 2023 08:56), Max: 36.4 (29 Nov 2023 15:12)  T(F): 97.4 (30 Nov 2023 08:56), Max: 97.5 (29 Nov 2023 15:12)  HR: 71 (30 Nov 2023 08:56) (70 - 71)  BP: 108/84 (30 Nov 2023 08:56) (107/71 - 108/84)  BP(mean): 89 (30 Nov 2023 08:56) (89 - 89)  RR: 17 (30 Nov 2023 08:56) (16 - 17)  SpO2: 97% (30 Nov 2023 08:56) (95% - 97%)    Parameters below as of 30 Nov 2023 08:56  Patient On (Oxygen Delivery Method): room air        Physical Exam:         Awake and alert         HEENT - unremarkable         Neck - supple         Back: tenderness TL region         Bilateral Upper Extremities:             Good Motor Strength             Sensation intact         Bilateral Lower Extremities             Neuro stable - diffuse weakness BLE 4/5 MS    I&O's Detail      A/P :  83y Female with 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 Due to the extensive involvement of T11 at the cord level, surgical decompression and stabilization was recommended.  Surgery would include thoracotomy, anterior corpectomy T11, anterior fusion [id number] with cage, instrumentation, bone graft. Surgery discussed at length with the patient's . Risks, benetfita and alternatives to surgery were also discussed. Risks include but are not limited to risk of anesthesia, infection, dural tear, spinal cord injury, nerve injury, excessive bleeding, failure of fusion/instrumentation, need to for additional surgery and incomplete relief of symptoms. Patient may require and second stage procedure at a later date for additional posterior stabilization. It was stressed that the surgery is to decompress the spinal canal and to stabilize the spinal and that it is not a curative procedure. Patient's  understands the procedure and risks and wishes to proceed. Surgery is scheduled for tomorrow morning.  -    NPO after MN  -    Continue present treatment

## 2023-11-30 NOTE — PROGRESS NOTE ADULT - SUBJECTIVE AND OBJECTIVE BOX
CC: LE weakness (28 Nov 2023 12:17)    HPI:  84 y/o F w/ PMH of HTN, dyslipidemia, GERD, parkinson's, neuropathy, anemia, loop recorder, p/w LE weakness. Patient has been having B/L LE weakness for a little less than 1 year. Symptoms have been getting progressively worse to the point where patient is unable to ambulate. Patient also has been getting worked up for anemia and approximately 40lb unintentional weight loss. She had outpatient CT C/A/P showing multiple bone mets and possible liver mets and path fracture of T11 vertebral body. Patient sent to ED for further evaluation. Patient evaluated by ortho with recommendation for MRI. Patient has a loop recorder in placed by Dr. Crawley, and patient is unsure if it is compatible with MRI. Will consult Dr. Crawley for input prior to MRI. Patient also C/o LE paresthesias and back pain.     INTERVAL HPI/ OVERNIGHT EVENTS: Pt was seen and examined, report in  pain but is better with meds. Ok PO intake. As per  overall pain is better. Awaiting procedure in am      Vital Signs Last 24 Hrs  T(C): 36.4 (30 Nov 2023 15:30), Max: 36.4 (30 Nov 2023 15:30)  T(F): 97.6 (30 Nov 2023 15:30), Max: 97.6 (30 Nov 2023 15:30)  HR: 69 (30 Nov 2023 15:30) (69 - 71)  BP: 100/62 (30 Nov 2023 15:30) (100/62 - 108/84)  BP(mean): 89 (30 Nov 2023 08:56) (89 - 89)  RR: 16 (30 Nov 2023 15:30) (16 - 17)  SpO2: 94% (30 Nov 2023 15:30) (94% - 97%)    Parameters below as of 30 Nov 2023 15:30  Patient On (Oxygen Delivery Method): room air        REVIEW OF SYSTEMS:  All other review of systems is negative unless indicated above.      PHYSICAL EXAM:  General: in no acute distress  Eyes: EOMI; conjunctiva and sclera clear  Head: Normocephalic; atraumatic  ENMT: No nasal discharge; airway clear  Respiratory: No wheezes, rales or rhonchi  Cardiovascular: Regular rate and rhythm. S1 and S2 Normal;   Gastrointestinal: Soft non-tender non-distended; Normal bowel sounds  Genitourinary: No  suprapubic  tenderness  Extremities: No edema  Neurological: Alert and oriented x 3, LLE weakness 4/5, hypokinesis and some muscle stiffness, hypophonia   Musculoskeletal: Normal muscle tone, without deformities  Psychiatric: Cooperative    LABS:                  Urinalysis Basic - ( 28 Nov 2023 08:12 )    Color: x / Appearance: x / SG: x / pH: x  Gluc: 64 mg/dL / Ketone: x  / Bili: x / Urobili: x   Blood: x / Protein: x / Nitrite: x   Leuk Esterase: x / RBC: x / WBC x   Sq Epi: x / Non Sq Epi: x / Bacteria: x                            9.1    8.66  )-----------( 238      ( 28 Nov 2023 08:12 )             28.6     28 Nov 2023 08:12    139    |  105    |  17     ----------------------------<  64     3.5     |  26     |  0.72     Ca    9.1        28 Nov 2023 08:12    TPro  6.0    /  Alb  2.7    /  TBili  0.5    /  DBili  x      /  AST  21     /  ALT  8      /  AlkPhos  123    27 Nov 2023 07:31          LIVER FUNCTIONS - ( 27 Nov 2023 07:31 )  Alb: 2.7 g/dL / Pro: 6.0 gm/dL / ALK PHOS: 123 U/L / ALT: 8 U/L / AST: 21 U/L / GGT: x           Urinalysis Basic - ( 28 Nov 2023 08:12 )  Color: x / Appearance: x / SG: x / pH: x  Gluc: 64 mg/dL / Ketone: x  / Bili: x / Urobili: x   Blood: x / Protein: x / Nitrite: x   Leuk Esterase: x / RBC: x / WBC x   Sq Epi: x / Non Sq Epi: x / Bacteria: x        MEDICATIONS  (STANDING):  atorvastatin 40 milliGRAM(s) Oral at bedtime  buPROPion SR (12-Hour) 100 milliGRAM(s) Oral daily  carbidopa/levodopa  25/100 1 Tablet(s) Oral four times a day  dexAMETHasone  Injectable 6 milliGRAM(s) IV Push every 6 hours  dexAMETHasone  Injectable   IV Push   donepezil 10 milliGRAM(s) Oral at bedtime  ferrous    sulfate 325 milliGRAM(s) Oral daily  heparin   Injectable 5000 Unit(s) SubCutaneous every 8 hours  influenza  Vaccine (HIGH DOSE) 0.7 milliLiter(s) IntraMuscular once  levothyroxine 50 MICROGram(s) Oral daily  losartan 25 milliGRAM(s) Oral daily  metoprolol succinate ER 25 milliGRAM(s) Oral daily  naloxone Injectable 0.4 milliGRAM(s) IV Push once  pantoprazole    Tablet 40 milliGRAM(s) Oral before breakfast  polyethylene glycol 3350 17 Gram(s) Oral daily  pramipexole 0.125 milliGRAM(s) Oral at bedtime  senna 2 Tablet(s) Oral at bedtime  verapamil  milliGRAM(s) Oral daily    MEDICATIONS  (PRN):  acetaminophen     Tablet .. 650 milliGRAM(s) Oral every 6 hours PRN Mild Pain (1 - 3), Moderate Pain (4 - 6)  bisacodyl 5 milliGRAM(s) Oral daily PRN Constipation  morphine  - Injectable 2 milliGRAM(s) IV Push every 4 hours PRN for breakthrough  oxyCODONE    IR 10 milliGRAM(s) Oral every 4 hours PRN Severe Pain (7 - 10)  oxyCODONE    IR 5 milliGRAM(s) Oral every 4 hours PRN Moderate Pain (4 - 6)          RADIOLOGY & ADDITIONAL TESTS:    ACC: [id number] EXAM:  MR SPINE LUMBAR WAW IC   ORDERED BY: BRIANNA TAPIA     PROCEDURE DATE:  11/25/2023          INTERPRETATION:  CLINICAL INFORMATION: Metastases    ADDITIONAL CLINICAL INFORMATION: Cancer C80.1    TECHNIQUE: Multiplanar, multisequence MRI was performed of the lumbar   spine.  IV Contrast: Gadavist  8 cc administered   2 cc discarded    CORRELATION: CT chest and abdomen 11/21/2023    FINDINGS:    ALIGNMENT: The alignment is normal.  VERTEBRA: There is a mild compression fracture deformity of T12 as well   as pathological compression fracture deformities of T11 and L5. There is   diffuse marrow infiltration of the T11 and L5 vertebral bodies with   extension to the posterior elements with hypointense T1 and hyperintense   T2/STIR signal with avid enhancement compatible with metastatic disease.   The loss of the cortical margins is better seen on the prior CT. There is   ventral epidural soft tissue component at T11 indenting and possibly   compressing the spinal cord. Axialimages were not obtained at this   level. There is diffuse ventral epidural soft tissue at L5 severely   compressing the thecal sac as well as the L5 and S1 nerve roots. There   also is diffuse involvement of the left left sacrum at the S1 and S2   levels extending to the left SI joint. There is minimum epidural soft   tissue along the lateral margin of the sacral foramen at the S1-S2 level.   There are additional smaller metastases involving L1, L2 and L4. There   are focal areas of hyperintenseT1 and T2 signal within the T2 12 and L1   vertebral bodies which may represent hemangiomas.  DISC SPACES: Maintained.  CANAL: The distal cord and conus are normal in signal. Conus terminates   at L1.  VISUALIZED INTRAPELVIC/INTRA-ABDOMINAL SOFT TISSUES: Normal.  PARASPINAL SOFT TISSUES: Normal.    INDIVIDUAL LEVELS:    L1-L2: No disc herniation, spinal canal stenosis or neural foramen   narrowing.    L2-L3: No disc herniation. Mild degenerative facet disease. No spinal   canal stenosis or neuralforamen narrowing.    L3-L4: No disc herniation. Mild degenerative facet disease. No spinal   canal stenosis or neural foramen narrowing.    IMPRESSION: Bony metastases at T11,L1, L2, L4, L5 and left sacrum.   Pathologic fractures of T11 and L5. Possible cord compression at T11 and   diffuse thecal sac compression at L5.      ACC: [id number] EXAM:  MR BRAIN WAW IC   ORDERED BY: STEW CASTELLON     PROCEDURE DATE:  11/29/2023          INTERPRETATION:  CLINICAL INDICATION: Evaluate for intracranial   metastatic disease.    TECHNIQUE: Multi-planar, multi-sequence magnetic resonance imaging of the   brain was performed with and without intravenous contrast.    CONTRAST: Post-contrast MR images were obtained following infusion of 8   mL of Gadavist    COMPARISON: 11/24/2023    FINDINGS: There is no acute infarct. There is no evidence of mass or   intracranial hemorrhage. There is no abnormal enhancement within the   brain. Ventricles and sulci are normal in size and configuration for the   patient's stated age. No midline shift or other significant mass effect   is noted.Gray-white differentiation is maintained throughout. Normal   flow voids are maintained in the dominant arterial and venous structures.   There are scattered foci of hyperintense T2 signal within the subcortical   and periventricular white matter which are nonspecific but likely related   to chronic microvascular ischemic disease.    The visualized paranasal sinuses and tympanomastoid spaces are clear.   Orbits and orbital contents are unremarkable.    IMPRESSION: NO EVIDENCE OF INTRACRANIAL HEMORRHAGE, ACUTE TERRITORIAL   INFARCT OR AREA OF ABNORMAL ENHANCEMENT. NO EVIDENCE OF INTRACRANIAL   METASTATIC DISEASE.

## 2023-12-01 ENCOUNTER — APPOINTMENT (OUTPATIENT)
Dept: SURGERY | Facility: HOSPITAL | Age: 83
End: 2023-12-01

## 2023-12-01 ENCOUNTER — RESULT REVIEW (OUTPATIENT)
Age: 83
End: 2023-12-01

## 2023-12-01 LAB
ANION GAP SERPL CALC-SCNC: 6 MMOL/L — SIGNIFICANT CHANGE UP (ref 5–17)
ANION GAP SERPL CALC-SCNC: 6 MMOL/L — SIGNIFICANT CHANGE UP (ref 5–17)
ANION GAP SERPL CALC-SCNC: 9 MMOL/L — SIGNIFICANT CHANGE UP (ref 5–17)
ANION GAP SERPL CALC-SCNC: 9 MMOL/L — SIGNIFICANT CHANGE UP (ref 5–17)
BLD GP AB SCN SERPL QL: SIGNIFICANT CHANGE UP
BLD GP AB SCN SERPL QL: SIGNIFICANT CHANGE UP
BUN SERPL-MCNC: 30 MG/DL — HIGH (ref 7–23)
CALCIUM SERPL-MCNC: 7.7 MG/DL — LOW (ref 8.5–10.1)
CALCIUM SERPL-MCNC: 7.7 MG/DL — LOW (ref 8.5–10.1)
CALCIUM SERPL-MCNC: 9 MG/DL — SIGNIFICANT CHANGE UP (ref 8.5–10.1)
CALCIUM SERPL-MCNC: 9 MG/DL — SIGNIFICANT CHANGE UP (ref 8.5–10.1)
CHLORIDE SERPL-SCNC: 105 MMOL/L — SIGNIFICANT CHANGE UP (ref 96–108)
CHLORIDE SERPL-SCNC: 105 MMOL/L — SIGNIFICANT CHANGE UP (ref 96–108)
CHLORIDE SERPL-SCNC: 110 MMOL/L — HIGH (ref 96–108)
CHLORIDE SERPL-SCNC: 110 MMOL/L — HIGH (ref 96–108)
CO2 SERPL-SCNC: 21 MMOL/L — LOW (ref 22–31)
CO2 SERPL-SCNC: 21 MMOL/L — LOW (ref 22–31)
CO2 SERPL-SCNC: 28 MMOL/L — SIGNIFICANT CHANGE UP (ref 22–31)
CO2 SERPL-SCNC: 28 MMOL/L — SIGNIFICANT CHANGE UP (ref 22–31)
CREAT SERPL-MCNC: 0.8 MG/DL — SIGNIFICANT CHANGE UP (ref 0.5–1.3)
CREAT SERPL-MCNC: 0.8 MG/DL — SIGNIFICANT CHANGE UP (ref 0.5–1.3)
CREAT SERPL-MCNC: 0.82 MG/DL — SIGNIFICANT CHANGE UP (ref 0.5–1.3)
CREAT SERPL-MCNC: 0.82 MG/DL — SIGNIFICANT CHANGE UP (ref 0.5–1.3)
EGFR: 72 ML/MIN/1.73M2 — SIGNIFICANT CHANGE UP
EGFR: 72 ML/MIN/1.73M2 — SIGNIFICANT CHANGE UP
EGFR: 74 ML/MIN/1.73M2 — SIGNIFICANT CHANGE UP
EGFR: 74 ML/MIN/1.73M2 — SIGNIFICANT CHANGE UP
GLUCOSE BLDC GLUCOMTR-MCNC: 124 MG/DL — HIGH (ref 70–99)
GLUCOSE BLDC GLUCOMTR-MCNC: 124 MG/DL — HIGH (ref 70–99)
GLUCOSE BLDC GLUCOMTR-MCNC: 141 MG/DL — HIGH (ref 70–99)
GLUCOSE BLDC GLUCOMTR-MCNC: 141 MG/DL — HIGH (ref 70–99)
GLUCOSE SERPL-MCNC: 116 MG/DL — HIGH (ref 70–99)
GLUCOSE SERPL-MCNC: 116 MG/DL — HIGH (ref 70–99)
GLUCOSE SERPL-MCNC: 146 MG/DL — HIGH (ref 70–99)
GLUCOSE SERPL-MCNC: 146 MG/DL — HIGH (ref 70–99)
HCT VFR BLD CALC: 21.9 % — LOW (ref 34.5–45)
HCT VFR BLD CALC: 21.9 % — LOW (ref 34.5–45)
HCT VFR BLD CALC: 26.9 % — LOW (ref 34.5–45)
HCT VFR BLD CALC: 26.9 % — LOW (ref 34.5–45)
HCT VFR BLD CALC: 30 % — LOW (ref 34.5–45)
HCT VFR BLD CALC: 30 % — LOW (ref 34.5–45)
HGB BLD-MCNC: 7.1 G/DL — LOW (ref 11.5–15.5)
HGB BLD-MCNC: 7.1 G/DL — LOW (ref 11.5–15.5)
HGB BLD-MCNC: 9 G/DL — LOW (ref 11.5–15.5)
HGB BLD-MCNC: 9 G/DL — LOW (ref 11.5–15.5)
HGB BLD-MCNC: 9.8 G/DL — LOW (ref 11.5–15.5)
HGB BLD-MCNC: 9.8 G/DL — LOW (ref 11.5–15.5)
INR BLD: 0.99 RATIO — SIGNIFICANT CHANGE UP (ref 0.85–1.18)
INR BLD: 0.99 RATIO — SIGNIFICANT CHANGE UP (ref 0.85–1.18)
MCHC RBC-ENTMCNC: 30.9 PG — SIGNIFICANT CHANGE UP (ref 27–34)
MCHC RBC-ENTMCNC: 30.9 PG — SIGNIFICANT CHANGE UP (ref 27–34)
MCHC RBC-ENTMCNC: 31.1 PG — SIGNIFICANT CHANGE UP (ref 27–34)
MCHC RBC-ENTMCNC: 31.1 PG — SIGNIFICANT CHANGE UP (ref 27–34)
MCHC RBC-ENTMCNC: 31.4 PG — SIGNIFICANT CHANGE UP (ref 27–34)
MCHC RBC-ENTMCNC: 31.4 PG — SIGNIFICANT CHANGE UP (ref 27–34)
MCHC RBC-ENTMCNC: 32.4 GM/DL — SIGNIFICANT CHANGE UP (ref 32–36)
MCHC RBC-ENTMCNC: 32.4 GM/DL — SIGNIFICANT CHANGE UP (ref 32–36)
MCHC RBC-ENTMCNC: 32.7 GM/DL — SIGNIFICANT CHANGE UP (ref 32–36)
MCHC RBC-ENTMCNC: 32.7 GM/DL — SIGNIFICANT CHANGE UP (ref 32–36)
MCHC RBC-ENTMCNC: 33.5 GM/DL — SIGNIFICANT CHANGE UP (ref 32–36)
MCHC RBC-ENTMCNC: 33.5 GM/DL — SIGNIFICANT CHANGE UP (ref 32–36)
MCV RBC AUTO: 93.7 FL — SIGNIFICANT CHANGE UP (ref 80–100)
MCV RBC AUTO: 93.7 FL — SIGNIFICANT CHANGE UP (ref 80–100)
MCV RBC AUTO: 95.2 FL — SIGNIFICANT CHANGE UP (ref 80–100)
PLATELET # BLD AUTO: 207 K/UL — SIGNIFICANT CHANGE UP (ref 150–400)
PLATELET # BLD AUTO: 207 K/UL — SIGNIFICANT CHANGE UP (ref 150–400)
PLATELET # BLD AUTO: 249 K/UL — SIGNIFICANT CHANGE UP (ref 150–400)
PLATELET # BLD AUTO: 249 K/UL — SIGNIFICANT CHANGE UP (ref 150–400)
PLATELET # BLD AUTO: 254 K/UL — SIGNIFICANT CHANGE UP (ref 150–400)
PLATELET # BLD AUTO: 254 K/UL — SIGNIFICANT CHANGE UP (ref 150–400)
POTASSIUM SERPL-MCNC: 3.5 MMOL/L — SIGNIFICANT CHANGE UP (ref 3.5–5.3)
POTASSIUM SERPL-MCNC: 3.5 MMOL/L — SIGNIFICANT CHANGE UP (ref 3.5–5.3)
POTASSIUM SERPL-MCNC: 4.4 MMOL/L — SIGNIFICANT CHANGE UP (ref 3.5–5.3)
POTASSIUM SERPL-MCNC: 4.4 MMOL/L — SIGNIFICANT CHANGE UP (ref 3.5–5.3)
POTASSIUM SERPL-SCNC: 3.5 MMOL/L — SIGNIFICANT CHANGE UP (ref 3.5–5.3)
POTASSIUM SERPL-SCNC: 3.5 MMOL/L — SIGNIFICANT CHANGE UP (ref 3.5–5.3)
POTASSIUM SERPL-SCNC: 4.4 MMOL/L — SIGNIFICANT CHANGE UP (ref 3.5–5.3)
POTASSIUM SERPL-SCNC: 4.4 MMOL/L — SIGNIFICANT CHANGE UP (ref 3.5–5.3)
PROTHROM AB SERPL-ACNC: 11.2 SEC — SIGNIFICANT CHANGE UP (ref 9.5–13)
PROTHROM AB SERPL-ACNC: 11.2 SEC — SIGNIFICANT CHANGE UP (ref 9.5–13)
RBC # BLD: 2.3 M/UL — LOW (ref 3.8–5.2)
RBC # BLD: 2.3 M/UL — LOW (ref 3.8–5.2)
RBC # BLD: 2.87 M/UL — LOW (ref 3.8–5.2)
RBC # BLD: 2.87 M/UL — LOW (ref 3.8–5.2)
RBC # BLD: 3.15 M/UL — LOW (ref 3.8–5.2)
RBC # BLD: 3.15 M/UL — LOW (ref 3.8–5.2)
RBC # FLD: 14.9 % — HIGH (ref 10.3–14.5)
RBC # FLD: 14.9 % — HIGH (ref 10.3–14.5)
RBC # FLD: 15.3 % — HIGH (ref 10.3–14.5)
RBC # FLD: 15.3 % — HIGH (ref 10.3–14.5)
RBC # FLD: 15.4 % — HIGH (ref 10.3–14.5)
RBC # FLD: 15.4 % — HIGH (ref 10.3–14.5)
SODIUM SERPL-SCNC: 139 MMOL/L — SIGNIFICANT CHANGE UP (ref 135–145)
SODIUM SERPL-SCNC: 139 MMOL/L — SIGNIFICANT CHANGE UP (ref 135–145)
SODIUM SERPL-SCNC: 140 MMOL/L — SIGNIFICANT CHANGE UP (ref 135–145)
SODIUM SERPL-SCNC: 140 MMOL/L — SIGNIFICANT CHANGE UP (ref 135–145)
WBC # BLD: 10.83 K/UL — HIGH (ref 3.8–10.5)
WBC # BLD: 10.83 K/UL — HIGH (ref 3.8–10.5)
WBC # BLD: 17.39 K/UL — HIGH (ref 3.8–10.5)
WBC # BLD: 17.39 K/UL — HIGH (ref 3.8–10.5)
WBC # BLD: 19.47 K/UL — HIGH (ref 3.8–10.5)
WBC # BLD: 19.47 K/UL — HIGH (ref 3.8–10.5)
WBC # FLD AUTO: 10.83 K/UL — HIGH (ref 3.8–10.5)
WBC # FLD AUTO: 10.83 K/UL — HIGH (ref 3.8–10.5)
WBC # FLD AUTO: 17.39 K/UL — HIGH (ref 3.8–10.5)
WBC # FLD AUTO: 17.39 K/UL — HIGH (ref 3.8–10.5)
WBC # FLD AUTO: 19.47 K/UL — HIGH (ref 3.8–10.5)
WBC # FLD AUTO: 19.47 K/UL — HIGH (ref 3.8–10.5)

## 2023-12-01 PROCEDURE — 63085 REMOVE VERT BODY DCMPRN THRC: CPT | Mod: 62

## 2023-12-01 PROCEDURE — 88311 DECALCIFY TISSUE: CPT | Mod: 26

## 2023-12-01 PROCEDURE — 99291 CRITICAL CARE FIRST HOUR: CPT

## 2023-12-01 PROCEDURE — 71045 X-RAY EXAM CHEST 1 VIEW: CPT | Mod: 26

## 2023-12-01 PROCEDURE — 88305 TISSUE EXAM BY PATHOLOGIST: CPT | Mod: 26

## 2023-12-01 RX ORDER — SODIUM CHLORIDE 9 MG/ML
1000 INJECTION, SOLUTION INTRAVENOUS ONCE
Refills: 0 | Status: COMPLETED | OUTPATIENT
Start: 2023-12-01 | End: 2023-12-01

## 2023-12-01 RX ORDER — HYDROMORPHONE HYDROCHLORIDE 2 MG/ML
0.25 INJECTION INTRAMUSCULAR; INTRAVENOUS; SUBCUTANEOUS
Refills: 0 | Status: DISCONTINUED | OUTPATIENT
Start: 2023-12-01 | End: 2023-12-01

## 2023-12-01 RX ORDER — PHENYLEPHRINE HYDROCHLORIDE 10 MG/ML
0.1 INJECTION INTRAVENOUS
Qty: 40 | Refills: 0 | Status: DISCONTINUED | OUTPATIENT
Start: 2023-12-01 | End: 2023-12-02

## 2023-12-01 RX ORDER — TRANEXAMIC ACID 100 MG/ML
1 INJECTION, SOLUTION INTRAVENOUS
Qty: 1000 | Refills: 0 | Status: DISCONTINUED | OUTPATIENT
Start: 2023-12-01 | End: 2023-12-04

## 2023-12-01 RX ORDER — SODIUM CHLORIDE 9 MG/ML
1000 INJECTION, SOLUTION INTRAVENOUS
Refills: 0 | Status: DISCONTINUED | OUTPATIENT
Start: 2023-12-01 | End: 2023-12-03

## 2023-12-01 RX ORDER — BUPROPION HYDROCHLORIDE 150 MG/1
100 TABLET, EXTENDED RELEASE ORAL DAILY
Refills: 0 | Status: DISCONTINUED | OUTPATIENT
Start: 2023-12-01 | End: 2023-12-16

## 2023-12-01 RX ORDER — FENTANYL CITRATE 50 UG/ML
25 INJECTION INTRAVENOUS
Refills: 0 | Status: DISCONTINUED | OUTPATIENT
Start: 2023-12-01 | End: 2023-12-01

## 2023-12-01 RX ORDER — CEFAZOLIN SODIUM 1 G
1000 VIAL (EA) INJECTION EVERY 8 HOURS
Refills: 0 | Status: DISCONTINUED | OUTPATIENT
Start: 2023-12-01 | End: 2023-12-01

## 2023-12-01 RX ORDER — SODIUM CHLORIDE 9 MG/ML
1000 INJECTION, SOLUTION INTRAVENOUS
Refills: 0 | Status: DISCONTINUED | OUTPATIENT
Start: 2023-12-01 | End: 2023-12-01

## 2023-12-01 RX ORDER — ONDANSETRON 8 MG/1
4 TABLET, FILM COATED ORAL ONCE
Refills: 0 | Status: DISCONTINUED | OUTPATIENT
Start: 2023-12-01 | End: 2023-12-01

## 2023-12-01 RX ORDER — OXYCODONE HYDROCHLORIDE 5 MG/1
5 TABLET ORAL ONCE
Refills: 0 | Status: DISCONTINUED | OUTPATIENT
Start: 2023-12-01 | End: 2023-12-01

## 2023-12-01 RX ORDER — SODIUM CHLORIDE 9 MG/ML
500 INJECTION INTRAMUSCULAR; INTRAVENOUS; SUBCUTANEOUS ONCE
Refills: 0 | Status: COMPLETED | OUTPATIENT
Start: 2023-12-01 | End: 2023-12-01

## 2023-12-01 RX ORDER — TRANEXAMIC ACID 100 MG/ML
1000 INJECTION, SOLUTION INTRAVENOUS ONCE
Refills: 0 | Status: DISCONTINUED | OUTPATIENT
Start: 2023-12-01 | End: 2023-12-04

## 2023-12-01 RX ORDER — CEFAZOLIN SODIUM 1 G
1000 VIAL (EA) INJECTION EVERY 8 HOURS
Refills: 0 | Status: COMPLETED | OUTPATIENT
Start: 2023-12-01 | End: 2023-12-02

## 2023-12-01 RX ADMIN — Medication 2 MILLIGRAM(S): at 05:55

## 2023-12-01 RX ADMIN — HYDROMORPHONE HYDROCHLORIDE 0.25 MILLIGRAM(S): 2 INJECTION INTRAMUSCULAR; INTRAVENOUS; SUBCUTANEOUS at 14:30

## 2023-12-01 RX ADMIN — HYDROMORPHONE HYDROCHLORIDE 0.25 MILLIGRAM(S): 2 INJECTION INTRAMUSCULAR; INTRAVENOUS; SUBCUTANEOUS at 13:50

## 2023-12-01 RX ADMIN — SODIUM CHLORIDE 125 MILLILITER(S): 9 INJECTION, SOLUTION INTRAVENOUS at 13:48

## 2023-12-01 RX ADMIN — FENTANYL CITRATE 25 MICROGRAM(S): 50 INJECTION INTRAVENOUS at 13:35

## 2023-12-01 RX ADMIN — PRAMIPEXOLE DIHYDROCHLORIDE 0.12 MILLIGRAM(S): 0.12 TABLET ORAL at 22:51

## 2023-12-01 RX ADMIN — FENTANYL CITRATE 25 MICROGRAM(S): 50 INJECTION INTRAVENOUS at 13:18

## 2023-12-01 RX ADMIN — HYDROMORPHONE HYDROCHLORIDE 0.25 MILLIGRAM(S): 2 INJECTION INTRAMUSCULAR; INTRAVENOUS; SUBCUTANEOUS at 14:38

## 2023-12-01 RX ADMIN — Medication 650 MILLIGRAM(S): at 23:30

## 2023-12-01 RX ADMIN — CARBIDOPA AND LEVODOPA 1 TABLET(S): 25; 100 TABLET ORAL at 22:57

## 2023-12-01 RX ADMIN — SODIUM CHLORIDE 1000 MILLILITER(S): 9 INJECTION INTRAMUSCULAR; INTRAVENOUS; SUBCUTANEOUS at 13:36

## 2023-12-01 RX ADMIN — ATORVASTATIN CALCIUM 40 MILLIGRAM(S): 80 TABLET, FILM COATED ORAL at 22:51

## 2023-12-01 RX ADMIN — SODIUM CHLORIDE 100 MILLILITER(S): 9 INJECTION, SOLUTION INTRAVENOUS at 17:22

## 2023-12-01 RX ADMIN — DONEPEZIL HYDROCHLORIDE 10 MILLIGRAM(S): 10 TABLET, FILM COATED ORAL at 22:51

## 2023-12-01 RX ADMIN — Medication 1000 MILLIGRAM(S): at 17:22

## 2023-12-01 RX ADMIN — FENTANYL CITRATE 25 MICROGRAM(S): 50 INJECTION INTRAVENOUS at 13:05

## 2023-12-01 RX ADMIN — FENTANYL CITRATE 25 MICROGRAM(S): 50 INJECTION INTRAVENOUS at 13:15

## 2023-12-01 RX ADMIN — Medication 650 MILLIGRAM(S): at 22:51

## 2023-12-01 RX ADMIN — SODIUM CHLORIDE 1000 MILLILITER(S): 9 INJECTION, SOLUTION INTRAVENOUS at 14:00

## 2023-12-01 RX ADMIN — Medication 1000 MILLIGRAM(S): at 22:57

## 2023-12-01 RX ADMIN — HYDROMORPHONE HYDROCHLORIDE 0.25 MILLIGRAM(S): 2 INJECTION INTRAMUSCULAR; INTRAVENOUS; SUBCUTANEOUS at 13:38

## 2023-12-01 RX ADMIN — HYDROMORPHONE HYDROCHLORIDE 0.25 MILLIGRAM(S): 2 INJECTION INTRAMUSCULAR; INTRAVENOUS; SUBCUTANEOUS at 15:00

## 2023-12-01 NOTE — PROGRESS NOTE ADULT - NS ATTEND AMEND GEN_ALL_CORE FT
84yo female  with PMHx  HTN, dyslipidemia, GERD, parkinson's, neuropathy, anemia, loop recorder, presents with LE weakness. Patient has been having B/L LE weakness for a little less than 1 year. Symptoms have been getting progressively worse to the point where patient is unable to ambulate.     outpatient CT C/A/P showing multiple bone mets and possible liver mets and path fracture of T11 vertebral body. Patient sent to ED for further evaluation.    Admitted for  1. Progressive B/L LE weakness due to  2. Pathological fx T11 with possible cord compression   2. bone mets T11, L1, L2, L4, L5    s/p anterior thoracic corpectomy, anterior thoracic spine fusion ,     Plan:  SICU  Bolus 1 L LR  Hold BP meds  NPO  IVF  For 1 U PRBC  OP is poor  Will start Pressors   1 U PRBC  Follow UO  No air leak in chest tube  CXR Reviewed and no PTX

## 2023-12-01 NOTE — BRIEF OPERATIVE NOTE - NSICDXBRIEFPROCEDURE_GEN_ALL_CORE_FT
PROCEDURES:  Left thoracotomy 01-Dec-2023 13:00:58  Guillermo Ceballos  Anterior thoracic corpectomy 01-Dec-2023 13:01:13  Guillermo Ceballos  Fusion, spine, thoracic, anterior approach 01-Dec-2023 13:02:14  Guillermo Ceballos  Anterior spinal instrumentation of two to three vertebral segments 01-Dec-2023 13:02:28  Guillermo Ceballos

## 2023-12-01 NOTE — BRIEF OPERATIVE NOTE - NSICDXBRIEFPREOP_GEN_ALL_CORE_FT
PRE-OP DIAGNOSIS:  Metastatic disease 01-Dec-2023 13:03:14  Guillermo Ceballos  Pathologic compression fracture of spine 01-Dec-2023 13:03:47  Guillermo Ceballos

## 2023-12-01 NOTE — PROGRESS NOTE ADULT - ASSESSMENT
ASSESSMENT  82yo female  with PMHx  HTN, dyslipidemia, GERD, parkinson's, neuropathy, anemia, loop recorder, presents with LE weakness. Patient has been having B/L LE weakness for a little less than 1 year. Symptoms have been getting progressively worse to the point where patient is unable to ambulate.     outpatient CT C/A/P showing multiple bone mets and possible liver mets and path fracture of T11 vertebral body. Patient sent to ED for further evaluation.    Admitted for  1. Progressive B/L LE weakness due to  2. Pathological fx T11, bone mets         ASSESSMENT  84yo female  with PMHx  HTN, dyslipidemia, GERD, parkinson's, neuropathy, anemia, loop recorder, presents with LE weakness. Patient has been having B/L LE weakness for a little less than 1 year. Symptoms have been getting progressively worse to the point where patient is unable to ambulate.     outpatient CT C/A/P showing multiple bone mets and possible liver mets and path fracture of T11 vertebral body. Patient sent to ED for further evaluation.    Admitted for  1. Progressive B/L LE weakness due to  2. Pathological fx T11 with possible cord compression   2. bone mets T11, L1, L2, L4, L5    s/p anterior thoracic corpectomy, anterior thoracic spine fusion ,     PLAN    Neuro: AAOx   CV:   Pulm: maintain O2 sat > 94%.  GI: PPI. bowel regimen prn  Nephro: monitor I & Os. Trend renal fxn  Endo: check a1c. BGMs ac hs. ISS.   Vasc:   MSK:   ID: trend WBC. monitor temps. f/u blood and urine cultures.   Heme: DVT ppx -   PT eval    Dispo:    Will discuss with           ASSESSMENT  84yo female  with PMHx  HTN, dyslipidemia, GERD, parkinson's, neuropathy, anemia, loop recorder, presents with LE weakness. Patient has been having B/L LE weakness for a little less than 1 year. Symptoms have been getting progressively worse to the point where patient is unable to ambulate.     outpatient CT C/A/P showing multiple bone mets and possible liver mets and path fracture of T11 vertebral body. Patient sent to ED for further evaluation.    Admitted for  1. Progressive B/L LE weakness due to  2. Pathological fx T11 with possible cord compression   2. bone mets T11, L1, L2, L4, L5    s/p anterior thoracic corpectomy, anterior thoracic spine fusion ,     PLAN    Neuro: lethargic suspect d/t dilaudid, anesthesia but arousable to voice, AAOx 2. follows commands intermittently   CV: BP low. will give 1L LR bolus. check CBC  Pulm: on 4L NC sating 97-98%. titrate maintain O2 sat > 94%. post op CXR - no obvious infiltrates, or evidence of fluid overload.   GI: NPO d/t lethargy.  PPI. bowel regimen prn  Nephro: UOP low. dave in place monitor I & Os. Trend renal fxn. cont IV maintenance fluids  ID: cont post op abx IV cefazolin. trend WBC. monitor temps.  Heme: no chemical DVT ppx due to bleeding risk. SCDs. CBC dropped 9-> 7.1 . transfuse 1u pRBC. repeat labs this evening.   PT eval    Dispo: SICU. Transfuse 1u pRBC. trend H/H. monitor UOP.     Will discuss with Dr. Marie         ASSESSMENT  82yo female  with PMHx  HTN, dyslipidemia, GERD, parkinson's, neuropathy, anemia, loop recorder, presents with LE weakness. Patient has been having B/L LE weakness for a little less than 1 year. Symptoms have been getting progressively worse to the point where patient is unable to ambulate.     outpatient CT C/A/P showing multiple bone mets and possible liver mets and path fracture of T11 vertebral body. Patient sent to ED for further evaluation.    Admitted for  1. Progressive B/L LE weakness due to  2. Pathological fx T11 with possible cord compression   2. bone mets T11, L1, L2, L4, L5    s/p anterior thoracic corpectomy, anterior thoracic spine fusion ,     PLAN    Neuro: lethargic suspect d/t dilaudid, anesthesia but arousable to voice, AAOx 2. follows commands intermittently   CV: BP low. will give 1L LR bolus. holc anti-htn meds (verapamil, metoprolol)  Pulm: on 4L NC sating 97-98%. titrate maintain O2 sat > 94%. post op CXR - no obvious infiltrates, or evidence of fluid overload.   GI: NPO d/t lethargy.  PPI. bowel regimen prn  Nephro: UOP low. dave in place monitor I & Os. Trend renal fxn. cont IV maintenance fluids  ID: cont post op abx IV cefazolin. trend WBC. monitor temps.  Heme: no chemical DVT ppx due to bleeding risk. SCDs. CBC dropped 9-> 7.1 . transfuse 1u pRBC. repeat labs this evening.   PT eval    Dispo: SICU. Transfuse 1u pRBC. trend H/H. monitor UOP.     Will discuss with Dr. Marie

## 2023-12-01 NOTE — PROGRESS NOTE ADULT - SUBJECTIVE AND OBJECTIVE BOX
Patient is a 83y old  Female who presents with a chief complaint of LE weakness (30 Nov 2023 12:48)      BRIEF HOSPITAL COURSE: ***    12/1 seen in PACU, lethargic but arousable, follows commands, c/o L sided pain by CT and lower back pain.    PAST MEDICAL & SURGICAL HISTORY:  GERD (gastroesophageal reflux disease)  HTN (hypertension)  HLD (hyperlipidemia)  Mood disorder  H/O gastric bypass  History of cholecystectomy  History of total kneereplacement, left      Medications:  ceFAZolin  Injectable. 1000 milliGRAM(s) IV Push every 8 hours  metoprolol succinate ER 25 milliGRAM(s) Oral daily  verapamil  milliGRAM(s) Oral daily  acetaminophen     Tablet .. 650 milliGRAM(s) Oral every 6 hours PRN  buPROPion . 100 milliGRAM(s) Oral daily  carbidopa/levodopa  25/100 1 Tablet(s) Oral four times a day  donepezil 10 milliGRAM(s) Oral at bedtime  fentaNYL    Injectable 25 MICROGram(s) IV Push every 5 minutes PRN  HYDROmorphone  Injectable 0.25 milliGRAM(s) IV Push every 10 minutes PRN  morphine  - Injectable 2 milliGRAM(s) IV Push every 4 hours PRN  ondansetron Injectable 4 milliGRAM(s) IV Push once PRN  oxyCODONE    IR 10 milliGRAM(s) Oral every 4 hours PRN  oxyCODONE    IR 5 milliGRAM(s) Oral once PRN  oxyCODONE    IR 5 milliGRAM(s) Oral every 4 hours PRN  pramipexole 0.125 milliGRAM(s) Oral at bedtime  tranexamic acid Infusion 1 mG/kG/Hr IV Continuous <Continuous>  tranexamic acid IVPB 1000 milliGRAM(s) IV Intermittent once  bisacodyl 5 milliGRAM(s) Oral daily PRN  pantoprazole    Tablet 40 milliGRAM(s) Oral before breakfast  polyethylene glycol 3350 17 Gram(s) Oral daily  senna 2 Tablet(s) Oral at bedtime  atorvastatin 40 milliGRAM(s) Oral at bedtime  levothyroxine 50 MICROGram(s) Oral daily  ferrous    sulfate 325 milliGRAM(s) Oral daily  lactated ringers Bolus 1000 milliLiter(s) IV Bolus once  lactated ringers. 1000 milliLiter(s) IV Continuous <Continuous>  influenza  Vaccine (HIGH DOSE) 0.7 milliLiter(s) IntraMuscular once  naloxone Injectable 0.4 milliGRAM(s) IV Push once      ICU Vital Signs Last 24 Hrs  T(C): 36.2 (01 Dec 2023 12:15), Max: 37.1 (01 Dec 2023 06:12)  T(F): 97.2 (01 Dec 2023 12:15), Max: 98.7 (01 Dec 2023 06:12)  HR: 68 (01 Dec 2023 13:45) (62 - 78)  BP: 93/61 (01 Dec 2023 13:45) (83/64 - 128/63)  BP(mean): --  ABP: 94/51 (01 Dec 2023 13:45) (94/51 - 101/52)  ABP(mean): --  RR: 13 (01 Dec 2023 13:45) (13 - 20)  SpO2: 99% (01 Dec 2023 13:45) (94% - 100%)    O2 Parameters below as of 01 Dec 2023 12:15  Patient On (Oxygen Delivery Method): nasal cannula  O2 Flow (L/min): 4      I&O's Detail    01 Dec 2023 07:01  -  01 Dec 2023 14:26  --------------------------------------------------------  IN:    Other (mL): 2200 mL  Total IN: 2200 mL    OUT:    Other (mL): 300 mL  Total OUT: 300 mL    Total NET: 1900 mL      LABS:                        9.8    10.83 )-----------( 249      ( 01 Dec 2023 05:29 )             30.0     12-01    139  |  105  |  30<H>  ----------------------------<  116<H>  4.4   |  28  |  0.82    Ca    9.0      01 Dec 2023 05:29    CAPILLARY BLOOD GLUCOSE      POCT Blood Glucose.: 124 mg/dL (01 Dec 2023 12:33)    PT/INR - ( 01 Dec 2023 05:29 )   PT: 11.2 sec;   INR: 0.99 ratio      Urinalysis Basic - ( 01 Dec 2023 05:29 )    Color: x / Appearance: x / SG: x / pH: x  Gluc: 116 mg/dL / Ketone: x  / Bili: x / Urobili: x   Blood: x / Protein: x / Nitrite: x   Leuk Esterase: x / RBC: x / WBC x   Sq Epi: x / Non Sq Epi: x / Bacteria: x      CULTURES:  Culture Results:   <10,000 CFU/mL Normal Urogenital Jaz (11-25 @ 06:15)  Culture Results:   No growth at 5 days (11-24 @ 19:54)  Culture Results:   No growth at 5 days (11-24 @ 19:54)      Physical Examination:    General: lethagic d/t pain meds, anesthesia but arousable. pale and frail appearing  HEENT: dry mucous membranes  PULM: Clear to auscultation bilaterally, no wheezing. L CT in place- serosanguinous output - no air leak seen  CVS: Regular rate and rhythm, no murmurs  ABD: Soft, nondistended,   EXT: No LE edema  SKIN: pale appearing  NEURO: lethargic but arousable to voice, follows commands intermittently. able to wiggle toes b/l     DEVICES:   R radial A line  R IJ central line  dave  L CT     RADIOLOGY:  Patient is a 83y old  Female who presents with a chief complaint of LE weakness (30 Nov 2023 12:48)      BRIEF HOSPITAL COURSE: ***    12/1 seen in PACU, lethargic but arousable, follows commands, c/o L sided pain by CT and lower back pain.    PAST MEDICAL & SURGICAL HISTORY:  GERD (gastroesophageal reflux disease)  HTN (hypertension)  HLD (hyperlipidemia)  Mood disorder  H/O gastric bypass  History of cholecystectomy  History of total kneereplacement, left      Medications:  ceFAZolin  Injectable. 1000 milliGRAM(s) IV Push every 8 hours  metoprolol succinate ER 25 milliGRAM(s) Oral daily  verapamil  milliGRAM(s) Oral daily  acetaminophen     Tablet .. 650 milliGRAM(s) Oral every 6 hours PRN  buPROPion . 100 milliGRAM(s) Oral daily  carbidopa/levodopa  25/100 1 Tablet(s) Oral four times a day  donepezil 10 milliGRAM(s) Oral at bedtime  fentaNYL    Injectable 25 MICROGram(s) IV Push every 5 minutes PRN  HYDROmorphone  Injectable 0.25 milliGRAM(s) IV Push every 10 minutes PRN  morphine  - Injectable 2 milliGRAM(s) IV Push every 4 hours PRN  ondansetron Injectable 4 milliGRAM(s) IV Push once PRN  oxyCODONE    IR 10 milliGRAM(s) Oral every 4 hours PRN  oxyCODONE    IR 5 milliGRAM(s) Oral once PRN  oxyCODONE    IR 5 milliGRAM(s) Oral every 4 hours PRN  pramipexole 0.125 milliGRAM(s) Oral at bedtime  tranexamic acid Infusion 1 mG/kG/Hr IV Continuous <Continuous>  tranexamic acid IVPB 1000 milliGRAM(s) IV Intermittent once  bisacodyl 5 milliGRAM(s) Oral daily PRN  pantoprazole    Tablet 40 milliGRAM(s) Oral before breakfast  polyethylene glycol 3350 17 Gram(s) Oral daily  senna 2 Tablet(s) Oral at bedtime  atorvastatin 40 milliGRAM(s) Oral at bedtime  levothyroxine 50 MICROGram(s) Oral daily  ferrous    sulfate 325 milliGRAM(s) Oral daily  lactated ringers Bolus 1000 milliLiter(s) IV Bolus once  lactated ringers. 1000 milliLiter(s) IV Continuous <Continuous>  influenza  Vaccine (HIGH DOSE) 0.7 milliLiter(s) IntraMuscular once  naloxone Injectable 0.4 milliGRAM(s) IV Push once      ICU Vital Signs Last 24 Hrs  T(C): 36.2 (01 Dec 2023 12:15), Max: 37.1 (01 Dec 2023 06:12)  T(F): 97.2 (01 Dec 2023 12:15), Max: 98.7 (01 Dec 2023 06:12)  HR: 68 (01 Dec 2023 13:45) (62 - 78)  BP: 93/61 (01 Dec 2023 13:45) (83/64 - 128/63)  BP(mean): --  ABP: 94/51 (01 Dec 2023 13:45) (94/51 - 101/52)  ABP(mean): --  RR: 13 (01 Dec 2023 13:45) (13 - 20)  SpO2: 99% (01 Dec 2023 13:45) (94% - 100%)    O2 Parameters below as of 01 Dec 2023 12:15  Patient On (Oxygen Delivery Method): nasal cannula  O2 Flow (L/min): 4      I&O's Detail    01 Dec 2023 07:01  -  01 Dec 2023 14:26  --------------------------------------------------------  IN:    Other (mL): 2200 mL  Total IN: 2200 mL    OUT:    Other (mL): 300 mL  Total OUT: 300 mL    Total NET: 1900 mL      LABS:                        9.8    10.83 )-----------( 249      ( 01 Dec 2023 05:29 )             30.0     12-01    139  |  105  |  30<H>  ----------------------------<  116<H>  4.4   |  28  |  0.82    Ca    9.0      01 Dec 2023 05:29    CAPILLARY BLOOD GLUCOSE      POCT Blood Glucose.: 124 mg/dL (01 Dec 2023 12:33)    PT/INR - ( 01 Dec 2023 05:29 )   PT: 11.2 sec;   INR: 0.99 ratio      Urinalysis Basic - ( 01 Dec 2023 05:29 )    Color: x / Appearance: x / SG: x / pH: x  Gluc: 116 mg/dL / Ketone: x  / Bili: x / Urobili: x   Blood: x / Protein: x / Nitrite: x   Leuk Esterase: x / RBC: x / WBC x   Sq Epi: x / Non Sq Epi: x / Bacteria: x      CULTURES:  Culture Results:   <10,000 CFU/mL Normal Urogenital Jaz (11-25 @ 06:15)  Culture Results:   No growth at 5 days (11-24 @ 19:54)  Culture Results:   No growth at 5 days (11-24 @ 19:54)      Physical Examination:    General: lethagic d/t pain meds, anesthesia but arousable. pale and frail appearing  HEENT: dry mucous membranes  PULM: Clear to auscultation bilaterally, no wheezing. L CT in place- serosanguinous output - no air leak seen  CVS: Regular rate and rhythm, no murmurs  ABD: Soft, nondistended,   EXT: No LE edema  SKIN: pale appearing  NEURO: lethargic but arousable to voice, follows commands intermittently. able to wiggle toes b/l     DEVICES:   R radial A line  R IJ central line  dave  L CT     RADIOLOGY:   < from: MR Lumbar Spine w/wo IV Cont (11.25.23 @ 11:57) >  INDIVIDUAL LEVELS:    L1-L2: No disc herniation, spinal canal stenosis or neural foramen   narrowing.    L2-L3: No disc herniation. Mild degenerative facet disease. No spinal   canal stenosis or neuralforamen narrowing.    L3-L4: No disc herniation. Mild degenerative facet disease. No spinal   canal stenosis or neural foramen narrowing.    IMPRESSION: Bony metastases at T11,L1, L2, L4, L5 and left sacrum.   Pathologic fractures of T11 and L5. Possible cord compression at T11 and   diffuse thecal sac compression at L5.    < end of copied text >

## 2023-12-01 NOTE — BRIEF OPERATIVE NOTE - NSICDXBRIEFPOSTOP_GEN_ALL_CORE_FT
POST-OP DIAGNOSIS:  Metastatic disease 01-Dec-2023 13:04:07  Guillermo Ceballos  Pathologic compression fracture of spine 01-Dec-2023 13:04:30  Guillermo Ceballos

## 2023-12-02 LAB
ANION GAP SERPL CALC-SCNC: 5 MMOL/L — SIGNIFICANT CHANGE UP (ref 5–17)
ANION GAP SERPL CALC-SCNC: 5 MMOL/L — SIGNIFICANT CHANGE UP (ref 5–17)
BUN SERPL-MCNC: 33 MG/DL — HIGH (ref 7–23)
BUN SERPL-MCNC: 33 MG/DL — HIGH (ref 7–23)
CALCIUM SERPL-MCNC: 7.9 MG/DL — LOW (ref 8.5–10.1)
CALCIUM SERPL-MCNC: 7.9 MG/DL — LOW (ref 8.5–10.1)
CHLORIDE SERPL-SCNC: 111 MMOL/L — HIGH (ref 96–108)
CHLORIDE SERPL-SCNC: 111 MMOL/L — HIGH (ref 96–108)
CO2 SERPL-SCNC: 25 MMOL/L — SIGNIFICANT CHANGE UP (ref 22–31)
CO2 SERPL-SCNC: 25 MMOL/L — SIGNIFICANT CHANGE UP (ref 22–31)
CREAT SERPL-MCNC: 1 MG/DL — SIGNIFICANT CHANGE UP (ref 0.5–1.3)
CREAT SERPL-MCNC: 1 MG/DL — SIGNIFICANT CHANGE UP (ref 0.5–1.3)
EGFR: 57 ML/MIN/1.73M2 — LOW
EGFR: 57 ML/MIN/1.73M2 — LOW
GLUCOSE BLDC GLUCOMTR-MCNC: 132 MG/DL — HIGH (ref 70–99)
GLUCOSE BLDC GLUCOMTR-MCNC: 132 MG/DL — HIGH (ref 70–99)
GLUCOSE SERPL-MCNC: 98 MG/DL — SIGNIFICANT CHANGE UP (ref 70–99)
GLUCOSE SERPL-MCNC: 98 MG/DL — SIGNIFICANT CHANGE UP (ref 70–99)
HCT VFR BLD CALC: 23.3 % — LOW (ref 34.5–45)
HCT VFR BLD CALC: 23.3 % — LOW (ref 34.5–45)
HCT VFR BLD CALC: 24.7 % — LOW (ref 34.5–45)
HCT VFR BLD CALC: 24.7 % — LOW (ref 34.5–45)
HGB BLD-MCNC: 7.8 G/DL — LOW (ref 11.5–15.5)
HGB BLD-MCNC: 7.8 G/DL — LOW (ref 11.5–15.5)
HGB BLD-MCNC: 8.1 G/DL — LOW (ref 11.5–15.5)
HGB BLD-MCNC: 8.1 G/DL — LOW (ref 11.5–15.5)
MAGNESIUM SERPL-MCNC: 2 MG/DL — SIGNIFICANT CHANGE UP (ref 1.6–2.6)
MAGNESIUM SERPL-MCNC: 2 MG/DL — SIGNIFICANT CHANGE UP (ref 1.6–2.6)
MCHC RBC-ENTMCNC: 30.7 PG — SIGNIFICANT CHANGE UP (ref 27–34)
MCHC RBC-ENTMCNC: 30.7 PG — SIGNIFICANT CHANGE UP (ref 27–34)
MCHC RBC-ENTMCNC: 31.5 PG — SIGNIFICANT CHANGE UP (ref 27–34)
MCHC RBC-ENTMCNC: 31.5 PG — SIGNIFICANT CHANGE UP (ref 27–34)
MCHC RBC-ENTMCNC: 32.8 GM/DL — SIGNIFICANT CHANGE UP (ref 32–36)
MCHC RBC-ENTMCNC: 32.8 GM/DL — SIGNIFICANT CHANGE UP (ref 32–36)
MCHC RBC-ENTMCNC: 33.5 GM/DL — SIGNIFICANT CHANGE UP (ref 32–36)
MCHC RBC-ENTMCNC: 33.5 GM/DL — SIGNIFICANT CHANGE UP (ref 32–36)
MCV RBC AUTO: 93.6 FL — SIGNIFICANT CHANGE UP (ref 80–100)
MCV RBC AUTO: 93.6 FL — SIGNIFICANT CHANGE UP (ref 80–100)
MCV RBC AUTO: 94 FL — SIGNIFICANT CHANGE UP (ref 80–100)
MCV RBC AUTO: 94 FL — SIGNIFICANT CHANGE UP (ref 80–100)
PHOSPHATE SERPL-MCNC: 2.3 MG/DL — LOW (ref 2.5–4.5)
PHOSPHATE SERPL-MCNC: 2.3 MG/DL — LOW (ref 2.5–4.5)
PLATELET # BLD AUTO: 160 K/UL — SIGNIFICANT CHANGE UP (ref 150–400)
PLATELET # BLD AUTO: 160 K/UL — SIGNIFICANT CHANGE UP (ref 150–400)
PLATELET # BLD AUTO: 189 K/UL — SIGNIFICANT CHANGE UP (ref 150–400)
PLATELET # BLD AUTO: 189 K/UL — SIGNIFICANT CHANGE UP (ref 150–400)
POTASSIUM SERPL-MCNC: 3.9 MMOL/L — SIGNIFICANT CHANGE UP (ref 3.5–5.3)
POTASSIUM SERPL-MCNC: 3.9 MMOL/L — SIGNIFICANT CHANGE UP (ref 3.5–5.3)
POTASSIUM SERPL-SCNC: 3.9 MMOL/L — SIGNIFICANT CHANGE UP (ref 3.5–5.3)
POTASSIUM SERPL-SCNC: 3.9 MMOL/L — SIGNIFICANT CHANGE UP (ref 3.5–5.3)
RBC # BLD: 2.48 M/UL — LOW (ref 3.8–5.2)
RBC # BLD: 2.48 M/UL — LOW (ref 3.8–5.2)
RBC # BLD: 2.64 M/UL — LOW (ref 3.8–5.2)
RBC # BLD: 2.64 M/UL — LOW (ref 3.8–5.2)
RBC # FLD: 15.4 % — HIGH (ref 10.3–14.5)
RBC # FLD: 15.4 % — HIGH (ref 10.3–14.5)
RBC # FLD: 15.5 % — HIGH (ref 10.3–14.5)
RBC # FLD: 15.5 % — HIGH (ref 10.3–14.5)
SODIUM SERPL-SCNC: 141 MMOL/L — SIGNIFICANT CHANGE UP (ref 135–145)
SODIUM SERPL-SCNC: 141 MMOL/L — SIGNIFICANT CHANGE UP (ref 135–145)
WBC # BLD: 11.64 K/UL — HIGH (ref 3.8–10.5)
WBC # BLD: 11.64 K/UL — HIGH (ref 3.8–10.5)
WBC # BLD: 12.36 K/UL — HIGH (ref 3.8–10.5)
WBC # BLD: 12.36 K/UL — HIGH (ref 3.8–10.5)
WBC # FLD AUTO: 11.64 K/UL — HIGH (ref 3.8–10.5)
WBC # FLD AUTO: 11.64 K/UL — HIGH (ref 3.8–10.5)
WBC # FLD AUTO: 12.36 K/UL — HIGH (ref 3.8–10.5)
WBC # FLD AUTO: 12.36 K/UL — HIGH (ref 3.8–10.5)

## 2023-12-02 PROCEDURE — 99233 SBSQ HOSP IP/OBS HIGH 50: CPT

## 2023-12-02 RX ORDER — SODIUM CHLORIDE 9 MG/ML
1000 INJECTION, SOLUTION INTRAVENOUS ONCE
Refills: 0 | Status: COMPLETED | OUTPATIENT
Start: 2023-12-02 | End: 2023-12-02

## 2023-12-02 RX ORDER — POTASSIUM PHOSPHATE, MONOBASIC POTASSIUM PHOSPHATE, DIBASIC 236; 224 MG/ML; MG/ML
15 INJECTION, SOLUTION INTRAVENOUS ONCE
Refills: 0 | Status: COMPLETED | OUTPATIENT
Start: 2023-12-02 | End: 2023-12-02

## 2023-12-02 RX ADMIN — ATORVASTATIN CALCIUM 40 MILLIGRAM(S): 80 TABLET, FILM COATED ORAL at 23:09

## 2023-12-02 RX ADMIN — OXYCODONE HYDROCHLORIDE 5 MILLIGRAM(S): 5 TABLET ORAL at 11:37

## 2023-12-02 RX ADMIN — DONEPEZIL HYDROCHLORIDE 10 MILLIGRAM(S): 10 TABLET, FILM COATED ORAL at 23:10

## 2023-12-02 RX ADMIN — CARBIDOPA AND LEVODOPA 1 TABLET(S): 25; 100 TABLET ORAL at 23:09

## 2023-12-02 RX ADMIN — CARBIDOPA AND LEVODOPA 1 TABLET(S): 25; 100 TABLET ORAL at 17:22

## 2023-12-02 RX ADMIN — SENNA PLUS 2 TABLET(S): 8.6 TABLET ORAL at 23:09

## 2023-12-02 RX ADMIN — PANTOPRAZOLE SODIUM 40 MILLIGRAM(S): 20 TABLET, DELAYED RELEASE ORAL at 05:49

## 2023-12-02 RX ADMIN — Medication 325 MILLIGRAM(S): at 09:01

## 2023-12-02 RX ADMIN — CARBIDOPA AND LEVODOPA 1 TABLET(S): 25; 100 TABLET ORAL at 05:57

## 2023-12-02 RX ADMIN — PRAMIPEXOLE DIHYDROCHLORIDE 0.12 MILLIGRAM(S): 0.12 TABLET ORAL at 23:09

## 2023-12-02 RX ADMIN — POLYETHYLENE GLYCOL 3350 17 GRAM(S): 17 POWDER, FOR SOLUTION ORAL at 09:01

## 2023-12-02 RX ADMIN — OXYCODONE HYDROCHLORIDE 5 MILLIGRAM(S): 5 TABLET ORAL at 12:30

## 2023-12-02 RX ADMIN — CARBIDOPA AND LEVODOPA 1 TABLET(S): 25; 100 TABLET ORAL at 11:37

## 2023-12-02 RX ADMIN — SODIUM CHLORIDE 1000 MILLILITER(S): 9 INJECTION, SOLUTION INTRAVENOUS at 01:56

## 2023-12-02 RX ADMIN — POTASSIUM PHOSPHATE, MONOBASIC POTASSIUM PHOSPHATE, DIBASIC 62.5 MILLIMOLE(S): 236; 224 INJECTION, SOLUTION INTRAVENOUS at 09:01

## 2023-12-02 RX ADMIN — BUPROPION HYDROCHLORIDE 100 MILLIGRAM(S): 150 TABLET, EXTENDED RELEASE ORAL at 09:01

## 2023-12-02 RX ADMIN — Medication 50 MICROGRAM(S): at 05:49

## 2023-12-02 RX ADMIN — SODIUM CHLORIDE 100 MILLILITER(S): 9 INJECTION, SOLUTION INTRAVENOUS at 09:00

## 2023-12-02 NOTE — PROGRESS NOTE ADULT - SUBJECTIVE AND OBJECTIVE BOX
Reason for Admission:    Reason for Admission:  · Reason for Admission	LE weakness      · Subjective and Objective:       Subjective: POD#1.  Sitting in bedside chair.  C/o surgical pain.     Objective: T11 corpectomy with T10-12 Instrmentation/Stabilization    Heent: N/C, AT PER no scleral icterus, No JVD  Pul: clear.  Chest tube in place on L to suction. 360mL output.   Cor: RRR  Abdomen: soft, normal BS.   Extremities: without edema.  No calf tenderness.  DF/PF 3/5 BL EHL 4/5 BL KE 3-/5 BL.  Unable to HF due to pain.     12-02    141  |  111<H>  |  33<H>  ----------------------------<  98  3.9   |  25  |  1.00    Ca    7.9<L>      02 Dec 2023 05:10  Phos  2.3     12-02  Mg     2.0     12-02                                   7.8    11.64 )-----------( 160      ( 02 Dec 2023 10:00 )             23.3     Assessment and Plan:   · Assessment	  POD 1 s/p T11 corpectomy with T10-12 anterior fusion.  Anemia - xfuse of 1u PRBC today.  Rpt CBC in PM  Chest Tube management as per general surgery.   DVT PPX - SCD  Pain Control - Oxycodone prn  OOB c PT, fall precautions      Plan discussed with patient's RN, Dr. Ceballos, patient's .

## 2023-12-02 NOTE — PROGRESS NOTE ADULT - SUBJECTIVE AND OBJECTIVE BOX
Patient is a 83y old  Female who presents with a chief complaint of LE weakness (30 Nov 2023 12:48)      BRIEF HOSPITAL COURSE: ***    12/1 seen in PACU, lethargic but arousable, follows commands, c/o L sided pain by CT and lower back pain.  12/2: No events over night, more awake today, no air leak from chest tube      PSH: gastric bypass, cholecystectomy, L total knee replacement, tonsillectomy     Social Hx: Former smoker - quit in 1980, denies etoh / drugs     Family Hx: Denies pertinent hx  (25 Nov 2023 00:04)       PAST MEDICAL & SURGICAL HISTORY:  GERD (gastroesophageal reflux disease)      HTN (hypertension)      HLD (hyperlipidemia)      Mood disorder      H/O gastric bypass      History of cholecystectomy      History of total knee replacement, left          FAMILY HISTORY:  No pertinent family history in first degree relatives        Social Hx:    Allergies    Originally Entered as [Moderate Rash] reaction to [SURGICAL TAPE] (Unknown)  Vitamin K (Other (Severe))  Fosamax (Unknown)    Intolerances            ICU Vital Signs Last 24 Hrs  T(C): 36.4 (02 Dec 2023 08:49), Max: 36.7 (02 Dec 2023 00:00)  T(F): 97.5 (02 Dec 2023 08:49), Max: 98.1 (02 Dec 2023 00:00)  HR: 87 (02 Dec 2023 10:00) (64 - 91)  BP: 97/63 (02 Dec 2023 10:00) (79/52 - 107/53)  BP(mean): 72 (02 Dec 2023 10:00) (62 - 95)  ABP: 104/63 (02 Dec 2023 10:00) (90/54 - 113/57)  ABP(mean): 79 (02 Dec 2023 10:00) (66 - 83)  RR: 12 (02 Dec 2023 10:00) (7 - 26)  SpO2: 99% (02 Dec 2023 10:00) (94% - 100%)    O2 Parameters below as of 02 Dec 2023 10:00  Patient On (Oxygen Delivery Method): room air                I&O's Summary    01 Dec 2023 07:01  -  02 Dec 2023 07:00  --------------------------------------------------------  IN: 6390 mL / OUT: 890 mL / NET: 5500 mL                              7.8    11.64 )-----------( 160      ( 02 Dec 2023 10:00 )             23.3       12-02    141  |  111<H>  |  33<H>  ----------------------------<  98  3.9   |  25  |  1.00    Ca    7.9<L>      02 Dec 2023 05:10  Phos  2.3     12-02  Mg     2.0     12-02                  Urinalysis Basic - ( 02 Dec 2023 05:10 )    Color: x / Appearance: x / SG: x / pH: x  Gluc: 98 mg/dL / Ketone: x  / Bili: x / Urobili: x   Blood: x / Protein: x / Nitrite: x   Leuk Esterase: x / RBC: x / WBC x   Sq Epi: x / Non Sq Epi: x / Bacteria: x        MEDICATIONS  (STANDING):  atorvastatin 40 milliGRAM(s) Oral at bedtime  buPROPion . 100 milliGRAM(s) Oral daily  carbidopa/levodopa  25/100 1 Tablet(s) Oral four times a day  donepezil 10 milliGRAM(s) Oral at bedtime  ferrous    sulfate 325 milliGRAM(s) Oral daily  influenza  Vaccine (HIGH DOSE) 0.7 milliLiter(s) IntraMuscular once  lactated ringers. 1000 milliLiter(s) (100 mL/Hr) IV Continuous <Continuous>  levothyroxine 50 MICROGram(s) Oral daily  naloxone Injectable 0.4 milliGRAM(s) IV Push once  pantoprazole    Tablet 40 milliGRAM(s) Oral before breakfast  polyethylene glycol 3350 17 Gram(s) Oral daily  pramipexole 0.125 milliGRAM(s) Oral at bedtime  senna 2 Tablet(s) Oral at bedtime  tranexamic acid Infusion 1 mG/kG/Hr (41.1 mL/Hr) IV Continuous <Continuous>  tranexamic acid IVPB 1000 milliGRAM(s) IV Intermittent once    MEDICATIONS  (PRN):  acetaminophen     Tablet .. 650 milliGRAM(s) Oral every 6 hours PRN Mild Pain (1 - 3), Moderate Pain (4 - 6)  bisacodyl 5 milliGRAM(s) Oral daily PRN Constipation  morphine  - Injectable 2 milliGRAM(s) IV Push every 4 hours PRN for breakthrough  oxyCODONE    IR 10 milliGRAM(s) Oral every 4 hours PRN Severe Pain (7 - 10)  oxyCODONE    IR 5 milliGRAM(s) Oral every 4 hours PRN Moderate Pain (4 - 6)      DVT Prophylaxis: V    Advanced Directives:  Discussed with:    Visit Information:    ** Time is exclusive of billed procedures and/or teaching and/or routine family updates.

## 2023-12-02 NOTE — PHYSICAL THERAPY INITIAL EVALUATION ADULT - PERTINENT HX OF CURRENT PROBLEM, REHAB EVAL
Patient is a 83y old  Female who presents with a chief complaint of LE weakness. PMHx  HTN, dyslipidemia, GERD, parkinson's, neuropathy, anemia, loop recorder. Imaging with Pathologic fracture T11, metastatic disease, bone mets T11, L1, L2, L4, L5. Pt. s/p Anterior spinal instrumentation of two to three vertebral segments, thoracic spine fusion

## 2023-12-02 NOTE — PHYSICAL THERAPY INITIAL EVALUATION ADULT - GENERAL OBSERVATIONS, REHAB EVAL
Pt. received supine in bed in SICU + A-line + L CT + CM + IV + dave agreeable to PT. Bed mob with Max A, attempted to stand to RW 2 people assistance but unable to stand. Pt. transferred to recliner via josee steady and left with all lines intact + alarm. RN aware. Pt. received supine in bed in SICU + A-line + L CT + CM + IV + dave agreeable to PT. Bed mob with Max A, attempted to stand to RW 2 people assistance but unable to stand. Pt. transferred to recliner via josee steady and left with all lines intact + alarm. RN aware. PT called Dr. Marie for new PT eval order. Pt. received supine in bed in SICU + A-line + L CT + CM + IV + dave agreeable to PT. Bed mob with Max A, attempted to stand to RW 2 people assistance but unable to stand. Pt. transferred to recliner via josee steady and left with all lines intact + alarm. RN aware. PT called Dr. Marie for new PT eval order., okay for resume with PT assess.

## 2023-12-02 NOTE — PROGRESS NOTE ADULT - ASSESSMENT
ASSESSMENT  82yo female  with PMHx  HTN, dyslipidemia, GERD, parkinson's, neuropathy, anemia, loop recorder, presents with LE weakness. Patient has been having B/L LE weakness for a little less than 1 year. Symptoms have been getting progressively worse to the point where patient is unable to ambulate.     outpatient CT C/A/P showing multiple bone mets and possible liver mets and path fracture of T11 vertebral body. Patient sent to ED for further evaluation.    Admitted for  1. Progressive B/L LE weakness due to  2. Pathological fx T11 with possible cord compression   2. bone mets T11, L1, L2, L4, L5    s/p anterior thoracic corpectomy, anterior thoracic spine fusion ,     PLAN    Neuro: LE Weakness  CV:  hold anti-htn meds (verapamil, metoprolol)  Pulm: on 4L NC sating 97-98%. titrate maintain O2 sat > 94%. post op CXR - no obvious infiltrates, or evidence of fluid overload.   GI: PO Diet  PPI. bowel regimen prn  Nephro: dave in place monitor I & Os. Trend renal fxn. cont IV maintenance fluids  ID: cont post op abx IV cefazolin. trend WBC. monitor temps.  Heme: no chemical DVT ppx due to bleeding risk. SCDs. CBC dropped 9-> 7.1 . transfuse 1u pRBC. repeat labs this evening.   PT eval

## 2023-12-02 NOTE — PROGRESS NOTE ADULT - SUBJECTIVE AND OBJECTIVE BOX
Subjective: POD#1    Objective: T11 corpectomy    Heent: N/C, AT PER no scleral icterus, No JVD  Pul: clear. L CT 350cc since OR. No air leak  Cor: RRR  Abdomen: soft, normal BS.   Extremities: without edema    12-02    141  |  111<H>  |  33<H>  ----------------------------<  98  3.9   |  25  |  1.00    Ca    7.9<L>      02 Dec 2023 05:10  Phos  2.3     12-02  Mg     2.0     12-02                              8.1    12.36 )-----------( 189      ( 02 Dec 2023 05:10 )             24.7

## 2023-12-03 LAB
ANION GAP SERPL CALC-SCNC: 5 MMOL/L — SIGNIFICANT CHANGE UP (ref 5–17)
ANION GAP SERPL CALC-SCNC: 5 MMOL/L — SIGNIFICANT CHANGE UP (ref 5–17)
BUN SERPL-MCNC: 25 MG/DL — HIGH (ref 7–23)
BUN SERPL-MCNC: 25 MG/DL — HIGH (ref 7–23)
CALCIUM SERPL-MCNC: 7.9 MG/DL — LOW (ref 8.5–10.1)
CALCIUM SERPL-MCNC: 7.9 MG/DL — LOW (ref 8.5–10.1)
CHLORIDE SERPL-SCNC: 111 MMOL/L — HIGH (ref 96–108)
CHLORIDE SERPL-SCNC: 111 MMOL/L — HIGH (ref 96–108)
CO2 SERPL-SCNC: 26 MMOL/L — SIGNIFICANT CHANGE UP (ref 22–31)
CO2 SERPL-SCNC: 26 MMOL/L — SIGNIFICANT CHANGE UP (ref 22–31)
CREAT SERPL-MCNC: 0.87 MG/DL — SIGNIFICANT CHANGE UP (ref 0.5–1.3)
CREAT SERPL-MCNC: 0.87 MG/DL — SIGNIFICANT CHANGE UP (ref 0.5–1.3)
EGFR: 67 ML/MIN/1.73M2 — SIGNIFICANT CHANGE UP
EGFR: 67 ML/MIN/1.73M2 — SIGNIFICANT CHANGE UP
GLUCOSE SERPL-MCNC: 89 MG/DL — SIGNIFICANT CHANGE UP (ref 70–99)
GLUCOSE SERPL-MCNC: 89 MG/DL — SIGNIFICANT CHANGE UP (ref 70–99)
HCT VFR BLD CALC: 22 % — LOW (ref 34.5–45)
HCT VFR BLD CALC: 22 % — LOW (ref 34.5–45)
HCT VFR BLD CALC: 22.7 % — LOW (ref 34.5–45)
HCT VFR BLD CALC: 22.7 % — LOW (ref 34.5–45)
HGB BLD-MCNC: 7.3 G/DL — LOW (ref 11.5–15.5)
HGB BLD-MCNC: 7.3 G/DL — LOW (ref 11.5–15.5)
HGB BLD-MCNC: 7.5 G/DL — LOW (ref 11.5–15.5)
HGB BLD-MCNC: 7.5 G/DL — LOW (ref 11.5–15.5)
MAGNESIUM SERPL-MCNC: 2 MG/DL — SIGNIFICANT CHANGE UP (ref 1.6–2.6)
MAGNESIUM SERPL-MCNC: 2 MG/DL — SIGNIFICANT CHANGE UP (ref 1.6–2.6)
MCHC RBC-ENTMCNC: 31.5 PG — SIGNIFICANT CHANGE UP (ref 27–34)
MCHC RBC-ENTMCNC: 33 GM/DL — SIGNIFICANT CHANGE UP (ref 32–36)
MCHC RBC-ENTMCNC: 33 GM/DL — SIGNIFICANT CHANGE UP (ref 32–36)
MCHC RBC-ENTMCNC: 33.2 GM/DL — SIGNIFICANT CHANGE UP (ref 32–36)
MCHC RBC-ENTMCNC: 33.2 GM/DL — SIGNIFICANT CHANGE UP (ref 32–36)
MCV RBC AUTO: 94.8 FL — SIGNIFICANT CHANGE UP (ref 80–100)
MCV RBC AUTO: 94.8 FL — SIGNIFICANT CHANGE UP (ref 80–100)
MCV RBC AUTO: 95.4 FL — SIGNIFICANT CHANGE UP (ref 80–100)
MCV RBC AUTO: 95.4 FL — SIGNIFICANT CHANGE UP (ref 80–100)
PHOSPHATE SERPL-MCNC: 1.8 MG/DL — LOW (ref 2.5–4.5)
PHOSPHATE SERPL-MCNC: 1.8 MG/DL — LOW (ref 2.5–4.5)
PLATELET # BLD AUTO: 119 K/UL — LOW (ref 150–400)
PLATELET # BLD AUTO: 119 K/UL — LOW (ref 150–400)
PLATELET # BLD AUTO: 122 K/UL — LOW (ref 150–400)
PLATELET # BLD AUTO: 122 K/UL — LOW (ref 150–400)
POTASSIUM SERPL-MCNC: 3.9 MMOL/L — SIGNIFICANT CHANGE UP (ref 3.5–5.3)
POTASSIUM SERPL-MCNC: 3.9 MMOL/L — SIGNIFICANT CHANGE UP (ref 3.5–5.3)
POTASSIUM SERPL-SCNC: 3.9 MMOL/L — SIGNIFICANT CHANGE UP (ref 3.5–5.3)
POTASSIUM SERPL-SCNC: 3.9 MMOL/L — SIGNIFICANT CHANGE UP (ref 3.5–5.3)
RBC # BLD: 2.32 M/UL — LOW (ref 3.8–5.2)
RBC # BLD: 2.32 M/UL — LOW (ref 3.8–5.2)
RBC # BLD: 2.38 M/UL — LOW (ref 3.8–5.2)
RBC # BLD: 2.38 M/UL — LOW (ref 3.8–5.2)
RBC # FLD: 15.8 % — HIGH (ref 10.3–14.5)
RBC # FLD: 15.8 % — HIGH (ref 10.3–14.5)
RBC # FLD: 15.9 % — HIGH (ref 10.3–14.5)
RBC # FLD: 15.9 % — HIGH (ref 10.3–14.5)
SODIUM SERPL-SCNC: 142 MMOL/L — SIGNIFICANT CHANGE UP (ref 135–145)
SODIUM SERPL-SCNC: 142 MMOL/L — SIGNIFICANT CHANGE UP (ref 135–145)
WBC # BLD: 13.66 K/UL — HIGH (ref 3.8–10.5)
WBC # BLD: 13.66 K/UL — HIGH (ref 3.8–10.5)
WBC # BLD: 9.97 K/UL — SIGNIFICANT CHANGE UP (ref 3.8–10.5)
WBC # BLD: 9.97 K/UL — SIGNIFICANT CHANGE UP (ref 3.8–10.5)
WBC # FLD AUTO: 13.66 K/UL — HIGH (ref 3.8–10.5)
WBC # FLD AUTO: 13.66 K/UL — HIGH (ref 3.8–10.5)
WBC # FLD AUTO: 9.97 K/UL — SIGNIFICANT CHANGE UP (ref 3.8–10.5)
WBC # FLD AUTO: 9.97 K/UL — SIGNIFICANT CHANGE UP (ref 3.8–10.5)

## 2023-12-03 PROCEDURE — 71045 X-RAY EXAM CHEST 1 VIEW: CPT | Mod: 26

## 2023-12-03 PROCEDURE — 99233 SBSQ HOSP IP/OBS HIGH 50: CPT

## 2023-12-03 RX ORDER — POTASSIUM PHOSPHATE, MONOBASIC POTASSIUM PHOSPHATE, DIBASIC 236; 224 MG/ML; MG/ML
30 INJECTION, SOLUTION INTRAVENOUS ONCE
Refills: 0 | Status: COMPLETED | OUTPATIENT
Start: 2023-12-03 | End: 2023-12-03

## 2023-12-03 RX ADMIN — OXYCODONE HYDROCHLORIDE 5 MILLIGRAM(S): 5 TABLET ORAL at 23:40

## 2023-12-03 RX ADMIN — CARBIDOPA AND LEVODOPA 1 TABLET(S): 25; 100 TABLET ORAL at 18:01

## 2023-12-03 RX ADMIN — POLYETHYLENE GLYCOL 3350 17 GRAM(S): 17 POWDER, FOR SOLUTION ORAL at 09:23

## 2023-12-03 RX ADMIN — Medication 325 MILLIGRAM(S): at 09:23

## 2023-12-03 RX ADMIN — OXYCODONE HYDROCHLORIDE 5 MILLIGRAM(S): 5 TABLET ORAL at 23:10

## 2023-12-03 RX ADMIN — PANTOPRAZOLE SODIUM 40 MILLIGRAM(S): 20 TABLET, DELAYED RELEASE ORAL at 09:23

## 2023-12-03 RX ADMIN — CARBIDOPA AND LEVODOPA 1 TABLET(S): 25; 100 TABLET ORAL at 11:32

## 2023-12-03 RX ADMIN — CARBIDOPA AND LEVODOPA 1 TABLET(S): 25; 100 TABLET ORAL at 23:03

## 2023-12-03 RX ADMIN — SENNA PLUS 2 TABLET(S): 8.6 TABLET ORAL at 23:04

## 2023-12-03 RX ADMIN — ATORVASTATIN CALCIUM 40 MILLIGRAM(S): 80 TABLET, FILM COATED ORAL at 23:03

## 2023-12-03 RX ADMIN — DONEPEZIL HYDROCHLORIDE 10 MILLIGRAM(S): 10 TABLET, FILM COATED ORAL at 23:04

## 2023-12-03 RX ADMIN — OXYCODONE HYDROCHLORIDE 10 MILLIGRAM(S): 5 TABLET ORAL at 05:46

## 2023-12-03 RX ADMIN — CARBIDOPA AND LEVODOPA 1 TABLET(S): 25; 100 TABLET ORAL at 05:46

## 2023-12-03 RX ADMIN — Medication 50 MICROGRAM(S): at 05:47

## 2023-12-03 RX ADMIN — PRAMIPEXOLE DIHYDROCHLORIDE 0.12 MILLIGRAM(S): 0.12 TABLET ORAL at 23:04

## 2023-12-03 RX ADMIN — BUPROPION HYDROCHLORIDE 100 MILLIGRAM(S): 150 TABLET, EXTENDED RELEASE ORAL at 09:23

## 2023-12-03 RX ADMIN — OXYCODONE HYDROCHLORIDE 10 MILLIGRAM(S): 5 TABLET ORAL at 06:16

## 2023-12-03 RX ADMIN — POTASSIUM PHOSPHATE, MONOBASIC POTASSIUM PHOSPHATE, DIBASIC 83.33 MILLIMOLE(S): 236; 224 INJECTION, SOLUTION INTRAVENOUS at 09:24

## 2023-12-03 NOTE — PROGRESS NOTE ADULT - SUBJECTIVE AND OBJECTIVE BOX
Subjective: POD#2    Objective: T 11 corpectomy    Heent: N/C, AT PER no scleral icterus, No JVD  Pul: clear, min draining serous  Cor: RRR  Abdomen: soft, normal BS.   Extremities: without edema    12-03    142  |  111<H>  |  25<H>  ----------------------------<  89  3.9   |  26  |  0.87    Ca    7.9<L>      03 Dec 2023 05:19  Phos  1.8     12-03  Mg     2.0     12-03                              7.3    9.97  )-----------( 122      ( 03 Dec 2023 05:19 )             22.0

## 2023-12-03 NOTE — PROGRESS NOTE ADULT - SUBJECTIVE AND OBJECTIVE BOX
Patient is a 83y old  Female who presents with a chief complaint of LE weakness (30 Nov 2023 12:48)      BRIEF HOSPITAL COURSE: ***    12/1 seen in PACU, lethargic but arousable, follows commands, c/o L sided pain by CT and lower back pain.  12/2: No events over night, more awake today, no air leak from chest tube  12/3: No events over night, decreased H & H, No air leak in chest tube       PAST MEDICAL & SURGICAL HISTORY:  GERD (gastroesophageal reflux disease)      HTN (hypertension)      HLD (hyperlipidemia)      Mood disorder      H/O gastric bypass      History of cholecystectomy      History of total knee replacement, left          FAMILY HISTORY:  No pertinent family history in first degree relatives        Social Hx:    Allergies    Originally Entered as [Moderate Rash] reaction to [SURGICAL TAPE] (Unknown)  Vitamin K (Other (Severe))  Fosamax (Unknown)    Intolerances            ICU Vital Signs Last 24 Hrs  T(C): 36.9 (03 Dec 2023 06:00), Max: 36.9 (03 Dec 2023 06:00)  T(F): 98.4 (03 Dec 2023 06:00), Max: 98.4 (03 Dec 2023 06:00)  HR: 122 (03 Dec 2023 10:00) (85 - 122)  BP: 99/62 (03 Dec 2023 10:00) (87/55 - 133/73)  BP(mean): 73 (03 Dec 2023 10:00) (60 - 79)  ABP: --  ABP(mean): --  RR: 18 (03 Dec 2023 10:00) (12 - 21)  SpO2: 97% (03 Dec 2023 10:00) (93% - 100%)    O2 Parameters below as of 03 Dec 2023 10:00  Patient On (Oxygen Delivery Method): room air                I&O's Summary    02 Dec 2023 07:01  -  03 Dec 2023 07:00  --------------------------------------------------------  IN: 2596 mL / OUT: 1200 mL / NET: 1396 mL                              7.3    9.97  )-----------( 122      ( 03 Dec 2023 05:19 )             22.0       12-03    142  |  111<H>  |  25<H>  ----------------------------<  89  3.9   |  26  |  0.87    Ca    7.9<L>      03 Dec 2023 05:19  Phos  1.8     12-03  Mg     2.0     12-03                  Urinalysis Basic - ( 03 Dec 2023 05:19 )    Color: x / Appearance: x / SG: x / pH: x  Gluc: 89 mg/dL / Ketone: x  / Bili: x / Urobili: x   Blood: x / Protein: x / Nitrite: x   Leuk Esterase: x / RBC: x / WBC x   Sq Epi: x / Non Sq Epi: x / Bacteria: x        MEDICATIONS  (STANDING):  atorvastatin 40 milliGRAM(s) Oral at bedtime  buPROPion . 100 milliGRAM(s) Oral daily  carbidopa/levodopa  25/100 1 Tablet(s) Oral four times a day  donepezil 10 milliGRAM(s) Oral at bedtime  ferrous    sulfate 325 milliGRAM(s) Oral daily  influenza  Vaccine (HIGH DOSE) 0.7 milliLiter(s) IntraMuscular once  levothyroxine 50 MICROGram(s) Oral daily  naloxone Injectable 0.4 milliGRAM(s) IV Push once  pantoprazole    Tablet 40 milliGRAM(s) Oral before breakfast  polyethylene glycol 3350 17 Gram(s) Oral daily  pramipexole 0.125 milliGRAM(s) Oral at bedtime  senna 2 Tablet(s) Oral at bedtime  tranexamic acid Infusion 1 mG/kG/Hr (41.1 mL/Hr) IV Continuous <Continuous>  tranexamic acid IVPB 1000 milliGRAM(s) IV Intermittent once    MEDICATIONS  (PRN):  acetaminophen     Tablet .. 650 milliGRAM(s) Oral every 6 hours PRN Mild Pain (1 - 3), Moderate Pain (4 - 6)  bisacodyl 5 milliGRAM(s) Oral daily PRN Constipation  morphine  - Injectable 2 milliGRAM(s) IV Push every 4 hours PRN for breakthrough  oxyCODONE    IR 10 milliGRAM(s) Oral every 4 hours PRN Severe Pain (7 - 10)  oxyCODONE    IR 5 milliGRAM(s) Oral every 4 hours PRN Moderate Pain (4 - 6)      DVT Prophylaxis: V    Advanced Directives:  Discussed with:    Visit Information:    ** Time is exclusive of billed procedures and/or teaching and/or routine family updates.

## 2023-12-03 NOTE — PROGRESS NOTE ADULT - ASSESSMENT
ASSESSMENT  82yo female  with PMHx  HTN, dyslipidemia, GERD, parkinson's, neuropathy, anemia, loop recorder, presents with LE weakness. Patient has been having B/L LE weakness for a little less than 1 year. Symptoms have been getting progressively worse to the point where patient is unable to ambulate.     outpatient CT C/A/P showing multiple bone mets and possible liver mets and path fracture of T11 vertebral body. Patient sent to ED for further evaluation.    Admitted for  1. Progressive B/L LE weakness due to  2. Pathological fx T11 with possible cord compression   2. bone mets T11, L1, L2, L4, L5    s/p anterior thoracic corpectomy, anterior thoracic spine fusion ,     PLAN    Neuro: LE Weakness  CV:  hold anti-htn meds (verapamil, metoprolol)  Pulm: on 4L NC sating 97-98%. titrate maintain O2 sat > 94%. post op CXR - no obvious infiltrates, or evidence of fluid overload.   GI: PO Diet  PPI. bowel regimen prn  Nephro: dave in place monitor I & Os. Trend renal fxn. cont IV maintenance fluids  ID: cont post op abx IV cefazolin. trend WBC. monitor temps.  Heme: no chemical DVT ppx due to bleeding risk. SCDs. CBC  at 11:00AM   PT eval             ASSESSMENT  84yo female  with PMHx  HTN, dyslipidemia, GERD, parkinson's, neuropathy, anemia, loop recorder, presents with LE weakness. Patient has been having B/L LE weakness for a little less than 1 year. Symptoms have been getting progressively worse to the point where patient is unable to ambulate.     outpatient CT C/A/P showing multiple bone mets and possible liver mets and path fracture of T11 vertebral body. Patient sent to ED for further evaluation.    Admitted for  1. Progressive B/L LE weakness due to  2. Pathological fx T11 with possible cord compression   2. bone mets T11, L1, L2, L4, L5    s/p anterior thoracic corpectomy, anterior thoracic spine fusion ,     PLAN    Neuro: LE Weakness  CV:  hold anti-htn meds (verapamil, metoprolol)  Pulm: on 4L NC sating 97-98%. titrate maintain O2 sat > 94%. post op CXR - no obvious infiltrates, or evidence of fluid overload.   GI: PO Diet  PPI. bowel regimen prn  Nephro: dave in place monitor I & Os. Trend renal fxn. cont IV maintenance fluids  ID: cont post op abx IV cefazolin. trend WBC. monitor temps.  Heme: no chemical DVT ppx due to bleeding risk. SCDs. CBC  at 11:00AM   PT eval

## 2023-12-03 NOTE — PROGRESS NOTE ADULT - SUBJECTIVE AND OBJECTIVE BOX
· Subjective and Objective:       Subjective: POD#2.  Resting in bed.  C/o surgical pain.     Objective: T11 corpectomy with T10-12 Instrmentation/Stabilization    Heent: N/C, AT PER no scleral icterus, No JVD  Pul: clear.  Chest tube in place on L to suction. 150mL output/24 hrs.  Cor: RRR  Abdomen: soft, normal BS.   Extremities: without edema.  No calf tenderness.  DF/PF 3/5 BL EHL 4/5 BL KE 3-/5 BL.  Unable to HF due to pain.     12-03-23                          7.5    13.66 )-----------( 119      ( 03 Dec 2023 11:17 )             22.7     12-03    142  |  111<H>  |  25<H>  ----------------------------<  89  3.9   |  26  |  0.87    Ca    7.9<L>      03 Dec 2023 05:19  Phos  1.8     12-03  Mg     2.0     12-03    MEDICATIONS  (STANDING):  atorvastatin 40 milliGRAM(s) Oral at bedtime  buPROPion . 100 milliGRAM(s) Oral daily  carbidopa/levodopa  25/100 1 Tablet(s) Oral four times a day  donepezil 10 milliGRAM(s) Oral at bedtime  ferrous    sulfate 325 milliGRAM(s) Oral daily  influenza  Vaccine (HIGH DOSE) 0.7 milliLiter(s) IntraMuscular once  levothyroxine 50 MICROGram(s) Oral daily  naloxone Injectable 0.4 milliGRAM(s) IV Push once  pantoprazole    Tablet 40 milliGRAM(s) Oral before breakfast  polyethylene glycol 3350 17 Gram(s) Oral daily  pramipexole 0.125 milliGRAM(s) Oral at bedtime  senna 2 Tablet(s) Oral at bedtime  tranexamic acid Infusion 1 mG/kG/Hr (41.1 mL/Hr) IV Continuous <Continuous>  tranexamic acid IVPB 1000 milliGRAM(s) IV Intermittent once    MEDICATIONS  (PRN):  acetaminophen     Tablet .. 650 milliGRAM(s) Oral every 6 hours PRN Mild Pain (1 - 3), Moderate Pain (4 - 6)  bisacodyl 5 milliGRAM(s) Oral daily PRN Constipation  morphine  - Injectable 2 milliGRAM(s) IV Push every 4 hours PRN for breakthrough  oxyCODONE    IR 10 milliGRAM(s) Oral every 4 hours PRN Severe Pain (7 - 10)  oxyCODONE    IR 5 milliGRAM(s) Oral every 4 hours PRN Moderate Pain (4 - 6)      Assessment and Plan:   · Assessment	  POD 2 s/p T11 corpectomy with T10-12 anterior fusion.  Anemia - post-surgical/dilutional.  Regular diet.  Consider 2units PRBC.   Chest Tube management as per general surgery.   DVT PPX - SCD  Pain Control - Oxycodone prn  OOB c PT, fall precautions    Plan discussed with patient's RN, Dr. Ceballos, patient's .

## 2023-12-04 LAB
ANION GAP SERPL CALC-SCNC: 5 MMOL/L — SIGNIFICANT CHANGE UP (ref 5–17)
ANION GAP SERPL CALC-SCNC: 5 MMOL/L — SIGNIFICANT CHANGE UP (ref 5–17)
BUN SERPL-MCNC: 16 MG/DL — SIGNIFICANT CHANGE UP (ref 7–23)
BUN SERPL-MCNC: 16 MG/DL — SIGNIFICANT CHANGE UP (ref 7–23)
CALCIUM SERPL-MCNC: 7.6 MG/DL — LOW (ref 8.5–10.1)
CALCIUM SERPL-MCNC: 7.6 MG/DL — LOW (ref 8.5–10.1)
CHLORIDE SERPL-SCNC: 111 MMOL/L — HIGH (ref 96–108)
CHLORIDE SERPL-SCNC: 111 MMOL/L — HIGH (ref 96–108)
CO2 SERPL-SCNC: 24 MMOL/L — SIGNIFICANT CHANGE UP (ref 22–31)
CO2 SERPL-SCNC: 24 MMOL/L — SIGNIFICANT CHANGE UP (ref 22–31)
CREAT SERPL-MCNC: 0.55 MG/DL — SIGNIFICANT CHANGE UP (ref 0.5–1.3)
CREAT SERPL-MCNC: 0.55 MG/DL — SIGNIFICANT CHANGE UP (ref 0.5–1.3)
EGFR: 92 ML/MIN/1.73M2 — SIGNIFICANT CHANGE UP
EGFR: 92 ML/MIN/1.73M2 — SIGNIFICANT CHANGE UP
GLUCOSE SERPL-MCNC: 95 MG/DL — SIGNIFICANT CHANGE UP (ref 70–99)
GLUCOSE SERPL-MCNC: 95 MG/DL — SIGNIFICANT CHANGE UP (ref 70–99)
HCT VFR BLD CALC: 21.5 % — LOW (ref 34.5–45)
HCT VFR BLD CALC: 21.5 % — LOW (ref 34.5–45)
HCT VFR BLD CALC: 23.3 % — LOW (ref 34.5–45)
HCT VFR BLD CALC: 23.3 % — LOW (ref 34.5–45)
HGB BLD-MCNC: 7 G/DL — CRITICAL LOW (ref 11.5–15.5)
HGB BLD-MCNC: 7 G/DL — CRITICAL LOW (ref 11.5–15.5)
HGB BLD-MCNC: 7.5 G/DL — LOW (ref 11.5–15.5)
HGB BLD-MCNC: 7.5 G/DL — LOW (ref 11.5–15.5)
MAGNESIUM SERPL-MCNC: 1.9 MG/DL — SIGNIFICANT CHANGE UP (ref 1.6–2.6)
MAGNESIUM SERPL-MCNC: 1.9 MG/DL — SIGNIFICANT CHANGE UP (ref 1.6–2.6)
MCHC RBC-ENTMCNC: 30.9 PG — SIGNIFICANT CHANGE UP (ref 27–34)
MCHC RBC-ENTMCNC: 30.9 PG — SIGNIFICANT CHANGE UP (ref 27–34)
MCHC RBC-ENTMCNC: 31.3 PG — SIGNIFICANT CHANGE UP (ref 27–34)
MCHC RBC-ENTMCNC: 31.3 PG — SIGNIFICANT CHANGE UP (ref 27–34)
MCHC RBC-ENTMCNC: 32.2 GM/DL — SIGNIFICANT CHANGE UP (ref 32–36)
MCHC RBC-ENTMCNC: 32.2 GM/DL — SIGNIFICANT CHANGE UP (ref 32–36)
MCHC RBC-ENTMCNC: 32.6 GM/DL — SIGNIFICANT CHANGE UP (ref 32–36)
MCHC RBC-ENTMCNC: 32.6 GM/DL — SIGNIFICANT CHANGE UP (ref 32–36)
MCV RBC AUTO: 95.9 FL — SIGNIFICANT CHANGE UP (ref 80–100)
MCV RBC AUTO: 95.9 FL — SIGNIFICANT CHANGE UP (ref 80–100)
MCV RBC AUTO: 96 FL — SIGNIFICANT CHANGE UP (ref 80–100)
MCV RBC AUTO: 96 FL — SIGNIFICANT CHANGE UP (ref 80–100)
PHOSPHATE SERPL-MCNC: 2 MG/DL — LOW (ref 2.5–4.5)
PHOSPHATE SERPL-MCNC: 2 MG/DL — LOW (ref 2.5–4.5)
PLATELET # BLD AUTO: 121 K/UL — LOW (ref 150–400)
PLATELET # BLD AUTO: 121 K/UL — LOW (ref 150–400)
PLATELET # BLD AUTO: 122 K/UL — LOW (ref 150–400)
PLATELET # BLD AUTO: 122 K/UL — LOW (ref 150–400)
POTASSIUM SERPL-MCNC: 4 MMOL/L — SIGNIFICANT CHANGE UP (ref 3.5–5.3)
POTASSIUM SERPL-MCNC: 4 MMOL/L — SIGNIFICANT CHANGE UP (ref 3.5–5.3)
POTASSIUM SERPL-SCNC: 4 MMOL/L — SIGNIFICANT CHANGE UP (ref 3.5–5.3)
POTASSIUM SERPL-SCNC: 4 MMOL/L — SIGNIFICANT CHANGE UP (ref 3.5–5.3)
RBC # BLD: 2.24 M/UL — LOW (ref 3.8–5.2)
RBC # BLD: 2.24 M/UL — LOW (ref 3.8–5.2)
RBC # BLD: 2.43 M/UL — LOW (ref 3.8–5.2)
RBC # BLD: 2.43 M/UL — LOW (ref 3.8–5.2)
RBC # FLD: 15.8 % — HIGH (ref 10.3–14.5)
SODIUM SERPL-SCNC: 140 MMOL/L — SIGNIFICANT CHANGE UP (ref 135–145)
SODIUM SERPL-SCNC: 140 MMOL/L — SIGNIFICANT CHANGE UP (ref 135–145)
WBC # BLD: 14.05 K/UL — HIGH (ref 3.8–10.5)
WBC # BLD: 14.05 K/UL — HIGH (ref 3.8–10.5)
WBC # BLD: 14.59 K/UL — HIGH (ref 3.8–10.5)
WBC # BLD: 14.59 K/UL — HIGH (ref 3.8–10.5)
WBC # FLD AUTO: 14.05 K/UL — HIGH (ref 3.8–10.5)
WBC # FLD AUTO: 14.05 K/UL — HIGH (ref 3.8–10.5)
WBC # FLD AUTO: 14.59 K/UL — HIGH (ref 3.8–10.5)
WBC # FLD AUTO: 14.59 K/UL — HIGH (ref 3.8–10.5)

## 2023-12-04 PROCEDURE — 99233 SBSQ HOSP IP/OBS HIGH 50: CPT

## 2023-12-04 PROCEDURE — 93010 ELECTROCARDIOGRAM REPORT: CPT

## 2023-12-04 PROCEDURE — 71045 X-RAY EXAM CHEST 1 VIEW: CPT | Mod: 26

## 2023-12-04 RX ORDER — SODIUM,POTASSIUM PHOSPHATES 278-250MG
2 POWDER IN PACKET (EA) ORAL ONCE
Refills: 0 | Status: COMPLETED | OUTPATIENT
Start: 2023-12-04 | End: 2023-12-04

## 2023-12-04 RX ADMIN — POLYETHYLENE GLYCOL 3350 17 GRAM(S): 17 POWDER, FOR SOLUTION ORAL at 09:36

## 2023-12-04 RX ADMIN — OXYCODONE HYDROCHLORIDE 10 MILLIGRAM(S): 5 TABLET ORAL at 10:20

## 2023-12-04 RX ADMIN — CARBIDOPA AND LEVODOPA 1 TABLET(S): 25; 100 TABLET ORAL at 05:25

## 2023-12-04 RX ADMIN — OXYCODONE HYDROCHLORIDE 5 MILLIGRAM(S): 5 TABLET ORAL at 16:03

## 2023-12-04 RX ADMIN — DONEPEZIL HYDROCHLORIDE 10 MILLIGRAM(S): 10 TABLET, FILM COATED ORAL at 23:43

## 2023-12-04 RX ADMIN — Medication 2 TABLET(S): at 16:03

## 2023-12-04 RX ADMIN — CARBIDOPA AND LEVODOPA 1 TABLET(S): 25; 100 TABLET ORAL at 17:40

## 2023-12-04 RX ADMIN — OXYCODONE HYDROCHLORIDE 10 MILLIGRAM(S): 5 TABLET ORAL at 09:36

## 2023-12-04 RX ADMIN — OXYCODONE HYDROCHLORIDE 5 MILLIGRAM(S): 5 TABLET ORAL at 17:02

## 2023-12-04 RX ADMIN — PANTOPRAZOLE SODIUM 40 MILLIGRAM(S): 20 TABLET, DELAYED RELEASE ORAL at 05:25

## 2023-12-04 RX ADMIN — OXYCODONE HYDROCHLORIDE 10 MILLIGRAM(S): 5 TABLET ORAL at 05:24

## 2023-12-04 RX ADMIN — CARBIDOPA AND LEVODOPA 1 TABLET(S): 25; 100 TABLET ORAL at 11:13

## 2023-12-04 RX ADMIN — Medication 50 MICROGRAM(S): at 05:19

## 2023-12-04 RX ADMIN — PRAMIPEXOLE DIHYDROCHLORIDE 0.12 MILLIGRAM(S): 0.12 TABLET ORAL at 23:43

## 2023-12-04 RX ADMIN — BUPROPION HYDROCHLORIDE 100 MILLIGRAM(S): 150 TABLET, EXTENDED RELEASE ORAL at 09:36

## 2023-12-04 RX ADMIN — Medication 325 MILLIGRAM(S): at 09:36

## 2023-12-04 RX ADMIN — OXYCODONE HYDROCHLORIDE 10 MILLIGRAM(S): 5 TABLET ORAL at 05:54

## 2023-12-04 RX ADMIN — CARBIDOPA AND LEVODOPA 1 TABLET(S): 25; 100 TABLET ORAL at 23:43

## 2023-12-04 RX ADMIN — SENNA PLUS 2 TABLET(S): 8.6 TABLET ORAL at 23:43

## 2023-12-04 RX ADMIN — ATORVASTATIN CALCIUM 40 MILLIGRAM(S): 80 TABLET, FILM COATED ORAL at 23:43

## 2023-12-04 NOTE — PROGRESS NOTE ADULT - SUBJECTIVE AND OBJECTIVE BOX
Subjective: POD#3    Objective: T 11 corpectomy    Heent: N/C, AT PER no scleral icterus, No JVD  Pul: clear. L CT 50cc  Cor: RRR  Abdomen: soft, normal BS.   Extremities: without edema    12-04    140  |  111<H>  |  16  ----------------------------<  95  4.0   |  24  |  0.55    Ca    7.6<L>      04 Dec 2023 05:58  Phos  2.0     12-04  Mg     1.9     12-04                              7.0    14.05 )-----------( 122      ( 04 Dec 2023 05:58 )             21.5

## 2023-12-04 NOTE — PROGRESS NOTE ADULT - SUBJECTIVE AND OBJECTIVE BOX
POST OPERATIVE DAY #:  3  STATUS POST:   Thoracotomy/anterior corpectomy T11/ ASF with instr T10-12                   SUBJECTIVE: Patient seen and examined. Comfortable. More alert.    OBJECTIVE:     Vital Signs Last 24 Hrs  T(C): 36.9 (04 Dec 2023 06:00), Max: 37.2 (03 Dec 2023 14:31)  T(F): 98.4 (04 Dec 2023 06:00), Max: 98.9 (03 Dec 2023 14:31)  HR: 120 (04 Dec 2023 06:00) (96 - 124)  BP: 93/64 (04 Dec 2023 06:00) (93/64 - 109/60)  BP(mean): 75 (04 Dec 2023 06:00) (64 - 80)  RR: 17 (04 Dec 2023 06:00) (9 - 30)  SpO2: 91% (04 Dec 2023 06:00) (91% - 98%)    Parameters below as of 04 Dec 2023 06:00  Patient On (Oxygen Delivery Method): room air        Awake and alert  HEENT: unremarkable  Neck: supple  Chest:          Dressing: clean/dry/intact             Ct - water seal  Bilateral Upper Extremities:         Good Motor Strength         Sensation intact  Bilateral Lower Extremities:          Neuro unchanged. Diffuse weakness BLE.    I&O's Detail    03 Dec 2023 07:01  -  04 Dec 2023 07:00  --------------------------------------------------------  IN:    IV PiggyBack: 250 mL  Total IN: 250 mL    OUT:    Chest Tube (mL): 95 mL    Indwelling Catheter - Urethral (mL): 1060 mL  Total OUT: 1155 mL    Total NET: -905 mL          LABS:                        7.0    14.05 )-----------( 122      ( 04 Dec 2023 05:58 )             21.5     12-04    140  |  111<H>  |  16  ----------------------------<  95  4.0   |  24  |  0.55    Ca    7.6<L>      04 Dec 2023 05:58  Phos  2.0     12-04  Mg     1.9     12-04        A/P :  83y Female s/p   Thoracotomy/anterior corpectomy T11/ASF with instr T10-12          POD # 3  -    Pain control  -    DVT ppx: SCDs    -    OOB as tolerated  -    Physical Therapy, re; ambulation  -    CT as per Dr Tirado  -    Hgb 7.0....transfuse 2 u PRBC  -    Continue present treatment  -    Good fixation of the thoracic spine was achieved anteriorly. Patient will not require posterior stabilization procedure.

## 2023-12-04 NOTE — PROGRESS NOTE ADULT - ASSESSMENT
ASSESSMENT  84yo female  with PMHx  HTN, dyslipidemia, GERD, parkinson's, neuropathy, anemia, loop recorder, presents with LE weakness. Patient has been having B/L LE weakness for a little less than 1 year. Symptoms have been getting progressively worse to the point where patient is unable to ambulate.     outpatient CT C/A/P showing multiple bone mets and possible liver mets and path fracture of T11 vertebral body. Patient sent to ED for further evaluation.    Admitted for  1. Progressive B/L LE weakness due to  2. Pathological fx T11 with possible cord compression   2. bone mets T11, L1, L2, L4, L5    s/p anterior thoracic corpectomy, anterior thoracic spine fusion ,     PLAN    Neuro: mentation improving, AAOx 2. follows commands intermittently . pain control prn  CV: BP low normal but stable. hold anti-htn meds (verapamil, metoprolol). check EKG for tachycardia  Pulm: on room air, L CT to waterseal today. repeat CXR this afternoon  GI: PO diet as tolerated. PPI. bowel regimen prn  Nephro: Cr 0.55,  dc dave. monitor I & Os. Trend renal fxn. replete Phos  ID:  s/p post op abx IV cefazolin. trend WBC. monitor temps.  Heme: no chemical DVT ppx due to bleeding risk. SCDs. CBC dropped 7.5 -> 7.0. repeat CBC 7.5.   PT eval    Dispo: SICU. L CT to waterseal, dc dave, dc central line. Hgb stable. f/u CXR this afternoon    Will discuss with Dr. Marie

## 2023-12-04 NOTE — PROGRESS NOTE ADULT - SUBJECTIVE AND OBJECTIVE BOX
Patient is a 83y old  Female who presents with a chief complaint of LE weakness (30 Nov 2023 12:48)      BRIEF HOSPITAL COURSE: ***    12/1 seen in PACU, lethargic but arousable, follows commands, c/o L sided pain by CT and lower back pain.  12/4 AAOx 2, denied back pain , sob , chest pain. doesnt know the year or why she is here really. CT to [facility]     PAST MEDICAL & SURGICAL HISTORY:  GERD (gastroesophageal reflux disease)  HTN (hypertension)  HLD (hyperlipidemia)  Mood disorder  H/O gastric bypass  History of cholecystectomy  History of total kneereplacement, left      MEDICATIONS  (STANDING):  atorvastatin 40 milliGRAM(s) Oral at bedtime  buPROPion . 100 milliGRAM(s) Oral daily  carbidopa/levodopa  25/100 1 Tablet(s) Oral four times a day  donepezil 10 milliGRAM(s) Oral at bedtime  ferrous    sulfate 325 milliGRAM(s) Oral daily  influenza  Vaccine (HIGH DOSE) 0.7 milliLiter(s) IntraMuscular once  levothyroxine 50 MICROGram(s) Oral daily  naloxone Injectable 0.4 milliGRAM(s) IV Push once  pantoprazole    Tablet 40 milliGRAM(s) Oral before breakfast  polyethylene glycol 3350 17 Gram(s) Oral daily  pramipexole 0.125 milliGRAM(s) Oral at bedtime  senna 2 Tablet(s) Oral at bedtime    Vital Signs Last 24 Hrs  T(C): 36.9 (04 Dec 2023 06:00), Max: 37.2 (03 Dec 2023 14:31)  T(F): 98.4 (04 Dec 2023 06:00), Max: 98.9 (03 Dec 2023 14:31)  HR: 111 (04 Dec 2023 08:00) (96 - 124)  BP: 112/69 (04 Dec 2023 08:00) (93/64 - 112/69)  BP(mean): 81 (04 Dec 2023 08:00) (64 - 81)  RR: 19 (04 Dec 2023 08:00) (9 - 30)  SpO2: 91% (04 Dec 2023 06:00) (91% - 98%)    Parameters below as of 04 Dec 2023 06:00  Patient On (Oxygen Delivery Method): room air      I&O's Detail    03 Dec 2023 07:01  -  04 Dec 2023 07:00  --------------------------------------------------------  IN:    IV PiggyBack: 250 mL  Total IN: 250 mL    OUT:    Chest Tube (mL): 95 mL    Indwelling Catheter - Urethral (mL): 1060 mL  Total OUT: 1155 mL    Total NET: -905 mL    LABS:                          7.5    14.59 )-----------( 121      ( 04 Dec 2023 08:48 )             23.3     12-04    140  |  111<H>  |  16  ----------------------------<  95  4.0   |  24  |  0.55    Ca    7.6<L>      04 Dec 2023 05:58  Phos  2.0     12-04  Mg     1.9     12-04      Urinalysis Basic - ( 04 Dec 2023 05:58 )    Color: x / Appearance: x / SG: x / pH: x  Gluc: 95 mg/dL / Ketone: x  / Bili: x / Urobili: x   Blood: x / Protein: x / Nitrite: x   Leuk Esterase: x / RBC: x / WBC x   Sq Epi: x / Non Sq Epi: x / Bacteria: x      CULTURES:  Culture Results:   <10,000 CFU/mL Normal Urogenital Jaz (11-25 @ 06:15)  Culture Results:   No growth at 5 days (11-24 @ 19:54)  Culture Results:   No growth at 5 days (11-24 @ 19:54)      Physical Examination:    General:mentation better, AAox 2 (self, place) pale and frail appearing  HEENT: dry mucous membranes  PULM: Clear to auscultation bilaterally, no wheezing. L CT in place- serosanguinous output - no air leak seen  CVS: Regular rate and rhythm, no murmurs  ABD: Soft, nondistended,   EXT: No LE edema  SKIN: pale appearing  NEURO: AAOx 2 (self, place  follows commands intermittently. able to wiggle toes b/l     DEVICES:   R IJ central line  dave  L CT     RADIOLOGY:   < from: MR Lumbar Spine w/wo IV Cont (11.25.23 @ 11:57) >  INDIVIDUAL LEVELS:    L1-L2: No disc herniation, spinal canal stenosis or neural foramen   narrowing.    L2-L3: No disc herniation. Mild degenerative facet disease. No spinal   canal stenosis or neuralforamen narrowing.    L3-L4: No disc herniation. Mild degenerative facet disease. No spinal   canal stenosis or neural foramen narrowing.    IMPRESSION: Bony metastases at T11,L1, L2, L4, L5 and left sacrum.   Pathologic fractures of T11 and L5. Possible cord compression at T11 and   diffuse thecal sac compression at L5.    < end of copied text >

## 2023-12-04 NOTE — PROGRESS NOTE ADULT - NS ATTEND AMEND GEN_ALL_CORE FT
84yo female  with PMHx  HTN, dyslipidemia, GERD, parkinson's, neuropathy, anemia, loop recorder, presents with LE weakness. Patient has been having B/L LE weakness for a little less than 1 year. Symptoms have been getting progressively worse to the point where patient is unable to ambulate.     outpatient CT C/A/P showing multiple bone mets and possible liver mets and path fracture of T11 vertebral body. Patient sent to ED for further evaluation.    Admitted for  1. Progressive B/L LE weakness due to  2. Pathological fx T11 with possible cord compression   2. bone mets T11, L1, L2, L4, L5    s/p anterior thoracic corpectomy, anterior thoracic spine fusion ,    Plan:    SICU  SICu  Chest tube to WS  Po Diet  PT  Venodynes

## 2023-12-05 LAB
ANION GAP SERPL CALC-SCNC: 5 MMOL/L — SIGNIFICANT CHANGE UP (ref 5–17)
ANION GAP SERPL CALC-SCNC: 5 MMOL/L — SIGNIFICANT CHANGE UP (ref 5–17)
BUN SERPL-MCNC: 13 MG/DL — SIGNIFICANT CHANGE UP (ref 7–23)
BUN SERPL-MCNC: 13 MG/DL — SIGNIFICANT CHANGE UP (ref 7–23)
CALCIUM SERPL-MCNC: 7.7 MG/DL — LOW (ref 8.5–10.1)
CALCIUM SERPL-MCNC: 7.7 MG/DL — LOW (ref 8.5–10.1)
CHLORIDE SERPL-SCNC: 109 MMOL/L — HIGH (ref 96–108)
CHLORIDE SERPL-SCNC: 109 MMOL/L — HIGH (ref 96–108)
CO2 SERPL-SCNC: 25 MMOL/L — SIGNIFICANT CHANGE UP (ref 22–31)
CO2 SERPL-SCNC: 25 MMOL/L — SIGNIFICANT CHANGE UP (ref 22–31)
CREAT SERPL-MCNC: 0.52 MG/DL — SIGNIFICANT CHANGE UP (ref 0.5–1.3)
CREAT SERPL-MCNC: 0.52 MG/DL — SIGNIFICANT CHANGE UP (ref 0.5–1.3)
EGFR: 93 ML/MIN/1.73M2 — SIGNIFICANT CHANGE UP
EGFR: 93 ML/MIN/1.73M2 — SIGNIFICANT CHANGE UP
GLUCOSE SERPL-MCNC: 82 MG/DL — SIGNIFICANT CHANGE UP (ref 70–99)
GLUCOSE SERPL-MCNC: 82 MG/DL — SIGNIFICANT CHANGE UP (ref 70–99)
HCT VFR BLD CALC: 21.3 % — LOW (ref 34.5–45)
HCT VFR BLD CALC: 21.3 % — LOW (ref 34.5–45)
HCT VFR BLD CALC: 26.6 % — LOW (ref 34.5–45)
HCT VFR BLD CALC: 26.6 % — LOW (ref 34.5–45)
HGB BLD-MCNC: 7.1 G/DL — LOW (ref 11.5–15.5)
HGB BLD-MCNC: 7.1 G/DL — LOW (ref 11.5–15.5)
HGB BLD-MCNC: 8.9 G/DL — LOW (ref 11.5–15.5)
HGB BLD-MCNC: 8.9 G/DL — LOW (ref 11.5–15.5)
MAGNESIUM SERPL-MCNC: 1.9 MG/DL — SIGNIFICANT CHANGE UP (ref 1.6–2.6)
MAGNESIUM SERPL-MCNC: 1.9 MG/DL — SIGNIFICANT CHANGE UP (ref 1.6–2.6)
MCHC RBC-ENTMCNC: 30.9 PG — SIGNIFICANT CHANGE UP (ref 27–34)
MCHC RBC-ENTMCNC: 30.9 PG — SIGNIFICANT CHANGE UP (ref 27–34)
MCHC RBC-ENTMCNC: 31.6 PG — SIGNIFICANT CHANGE UP (ref 27–34)
MCHC RBC-ENTMCNC: 31.6 PG — SIGNIFICANT CHANGE UP (ref 27–34)
MCHC RBC-ENTMCNC: 33.3 GM/DL — SIGNIFICANT CHANGE UP (ref 32–36)
MCHC RBC-ENTMCNC: 33.3 GM/DL — SIGNIFICANT CHANGE UP (ref 32–36)
MCHC RBC-ENTMCNC: 33.5 GM/DL — SIGNIFICANT CHANGE UP (ref 32–36)
MCHC RBC-ENTMCNC: 33.5 GM/DL — SIGNIFICANT CHANGE UP (ref 32–36)
MCV RBC AUTO: 92.4 FL — SIGNIFICANT CHANGE UP (ref 80–100)
MCV RBC AUTO: 92.4 FL — SIGNIFICANT CHANGE UP (ref 80–100)
MCV RBC AUTO: 94.7 FL — SIGNIFICANT CHANGE UP (ref 80–100)
MCV RBC AUTO: 94.7 FL — SIGNIFICANT CHANGE UP (ref 80–100)
PHOSPHATE SERPL-MCNC: 1.9 MG/DL — LOW (ref 2.5–4.5)
PHOSPHATE SERPL-MCNC: 1.9 MG/DL — LOW (ref 2.5–4.5)
PLATELET # BLD AUTO: 129 K/UL — LOW (ref 150–400)
PLATELET # BLD AUTO: 129 K/UL — LOW (ref 150–400)
PLATELET # BLD AUTO: 146 K/UL — LOW (ref 150–400)
PLATELET # BLD AUTO: 146 K/UL — LOW (ref 150–400)
POTASSIUM SERPL-MCNC: 4 MMOL/L — SIGNIFICANT CHANGE UP (ref 3.5–5.3)
POTASSIUM SERPL-MCNC: 4 MMOL/L — SIGNIFICANT CHANGE UP (ref 3.5–5.3)
POTASSIUM SERPL-SCNC: 4 MMOL/L — SIGNIFICANT CHANGE UP (ref 3.5–5.3)
POTASSIUM SERPL-SCNC: 4 MMOL/L — SIGNIFICANT CHANGE UP (ref 3.5–5.3)
RBC # BLD: 2.25 M/UL — LOW (ref 3.8–5.2)
RBC # BLD: 2.25 M/UL — LOW (ref 3.8–5.2)
RBC # BLD: 2.88 M/UL — LOW (ref 3.8–5.2)
RBC # BLD: 2.88 M/UL — LOW (ref 3.8–5.2)
RBC # FLD: 15.9 % — HIGH (ref 10.3–14.5)
RBC # FLD: 15.9 % — HIGH (ref 10.3–14.5)
RBC # FLD: 16.8 % — HIGH (ref 10.3–14.5)
RBC # FLD: 16.8 % — HIGH (ref 10.3–14.5)
SODIUM SERPL-SCNC: 139 MMOL/L — SIGNIFICANT CHANGE UP (ref 135–145)
SODIUM SERPL-SCNC: 139 MMOL/L — SIGNIFICANT CHANGE UP (ref 135–145)
SURGICAL PATHOLOGY STUDY: SIGNIFICANT CHANGE UP
SURGICAL PATHOLOGY STUDY: SIGNIFICANT CHANGE UP
WBC # BLD: 13.09 K/UL — HIGH (ref 3.8–10.5)
WBC # BLD: 13.09 K/UL — HIGH (ref 3.8–10.5)
WBC # BLD: 15.32 K/UL — HIGH (ref 3.8–10.5)
WBC # BLD: 15.32 K/UL — HIGH (ref 3.8–10.5)
WBC # FLD AUTO: 13.09 K/UL — HIGH (ref 3.8–10.5)
WBC # FLD AUTO: 13.09 K/UL — HIGH (ref 3.8–10.5)
WBC # FLD AUTO: 15.32 K/UL — HIGH (ref 3.8–10.5)
WBC # FLD AUTO: 15.32 K/UL — HIGH (ref 3.8–10.5)

## 2023-12-05 PROCEDURE — 71045 X-RAY EXAM CHEST 1 VIEW: CPT | Mod: 26

## 2023-12-05 PROCEDURE — 99233 SBSQ HOSP IP/OBS HIGH 50: CPT

## 2023-12-05 RX ORDER — CEFEPIME 1 G/1
2000 INJECTION, POWDER, FOR SOLUTION INTRAMUSCULAR; INTRAVENOUS EVERY 8 HOURS
Refills: 0 | Status: DISCONTINUED | OUTPATIENT
Start: 2023-12-05 | End: 2023-12-05

## 2023-12-05 RX ORDER — CEFEPIME 1 G/1
2000 INJECTION, POWDER, FOR SOLUTION INTRAMUSCULAR; INTRAVENOUS EVERY 8 HOURS
Refills: 0 | Status: DISCONTINUED | OUTPATIENT
Start: 2023-12-05 | End: 2023-12-15

## 2023-12-05 RX ADMIN — OXYCODONE HYDROCHLORIDE 10 MILLIGRAM(S): 5 TABLET ORAL at 22:14

## 2023-12-05 RX ADMIN — PRAMIPEXOLE DIHYDROCHLORIDE 0.12 MILLIGRAM(S): 0.12 TABLET ORAL at 23:21

## 2023-12-05 RX ADMIN — CARBIDOPA AND LEVODOPA 1 TABLET(S): 25; 100 TABLET ORAL at 17:49

## 2023-12-05 RX ADMIN — CARBIDOPA AND LEVODOPA 1 TABLET(S): 25; 100 TABLET ORAL at 06:22

## 2023-12-05 RX ADMIN — DONEPEZIL HYDROCHLORIDE 10 MILLIGRAM(S): 10 TABLET, FILM COATED ORAL at 22:15

## 2023-12-05 RX ADMIN — CARBIDOPA AND LEVODOPA 1 TABLET(S): 25; 100 TABLET ORAL at 12:20

## 2023-12-05 RX ADMIN — CEFEPIME 2000 MILLIGRAM(S): 1 INJECTION, POWDER, FOR SOLUTION INTRAMUSCULAR; INTRAVENOUS at 23:22

## 2023-12-05 RX ADMIN — PANTOPRAZOLE SODIUM 40 MILLIGRAM(S): 20 TABLET, DELAYED RELEASE ORAL at 06:22

## 2023-12-05 RX ADMIN — Medication 50 MICROGRAM(S): at 06:22

## 2023-12-05 RX ADMIN — ATORVASTATIN CALCIUM 40 MILLIGRAM(S): 80 TABLET, FILM COATED ORAL at 22:14

## 2023-12-05 RX ADMIN — BUPROPION HYDROCHLORIDE 100 MILLIGRAM(S): 150 TABLET, EXTENDED RELEASE ORAL at 10:14

## 2023-12-05 RX ADMIN — POLYETHYLENE GLYCOL 3350 17 GRAM(S): 17 POWDER, FOR SOLUTION ORAL at 10:13

## 2023-12-05 RX ADMIN — Medication 85 MILLIMOLE(S): at 12:19

## 2023-12-05 RX ADMIN — OXYCODONE HYDROCHLORIDE 10 MILLIGRAM(S): 5 TABLET ORAL at 06:22

## 2023-12-05 RX ADMIN — Medication 325 MILLIGRAM(S): at 10:14

## 2023-12-05 RX ADMIN — SENNA PLUS 2 TABLET(S): 8.6 TABLET ORAL at 22:15

## 2023-12-05 NOTE — PROGRESS NOTE ADULT - SUBJECTIVE AND OBJECTIVE BOX
POST OPERATIVE DAY #:  3  STATUS POST:   Thoracotomy, anterior corpectomy T11, ASF with instr T10-12                   SUBJECTIVE: Comfortable.     OBJECTIVE:     Vital Signs Last 24 Hrs  T(C): 37.2 (05 Dec 2023 05:00), Max: 37.3 (04 Dec 2023 15:57)  T(F): 98.9 (05 Dec 2023 05:00), Max: 99.1 (04 Dec 2023 15:57)  HR: 114 (05 Dec 2023 06:00) (70 - 126)  BP: 98/84 (05 Dec 2023 06:00) (90/51 - 109/93)  BP(mean): 90 (05 Dec 2023 06:00) (65 - 101)  RR: 19 (05 Dec 2023 06:00) (14 - 20)  SpO2: 94% (05 Dec 2023 06:00) (94% - 97%)    Parameters below as of 05 Dec 2023 04:00  Patient On (Oxygen Delivery Method): room air        Awake and alert  HEENT: unremarkable  Neck: supple  Chest:          Dressing: clean/dry/intact             CT to WS  Bilateral Upper Extremities:         Good Motor Strength         Sensation intact  Bilateral Lower Extremities:          Neuro unchanged                   I&O's Detail    04 Dec 2023 07:01  -  05 Dec 2023 07:00  --------------------------------------------------------  IN:  Total IN: 0 mL    OUT:    Chest Tube (mL): 90 mL    Indwelling Catheter - Urethral (mL): 300 mL    Intermittent Catheterization - Urethral (mL): 360 mL    Voided (mL): 0 mL  Total OUT: 750 mL    Total NET: -750 mL          LABS:                        7.1    15.32 )-----------( 129      ( 05 Dec 2023 06:20 )             21.3     12-05    139  |  109<H>  |  13  ----------------------------<  82  4.0   |  25  |  0.52    Ca    7.7<L>      05 Dec 2023 06:20  Phos  1.9     12-05  Mg     1.9     12-05        A/P :  83y Female s/p  Thoracotomy, anterior corpectomy T11. ASF with instr T10-12           POD # 3  -    Pain control  -    DVT ppx: SCDs    -    OOB as tolerated  -    Physical Therapy, re; ambulation  -    CT as per Dr Tirado  -    Continue present treatment

## 2023-12-05 NOTE — PROGRESS NOTE ADULT - ASSESSMENT
ASSESSMENT  82yo female  with PMHx  HTN, dyslipidemia, GERD, parkinson's, neuropathy, anemia, loop recorder, presents with LE weakness. Patient has been having B/L LE weakness for a little less than 1 year. Symptoms have been getting progressively worse to the point where patient is unable to ambulate.     outpatient CT C/A/P showing multiple bone mets and possible liver mets and path fracture of T11 vertebral body. Patient sent to ED for further evaluation.    Admitted for  1. Progressive B/L LE weakness due to  2. Pathological fx T11 with possible cord compression   2. bone mets T11, L1, L2, L4, L5    s/p anterior thoracic corpectomy, anterior thoracic spine fusion ,     PLAN    Neuro: mentation cont  improve, AAOx 2. follows commands intermittently . pain control prn  CV: BP low normal but stable. sinus tach on monitor  holding anti-htn meds (verapamil, metoprolol).   Pulm: on room air, L CT to [facility], CXR no reaccumulation of fluid but possible LLL infiltrate? denies cough, sob.   GI: PO diet as tolerated. PPI. bowel regimen prn  Nephro: Cr 0.55, straight cath x 1 overnight.  monitor I & Os. Trend renal fxn. replete Phos again today.   ID: leukocytosis worsening, afebrile. CXR possible LLL opacity. will check UA if pt is retaining again.   Heme: Hgb 7.1 today. will transfuse 1u pRBC for symptomatic anemia - tachycardia, low BP. no chemical DVT ppx due to bleeding risk. SCDs.   PT eval    Dispo: transfer to floors if CT removed. . L CT to [facility], transfuse 1u pRBC. f/u CXR this afternoon    Will discuss with Dr. Marie

## 2023-12-05 NOTE — PROGRESS NOTE ADULT - NS ATTEND AMEND GEN_ALL_CORE FT
82yo female  with PMHx  HTN, dyslipidemia, GERD, parkinson's, neuropathy, anemia, loop recorder, presents with LE weakness. Patient has been having B/L LE weakness for a little less than 1 year. Symptoms have been getting progressively worse to the point where patient is unable to ambulate.     outpatient CT C/A/P showing multiple bone mets and possible liver mets and path fracture of T11 vertebral body. Patient sent to ED for further evaluation.    Admitted for  1. Progressive B/L LE weakness due to  2. Pathological fx T11 with possible cord compression   2. bone mets T11, L1, L2, L4, L5    s/p anterior thoracic corpectomy, anterior thoracic spine fusion ,       Plan:  SICU  D/C Chest tube today if CXR OK  Po Diet  Pain Control  Coverage for LLL PNA  Transfuse 1 U PRBC  DNR/DNI  Transfer to 1n  PT

## 2023-12-05 NOTE — PROGRESS NOTE ADULT - SUBJECTIVE AND OBJECTIVE BOX
Patient is a 83y old  Female who presents with a chief complaint of LE weakness (30 Nov 2023 12:48)      BRIEF HOSPITAL COURSE: ***    12/1 seen in PACU, lethargic but arousable, follows commands, c/o L sided pain by CT and lower back pain.  12/4 AAOx 2, denied back pain , sob , chest pain. doesnt know the year or why [facility] really[facility]   [facility]5 more awake today, lower back pain, states her night was okay. CT serous. had to be straight cath overnight.    PAST MEDICAL & SURGICAL HISTORY:  GERD (gastroesophageal reflux disease)  HTN (hypertension)  HLD (hyperlipidemia)  Mood disorder  H/O gastric bypass  History of cholecystectomy  History of total kneereplacement, left    MEDICATIONS  (STANDING):  atorvastatin 40 milliGRAM(s) Oral at bedtime  buPROPion . 100 milliGRAM(s) Oral daily  carbidopa/levodopa  25/100 1 Tablet(s) Oral four times a day  donepezil 10 milliGRAM(s) Oral at bedtime  ferrous    sulfate 325 milliGRAM(s) Oral daily  influenza  Vaccine (HIGH DOSE) 0.7 milliLiter(s) IntraMuscular once  levothyroxine 50 MICROGram(s) Oral daily  naloxone Injectable 0.4 milliGRAM(s) IV Push once  pantoprazole    Tablet 40 milliGRAM(s) Oral before breakfast  polyethylene glycol 3350 17 Gram(s) Oral daily  pramipexole 0.125 milliGRAM(s) Oral at bedtime  senna 2 Tablet(s) Oral at bedtime  sodium phosphate 30 milliMole(s)/500 mL IVPB 30 milliMole(s) IV Intermittent once    Vital Signs Last 24 Hrs  T(C): 36.9 (05 Dec 2023 10:12), Max: 37.3 (04 Dec 2023 15:57)  T(F): 98.5 (05 Dec 2023 10:12), Max: 99.1 (04 Dec 2023 15:57)  HR: 114 (05 Dec 2023 06:00) (70 - 116)  BP: 98/84 (05 Dec 2023 06:00) (90/51 - 109/93)  BP(mean): 90 (05 Dec 2023 06:00) (65 - 101)  RR: 19 (05 Dec 2023 06:00) (14 - 20)  SpO2: 94% (05 Dec 2023 06:00) (94% - 97%)    Parameters below as of 05 Dec 2023 04:00  Patient On (Oxygen Delivery Method): room air    I&O's Detail    04 Dec 2023 07:01  -  05 Dec 2023 07:00  --------------------------------------------------------  IN:  Total IN: 0 mL    OUT:    Chest Tube (mL): 90 mL    Indwelling Catheter - Urethral (mL): 300 mL    Intermittent Catheterization - Urethral (mL): 360 mL    Voided (mL): 0 mL  Total OUT: 750 mL    Total NET: -750 mL      LABS:                        7.1    15.32 )-----------( 129      ( 05 Dec 2023 06:20 )             21.3     12-05    139  |  109<H>  |  13  ----------------------------<  82  4.0   |  25  |  0.52    Ca    7.7<L>      05 Dec 2023 06:20  Phos  1.9     12-05  Mg     1.9     12-05      Urinalysis Basic - ( 05 Dec 2023 06:20 )    Color: x / Appearance: x / SG: x / pH: x  Gluc: 82 mg/dL / Ketone: x  / Bili: x / Urobili: x   Blood: x / Protein: x / Nitrite: x   Leuk Esterase: x / RBC: x / WBC x   Sq Epi: x / Non Sq Epi: x / Bacteria: x      CULTURES:  Culture Results:   <10,000 CFU/mL Normal Urogenital Jaz (11-25 @ 06:15)  Culture Results:   No growth at 5 days (11-24 @ 19:54)  Culture Results:   No growth at 5 days (11-24 @ 19:54)      Physical Examination:    General: mentation better, pale and frail appearing  HEENT: dry mucous membranes  PULM: Clear to auscultation bilaterally, no wheezing. L CT in place- serosanguinous output - no air leak seen  CVS: Regular rate and rhythm, no murmurs  ABD: Soft, nondistended,   EXT: No LE edema  SKIN: pale appearing  NEURO: mentation better, communicating more. AAOx 2 (self, place  follows commands intermittently. able to wiggle toes b/l     DEVICES:   L CT     RADIOLOGY:   < from: MR Lumbar Spine w/wo IV Cont (11.25.23 @ 11:57) >  INDIVIDUAL LEVELS:    L1-L2: No disc herniation, spinal canal stenosis or neural foramen   narrowing.    L2-L3: No disc herniation. Mild degenerative facet disease. No spinal   canal stenosis or neuralforamen narrowing.    L3-L4: No disc herniation. Mild degenerative facet disease. No spinal   canal stenosis or neural foramen narrowing.    IMPRESSION: Bony metastases at T11,L1, L2, L4, L5 and left sacrum.   Pathologic fractures of T11 and L5. Possible cord compression at T11 and   diffuse thecal sac compression at L5.    < end of copied text >     Patient is a 83y old  Female who presents with a chief complaint of LE weakness (30 Nov 2023 12:48)      BRIEF HOSPITAL COURSE: ***    12/1 seen in PACU, lethargic but arousable, follows commands, c/o L sided pain by CT and lower back pain.  12/4 AAOx 2, denied back pain , sob , chest pain. doesnt know the year or why [facility] is [facility] really[facility]   [facility] more awake today, lower back pain, states her night was okay. CT serous. had to be straight cath overnight.    PAST MEDICAL & SURGICAL HISTORY:  GERD (gastroesophageal reflux disease)  HTN (hypertension)  HLD (hyperlipidemia)  Mood disorder  H/O gastric bypass  History of cholecystectomy  History of total kneereplacement, left    MEDICATIONS  (STANDING):  atorvastatin 40 milliGRAM(s) Oral at bedtime  buPROPion . 100 milliGRAM(s) Oral daily  carbidopa/levodopa  25/100 1 Tablet(s) Oral four times a day  donepezil 10 milliGRAM(s) Oral at bedtime  ferrous    sulfate 325 milliGRAM(s) Oral daily  influenza  Vaccine (HIGH DOSE) 0.7 milliLiter(s) IntraMuscular once  levothyroxine 50 MICROGram(s) Oral daily  naloxone Injectable 0.4 milliGRAM(s) IV Push once  pantoprazole    Tablet 40 milliGRAM(s) Oral before breakfast  polyethylene glycol 3350 17 Gram(s) Oral daily  pramipexole 0.125 milliGRAM(s) Oral at bedtime  senna 2 Tablet(s) Oral at bedtime  sodium phosphate 30 milliMole(s)/500 mL IVPB 30 milliMole(s) IV Intermittent once    Vital Signs Last 24 Hrs  T(C): 36.9 (05 Dec 2023 10:12), Max: 37.3 (04 Dec 2023 15:57)  T(F): 98.5 (05 Dec 2023 10:12), Max: 99.1 (04 Dec 2023 15:57)  HR: 114 (05 Dec 2023 06:00) (70 - 116)  BP: 98/84 (05 Dec 2023 06:00) (90/51 - 109/93)  BP(mean): 90 (05 Dec 2023 06:00) (65 - 101)  RR: 19 (05 Dec 2023 06:00) (14 - 20)  SpO2: 94% (05 Dec 2023 06:00) (94% - 97%)    Parameters below as of 05 Dec 2023 04:00  Patient On (Oxygen Delivery Method): room air    I&O's Detail    04 Dec 2023 07:01  -  05 Dec 2023 07:00  --------------------------------------------------------  IN:  Total IN: 0 mL    OUT:    Chest Tube (mL): 90 mL    Indwelling Catheter - Urethral (mL): 300 mL    Intermittent Catheterization - Urethral (mL): 360 mL    Voided (mL): 0 mL  Total OUT: 750 mL    Total NET: -750 mL      LABS:                        7.1    15.32 )-----------( 129      ( 05 Dec 2023 06:20 )             21.3     12-05    139  |  109<H>  |  13  ----------------------------<  82  4.0   |  25  |  0.52    Ca    7.7<L>      05 Dec 2023 06:20  Phos  1.9     12-05  Mg     1.9     12-05      Urinalysis Basic - ( 05 Dec 2023 06:20 )    Color: x / Appearance: x / SG: x / pH: x  Gluc: 82 mg/dL / Ketone: x  / Bili: x / Urobili: x   Blood: x / Protein: x / Nitrite: x   Leuk Esterase: x / RBC: x / WBC x   Sq Epi: x / Non Sq Epi: x / Bacteria: x      CULTURES:  Culture Results:   <10,000 CFU/mL Normal Urogenital Jaz (11-25 @ 06:15)  Culture Results:   No growth at 5 days (11-24 @ 19:54)  Culture Results:   No growth at 5 days (11-24 @ 19:54)      Physical Examination:    General: mentation better, pale and frail appearing  HEENT: dry mucous membranes  PULM: Clear to auscultation bilaterally, no wheezing. L CT in place- serosanguinous output - no air leak seen  CVS: Regular rate and rhythm, no murmurs  ABD: Soft, nondistended,   EXT: No LE edema  SKIN: pale appearing  NEURO: mentation better, communicating more. AAOx 2 (self, place  follows commands intermittently. able to wiggle toes b/l     DEVICES:   L CT     RADIOLOGY:   < from: MR Lumbar Spine w/wo IV Cont (11.25.23 @ 11:57) >  INDIVIDUAL LEVELS:    L1-L2: No disc herniation, spinal canal stenosis or neural foramen   narrowing.    L2-L3: No disc herniation. Mild degenerative facet disease. No spinal   canal stenosis or neuralforamen narrowing.    L3-L4: No disc herniation. Mild degenerative facet disease. No spinal   canal stenosis or neural foramen narrowing.    IMPRESSION: Bony metastases at T11,L1, L2, L4, L5 and left sacrum.   Pathologic fractures of T11 and L5. Possible cord compression at T11 and   diffuse thecal sac compression at L5.    < end of copied text >

## 2023-12-06 LAB
ANION GAP SERPL CALC-SCNC: 6 MMOL/L — SIGNIFICANT CHANGE UP (ref 5–17)
ANION GAP SERPL CALC-SCNC: 6 MMOL/L — SIGNIFICANT CHANGE UP (ref 5–17)
BUN SERPL-MCNC: 11 MG/DL — SIGNIFICANT CHANGE UP (ref 7–23)
BUN SERPL-MCNC: 11 MG/DL — SIGNIFICANT CHANGE UP (ref 7–23)
CALCIUM SERPL-MCNC: 7.9 MG/DL — LOW (ref 8.5–10.1)
CALCIUM SERPL-MCNC: 7.9 MG/DL — LOW (ref 8.5–10.1)
CHLORIDE SERPL-SCNC: 107 MMOL/L — SIGNIFICANT CHANGE UP (ref 96–108)
CHLORIDE SERPL-SCNC: 107 MMOL/L — SIGNIFICANT CHANGE UP (ref 96–108)
CO2 SERPL-SCNC: 26 MMOL/L — SIGNIFICANT CHANGE UP (ref 22–31)
CO2 SERPL-SCNC: 26 MMOL/L — SIGNIFICANT CHANGE UP (ref 22–31)
CREAT SERPL-MCNC: 0.4 MG/DL — LOW (ref 0.5–1.3)
CREAT SERPL-MCNC: 0.4 MG/DL — LOW (ref 0.5–1.3)
EGFR: 99 ML/MIN/1.73M2 — SIGNIFICANT CHANGE UP
EGFR: 99 ML/MIN/1.73M2 — SIGNIFICANT CHANGE UP
FERRITIN SERPL-MCNC: 575 NG/ML — HIGH (ref 13–330)
FERRITIN SERPL-MCNC: 575 NG/ML — HIGH (ref 13–330)
GLUCOSE SERPL-MCNC: 83 MG/DL — SIGNIFICANT CHANGE UP (ref 70–99)
GLUCOSE SERPL-MCNC: 83 MG/DL — SIGNIFICANT CHANGE UP (ref 70–99)
HCT VFR BLD CALC: 28.2 % — LOW (ref 34.5–45)
HCT VFR BLD CALC: 28.2 % — LOW (ref 34.5–45)
HGB BLD-MCNC: 9.1 G/DL — LOW (ref 11.5–15.5)
HGB BLD-MCNC: 9.1 G/DL — LOW (ref 11.5–15.5)
IRON SATN MFR SERPL: 17 % — SIGNIFICANT CHANGE UP (ref 14–50)
IRON SATN MFR SERPL: 17 % — SIGNIFICANT CHANGE UP (ref 14–50)
IRON SATN MFR SERPL: 24 UG/DL — LOW (ref 30–160)
IRON SATN MFR SERPL: 24 UG/DL — LOW (ref 30–160)
MAGNESIUM SERPL-MCNC: 1.9 MG/DL — SIGNIFICANT CHANGE UP (ref 1.6–2.6)
MAGNESIUM SERPL-MCNC: 1.9 MG/DL — SIGNIFICANT CHANGE UP (ref 1.6–2.6)
MCHC RBC-ENTMCNC: 29.9 PG — SIGNIFICANT CHANGE UP (ref 27–34)
MCHC RBC-ENTMCNC: 29.9 PG — SIGNIFICANT CHANGE UP (ref 27–34)
MCHC RBC-ENTMCNC: 32.3 GM/DL — SIGNIFICANT CHANGE UP (ref 32–36)
MCHC RBC-ENTMCNC: 32.3 GM/DL — SIGNIFICANT CHANGE UP (ref 32–36)
MCV RBC AUTO: 92.8 FL — SIGNIFICANT CHANGE UP (ref 80–100)
MCV RBC AUTO: 92.8 FL — SIGNIFICANT CHANGE UP (ref 80–100)
PHOSPHATE SERPL-MCNC: 2.5 MG/DL — SIGNIFICANT CHANGE UP (ref 2.5–4.5)
PHOSPHATE SERPL-MCNC: 2.5 MG/DL — SIGNIFICANT CHANGE UP (ref 2.5–4.5)
PLATELET # BLD AUTO: 160 K/UL — SIGNIFICANT CHANGE UP (ref 150–400)
PLATELET # BLD AUTO: 160 K/UL — SIGNIFICANT CHANGE UP (ref 150–400)
POTASSIUM SERPL-MCNC: 3.8 MMOL/L — SIGNIFICANT CHANGE UP (ref 3.5–5.3)
POTASSIUM SERPL-MCNC: 3.8 MMOL/L — SIGNIFICANT CHANGE UP (ref 3.5–5.3)
POTASSIUM SERPL-SCNC: 3.8 MMOL/L — SIGNIFICANT CHANGE UP (ref 3.5–5.3)
POTASSIUM SERPL-SCNC: 3.8 MMOL/L — SIGNIFICANT CHANGE UP (ref 3.5–5.3)
RBC # BLD: 3.04 M/UL — LOW (ref 3.8–5.2)
RBC # BLD: 3.04 M/UL — LOW (ref 3.8–5.2)
RBC # FLD: 16.8 % — HIGH (ref 10.3–14.5)
RBC # FLD: 16.8 % — HIGH (ref 10.3–14.5)
SODIUM SERPL-SCNC: 139 MMOL/L — SIGNIFICANT CHANGE UP (ref 135–145)
SODIUM SERPL-SCNC: 139 MMOL/L — SIGNIFICANT CHANGE UP (ref 135–145)
TIBC SERPL-MCNC: 140 UG/DL — LOW (ref 220–430)
TIBC SERPL-MCNC: 140 UG/DL — LOW (ref 220–430)
UIBC SERPL-MCNC: 116 UG/DL — SIGNIFICANT CHANGE UP (ref 110–370)
UIBC SERPL-MCNC: 116 UG/DL — SIGNIFICANT CHANGE UP (ref 110–370)
WBC # BLD: 13.33 K/UL — HIGH (ref 3.8–10.5)
WBC # BLD: 13.33 K/UL — HIGH (ref 3.8–10.5)
WBC # FLD AUTO: 13.33 K/UL — HIGH (ref 3.8–10.5)
WBC # FLD AUTO: 13.33 K/UL — HIGH (ref 3.8–10.5)

## 2023-12-06 PROCEDURE — 99232 SBSQ HOSP IP/OBS MODERATE 35: CPT

## 2023-12-06 PROCEDURE — 99233 SBSQ HOSP IP/OBS HIGH 50: CPT

## 2023-12-06 RX ORDER — METOPROLOL TARTRATE 50 MG
25 TABLET ORAL DAILY
Refills: 0 | Status: DISCONTINUED | OUTPATIENT
Start: 2023-12-06 | End: 2023-12-16

## 2023-12-06 RX ADMIN — CEFEPIME 2000 MILLIGRAM(S): 1 INJECTION, POWDER, FOR SOLUTION INTRAMUSCULAR; INTRAVENOUS at 05:37

## 2023-12-06 RX ADMIN — SENNA PLUS 2 TABLET(S): 8.6 TABLET ORAL at 22:03

## 2023-12-06 RX ADMIN — PANTOPRAZOLE SODIUM 40 MILLIGRAM(S): 20 TABLET, DELAYED RELEASE ORAL at 09:46

## 2023-12-06 RX ADMIN — ATORVASTATIN CALCIUM 40 MILLIGRAM(S): 80 TABLET, FILM COATED ORAL at 22:03

## 2023-12-06 RX ADMIN — CARBIDOPA AND LEVODOPA 1 TABLET(S): 25; 100 TABLET ORAL at 05:32

## 2023-12-06 RX ADMIN — Medication 50 MICROGRAM(S): at 05:32

## 2023-12-06 RX ADMIN — Medication 325 MILLIGRAM(S): at 09:47

## 2023-12-06 RX ADMIN — BUPROPION HYDROCHLORIDE 100 MILLIGRAM(S): 150 TABLET, EXTENDED RELEASE ORAL at 09:46

## 2023-12-06 RX ADMIN — Medication 650 MILLIGRAM(S): at 13:34

## 2023-12-06 RX ADMIN — CARBIDOPA AND LEVODOPA 1 TABLET(S): 25; 100 TABLET ORAL at 00:24

## 2023-12-06 RX ADMIN — PRAMIPEXOLE DIHYDROCHLORIDE 0.12 MILLIGRAM(S): 0.12 TABLET ORAL at 22:05

## 2023-12-06 RX ADMIN — CARBIDOPA AND LEVODOPA 1 TABLET(S): 25; 100 TABLET ORAL at 23:43

## 2023-12-06 RX ADMIN — DONEPEZIL HYDROCHLORIDE 10 MILLIGRAM(S): 10 TABLET, FILM COATED ORAL at 22:03

## 2023-12-06 RX ADMIN — CEFEPIME 2000 MILLIGRAM(S): 1 INJECTION, POWDER, FOR SOLUTION INTRAMUSCULAR; INTRAVENOUS at 22:04

## 2023-12-06 RX ADMIN — CEFEPIME 2000 MILLIGRAM(S): 1 INJECTION, POWDER, FOR SOLUTION INTRAMUSCULAR; INTRAVENOUS at 13:35

## 2023-12-06 RX ADMIN — CARBIDOPA AND LEVODOPA 1 TABLET(S): 25; 100 TABLET ORAL at 13:34

## 2023-12-06 RX ADMIN — CARBIDOPA AND LEVODOPA 1 TABLET(S): 25; 100 TABLET ORAL at 18:41

## 2023-12-06 RX ADMIN — Medication 25 MILLIGRAM(S): at 22:05

## 2023-12-06 NOTE — PROGRESS NOTE ADULT - ASSESSMENT
84 y/o F w/ PMH of HTN, Dyslipidemia, GERD, Barrette Esophagus, Parkinson's, Neuropathy, CRI, Loop recorder, IRINEO, followed outpatient, with complaints of progressively worsening B/L LE weakness, paresthesia, back pain, difficulty ambulating, and 40 lbs unintentional weight loss, was sent for CT CAP 11/22/23 which revealed multiple bone mets and possible liver mets and path fracture of T11 vertebral body. She was sent by Hematologist to ED for further evaluation.       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  - CT CAP imaging revealed multiple lytic osseous lesions highly suspicious for metastatic disease. Lesions in the L5 and T11 vertebral bodies have epidural extension.  Pathologic fracture T11 vertebral body. Some of the rib lesions demonstrate pathologic fractures. Multiple hepatic lesions are not characterized without intravenous contrast but nonetheless are suspicious for metastatic disease.  - CT Head with no acute intracranial hemorrhage or mass effect  - MR imaging with bony metastases at T11,L1, L2, L4, L5 and left sacrum. Pathologic fractures of T11 and L5. Possible cord compression at T11 and diffuse thecal sac compression at L5. No evidence for metastatic disease to the cervical spine or cervical spinal cord compression. Thoracic spine: Multifocal metastatic disease. A large metastasis replaces the T11 vertebral body with an associated mild to moderate pathologic compression fracture deformity and retropulsion of bone. Epidural tumor extension is noted. The compression fracture with retropulsion of bone and epidural tumor extension results in mild to moderate thoracic spinal cord compression.  - Ortho Dr Ceballos following- recommended surgery for decompression and stabilization at T 11 - family wants to have diagnosis before deciding re surgery.    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 Immunohistochemical stains for cytokeratin, CAM 5.2 and CK7 are positive. for CD56 is positive, which is indicative of being neuroendocrine. Synaptophysin is focally positive. Chromogranin,TTF-1, CDX-2, CK 20, JUDY-3, PAX-8, Melan-A, CD45, p63, p40, HepPar 1 and CK17 are negative. The immunohistochemical profile does not definitively determine the primary site.  - Communicated the result to the patient and patient requested if we can let her - Julius know as well. Called on the number in the system, with no answer Will attempt again later.  -Palliative following.    - s/p  Left thoracotomy, Anterior thoracic corpectomy, fusion T10-12., Neuro Sx following  - Rad/Onc consulted;-->consider surgery if possible due to bulky disease. Pt cannot be discharged home for RT. Advise consideration of surgery if possible.   -d/w Dr. Mansfield--> patient is too fragile and will need to be d/c' d to [facility].    # Normocytic Anemia     - Hgb remains low but stable, not requiring PRBC   - Multifactorial with Hx iron deficiency, B12 deficiency, CRI, and anemia of chronic disease and now possibly due to malignancy  - Follows outpatient, received x1 iron infusion 10/09/23  - B12 deficiency ---> started methylcobalamin 1000mcg , repeat level normal / >2000  - Outpatient initial presentation with anemia, and renal insufficiency labs were sent to r/o MM 06/2023 ---> negative  - Plan was to start YANET injections outpatient, once iron stores repleted  - Continue to trend serial CBCs    84 y/o F w/ PMH of HTN, Dyslipidemia, GERD, Barrette Esophagus, Parkinson's, Neuropathy, CRI, Loop recorder, IRINEO, followed outpatient, with complaints of progressively worsening B/L LE weakness, paresthesia, back pain, difficulty ambulating, and 40 lbs unintentional weight loss, was sent for CT CAP 11/22/23 which revealed multiple bone mets and possible liver mets and path fracture of T11 vertebral body. She was sent by Hematologist to ED for further evaluation.       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  - CT CAP imaging revealed multiple lytic osseous lesions highly suspicious for metastatic disease. Lesions in the L5 and T11 vertebral bodies have epidural extension.  Pathologic fracture T11 vertebral body. Some of the rib lesions demonstrate pathologic fractures. Multiple hepatic lesions are not characterized without intravenous contrast but nonetheless are suspicious for metastatic disease.  - CT Head with no acute intracranial hemorrhage or mass effect  - MR imaging with bony metastases at T11,L1, L2, L4, L5 and left sacrum. Pathologic fractures of T11 and L5. Possible cord compression at T11 and diffuse thecal sac compression at L5. No evidence for metastatic disease to the cervical spine or cervical spinal cord compression. Thoracic spine: Multifocal metastatic disease. A large metastasis replaces the T11 vertebral body with an associated mild to moderate pathologic compression fracture deformity and retropulsion of bone. Epidural tumor extension is noted. The compression fracture with retropulsion of bone and epidural tumor extension results in mild to moderate thoracic spinal cord compression.  - Ortho Dr Ceballos following- recommended surgery for decompression and stabilization at T 11 - family wants to have diagnosis before deciding re surgery.    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 Immunohistochemical stains for cytokeratin, CAM 5.2 and CK7 are positive. for CD56 is positive, which is indicative of being neuroendocrine. Synaptophysin is focally positive. Chromogranin,TTF-1, CDX-2, CK 20, JUDY-3, PAX-8, Melan-A, CD45, p63, p40, HepPar 1 and CK17 are negative. The immunohistochemical profile does not definitively determine the primary site.  - Communicated the result to the patient and patient requested if we can let her - Julius know as well. Called on the number in the system, with no answer Will attempt again later.  -Palliative following.    - s/p  Left thoracotomy, Anterior thoracic corpectomy, fusion T10-12., Neuro Sx following  - Rad/Onc consulted;-->consider surgery if possible due to bulky disease. Pt cannot be discharged home for RT. Advise consideration of surgery if possible.   -d/w Dr. Mansfield--> patient is too fragile and will need to be d/c' d to [facility].    # Normocytic Anemia     - Hgb remains low but stable, not requiring PRBC   - Multifactorial with Hx iron deficiency, B12 deficiency, CRI, and anemia of chronic disease and now possibly due to malignancy  - Follows outpatient, received x1 iron infusion 10/09/23  - B12 deficiency ---> started methylcobalamin 1000mcg , repeat level normal / >2000  - Outpatient initial presentation with anemia, and renal insufficiency labs were sent to r/o MM 06/2023 ---> negative  - Plan was to start YANET injections outpatient, once iron stores repleted  - Continue to trend serial CBCs    82 y/o F w/ PMH of HTN, Dyslipidemia, GERD, Barrette Esophagus, Parkinson's, Neuropathy, CRI, Loop recorder, IRINEO, followed outpatient, with complaints of progressively worsening B/L LE weakness, paresthesia, back pain, difficulty ambulating, and 40 lbs unintentional weight loss, was sent for CT CAP 11/22/23 which revealed multiple bone mets and possible liver mets and path fracture of T11 vertebral body. She was sent by Hematologist to ED for further evaluation.       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  - CT CAP imaging revealed multiple lytic osseous lesions highly suspicious for metastatic disease. Lesions in the L5 and T11 vertebral bodies have epidural extension.  Pathologic fracture T11 vertebral body. Some of the rib lesions demonstrate pathologic fractures. Multiple hepatic lesions are not characterized without intravenous contrast but nonetheless are suspicious for metastatic disease.  - CT Head with no acute intracranial hemorrhage or mass effect  - MR imaging with bony metastases at T11,L1, L2, L4, L5 and left sacrum. Pathologic fractures of T11 and L5. Possible cord compression at T11 and diffuse thecal sac compression at L5. No evidence for metastatic disease to the cervical spine or cervical spinal cord compression. Thoracic spine: Multifocal metastatic disease. A large metastasis replaces the T11 vertebral body with an associated mild to moderate pathologic compression fracture deformity and retropulsion of bone. Epidural tumor extension is noted. The compression fracture with retropulsion of bone and epidural tumor extension results in mild to moderate thoracic spinal cord compression.  - Ortho Dr Ceballos following- recommended surgery for decompression and stabilization at T 11 - family wants to have diagnosis before deciding re surgery.    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 Immunohistochemical stains for cytokeratin, CAM 5.2 and CK7 are positive. for CD56 is positive, which is indicative of being neuroendocrine. Synaptophysin is focally positive. Chromogranin,TTF-1, CDX-2, CK 20, JUDY-3, PAX-8, Melan-A, CD45, p63, p40, HepPar 1 and CK17 are negative. The immunohistochemical profile does not definitively determine the primary site.  - Communicated the result to the patient and patient requested if we can let her - Julius know as well. Called on the number in the system, with no answer Will attempt again later.  -Palliative following.    - s/p  Left thoracotomy, Anterior thoracic corpectomy, fusion T10-12., Neuro Sx following  - Rad/Onc consulted;-->consider surgery if possible due to bulky disease. Pt cannot be discharged home for RT. Advise consideration of surgery if possible.   -d/w Dr. Mansfield & NPNiko--> patient is too fragile and will need to be d/c' d to [facility].    # Normocytic Anemia     - Hgb remains low but stable, not requiring PRBC   - Multifactorial with Hx iron deficiency, B12 deficiency, CRI, and anemia of chronic disease and now possibly due to malignancy  - Follows outpatient, received x1 iron infusion 10/09/23  - B12 deficiency ---> started methylcobalamin 1000mcg , repeat level normal / >2000  - Outpatient initial presentation with anemia, and renal insufficiency labs were sent to r/o MM 06/2023 ---> negative  - Plan was to start YANET injections outpatient, once iron stores repleted  - Continue to trend serial CBCs    82 y/o F w/ PMH of HTN, Dyslipidemia, GERD, Barrette Esophagus, Parkinson's, Neuropathy, CRI, Loop recorder, IRINEO, followed outpatient, with complaints of progressively worsening B/L LE weakness, paresthesia, back pain, difficulty ambulating, and 40 lbs unintentional weight loss, was sent for CT CAP 11/22/23 which revealed multiple bone mets and possible liver mets and path fracture of T11 vertebral body. She was sent by Hematologist to ED for further evaluation.       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  - CT CAP imaging revealed multiple lytic osseous lesions highly suspicious for metastatic disease. Lesions in the L5 and T11 vertebral bodies have epidural extension.  Pathologic fracture T11 vertebral body. Some of the rib lesions demonstrate pathologic fractures. Multiple hepatic lesions are not characterized without intravenous contrast but nonetheless are suspicious for metastatic disease.  - CT Head with no acute intracranial hemorrhage or mass effect  - MR imaging with bony metastases at T11,L1, L2, L4, L5 and left sacrum. Pathologic fractures of T11 and L5. Possible cord compression at T11 and diffuse thecal sac compression at L5. No evidence for metastatic disease to the cervical spine or cervical spinal cord compression. Thoracic spine: Multifocal metastatic disease. A large metastasis replaces the T11 vertebral body with an associated mild to moderate pathologic compression fracture deformity and retropulsion of bone. Epidural tumor extension is noted. The compression fracture with retropulsion of bone and epidural tumor extension results in mild to moderate thoracic spinal cord compression.  - Ortho Dr Ceballos following- recommended surgery for decompression and stabilization at T 11 - family wants to have diagnosis before deciding re surgery.    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 Immunohistochemical stains for cytokeratin, CAM 5.2 and CK7 are positive. for CD56 is positive, which is indicative of being neuroendocrine. Synaptophysin is focally positive. Chromogranin,TTF-1, CDX-2, CK 20, JUDY-3, PAX-8, Melan-A, CD45, p63, p40, HepPar 1 and CK17 are negative. The immunohistochemical profile does not definitively determine the primary site.  - Communicated the result to the patient and patient requested if we can let her - Julius know as well. Called on the number in the system, with no answer Will attempt again later.  -Palliative following.    - s/p  Left thoracotomy, Anterior thoracic corpectomy, fusion T10-12., Neuro Sx following  - Rad/Onc consulted;-->consider surgery if possible due to bulky disease. Pt cannot be discharged home for RT. Advise consideration of surgery if possible.   -d/w Dr. Mansfield & NPNiko--> patient is too fragile and will need to be d/c' d to [facility].    # Normocytic Anemia     - Hgb remains low but stable, not requiring PRBC   - Multifactorial with Hx iron deficiency, B12 deficiency, CRI, and anemia of chronic disease and now possibly due to malignancy  - Follows outpatient, received x1 iron infusion 10/09/23  - B12 deficiency ---> started methylcobalamin 1000mcg , repeat level normal / >2000  - Outpatient initial presentation with anemia, and renal insufficiency labs were sent to r/o MM 06/2023 ---> negative  - Plan was to start YANET injections outpatient, once iron stores repleted  - Continue to trend serial CBCs    84 y/o F w/ PMH of HTN, Dyslipidemia, GERD, Barrette Esophagus, Parkinson's, Neuropathy, CRI, Loop recorder, IRINEO, followed outpatient, with complaints of progressively worsening B/L LE weakness, paresthesia, back pain, difficulty ambulating, and 40 lbs unintentional weight loss, was sent for CT CAP 11/22/23 which revealed multiple bone mets and possible liver mets and path fracture of T11 vertebral body. She was sent by Hematologist to ED for further evaluation.       # Bilateral LE Weakness with Bone and Liver Metastatic Disease    - Progressively worsening B/ LLE weakness, paresthesias, back pain, difficulty ambulating, and 40 lbs unintentional weight loss over a year.  - CT CAP imaging revealed multiple lytic osseous lesions highly suspicious for metastatic disease. Lesions in the L5 and T11 vertebral bodies have epidural extension.  Pathologic fracture T11 vertebral body. Some of the rib lesions demonstrate pathologic fractures. Multiple hepatic lesions suspicious for metastatic disease.  - CT Head with no acute intracranial hemorrhage or mass effect  - MR imaging with bony metastases at T11, L1, L2, L4, L5 and left sacrum. Pathologic fractures of T11 and L5. Possible cord compression at T11 and diffuse thecal sac compression at L5. No evidence for metastatic disease to the cervical spine or cervical spinal cord compression. Thoracic spine: Multifocal metastatic disease. A large metastasis replaces the T11 vertebral body with an associated mild to moderate pathologic compression fracture deformity and retropulsion of bone. Epidural tumor extension is noted. The compression fracture with retropulsion of bone and epidural tumor extension results in mild to moderate thoracic spinal cord compression.  - Ortho Dr Ceballos following- recommended surgery for decompression and stabilization at T11 - family wants to have diagnosis before deciding re surgery.  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  Histologically, the tumor is composed of sheets of malignant cells with a high nuclear to cytoplasmic ratio, necrosis, and individual cell necrosis, reminiscent of small cell carcinoma. The immunohistochemical profile does not definitively determine the primary site.  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  This would be continued for four to six cycles, with the immune checkpoint inhibitor continuing until progression of disease or unacceptable toxicity.   -MRI brain 11/29/23 negative.  Discuss with RT onc possibility of prophylactic intracranial RT.  -Communicated the result to the patient and patient requested if we can let her - Julius know as well. Called on the number in the system, with no answer. Will attempt again later.    -Palliative following.  -s/p surgical decompression of T11 - POD 5 left thoracotomy, Anterior thoracic corpectomy, fusion T10-12.,     -Sheela Knutson advised outpt palliative radiotherapy to T11 and L5 adjuvantly.   -Neuro Sx following  -d/w Dr. Mansfield & NPNiko--> will need to be d/c' d to [facility]    # Normocytic Anemia     - Hgb remains low but stable, not requiring PRBC   - Multifactorial with Hx iron deficiency, B12 deficiency, CRI, and anemia of chronic disease and now possibly due to malignancy  - Follows outpatient, received x1 iron infusion 10/09/23  - B12 deficiency ---> started methylcobalamin 1000mcg , repeat level normal / >2000  - Outpatient initial presentation with anemia, and renal insufficiency labs were sent to r/o MM 06/2023 ---> negative  - Plan was to start YANET injections outpatient, once iron stores repleted  - Continue to trend serial CBCs    82 y/o F w/ PMH of HTN, Dyslipidemia, GERD, Barrette Esophagus, Parkinson's, Neuropathy, CRI, Loop recorder, IRINEO, followed outpatient, with complaints of progressively worsening B/L LE weakness, paresthesia, back pain, difficulty ambulating, and 40 lbs unintentional weight loss, was sent for CT CAP 11/22/23 which revealed multiple bone mets and possible liver mets and path fracture of T11 vertebral body. She was sent by Hematologist to ED for further evaluation.       # Bilateral LE Weakness with Bone and Liver Metastatic Disease    - Progressively worsening B/ LLE weakness, paresthesias, back pain, difficulty ambulating, and 40 lbs unintentional weight loss over a year.  - CT CAP imaging revealed multiple lytic osseous lesions highly suspicious for metastatic disease. Lesions in the L5 and T11 vertebral bodies have epidural extension.  Pathologic fracture T11 vertebral body. Some of the rib lesions demonstrate pathologic fractures. Multiple hepatic lesions suspicious for metastatic disease.  - CT Head with no acute intracranial hemorrhage or mass effect  - MR imaging with bony metastases at T11, L1, L2, L4, L5 and left sacrum. Pathologic fractures of T11 and L5. Possible cord compression at T11 and diffuse thecal sac compression at L5. No evidence for metastatic disease to the cervical spine or cervical spinal cord compression. Thoracic spine: Multifocal metastatic disease. A large metastasis replaces the T11 vertebral body with an associated mild to moderate pathologic compression fracture deformity and retropulsion of bone. Epidural tumor extension is noted. The compression fracture with retropulsion of bone and epidural tumor extension results in mild to moderate thoracic spinal cord compression.  - Ortho Dr Ceballos following- recommended surgery for decompression and stabilization at T11 - family wants to have diagnosis before deciding re surgery.  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  Histologically, the tumor is composed of sheets of malignant cells with a high nuclear to cytoplasmic ratio, necrosis, and individual cell necrosis, reminiscent of small cell carcinoma. The immunohistochemical profile does not definitively determine the primary site.  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  This would be continued for four to six cycles, with the immune checkpoint inhibitor continuing until progression of disease or unacceptable toxicity.   -MRI brain 11/29/23 negative.  Discuss with RT onc possibility of prophylactic intracranial RT.  -Communicated the result to the patient and patient requested if we can let her - Julius know as well. Called on the number in the system, with no answer. Will attempt again later.    -Palliative following.  -s/p surgical decompression of T11 - POD 5 left thoracotomy, Anterior thoracic corpectomy, fusion T10-12.,     -Sheela Knutson advised outpt palliative radiotherapy to T11 and L5 adjuvantly.   -Neuro Sx following  -d/w Dr. Mansfield & NPNiko--> will need to be d/c' d to [facility]    # Normocytic Anemia     - Hgb remains low but stable, not requiring PRBC   - Multifactorial with Hx iron deficiency, B12 deficiency, CRI, and anemia of chronic disease and now possibly due to malignancy  - Follows outpatient, received x1 iron infusion 10/09/23  - B12 deficiency ---> started methylcobalamin 1000mcg , repeat level normal / >2000  - Outpatient initial presentation with anemia, and renal insufficiency labs were sent to r/o MM 06/2023 ---> negative  - Plan was to start YANET injections outpatient, once iron stores repleted  - Continue to trend serial CBCs

## 2023-12-06 NOTE — PROGRESS NOTE ADULT - ASSESSMENT
84 y/o F w/ PMH of HTN, dyslipidemia, GERD, parkinson's, neuropathy, anemia, loop recorder, p/w LE weakness.    POD #9 Left rib mass biopsy with US guidance, POD# 5 Left thoracotomy, Anterior thoracic corpectomy, fusion T10-12.    - Pain control PRN  - Cont PT/OT, ambulate as tolerated.  BC recommended.   - Cont medical management per hospitalist.   - Chest Tube removed yesterday, CXR reviewed and stable.  Surgery signed-off.  - DVT ppx: SCDs, no chemoprophylaxis until cleared by Ortho attending.     d/w Dr Ceballos.

## 2023-12-06 NOTE — PROGRESS NOTE ADULT - SUBJECTIVE AND OBJECTIVE BOX
HPI:    84 y/o F w/ MH of HTN, dyslipidemia, GERD, parkinson's, neuropathy, iron deficiency anemia, loop recorder, p/w LE weakness. Patient has been having B/L LE weakness for a little less than 1 year. Symptoms have been getting progressively worse to the point where patient is unable to ambulate, also with  LE paresthesias and back pain. and 40 lbs unintentional weight loss. She was sent for CT-C/A/P- which revealed multiple bone mets and possible liver mets and path fracture of T11 vertebral body.  She was sent to ED for further evaluation.     11/25/23- Seen at bedside, along with Dr. Loera, alert, awake, in no acute distress. Reports left lower extremities with tingling and numbness, denies cauda equina symptoms. Reports difficulty ambulating even with a walker.     11/27/23: Seen at bedside with attending Dr Loera and Hospitalist Dr Hills, currently awaiting IR bx procedure hopefully today     11/28/23: Seen at bedside with nursing staff, no acute distress, pain controlled, s/p rib Bx with IR yesterday    12/6/2023- Seen at bedside, OOB to chair. Reports feels weak, with cough, unable to bring up the phlegm    PAST MEDICAL & SURGICAL HISTORY:    GERD (gastroesophageal reflux disease)    HTN (hypertension)    HLD (hyperlipidemia)    Mood disorder    H/O gastric bypass    History of cholecystectomy    History of total knee replacement, left    Iron Deficiency Anemia    CRI    Barrette Esophagus      FAMILY HISTORY:    COPD- Brother  CAD- Sister  Stroke- Mother  Parkinson-Father      MEDICATIONS  (STANDING):    atorvastatin 40 milliGRAM(s) Oral at bedtime  buPROPion . 100 milliGRAM(s) Oral daily  carbidopa/levodopa  25/100 1 Tablet(s) Oral four times a day  cefepime  Injectable. 2000 milliGRAM(s) IV Push every 8 hours  donepezil 10 milliGRAM(s) Oral at bedtime  ferrous    sulfate 325 milliGRAM(s) Oral daily  influenza  Vaccine (HIGH DOSE) 0.7 milliLiter(s) IntraMuscular once  levothyroxine 50 MICROGram(s) Oral daily  naloxone Injectable 0.4 milliGRAM(s) IV Push once  pantoprazole    Tablet 40 milliGRAM(s) Oral before breakfast  polyethylene glycol 3350 17 Gram(s) Oral daily  pramipexole 0.125 milliGRAM(s) Oral at bedtime  senna 2 Tablet(s) Oral at bedtime    MEDICATIONS  (PRN):    acetaminophen     Tablet .. 650 milliGRAM(s) Oral every 6 hours PRN Mild Pain (1 - 3), Moderate Pain (4 - 6)  bisacodyl 5 milliGRAM(s) Oral daily PRN Constipation    ALLERGIES:    Originally Entered as [Moderate Rash] reaction to [SURGICAL TAPE] (Unknown)  Vitamin K (Other (Severe))  Fosamax (Unknown)      REVIEW OF SYSTEMS:    Constitutional, Eyes, ENT, Cardiovascular, Respiratory, Gastrointestinal, Genitourinary, Musculoskeletal, Integumentary, Neurological, Psychiatric, Endocrine, Heme/Lymph and Allergic/Immunologic review of systems are otherwise negative except as noted in HPI.       VITAL SIGNS:    T(C): 37.2 (06 Dec 2023 07:27), Max: 37.2 (06 Dec 2023 07:27)  T(F): 98.9 (06 Dec 2023 07:27), Max: 98.9 (06 Dec 2023 07:27)  HR: 100 (06 Dec 2023 07:27) (90 - 113)  BP: 130/69 (06 Dec 2023 07:27) (104/66 - 141/81)  BP(mean): 83 (05 Dec 2023 18:00) (77 - 83)  RR: 17 (06 Dec 2023 07:27) (11 - 17)  SpO2: 96% (06 Dec 2023 07:27) (91% - 97%)    Parameters below as of 06 Dec 2023 07:27  Patient On (Oxygen Delivery Method): room air    PHYSICAL EXAM:    CONSTITUTIONAL : NAD, but frail elderly.  NERVOUS SYSTEM:  Alert & Oriented X 2, with mild weakness in left lower extremity and reported tingling.  HEAD:  Atraumatic, Normocephalic  EYES:  conjunctiva and sclera clear  HEENT: Moist mucous membranes, Supple neck , No JVD  CHEST: decreased B/L BS  BREAST: With no palpable mass.  HEART: Regular rate and rhythm  ABDOMEN: Soft, Non-tender, Non-distended; Bowel sounds present  GENITOURINARY- Voiding, no suprapubic tenderness  EXTREMITIES:  2+ Peripheral Pulses, No clubbing, cyanosis, no edema  MUSCULOSKELETAL:- active ROM  SKIN: ecchymosis on upper extremities on hands    LABS:                                             9.1    8.66  )-----------( 238      ( 28 Nov 2023 08:12 )             28.6     11-28    139  |  105  |  17  ----------------------------<  64<L>  3.5   |  26  |  0.72    Ca    9.1      28 Nov 2023 08:12    TPro  6.0  /  Alb  2.7<L>  /  TBili  0.5  /  DBili  x   /  AST  21  /  ALT  8<L>  /  AlkPhos  123<H>  11-27      RADIOLOGY & ADDITIONAL STUDIES:      EXAM:  MR SPINE LUMBAR WAW IC       PROCEDURE DATE:  11/25/2023      INTERPRETATION:  CLINICAL INFORMATION: Metastases    ADDITIONAL CLINICAL INFORMATION: Cancer C80.1    TECHNIQUE: Multiplanar, multisequence MRI was performed of the lumbar   spine.  IV Contrast: Gadavist  8 cc administered   2 cc discarded    CORRELATION: CT chest and abdomen 11/21/2023    FINDINGS:    ALIGNMENT: The alignment is normal.  VERTEBRA: There is a mild compression fracture deformity of T12 as well   as pathological compression fracture deformities of T11 and L5. There is   diffuse marrow infiltration of the T11 and L5 vertebral bodies with   extension to the posterior elements with hypointense T1 and hyperintense   T2/STIR signal with avid enhancement compatible with metastatic disease.   The loss of the cortical margins is better seen on the prior CT. There is   ventral epidural soft tissue component at T11 indenting and possibly   compressing the spinal cord. Axial images were not obtained at this   level. There is diffuse ventral epidural soft tissue at L5 severely   compressing the thecal sac as well as the L5 and S1 nerve roots. There   also is diffuse involvement of the left sacrum at the S1 and S2   levels extending to the left SI joint. There is minimum epidural soft   tissue along the lateral margin of the sacral foramen at the S1-S2 level.   There are additional smaller metastases involving L1, L2 and L4. There   are focal areas of hyperintenseT1 and T2 signal within the T2 12 and L1   vertebral bodies which may represent hemangiomas.  DISC SPACES: Maintained.  CANAL: The distal cord and conus are normal in signal. Conus terminates   at L1.  VISUALIZED INTRAPELVIC/INTRA-ABDOMINAL SOFT TISSUES: Normal.  PARASPINAL SOFT TISSUES: Normal.    INDIVIDUAL LEVELS:    L1-L2: No disc herniation, spinal canal stenosis or neural foramen   narrowing.    L2-L3: No disc herniation. Mild degenerative facet disease. No spinal   canal stenosis or neural foramen narrowing.    L3-L4: No disc herniation. Mild degenerative facet disease. No spinal   canal stenosis or neural foramen narrowing.    IMPRESSION: Bony metastases at T11,L1, L2, L4, L5 and left sacrum.   Pathologic fractures of T11 and L5. Possible cord compression at T11 and   diffuse thecal sac compression at L5.          EXAM:  MR SPINE THORACIC WAW IC     EXAM:  MR SPINE CERVICAL WAW IC      PROCEDURE DATE:  11/25/2023      INTERPRETATION:  History: 84 y/o F with a PMhx of anemia, GERD, HTN, HLD,   mood disorder, gastric bypass, and cholecystectomy presents to the ED SIB   MD c/o weakness. The pt had outpatient w/u for worsening anemia with   weakness. CT and XR showing lytic legions in spine with epidural   extensions leading to increased weakness and difficulty ambulating. PCP   sent pt to ED for further evaluation. Pt reports weight loss over months.   Denies any sort of active bleeding no blood in stool. Denies CP, or SOB.   C/o lower back pain. Some numbness and tingling to LLE. Denies bowl or   bladder incontinence. Denies numbness consistent with saddle anesthesia.   She had  outpatient CT C/A/P showing multiple bone mets and possible liver mets   and path  fracture of T11 vertebral body. Patient also C/o LE  paresthesias and back pain.    Description: A MRI of the cervical spine and a MRI of the thoracic spine   with and without gadolinium contrast were performed with multiplanar   multisequence technique.    8 cc intravenous Gadavist gadolinium contrast was administered, 2 cc   contrast was discarded.    Comparison is made to the thoracic and lumbar spine region from the chest   abdomen pelvic CT from 11/21/2023, and cervical spine CT from 08/08/2022    Cervical spine MRI:    Bulky dorsal paraspinal tumor in the upper thoracic region is partially   visualized. Please see thoracic spine MRI component of this report.    No focal cervical spine bony lesions are visualized. There is no evidence   for epidural tumor extension in the cervical spine.    Small broad disc bulges are present at the C4-C5, C5-C6, C6-C7 levels   with associated mild central canal stenosis. Multilevel ligamentum flavum   hypertrophy multilevel facet degenerative changes are noted.    There is no cervical spinal cord mass or syrinx. No abnormal intraspinal   enhancement is noted in the cervical spine. There is no cervical spinal   cord compression.    Thoracic spine MRI:    A large metastasis replaces the T11 vertebral body with extension into   the pedicles, with an associated mild to moderate pathologic compression   fracture deformity and retropulsion of bone. Epidural tumor extension is   noted. Tumor extension into the left greater than right neural foramina   at this levels also noted. The compression fracture with retropulsion of   bone and epidural tumor extension results in mild to moderate thoracic   spinal cord compression. A left-sided rib metastasis with soft tissue   extension of tumor is partially visualized at this level.    Additional metastases are noted involving the T9 and L2 vertebral bodies   without associated epidural tumor extension    A moderate sized hemangioma involves the T8 vertebral body with   associated increased T1 signal, increased STIR signal, and enhancement.    A 3.5 cm x 3.2cm x 3.8 cm bulky paraspinal metastasis involves the right   posterior elements at the T3 and T4 levels with extension into the   paraspinal musculature and abutment and possible involvement of the   posterior ribs at these levels. No significant intraspinal extension of   tumor at this level is appreciated.    Nonspecific bilateral pleural effusions and pleural thickening is noted.   Multiple enhancing liver lesions are partially visualized. Please see   separate chest abdomen pelvic CT report.    IMPRESSION:    Cervical spine:    No evidence for metastatic disease to the cervical spine or cervical   spinal cord compression.    Thoracic spine:    Multifocal metastatic disease is noted as described.    A large metastasis replaces the T11 vertebral body with an associated   mild to moderate pathologic compression fracture deformity and   retropulsion of bone. Epidural tumor extension is noted. The compression   fracture with retropulsion of bone and epidural tumor extension results   in mild to moderate thoracic spinal cord compression.              EXAM:  CT BRAIN    PROCEDURE DATE:  11/24/2023      INTERPRETATION:  CLINICAL INFORMATION:  Mets to liver, spine.  Unknown   primary.  R/O Brain mets, bleed.    TECHNIQUE:  Axial CT images were acquired through the head.  Intravenous contrast: None  Two-dimensional reformats were generated.    COMPARISON STUDY: CT head 8/8/2022    FINDINGS:    There is no CT evidence of acute intracranial hemorrhage, mass effect,   midline shift, or acute, large territorial infarct.  The ventricles and   sulci are prominent compatible with age-appropriate brain parenchymal   volume loss. Mild patchy periventricular white matter hypoattenuation is   nonspecific, although likely due to chronic microangiopathy. The basal   cisterns are patent. There is a partially empty sella.    The mastoid air cells and middle ear cavities are grossly clear. The   visualized paranasal sinuses are well aerated.    The calvarium and skull base are grossly intact.    IMPRESSION:  No acute intracranial hemorrhage or mass effect.    Please note that contrast-enhanced MRI of the brain is more sensitive in   the detection of intracranial metastases.      EXAM: [id number] - CT ABDOMEN AND PELVIS  -   EXAM: [id number] - CT CHEST  -     PROCEDURE DATE:  11/21/2023    INTERPRETATION:  CLINICAL INFORMATION: Anemia and 40 pound weight loss.    COMPARISON: None.    CONTRAST/COMPLICATIONS:  IV Contrast: NONE  Oral Contrast: NONE  Complications: None reported at time of study completion    PROCEDURE:  CT of the Chest, Abdomen and Pelvis was performed.  Sagittal and coronal reformats were performed.    FINDINGS:  CHEST:  LUNGS AND LARGE AIRWAYS: Patent central airways. There is atelectasis at the right lung base.  PLEURA: No pleural effusion.  VESSELS: Coronary artery calcifications and atherosclerotic calcifications within the thoracic aorta.  HEART: Heart size is normal. No pericardial effusion.  MEDIASTINUM AND CRIS: No lymphadenopathy.  CHEST WALL AND LOWER NECK: There is a loop recorder left anterior chest wall. There is a 0.8 cm right lobe thyroid nodule.    ABDOMEN AND PELVIS:  LIVER: Bilobar indeterminate hepatic lesions are nonetheless suspicious for metastatic disease. The largest lesion is in the hepatic dome measuring 4.8 cm. There is a mass interposed between the caudate lobe and left lobe of liver measuring 3.4 cm on series 2 image 68.  BILE DUCTS: Normal caliber.  GALLBLADDER: Removed.  SPLEEN: Within normal limits.  PANCREAS: Within normal limits.  ADRENALS: There is a 1.9 cm right adrenal adenoma.  KIDNEYS/URETERS: Within normal limits.    BLADDER: Within normal limits.  REPRODUCTIVE ORGANS: Uterus and adnexa within normal limits.    BOWEL: No bowel obstruction. 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 There is herniation of the gastric pouch and small amount of the jejunum distal to the gastrojejunal anastomosis into the mediastinum through the esophageal hiatus. There is also herniation of a portion of the excluded stomach through the esophageal hiatus.  PERITONEUM: No ascites.  VESSELS: Within normal limits.  RETROPERITONEUM/LYMPH NODES: No lymphadenopathy.  ABDOMINAL WALL: Within normal limits.  BONES: There is a lytic lesion with posterior cortical destruction involving the L5 vertebral body with epidural extension. There is mild loss of height of the T12 vertebral body. There is a lytic lesion and pathologic fracture through the T11 vertebral body with likely epidural extension and extension into the left pedicle. There is heterogeneous appearance of the T8-9 vertebral bodies with a lytic lesion in T9 vertebral body. There is a lytic lesion with soft tissue component in the right fourth rib with associated pathologic fracture. There is an old fracture of the left humeral neck. There is a lytic lesion in the left do sacrum. There is a lytic lesion in the right fourth transverse process as well as lytic lesion with pathologic fracture in the left 10th rib. There is a 4.6 x 3.8 cm expansile lesion in the left third rib.    IMPRESSION:  Multiple lytic osseous lesions highly suspicious for metastatic disease. Lesions in the L5 and T11 vertebral bodies have epidural extension.  Pathologic fracture T11 vertebral body. Some of the rib lesions demonstrate pathologic fractures. Please see above comments.    Multiple hepatic lesions are not characterized without intravenous contrast but nonetheless are suspicious for metastatic disease.         HPI:    82 y/o F w/ MH of HTN, dyslipidemia, GERD, parkinson's, neuropathy, iron deficiency anemia, loop recorder, p/w LE weakness. Patient has been having B/L LE weakness for a little less than 1 year. Symptoms have been getting progressively worse to the point where patient is unable to ambulate, also with  LE paresthesias and back pain. and 40 lbs unintentional weight loss. She was sent for CT-C/A/P- which revealed multiple bone mets and possible liver mets and path fracture of T11 vertebral body.  She was sent to ED for further evaluation.     11/25/23- Seen at bedside, along with Dr. Loera, alert, awake, in no acute distress. Reports left lower extremities with tingling and numbness, denies cauda equina symptoms. Reports difficulty ambulating even with a walker.     11/27/23: Seen at bedside with attending Dr Loera and Hospitalist Dr Hills, currently awaiting IR bx procedure hopefully today     11/28/23: Seen at bedside with nursing staff, no acute distress, pain controlled, s/p rib Bx with IR yesterday    12/6/2023- Seen at bedside, OOB to chair. Reports feels weak, with cough, unable to bring up the phlegm    PAST MEDICAL & SURGICAL HISTORY:    GERD (gastroesophageal reflux disease)    HTN (hypertension)    HLD (hyperlipidemia)    Mood disorder    H/O gastric bypass    History of cholecystectomy    History of total knee replacement, left    Iron Deficiency Anemia    CRI    Barrette Esophagus      FAMILY HISTORY:    COPD- Brother  CAD- Sister  Stroke- Mother  Parkinson-Father      MEDICATIONS  (STANDING):    atorvastatin 40 milliGRAM(s) Oral at bedtime  buPROPion . 100 milliGRAM(s) Oral daily  carbidopa/levodopa  25/100 1 Tablet(s) Oral four times a day  cefepime  Injectable. 2000 milliGRAM(s) IV Push every 8 hours  donepezil 10 milliGRAM(s) Oral at bedtime  ferrous    sulfate 325 milliGRAM(s) Oral daily  influenza  Vaccine (HIGH DOSE) 0.7 milliLiter(s) IntraMuscular once  levothyroxine 50 MICROGram(s) Oral daily  naloxone Injectable 0.4 milliGRAM(s) IV Push once  pantoprazole    Tablet 40 milliGRAM(s) Oral before breakfast  polyethylene glycol 3350 17 Gram(s) Oral daily  pramipexole 0.125 milliGRAM(s) Oral at bedtime  senna 2 Tablet(s) Oral at bedtime    MEDICATIONS  (PRN):    acetaminophen     Tablet .. 650 milliGRAM(s) Oral every 6 hours PRN Mild Pain (1 - 3), Moderate Pain (4 - 6)  bisacodyl 5 milliGRAM(s) Oral daily PRN Constipation    ALLERGIES:    Originally Entered as [Moderate Rash] reaction to [SURGICAL TAPE] (Unknown)  Vitamin K (Other (Severe))  Fosamax (Unknown)      REVIEW OF SYSTEMS:    Constitutional, Eyes, ENT, Cardiovascular, Respiratory, Gastrointestinal, Genitourinary, Musculoskeletal, Integumentary, Neurological, Psychiatric, Endocrine, Heme/Lymph and Allergic/Immunologic review of systems are otherwise negative except as noted in HPI.       VITAL SIGNS:    T(C): 37.2 (06 Dec 2023 07:27), Max: 37.2 (06 Dec 2023 07:27)  T(F): 98.9 (06 Dec 2023 07:27), Max: 98.9 (06 Dec 2023 07:27)  HR: 100 (06 Dec 2023 07:27) (90 - 113)  BP: 130/69 (06 Dec 2023 07:27) (104/66 - 141/81)  BP(mean): 83 (05 Dec 2023 18:00) (77 - 83)  RR: 17 (06 Dec 2023 07:27) (11 - 17)  SpO2: 96% (06 Dec 2023 07:27) (91% - 97%)    Parameters below as of 06 Dec 2023 07:27  Patient On (Oxygen Delivery Method): room air    PHYSICAL EXAM:    CONSTITUTIONAL : NAD, but frail elderly.  NERVOUS SYSTEM:  Alert & Oriented X 2, with mild weakness in left lower extremity and reported tingling.  HEAD:  Atraumatic, Normocephalic  EYES:  conjunctiva and sclera clear  HEENT: Moist mucous membranes, Supple neck , No JVD  CHEST: decreased B/L BS  BREAST: With no palpable mass.  HEART: Regular rate and rhythm  ABDOMEN: Soft, Non-tender, Non-distended; Bowel sounds present  GENITOURINARY- Voiding, no suprapubic tenderness  EXTREMITIES:  2+ Peripheral Pulses, No clubbing, cyanosis, no edema  MUSCULOSKELETAL:- active ROM  SKIN: ecchymosis on upper extremities on hands    LABS:                                             9.1    8.66  )-----------( 238      ( 28 Nov 2023 08:12 )             28.6     11-28    139  |  105  |  17  ----------------------------<  64<L>  3.5   |  26  |  0.72    Ca    9.1      28 Nov 2023 08:12    TPro  6.0  /  Alb  2.7<L>  /  TBili  0.5  /  DBili  x   /  AST  21  /  ALT  8<L>  /  AlkPhos  123<H>  11-27      RADIOLOGY & ADDITIONAL STUDIES:      EXAM:  MR SPINE LUMBAR WAW IC       PROCEDURE DATE:  11/25/2023      INTERPRETATION:  CLINICAL INFORMATION: Metastases    ADDITIONAL CLINICAL INFORMATION: Cancer C80.1    TECHNIQUE: Multiplanar, multisequence MRI was performed of the lumbar   spine.  IV Contrast: Gadavist  8 cc administered   2 cc discarded    CORRELATION: CT chest and abdomen 11/21/2023    FINDINGS:    ALIGNMENT: The alignment is normal.  VERTEBRA: There is a mild compression fracture deformity of T12 as well   as pathological compression fracture deformities of T11 and L5. There is   diffuse marrow infiltration of the T11 and L5 vertebral bodies with   extension to the posterior elements with hypointense T1 and hyperintense   T2/STIR signal with avid enhancement compatible with metastatic disease.   The loss of the cortical margins is better seen on the prior CT. There is   ventral epidural soft tissue component at T11 indenting and possibly   compressing the spinal cord. Axial images were not obtained at this   level. There is diffuse ventral epidural soft tissue at L5 severely   compressing the thecal sac as well as the L5 and S1 nerve roots. There   also is diffuse involvement of the left sacrum at the S1 and S2   levels extending to the left SI joint. There is minimum epidural soft   tissue along the lateral margin of the sacral foramen at the S1-S2 level.   There are additional smaller metastases involving L1, L2 and L4. There   are focal areas of hyperintenseT1 and T2 signal within the T2 12 and L1   vertebral bodies which may represent hemangiomas.  DISC SPACES: Maintained.  CANAL: The distal cord and conus are normal in signal. Conus terminates   at L1.  VISUALIZED INTRAPELVIC/INTRA-ABDOMINAL SOFT TISSUES: Normal.  PARASPINAL SOFT TISSUES: Normal.    INDIVIDUAL LEVELS:    L1-L2: No disc herniation, spinal canal stenosis or neural foramen   narrowing.    L2-L3: No disc herniation. Mild degenerative facet disease. No spinal   canal stenosis or neural foramen narrowing.    L3-L4: No disc herniation. Mild degenerative facet disease. No spinal   canal stenosis or neural foramen narrowing.    IMPRESSION: Bony metastases at T11,L1, L2, L4, L5 and left sacrum.   Pathologic fractures of T11 and L5. Possible cord compression at T11 and   diffuse thecal sac compression at L5.          EXAM:  MR SPINE THORACIC WAW IC     EXAM:  MR SPINE CERVICAL WAW IC      PROCEDURE DATE:  11/25/2023      INTERPRETATION:  History: 82 y/o F with a PMhx of anemia, GERD, HTN, HLD,   mood disorder, gastric bypass, and cholecystectomy presents to the ED SIB   MD c/o weakness. The pt had outpatient w/u for worsening anemia with   weakness. CT and XR showing lytic legions in spine with epidural   extensions leading to increased weakness and difficulty ambulating. PCP   sent pt to ED for further evaluation. Pt reports weight loss over months.   Denies any sort of active bleeding no blood in stool. Denies CP, or SOB.   C/o lower back pain. Some numbness and tingling to LLE. Denies bowl or   bladder incontinence. Denies numbness consistent with saddle anesthesia.   She had  outpatient CT C/A/P showing multiple bone mets and possible liver mets   and path  fracture of T11 vertebral body. Patient also C/o LE  paresthesias and back pain.    Description: A MRI of the cervical spine and a MRI of the thoracic spine   with and without gadolinium contrast were performed with multiplanar   multisequence technique.    8 cc intravenous Gadavist gadolinium contrast was administered, 2 cc   contrast was discarded.    Comparison is made to the thoracic and lumbar spine region from the chest   abdomen pelvic CT from 11/21/2023, and cervical spine CT from 08/08/2022    Cervical spine MRI:    Bulky dorsal paraspinal tumor in the upper thoracic region is partially   visualized. Please see thoracic spine MRI component of this report.    No focal cervical spine bony lesions are visualized. There is no evidence   for epidural tumor extension in the cervical spine.    Small broad disc bulges are present at the C4-C5, C5-C6, C6-C7 levels   with associated mild central canal stenosis. Multilevel ligamentum flavum   hypertrophy multilevel facet degenerative changes are noted.    There is no cervical spinal cord mass or syrinx. No abnormal intraspinal   enhancement is noted in the cervical spine. There is no cervical spinal   cord compression.    Thoracic spine MRI:    A large metastasis replaces the T11 vertebral body with extension into   the pedicles, with an associated mild to moderate pathologic compression   fracture deformity and retropulsion of bone. Epidural tumor extension is   noted. Tumor extension into the left greater than right neural foramina   at this levels also noted. The compression fracture with retropulsion of   bone and epidural tumor extension results in mild to moderate thoracic   spinal cord compression. A left-sided rib metastasis with soft tissue   extension of tumor is partially visualized at this level.    Additional metastases are noted involving the T9 and L2 vertebral bodies   without associated epidural tumor extension    A moderate sized hemangioma involves the T8 vertebral body with   associated increased T1 signal, increased STIR signal, and enhancement.    A 3.5 cm x 3.2cm x 3.8 cm bulky paraspinal metastasis involves the right   posterior elements at the T3 and T4 levels with extension into the   paraspinal musculature and abutment and possible involvement of the   posterior ribs at these levels. No significant intraspinal extension of   tumor at this level is appreciated.    Nonspecific bilateral pleural effusions and pleural thickening is noted.   Multiple enhancing liver lesions are partially visualized. Please see   separate chest abdomen pelvic CT report.    IMPRESSION:    Cervical spine:    No evidence for metastatic disease to the cervical spine or cervical   spinal cord compression.    Thoracic spine:    Multifocal metastatic disease is noted as described.    A large metastasis replaces the T11 vertebral body with an associated   mild to moderate pathologic compression fracture deformity and   retropulsion of bone. Epidural tumor extension is noted. The compression   fracture with retropulsion of bone and epidural tumor extension results   in mild to moderate thoracic spinal cord compression.              EXAM:  CT BRAIN    PROCEDURE DATE:  11/24/2023      INTERPRETATION:  CLINICAL INFORMATION:  Mets to liver, spine.  Unknown   primary.  R/O Brain mets, bleed.    TECHNIQUE:  Axial CT images were acquired through the head.  Intravenous contrast: None  Two-dimensional reformats were generated.    COMPARISON STUDY: CT head 8/8/2022    FINDINGS:    There is no CT evidence of acute intracranial hemorrhage, mass effect,   midline shift, or acute, large territorial infarct.  The ventricles and   sulci are prominent compatible with age-appropriate brain parenchymal   volume loss. Mild patchy periventricular white matter hypoattenuation is   nonspecific, although likely due to chronic microangiopathy. The basal   cisterns are patent. There is a partially empty sella.    The mastoid air cells and middle ear cavities are grossly clear. The   visualized paranasal sinuses are well aerated.    The calvarium and skull base are grossly intact.    IMPRESSION:  No acute intracranial hemorrhage or mass effect.    Please note that contrast-enhanced MRI of the brain is more sensitive in   the detection of intracranial metastases.      EXAM: [id number] - CT ABDOMEN AND PELVIS  -   EXAM: [id number] - CT CHEST  -     PROCEDURE DATE:  11/21/2023    INTERPRETATION:  CLINICAL INFORMATION: Anemia and 40 pound weight loss.    COMPARISON: None.    CONTRAST/COMPLICATIONS:  IV Contrast: NONE  Oral Contrast: NONE  Complications: None reported at time of study completion    PROCEDURE:  CT of the Chest, Abdomen and Pelvis was performed.  Sagittal and coronal reformats were performed.    FINDINGS:  CHEST:  LUNGS AND LARGE AIRWAYS: Patent central airways. There is atelectasis at the right lung base.  PLEURA: No pleural effusion.  VESSELS: Coronary artery calcifications and atherosclerotic calcifications within the thoracic aorta.  HEART: Heart size is normal. No pericardial effusion.  MEDIASTINUM AND CRIS: No lymphadenopathy.  CHEST WALL AND LOWER NECK: There is a loop recorder left anterior chest wall. There is a 0.8 cm right lobe thyroid nodule.    ABDOMEN AND PELVIS:  LIVER: Bilobar indeterminate hepatic lesions are nonetheless suspicious for metastatic disease. The largest lesion is in the hepatic dome measuring 4.8 cm. There is a mass interposed between the caudate lobe and left lobe of liver measuring 3.4 cm on series 2 image 68.  BILE DUCTS: Normal caliber.  GALLBLADDER: Removed.  SPLEEN: Within normal limits.  PANCREAS: Within normal limits.  ADRENALS: There is a 1.9 cm right adrenal adenoma.  KIDNEYS/URETERS: Within normal limits.    BLADDER: Within normal limits.  REPRODUCTIVE ORGANS: Uterus and adnexa within normal limits.    BOWEL: No bowel obstruction. 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 There is herniation of the gastric pouch and small amount of the jejunum distal to the gastrojejunal anastomosis into the mediastinum through the esophageal hiatus. There is also herniation of a portion of the excluded stomach through the esophageal hiatus.  PERITONEUM: No ascites.  VESSELS: Within normal limits.  RETROPERITONEUM/LYMPH NODES: No lymphadenopathy.  ABDOMINAL WALL: Within normal limits.  BONES: There is a lytic lesion with posterior cortical destruction involving the L5 vertebral body with epidural extension. There is mild loss of height of the T12 vertebral body. There is a lytic lesion and pathologic fracture through the T11 vertebral body with likely epidural extension and extension into the left pedicle. There is heterogeneous appearance of the T8-9 vertebral bodies with a lytic lesion in T9 vertebral body. There is a lytic lesion with soft tissue component in the right fourth rib with associated pathologic fracture. There is an old fracture of the left humeral neck. There is a lytic lesion in the left do sacrum. There is a lytic lesion in the right fourth transverse process as well as lytic lesion with pathologic fracture in the left 10th rib. There is a 4.6 x 3.8 cm expansile lesion in the left third rib.    IMPRESSION:  Multiple lytic osseous lesions highly suspicious for metastatic disease. Lesions in the L5 and T11 vertebral bodies have epidural extension.  Pathologic fracture T11 vertebral body. Some of the rib lesions demonstrate pathologic fractures. Please see above comments.    Multiple hepatic lesions are not characterized without intravenous contrast but nonetheless are suspicious for metastatic disease.         HPI:    82 y/o F w/ MH of HTN, dyslipidemia, GERD, parkinson's, neuropathy, iron deficiency anemia, loop recorder, p/w LE weakness. Patient has been having B/L LE weakness for a little less than 1 year. Symptoms have been getting progressively worse to the point where patient is unable to ambulate, also with  LE paresthesias and back pain. and 40 lbs unintentional weight loss. She was sent for CT-C/A/P- which revealed multiple bone mets and possible liver mets and path fracture of T11 vertebral body.  She was sent to ED for further evaluation.     11/25/23- Seen at bedside, along with Dr. Loera, alert, awake, in no acute distress. Reports left lower extremities with tingling and numbness, denies cauda equina symptoms. Reports difficulty ambulating even with a walker.     11/27/23: Seen at bedside with attending Dr Loera and Hospitalist Dr Hills, currently awaiting IR bx procedure hopefully today     11/28/23: Seen at bedside with nursing staff, no acute distress, pain controlled, s/p rib Bx with IR yesterday    12/6/2023- Seen at bedside, OOB to chair. Reports feels weak, with cough, unable to have a productive cough    PAST MEDICAL & SURGICAL HISTORY:    GERD (gastroesophageal reflux disease)    HTN (hypertension)    HLD (hyperlipidemia)    Mood disorder    H/O gastric bypass    History of cholecystectomy    History of total knee replacement, left    Iron Deficiency Anemia    CRI    Barrette Esophagus      FAMILY HISTORY:    COPD- Brother  CAD- Sister  Stroke- Mother  Parkinson-Father      MEDICATIONS  (STANDING):    atorvastatin 40 milliGRAM(s) Oral at bedtime  buPROPion . 100 milliGRAM(s) Oral daily  carbidopa/levodopa  25/100 1 Tablet(s) Oral four times a day  cefepime  Injectable. 2000 milliGRAM(s) IV Push every 8 hours  donepezil 10 milliGRAM(s) Oral at bedtime  ferrous    sulfate 325 milliGRAM(s) Oral daily  influenza  Vaccine (HIGH DOSE) 0.7 milliLiter(s) IntraMuscular once  levothyroxine 50 MICROGram(s) Oral daily  naloxone Injectable 0.4 milliGRAM(s) IV Push once  pantoprazole    Tablet 40 milliGRAM(s) Oral before breakfast  polyethylene glycol 3350 17 Gram(s) Oral daily  pramipexole 0.125 milliGRAM(s) Oral at bedtime  senna 2 Tablet(s) Oral at bedtime    MEDICATIONS  (PRN):    acetaminophen     Tablet .. 650 milliGRAM(s) Oral every 6 hours PRN Mild Pain (1 - 3), Moderate Pain (4 - 6)  bisacodyl 5 milliGRAM(s) Oral daily PRN Constipation    ALLERGIES:    Originally Entered as [Moderate Rash] reaction to [SURGICAL TAPE] (Unknown)  Vitamin K (Other (Severe))  Fosamax (Unknown)      REVIEW OF SYSTEMS:    Constitutional, Eyes, ENT, Cardiovascular, Respiratory, Gastrointestinal, Genitourinary, Musculoskeletal, Integumentary, Neurological, Psychiatric, Endocrine, Heme/Lymph and Allergic/Immunologic review of systems are otherwise negative except as noted in HPI.       VITAL SIGNS:    T(C): 37.2 (06 Dec 2023 07:27), Max: 37.2 (06 Dec 2023 07:27)  T(F): 98.9 (06 Dec 2023 07:27), Max: 98.9 (06 Dec 2023 07:27)  HR: 100 (06 Dec 2023 07:27) (90 - 113)  BP: 130/69 (06 Dec 2023 07:27) (104/66 - 141/81)  BP(mean): 83 (05 Dec 2023 18:00) (77 - 83)  RR: 17 (06 Dec 2023 07:27) (11 - 17)  SpO2: 96% (06 Dec 2023 07:27) (91% - 97%)    Parameters below as of 06 Dec 2023 07:27  Patient On (Oxygen Delivery Method): room air    PHYSICAL EXAM:    CONSTITUTIONAL : NAD, but frail elderly.  NERVOUS SYSTEM:  Alert & Oriented X 2, with mild weakness in left lower extremity and reported tingling.  HEAD:  Atraumatic, Normocephalic  EYES:  conjunctiva and sclera clear  HEENT: Moist mucous membranes, Supple neck , No JVD  CHEST: decreased B/L BS  BREAST: With no palpable mass.  HEART: Regular rate and rhythm  ABDOMEN: Soft, Non-tender, Non-distended; Bowel sounds present  GENITOURINARY- Voiding, no suprapubic tenderness  EXTREMITIES:  2+ Peripheral Pulses, No clubbing, cyanosis, no edema  MUSCULOSKELETAL:- active ROM  SKIN: ecchymosis on upper extremities on hands    LABS:                                             9.1    8.66  )-----------( 238      ( 28 Nov 2023 08:12 )             28.6     11-28    139  |  105  |  17  ----------------------------<  64<L>  3.5   |  26  |  0.72    Ca    9.1      28 Nov 2023 08:12    TPro  6.0  /  Alb  2.7<L>  /  TBili  0.5  /  DBili  x   /  AST  21  /  ALT  8<L>  /  AlkPhos  123<H>  11-27      RADIOLOGY & ADDITIONAL STUDIES:      EXAM:  MR SPINE LUMBAR WAW IC       PROCEDURE DATE:  11/25/2023      INTERPRETATION:  CLINICAL INFORMATION: Metastases    ADDITIONAL CLINICAL INFORMATION: Cancer C80.1    TECHNIQUE: Multiplanar, multisequence MRI was performed of the lumbar   spine.  IV Contrast: Gadavist  8 cc administered   2 cc discarded    CORRELATION: CT chest and abdomen 11/21/2023    FINDINGS:    ALIGNMENT: The alignment is normal.  VERTEBRA: There is a mild compression fracture deformity of T12 as well   as pathological compression fracture deformities of T11 and L5. There is   diffuse marrow infiltration of the T11 and L5 vertebral bodies with   extension to the posterior elements with hypointense T1 and hyperintense   T2/STIR signal with avid enhancement compatible with metastatic disease.   The loss of the cortical margins is better seen on the prior CT. There is   ventral epidural soft tissue component at T11 indenting and possibly   compressing the spinal cord. Axial images were not obtained at this   level. There is diffuse ventral epidural soft tissue at L5 severely   compressing the thecal sac as well as the L5 and S1 nerve roots. There   also is diffuse involvement of the left sacrum at the S1 and S2   levels extending to the left SI joint. There is minimum epidural soft   tissue along the lateral margin of the sacral foramen at the S1-S2 level.   There are additional smaller metastases involving L1, L2 and L4. There   are focal areas of hyperintenseT1 and T2 signal within the T2 12 and L1   vertebral bodies which may represent hemangiomas.  DISC SPACES: Maintained.  CANAL: The distal cord and conus are normal in signal. Conus terminates   at L1.  VISUALIZED INTRAPELVIC/INTRA-ABDOMINAL SOFT TISSUES: Normal.  PARASPINAL SOFT TISSUES: Normal.    INDIVIDUAL LEVELS:    L1-L2: No disc herniation, spinal canal stenosis or neural foramen   narrowing.    L2-L3: No disc herniation. Mild degenerative facet disease. No spinal   canal stenosis or neural foramen narrowing.    L3-L4: No disc herniation. Mild degenerative facet disease. No spinal   canal stenosis or neural foramen narrowing.    IMPRESSION: Bony metastases at T11,L1, L2, L4, L5 and left sacrum.   Pathologic fractures of T11 and L5. Possible cord compression at T11 and   diffuse thecal sac compression at L5.          EXAM:  MR SPINE THORACIC WAW IC     EXAM:  MR SPINE CERVICAL WAW IC      PROCEDURE DATE:  11/25/2023      INTERPRETATION:  History: 82 y/o F with a PMhx of anemia, GERD, HTN, HLD,   mood disorder, gastric bypass, and cholecystectomy presents to the ED SIB   MD c/o weakness. The pt had outpatient w/u for worsening anemia with   weakness. CT and XR showing lytic legions in spine with epidural   extensions leading to increased weakness and difficulty ambulating. PCP   sent pt to ED for further evaluation. Pt reports weight loss over months.   Denies any sort of active bleeding no blood in stool. Denies CP, or SOB.   C/o lower back pain. Some numbness and tingling to LLE. Denies bowl or   bladder incontinence. Denies numbness consistent with saddle anesthesia.   She had  outpatient CT C/A/P showing multiple bone mets and possible liver mets   and path  fracture of T11 vertebral body. Patient also C/o LE  paresthesias and back pain.    Description: A MRI of the cervical spine and a MRI of the thoracic spine   with and without gadolinium contrast were performed with multiplanar   multisequence technique.    8 cc intravenous Gadavist gadolinium contrast was administered, 2 cc   contrast was discarded.    Comparison is made to the thoracic and lumbar spine region from the chest   abdomen pelvic CT from 11/21/2023, and cervical spine CT from 08/08/2022    Cervical spine MRI:    Bulky dorsal paraspinal tumor in the upper thoracic region is partially   visualized. Please see thoracic spine MRI component of this report.    No focal cervical spine bony lesions are visualized. There is no evidence   for epidural tumor extension in the cervical spine.    Small broad disc bulges are present at the C4-C5, C5-C6, C6-C7 levels   with associated mild central canal stenosis. Multilevel ligamentum flavum   hypertrophy multilevel facet degenerative changes are noted.    There is no cervical spinal cord mass or syrinx. No abnormal intraspinal   enhancement is noted in the cervical spine. There is no cervical spinal   cord compression.    Thoracic spine MRI:    A large metastasis replaces the T11 vertebral body with extension into   the pedicles, with an associated mild to moderate pathologic compression   fracture deformity and retropulsion of bone. Epidural tumor extension is   noted. Tumor extension into the left greater than right neural foramina   at this levels also noted. The compression fracture with retropulsion of   bone and epidural tumor extension results in mild to moderate thoracic   spinal cord compression. A left-sided rib metastasis with soft tissue   extension of tumor is partially visualized at this level.    Additional metastases are noted involving the T9 and L2 vertebral bodies   without associated epidural tumor extension    A moderate sized hemangioma involves the T8 vertebral body with   associated increased T1 signal, increased STIR signal, and enhancement.    A 3.5 cm x 3.2cm x 3.8 cm bulky paraspinal metastasis involves the right   posterior elements at the T3 and T4 levels with extension into the   paraspinal musculature and abutment and possible involvement of the   posterior ribs at these levels. No significant intraspinal extension of   tumor at this level is appreciated.    Nonspecific bilateral pleural effusions and pleural thickening is noted.   Multiple enhancing liver lesions are partially visualized. Please see   separate chest abdomen pelvic CT report.    IMPRESSION:    Cervical spine:    No evidence for metastatic disease to the cervical spine or cervical   spinal cord compression.    Thoracic spine:    Multifocal metastatic disease is noted as described.    A large metastasis replaces the T11 vertebral body with an associated   mild to moderate pathologic compression fracture deformity and   retropulsion of bone. Epidural tumor extension is noted. The compression   fracture with retropulsion of bone and epidural tumor extension results   in mild to moderate thoracic spinal cord compression.              EXAM:  CT BRAIN    PROCEDURE DATE:  11/24/2023      INTERPRETATION:  CLINICAL INFORMATION:  Mets to liver, spine.  Unknown   primary.  R/O Brain mets, bleed.    TECHNIQUE:  Axial CT images were acquired through the head.  Intravenous contrast: None  Two-dimensional reformats were generated.    COMPARISON STUDY: CT head 8/8/2022    FINDINGS:    There is no CT evidence of acute intracranial hemorrhage, mass effect,   midline shift, or acute, large territorial infarct.  The ventricles and   sulci are prominent compatible with age-appropriate brain parenchymal   volume loss. Mild patchy periventricular white matter hypoattenuation is   nonspecific, although likely due to chronic microangiopathy. The basal   cisterns are patent. There is a partially empty sella.    The mastoid air cells and middle ear cavities are grossly clear. The   visualized paranasal sinuses are well aerated.    The calvarium and skull base are grossly intact.    IMPRESSION:  No acute intracranial hemorrhage or mass effect.    Please note that contrast-enhanced MRI of the brain is more sensitive in   the detection of intracranial metastases.      EXAM: [id number] - CT ABDOMEN AND PELVIS  -   EXAM: [id number] - CT CHEST  -     PROCEDURE DATE:  11/21/2023    INTERPRETATION:  CLINICAL INFORMATION: Anemia and 40 pound weight loss.    COMPARISON: None.    CONTRAST/COMPLICATIONS:  IV Contrast: NONE  Oral Contrast: NONE  Complications: None reported at time of study completion    PROCEDURE:  CT of the Chest, Abdomen and Pelvis was performed.  Sagittal and coronal reformats were performed.    FINDINGS:  CHEST:  LUNGS AND LARGE AIRWAYS: Patent central airways. There is atelectasis at the right lung base.  PLEURA: No pleural effusion.  VESSELS: Coronary artery calcifications and atherosclerotic calcifications within the thoracic aorta.  HEART: Heart size is normal. No pericardial effusion.  MEDIASTINUM AND CRIS: No lymphadenopathy.  CHEST WALL AND LOWER NECK: There is a loop recorder left anterior chest wall. There is a 0.8 cm right lobe thyroid nodule.    ABDOMEN AND PELVIS:  LIVER: Bilobar indeterminate hepatic lesions are nonetheless suspicious for metastatic disease. The largest lesion is in the hepatic dome measuring 4.8 cm. There is a mass interposed between the caudate lobe and left lobe of liver measuring 3.4 cm on series 2 image 68.  BILE DUCTS: Normal caliber.  GALLBLADDER: Removed.  SPLEEN: Within normal limits.  PANCREAS: Within normal limits.  ADRENALS: There is a 1.9 cm right adrenal adenoma.  KIDNEYS/URETERS: Within normal limits.    BLADDER: Within normal limits.  REPRODUCTIVE ORGANS: Uterus and adnexa within normal limits.    BOWEL: No bowel obstruction. 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 There is herniation of the gastric pouch and small amount of the jejunum distal to the gastrojejunal anastomosis into the mediastinum through the esophageal hiatus. There is also herniation of a portion of the excluded stomach through the esophageal hiatus.  PERITONEUM: No ascites.  VESSELS: Within normal limits.  RETROPERITONEUM/LYMPH NODES: No lymphadenopathy.  ABDOMINAL WALL: Within normal limits.  BONES: There is a lytic lesion with posterior cortical destruction involving the L5 vertebral body with epidural extension. There is mild loss of height of the T12 vertebral body. There is a lytic lesion and pathologic fracture through the T11 vertebral body with likely epidural extension and extension into the left pedicle. There is heterogeneous appearance of the T8-9 vertebral bodies with a lytic lesion in T9 vertebral body. There is a lytic lesion with soft tissue component in the right fourth rib with associated pathologic fracture. There is an old fracture of the left humeral neck. There is a lytic lesion in the left do sacrum. There is a lytic lesion in the right fourth transverse process as well as lytic lesion with pathologic fracture in the left 10th rib. There is a 4.6 x 3.8 cm expansile lesion in the left third rib.    IMPRESSION:  Multiple lytic osseous lesions highly suspicious for metastatic disease. Lesions in the L5 and T11 vertebral bodies have epidural extension.  Pathologic fracture T11 vertebral body. Some of the rib lesions demonstrate pathologic fractures. Please see above comments.    Multiple hepatic lesions are not characterized without intravenous contrast but nonetheless are suspicious for metastatic disease.         HPI:    84 y/o F w/ MH of HTN, dyslipidemia, GERD, parkinson's, neuropathy, iron deficiency anemia, loop recorder, p/w LE weakness. Patient has been having B/L LE weakness for a little less than 1 year. Symptoms have been getting progressively worse to the point where patient is unable to ambulate, also with  LE paresthesias and back pain. and 40 lbs unintentional weight loss. She was sent for CT-C/A/P- which revealed multiple bone mets and possible liver mets and path fracture of T11 vertebral body.  She was sent to ED for further evaluation.     11/25/23- Seen at bedside, along with Dr. Loera, alert, awake, in no acute distress. Reports left lower extremities with tingling and numbness, denies cauda equina symptoms. Reports difficulty ambulating even with a walker.     11/27/23: Seen at bedside with attending Dr Loera and Hospitalist Dr Hills, currently awaiting IR bx procedure hopefully today     11/28/23: Seen at bedside with nursing staff, no acute distress, pain controlled, s/p rib Bx with IR yesterday    12/6/2023- Seen at bedside, OOB to chair. Reports feels weak, with cough, unable to have a productive cough    PAST MEDICAL & SURGICAL HISTORY:    GERD (gastroesophageal reflux disease)    HTN (hypertension)    HLD (hyperlipidemia)    Mood disorder    H/O gastric bypass    History of cholecystectomy    History of total knee replacement, left    Iron Deficiency Anemia    CRI    Barrette Esophagus      FAMILY HISTORY:    COPD- Brother  CAD- Sister  Stroke- Mother  Parkinson-Father      MEDICATIONS  (STANDING):    atorvastatin 40 milliGRAM(s) Oral at bedtime  buPROPion . 100 milliGRAM(s) Oral daily  carbidopa/levodopa  25/100 1 Tablet(s) Oral four times a day  cefepime  Injectable. 2000 milliGRAM(s) IV Push every 8 hours  donepezil 10 milliGRAM(s) Oral at bedtime  ferrous    sulfate 325 milliGRAM(s) Oral daily  influenza  Vaccine (HIGH DOSE) 0.7 milliLiter(s) IntraMuscular once  levothyroxine 50 MICROGram(s) Oral daily  naloxone Injectable 0.4 milliGRAM(s) IV Push once  pantoprazole    Tablet 40 milliGRAM(s) Oral before breakfast  polyethylene glycol 3350 17 Gram(s) Oral daily  pramipexole 0.125 milliGRAM(s) Oral at bedtime  senna 2 Tablet(s) Oral at bedtime    MEDICATIONS  (PRN):    acetaminophen     Tablet .. 650 milliGRAM(s) Oral every 6 hours PRN Mild Pain (1 - 3), Moderate Pain (4 - 6)  bisacodyl 5 milliGRAM(s) Oral daily PRN Constipation    ALLERGIES:    Originally Entered as [Moderate Rash] reaction to [SURGICAL TAPE] (Unknown)  Vitamin K (Other (Severe))  Fosamax (Unknown)      REVIEW OF SYSTEMS:    Constitutional, Eyes, ENT, Cardiovascular, Respiratory, Gastrointestinal, Genitourinary, Musculoskeletal, Integumentary, Neurological, Psychiatric, Endocrine, Heme/Lymph and Allergic/Immunologic review of systems are otherwise negative except as noted in HPI.       VITAL SIGNS:    T(C): 37.2 (06 Dec 2023 07:27), Max: 37.2 (06 Dec 2023 07:27)  T(F): 98.9 (06 Dec 2023 07:27), Max: 98.9 (06 Dec 2023 07:27)  HR: 100 (06 Dec 2023 07:27) (90 - 113)  BP: 130/69 (06 Dec 2023 07:27) (104/66 - 141/81)  BP(mean): 83 (05 Dec 2023 18:00) (77 - 83)  RR: 17 (06 Dec 2023 07:27) (11 - 17)  SpO2: 96% (06 Dec 2023 07:27) (91% - 97%)    Parameters below as of 06 Dec 2023 07:27  Patient On (Oxygen Delivery Method): room air    PHYSICAL EXAM:    CONSTITUTIONAL : NAD, but frail elderly.  NERVOUS SYSTEM:  Alert & Oriented X 2, with mild weakness in left lower extremity and reported tingling.  HEAD:  Atraumatic, Normocephalic  EYES:  conjunctiva and sclera clear  HEENT: Moist mucous membranes, Supple neck , No JVD  CHEST: decreased B/L BS  BREAST: With no palpable mass.  HEART: Regular rate and rhythm  ABDOMEN: Soft, Non-tender, Non-distended; Bowel sounds present  GENITOURINARY- Voiding, no suprapubic tenderness  EXTREMITIES:  2+ Peripheral Pulses, No clubbing, cyanosis, no edema  MUSCULOSKELETAL:- active ROM  SKIN: ecchymosis on upper extremities on hands    LABS:                                             9.1    8.66  )-----------( 238      ( 28 Nov 2023 08:12 )             28.6     11-28    139  |  105  |  17  ----------------------------<  64<L>  3.5   |  26  |  0.72    Ca    9.1      28 Nov 2023 08:12    TPro  6.0  /  Alb  2.7<L>  /  TBili  0.5  /  DBili  x   /  AST  21  /  ALT  8<L>  /  AlkPhos  123<H>  11-27      RADIOLOGY & ADDITIONAL STUDIES:      EXAM:  MR SPINE LUMBAR WAW IC       PROCEDURE DATE:  11/25/2023      INTERPRETATION:  CLINICAL INFORMATION: Metastases    ADDITIONAL CLINICAL INFORMATION: Cancer C80.1    TECHNIQUE: Multiplanar, multisequence MRI was performed of the lumbar   spine.  IV Contrast: Gadavist  8 cc administered   2 cc discarded    CORRELATION: CT chest and abdomen 11/21/2023    FINDINGS:    ALIGNMENT: The alignment is normal.  VERTEBRA: There is a mild compression fracture deformity of T12 as well   as pathological compression fracture deformities of T11 and L5. There is   diffuse marrow infiltration of the T11 and L5 vertebral bodies with   extension to the posterior elements with hypointense T1 and hyperintense   T2/STIR signal with avid enhancement compatible with metastatic disease.   The loss of the cortical margins is better seen on the prior CT. There is   ventral epidural soft tissue component at T11 indenting and possibly   compressing the spinal cord. Axial images were not obtained at this   level. There is diffuse ventral epidural soft tissue at L5 severely   compressing the thecal sac as well as the L5 and S1 nerve roots. There   also is diffuse involvement of the left sacrum at the S1 and S2   levels extending to the left SI joint. There is minimum epidural soft   tissue along the lateral margin of the sacral foramen at the S1-S2 level.   There are additional smaller metastases involving L1, L2 and L4. There   are focal areas of hyperintenseT1 and T2 signal within the T2 12 and L1   vertebral bodies which may represent hemangiomas.  DISC SPACES: Maintained.  CANAL: The distal cord and conus are normal in signal. Conus terminates   at L1.  VISUALIZED INTRAPELVIC/INTRA-ABDOMINAL SOFT TISSUES: Normal.  PARASPINAL SOFT TISSUES: Normal.    INDIVIDUAL LEVELS:    L1-L2: No disc herniation, spinal canal stenosis or neural foramen   narrowing.    L2-L3: No disc herniation. Mild degenerative facet disease. No spinal   canal stenosis or neural foramen narrowing.    L3-L4: No disc herniation. Mild degenerative facet disease. No spinal   canal stenosis or neural foramen narrowing.    IMPRESSION: Bony metastases at T11,L1, L2, L4, L5 and left sacrum.   Pathologic fractures of T11 and L5. Possible cord compression at T11 and   diffuse thecal sac compression at L5.          EXAM:  MR SPINE THORACIC WAW IC     EXAM:  MR SPINE CERVICAL WAW IC      PROCEDURE DATE:  11/25/2023      INTERPRETATION:  History: 84 y/o F with a PMhx of anemia, GERD, HTN, HLD,   mood disorder, gastric bypass, and cholecystectomy presents to the ED SIB   MD c/o weakness. The pt had outpatient w/u for worsening anemia with   weakness. CT and XR showing lytic legions in spine with epidural   extensions leading to increased weakness and difficulty ambulating. PCP   sent pt to ED for further evaluation. Pt reports weight loss over months.   Denies any sort of active bleeding no blood in stool. Denies CP, or SOB.   C/o lower back pain. Some numbness and tingling to LLE. Denies bowl or   bladder incontinence. Denies numbness consistent with saddle anesthesia.   She had  outpatient CT C/A/P showing multiple bone mets and possible liver mets   and path  fracture of T11 vertebral body. Patient also C/o LE  paresthesias and back pain.    Description: A MRI of the cervical spine and a MRI of the thoracic spine   with and without gadolinium contrast were performed with multiplanar   multisequence technique.    8 cc intravenous Gadavist gadolinium contrast was administered, 2 cc   contrast was discarded.    Comparison is made to the thoracic and lumbar spine region from the chest   abdomen pelvic CT from 11/21/2023, and cervical spine CT from 08/08/2022    Cervical spine MRI:    Bulky dorsal paraspinal tumor in the upper thoracic region is partially   visualized. Please see thoracic spine MRI component of this report.    No focal cervical spine bony lesions are visualized. There is no evidence   for epidural tumor extension in the cervical spine.    Small broad disc bulges are present at the C4-C5, C5-C6, C6-C7 levels   with associated mild central canal stenosis. Multilevel ligamentum flavum   hypertrophy multilevel facet degenerative changes are noted.    There is no cervical spinal cord mass or syrinx. No abnormal intraspinal   enhancement is noted in the cervical spine. There is no cervical spinal   cord compression.    Thoracic spine MRI:    A large metastasis replaces the T11 vertebral body with extension into   the pedicles, with an associated mild to moderate pathologic compression   fracture deformity and retropulsion of bone. Epidural tumor extension is   noted. Tumor extension into the left greater than right neural foramina   at this levels also noted. The compression fracture with retropulsion of   bone and epidural tumor extension results in mild to moderate thoracic   spinal cord compression. A left-sided rib metastasis with soft tissue   extension of tumor is partially visualized at this level.    Additional metastases are noted involving the T9 and L2 vertebral bodies   without associated epidural tumor extension    A moderate sized hemangioma involves the T8 vertebral body with   associated increased T1 signal, increased STIR signal, and enhancement.    A 3.5 cm x 3.2cm x 3.8 cm bulky paraspinal metastasis involves the right   posterior elements at the T3 and T4 levels with extension into the   paraspinal musculature and abutment and possible involvement of the   posterior ribs at these levels. No significant intraspinal extension of   tumor at this level is appreciated.    Nonspecific bilateral pleural effusions and pleural thickening is noted.   Multiple enhancing liver lesions are partially visualized. Please see   separate chest abdomen pelvic CT report.    IMPRESSION:    Cervical spine:    No evidence for metastatic disease to the cervical spine or cervical   spinal cord compression.    Thoracic spine:    Multifocal metastatic disease is noted as described.    A large metastasis replaces the T11 vertebral body with an associated   mild to moderate pathologic compression fracture deformity and   retropulsion of bone. Epidural tumor extension is noted. The compression   fracture with retropulsion of bone and epidural tumor extension results   in mild to moderate thoracic spinal cord compression.              EXAM:  CT BRAIN    PROCEDURE DATE:  11/24/2023      INTERPRETATION:  CLINICAL INFORMATION:  Mets to liver, spine.  Unknown   primary.  R/O Brain mets, bleed.    TECHNIQUE:  Axial CT images were acquired through the head.  Intravenous contrast: None  Two-dimensional reformats were generated.    COMPARISON STUDY: CT head 8/8/2022    FINDINGS:    There is no CT evidence of acute intracranial hemorrhage, mass effect,   midline shift, or acute, large territorial infarct.  The ventricles and   sulci are prominent compatible with age-appropriate brain parenchymal   volume loss. Mild patchy periventricular white matter hypoattenuation is   nonspecific, although likely due to chronic microangiopathy. The basal   cisterns are patent. There is a partially empty sella.    The mastoid air cells and middle ear cavities are grossly clear. The   visualized paranasal sinuses are well aerated.    The calvarium and skull base are grossly intact.    IMPRESSION:  No acute intracranial hemorrhage or mass effect.    Please note that contrast-enhanced MRI of the brain is more sensitive in   the detection of intracranial metastases.      EXAM: [id number] - CT ABDOMEN AND PELVIS  -   EXAM: [id number] - CT CHEST  -     PROCEDURE DATE:  11/21/2023    INTERPRETATION:  CLINICAL INFORMATION: Anemia and 40 pound weight loss.    COMPARISON: None.    CONTRAST/COMPLICATIONS:  IV Contrast: NONE  Oral Contrast: NONE  Complications: None reported at time of study completion    PROCEDURE:  CT of the Chest, Abdomen and Pelvis was performed.  Sagittal and coronal reformats were performed.    FINDINGS:  CHEST:  LUNGS AND LARGE AIRWAYS: Patent central airways. There is atelectasis at the right lung base.  PLEURA: No pleural effusion.  VESSELS: Coronary artery calcifications and atherosclerotic calcifications within the thoracic aorta.  HEART: Heart size is normal. No pericardial effusion.  MEDIASTINUM AND CRIS: No lymphadenopathy.  CHEST WALL AND LOWER NECK: There is a loop recorder left anterior chest wall. There is a 0.8 cm right lobe thyroid nodule.    ABDOMEN AND PELVIS:  LIVER: Bilobar indeterminate hepatic lesions are nonetheless suspicious for metastatic disease. The largest lesion is in the hepatic dome measuring 4.8 cm. There is a mass interposed between the caudate lobe and left lobe of liver measuring 3.4 cm on series 2 image 68.  BILE DUCTS: Normal caliber.  GALLBLADDER: Removed.  SPLEEN: Within normal limits.  PANCREAS: Within normal limits.  ADRENALS: There is a 1.9 cm right adrenal adenoma.  KIDNEYS/URETERS: Within normal limits.    BLADDER: Within normal limits.  REPRODUCTIVE ORGANS: Uterus and adnexa within normal limits.    BOWEL: No bowel obstruction. 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 There is herniation of the gastric pouch and small amount of the jejunum distal to the gastrojejunal anastomosis into the mediastinum through the esophageal hiatus. There is also herniation of a portion of the excluded stomach through the esophageal hiatus.  PERITONEUM: No ascites.  VESSELS: Within normal limits.  RETROPERITONEUM/LYMPH NODES: No lymphadenopathy.  ABDOMINAL WALL: Within normal limits.  BONES: There is a lytic lesion with posterior cortical destruction involving the L5 vertebral body with epidural extension. There is mild loss of height of the T12 vertebral body. There is a lytic lesion and pathologic fracture through the T11 vertebral body with likely epidural extension and extension into the left pedicle. There is heterogeneous appearance of the T8-9 vertebral bodies with a lytic lesion in T9 vertebral body. There is a lytic lesion with soft tissue component in the right fourth rib with associated pathologic fracture. There is an old fracture of the left humeral neck. There is a lytic lesion in the left do sacrum. There is a lytic lesion in the right fourth transverse process as well as lytic lesion with pathologic fracture in the left 10th rib. There is a 4.6 x 3.8 cm expansile lesion in the left third rib.    IMPRESSION:  Multiple lytic osseous lesions highly suspicious for metastatic disease. Lesions in the L5 and T11 vertebral bodies have epidural extension.  Pathologic fracture T11 vertebral body. Some of the rib lesions demonstrate pathologic fractures. Please see above comments.    Multiple hepatic lesions are not characterized without intravenous contrast but nonetheless are suspicious for metastatic disease.

## 2023-12-06 NOTE — PROGRESS NOTE ADULT - SUBJECTIVE AND OBJECTIVE BOX
HOSPITALIST PROGRESS NOTE    HPI: 84 y/o F w/ PMH of HTN, dyslipidemia, GERD, parkinson's, neuropathy, anemia, loop recorder, p/w LE weakness. Patient has been having B/L LE weakness for a little less than 1 year. Symptoms have been getting progressively worse to the point where patient is unable to ambulate. Patient also has been getting worked up for anemia and approximately 40lb unintentional weight loss. She had outpatient CT C/A/P showing multiple bone mets and possible liver mets and path fracture of T11 vertebral body. Patient sent to ED for further evaluation. Patient evaluated by ortho with recommendation for MRI. Now POD #9 Left rib mass biopsy with US guidance, POD# 5 Left thoracotomy, Anterior thoracic corpectomy, fusion T10-12.     12/6: Patient seen and examined at bedside. She reports generalized back pain from sitting in chair, otherwise has no specific complaints.   Discussed biopsy results of left rib mass with Heme/Onc Team and Palliative. 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 Will need to discuss treatment/options and prognosis with , Julius.     VITAL SIGNS:  Vital Signs Last 24 Hrs  T(C): 37.2 (06 Dec 2023 07:27), Max: 37.2 (06 Dec 2023 07:27)  T(F): 98.9 (06 Dec 2023 07:27), Max: 98.9 (06 Dec 2023 07:27)  HR: 100 (06 Dec 2023 07:27) (90 - 113)  BP: 130/69 (06 Dec 2023 07:27) (104/66 - 141/81)  BP(mean): 83 (05 Dec 2023 18:00) (77 - 83)  RR: 17 (06 Dec 2023 07:27) (11 - 17)  SpO2: 96% (06 Dec 2023 07:27) (91% - 97%)    Parameters below as of 06 Dec 2023 07:27  Patient On (Oxygen Delivery Method): room air      PHYSICAL EXAM:  General: Mild discomfort, non-toxic   HEENT: NC/AT; PERRL, anicteric sclera; MMM  Neck: Supple  Cardiovascular: +S1/S2, RRR, no murmurs, rubs, gallops  Respiratory: CTA B/L; no W/R/R  Gastrointestinal: soft, NT/ND; +BSx4  Extremities: WWP; +anasarca. Generalized weakness throughout   Vascular: 2+ radial, DP/PT pulses B/L  Neurological: AAOx2-3; no focal deficits    MEDICATIONS:  MEDICATIONS  (STANDING):  atorvastatin 40 milliGRAM(s) Oral at bedtime  buPROPion . 100 milliGRAM(s) Oral daily  carbidopa/levodopa  25/100 1 Tablet(s) Oral four times a day  cefepime  Injectable. 2000 milliGRAM(s) IV Push every 8 hours  donepezil 10 milliGRAM(s) Oral at bedtime  ferrous    sulfate 325 milliGRAM(s) Oral daily  influenza  Vaccine (HIGH DOSE) 0.7 milliLiter(s) IntraMuscular once  levothyroxine 50 MICROGram(s) Oral daily  naloxone Injectable 0.4 milliGRAM(s) IV Push once  pantoprazole    Tablet 40 milliGRAM(s) Oral before breakfast  polyethylene glycol 3350 17 Gram(s) Oral daily  pramipexole 0.125 milliGRAM(s) Oral at bedtime  senna 2 Tablet(s) Oral at bedtime    MEDICATIONS  (PRN):  acetaminophen     Tablet .. 650 milliGRAM(s) Oral every 6 hours PRN Mild Pain (1 - 3), Moderate Pain (4 - 6)  bisacodyl 5 milliGRAM(s) Oral daily PRN Constipation      ALLERGIES:  Allergies    Originally Entered as [Moderate Rash] reaction to [SURGICAL TAPE] (Unknown)  Vitamin K (Other (Severe))  Fosamax (Unknown)    Intolerances        LABS:                        9.1    13.33 )-----------( 160      ( 06 Dec 2023 07:04 )             28.2     12-06    139  |  107  |  11  ----------------------------<  83  3.8   |  26  |  0.40<L>    Ca    7.9<L>      06 Dec 2023 07:04  Phos  2.5     12-06  Mg     1.9     12-06        Urinalysis Basic - ( 06 Dec 2023 07:04 )    Color: x / Appearance: x / SG: x / pH: x  Gluc: 83 mg/dL / Ketone: x  / Bili: x / Urobili: x   Blood: x / Protein: x / Nitrite: x   Leuk Esterase: x / RBC: x / WBC x   Sq Epi: x / Non Sq Epi: x / Bacteria: x      CAPILLARY BLOOD GLUCOSE            RADIOLOGY & ADDITIONAL TESTS: Reviewed. HOSPITALIST PROGRESS NOTE    HPI: 82 y/o F w/ PMH of HTN, dyslipidemia, GERD, parkinson's, neuropathy, anemia, loop recorder, p/w LE weakness. Patient has been having B/L LE weakness for a little less than 1 year. Symptoms have been getting progressively worse to the point where patient is unable to ambulate. Patient also has been getting worked up for anemia and approximately 40lb unintentional weight loss. She had outpatient CT C/A/P showing multiple bone mets and possible liver mets and path fracture of T11 vertebral body. Patient sent to ED for further evaluation. Patient evaluated by ortho with recommendation for MRI. Now POD #9 Left rib mass biopsy with US guidance, POD# 5 Left thoracotomy, Anterior thoracic corpectomy, fusion T10-12.     12/6: Patient seen and examined at bedside. She reports generalized back pain from sitting in chair, otherwise has no specific complaints.   Discussed biopsy results of left rib mass with Heme/Onc Team and Palliative. 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 Will need to discuss treatment/options and prognosis with , Julius.     VITAL SIGNS:  Vital Signs Last 24 Hrs  T(C): 37.2 (06 Dec 2023 07:27), Max: 37.2 (06 Dec 2023 07:27)  T(F): 98.9 (06 Dec 2023 07:27), Max: 98.9 (06 Dec 2023 07:27)  HR: 100 (06 Dec 2023 07:27) (90 - 113)  BP: 130/69 (06 Dec 2023 07:27) (104/66 - 141/81)  BP(mean): 83 (05 Dec 2023 18:00) (77 - 83)  RR: 17 (06 Dec 2023 07:27) (11 - 17)  SpO2: 96% (06 Dec 2023 07:27) (91% - 97%)    Parameters below as of 06 Dec 2023 07:27  Patient On (Oxygen Delivery Method): room air      PHYSICAL EXAM:  General: Mild discomfort, non-toxic   HEENT: NC/AT; PERRL, anicteric sclera; MMM  Neck: Supple  Cardiovascular: +S1/S2, RRR, no murmurs, rubs, gallops  Respiratory: CTA B/L; no W/R/R  Gastrointestinal: soft, NT/ND; +BSx4  Extremities: WWP; +anasarca. Generalized weakness throughout   Vascular: 2+ radial, DP/PT pulses B/L  Neurological: AAOx2-3; no focal deficits    MEDICATIONS:  MEDICATIONS  (STANDING):  atorvastatin 40 milliGRAM(s) Oral at bedtime  buPROPion . 100 milliGRAM(s) Oral daily  carbidopa/levodopa  25/100 1 Tablet(s) Oral four times a day  cefepime  Injectable. 2000 milliGRAM(s) IV Push every 8 hours  donepezil 10 milliGRAM(s) Oral at bedtime  ferrous    sulfate 325 milliGRAM(s) Oral daily  influenza  Vaccine (HIGH DOSE) 0.7 milliLiter(s) IntraMuscular once  levothyroxine 50 MICROGram(s) Oral daily  naloxone Injectable 0.4 milliGRAM(s) IV Push once  pantoprazole    Tablet 40 milliGRAM(s) Oral before breakfast  polyethylene glycol 3350 17 Gram(s) Oral daily  pramipexole 0.125 milliGRAM(s) Oral at bedtime  senna 2 Tablet(s) Oral at bedtime    MEDICATIONS  (PRN):  acetaminophen     Tablet .. 650 milliGRAM(s) Oral every 6 hours PRN Mild Pain (1 - 3), Moderate Pain (4 - 6)  bisacodyl 5 milliGRAM(s) Oral daily PRN Constipation      ALLERGIES:  Allergies    Originally Entered as [Moderate Rash] reaction to [SURGICAL TAPE] (Unknown)  Vitamin K (Other (Severe))  Fosamax (Unknown)    Intolerances        LABS:                        9.1    13.33 )-----------( 160      ( 06 Dec 2023 07:04 )             28.2     12-06    139  |  107  |  11  ----------------------------<  83  3.8   |  26  |  0.40<L>    Ca    7.9<L>      06 Dec 2023 07:04  Phos  2.5     12-06  Mg     1.9     12-06        Urinalysis Basic - ( 06 Dec 2023 07:04 )    Color: x / Appearance: x / SG: x / pH: x  Gluc: 83 mg/dL / Ketone: x  / Bili: x / Urobili: x   Blood: x / Protein: x / Nitrite: x   Leuk Esterase: x / RBC: x / WBC x   Sq Epi: x / Non Sq Epi: x / Bacteria: x      CAPILLARY BLOOD GLUCOSE            RADIOLOGY & ADDITIONAL TESTS: Reviewed.

## 2023-12-06 NOTE — PROGRESS NOTE ADULT - SUBJECTIVE AND OBJECTIVE BOX
82 y/o F w/ PMH of HTN, dyslipidemia, GERD, parkinson's, neuropathy, anemia, loop recorder, p/w LE weakness. Patient has been having B/L LE weakness for a little less than 1 year. Symptoms have been getting progressively worse to the point where patient is unable to ambulate. Patient also has been getting worked up for anemia and approximately 40lb unintentional weight loss. She had outpatient CT C/A/P showing multiple bone mets and possible liver mets and path fracture of T11 vertebral body. Patient sent to ED for further evaluation. Patient evaluated by ortho with recommendation for MRI    12/6 Now POD #9 Left rib mass biopsy with US guidance, POD# 5 Left thoracotomy, Anterior thoracic corpectomy, fusion T10-12.  Patient seen this afternoon in bedside chair, Chest tube removed yesterday by surgery, follow-up CXR reviewed by surgical team.  Patient endorses difficulty breathing, but no obvious signs of distress, states she felt the same yesterday.  Vitals stable.  Incentive spirometry at bedside.      Vital Signs Last 24 Hrs  T(C): 37.2 (06 Dec 2023 07:27), Max: 37.2 (06 Dec 2023 07:27)  T(F): 98.9 (06 Dec 2023 07:27), Max: 98.9 (06 Dec 2023 07:27)  HR: 100 (06 Dec 2023 07:27) (90 - 119)  BP: 130/69 (06 Dec 2023 07:27) (103/67 - 141/81)  BP(mean): 83 (05 Dec 2023 18:00) (77 - 98)  RR: 17 (06 Dec 2023 07:27) (11 - 22)  SpO2: 96% (06 Dec 2023 07:27) (91% - 97%)    Parameters below as of 06 Dec 2023 07:27  Patient On (Oxygen Delivery Method): room air    PHYSICAL EXAM:    General: No Acute Distress     Neurological: Awake, alert oriented to person, place and time, Following Commands, PERRL, EOMI, Face Symmetrical, Speech Fluent, Moving all extremities, BUE 4/5. edematous, BLE 3-4/5 pain limited, positional limited in chair.      Pulmonary: Clear to Auscultation, No Rales, No Rhonchi, No Wheezes     Cardiovascular: S1, S2, Regular Rate and Rhythm     Gastrointestinal: Soft, Nontender, Nondistended     Incision: CDI    LABS:                        9.1    13.33 )-----------( 160      ( 06 Dec 2023 07:04 )             28.2    12-06    139  |  107  |  11  ----------------------------<  83  3.8   |  26  |  0.40<L>    Ca    7.9<L>      06 Dec 2023 07:04  Phos  2.5     12-06  Mg     1.9     12-06 12-05 @ 07:01  -  12-06 @ 07:00  --------------------------------------------------------  IN: 0 mL / OUT: 200 mL / NET: -200 mL      MEDICATIONS:  Antibiotics:  cefepime  Injectable. 2000 milliGRAM(s) IV Push every 8 hours    Neuro:  acetaminophen     Tablet .. 650 milliGRAM(s) Oral every 6 hours PRN Mild Pain (1 - 3), Moderate Pain (4 - 6)  buPROPion . 100 milliGRAM(s) Oral daily  carbidopa/levodopa  25/100 1 Tablet(s) Oral four times a day  donepezil 10 milliGRAM(s) Oral at bedtime  pramipexole 0.125 milliGRAM(s) Oral at bedtime    Cardiac:    Pulm:    GI/:  bisacodyl 5 milliGRAM(s) Oral daily PRN Constipation  pantoprazole    Tablet 40 milliGRAM(s) Oral before breakfast  polyethylene glycol 3350 17 Gram(s) Oral daily  senna 2 Tablet(s) Oral at bedtime    Other:   atorvastatin 40 milliGRAM(s) Oral at bedtime  ferrous    sulfate 325 milliGRAM(s) Oral daily  influenza  Vaccine (HIGH DOSE) 0.7 milliLiter(s) IntraMuscular once  levothyroxine 50 MICROGram(s) Oral daily  naloxone Injectable 0.4 milliGRAM(s) IV Push once    IMAGING:     < from: Xray Chest 1 View-PORTABLE IMMEDIATE (Xray Chest 1 View-PORTABLE IMMEDIATE .) (12.05.23 @ 12:28) >  IMPRESSION:  1. Left chest tube removal without pneumothorax and no free pleural   effusion is seen.  2. Suspected loculatedpleural fluid, perhaps loculated hemothorax,   versus pleural-based mass in the peripheral left upper thorax, better   visualized on the current exam although likely unchanged in retrospect.  3. Normal heart size with atherosclerosis and cardiac looprecorder but   no CHF.  4. Multiple recent left-sided rib fractures are seen.  5. Additional chronic skeletal findings as outlined above.    < end of copied text >

## 2023-12-06 NOTE — PROGRESS NOTE ADULT - ASSESSMENT
82 y/o F w/ PMH of HTN, dyslipidemia, GERD, parkinson's, neuropathy, anemia, loop recorder, p/w LE weakness. Patient has been having B/L LE weakness for a little less than 1 year. Symptoms have been getting progressively worse to the point where patient is unable to ambulate. Patient also has been getting worked up for anemia and approximately 40lb unintentional weight loss. She had outpatient CT C/A/P showing multiple bone mets and possible liver mets and path fracture of T11 vertebral body. Patient sent to ED for further evaluation. Patient evaluated by ortho with recommendation for MRI. Now POD #9 Left rib mass biopsy with US guidance, POD# 5 Left thoracotomy, Anterior thoracic corpectomy, fusion T10-12.     #Left rib mass biopsy, poorly differentiated carcinoma with neuroendocrine features  #Left thoracotomy, Anterior thoracic corpectomy, fusion T10-12  - Given findings of poorly differentiated carcinoma, will need to discuss treatment options/prognosis with patient and family () present)  - Heme/Onc and Palliative following and aware    #Symptomatic anemia   - s/p 1 unit of PRBC yesterday, Hgb stable today    #Pneumonia, LLL opacity  - Continue Cefepime for tx of presumed pneumonia    #Hypothyroidism  - Continue Synthroid     #Hypertension  BP medications held yesterday due to soft pressures. Resume Toprol  Resume Verapamil tomorrow    #Severe protein-calorie malnutrition     DISPO: BC when stable for discharge  DNR/DNI with NIV 84 y/o F w/ PMH of HTN, dyslipidemia, GERD, parkinson's, neuropathy, anemia, loop recorder, p/w LE weakness. Patient has been having B/L LE weakness for a little less than 1 year. Symptoms have been getting progressively worse to the point where patient is unable to ambulate. Patient also has been getting worked up for anemia and approximately 40lb unintentional weight loss. She had outpatient CT C/A/P showing multiple bone mets and possible liver mets and path fracture of T11 vertebral body. Patient sent to ED for further evaluation. Patient evaluated by ortho with recommendation for MRI. Now POD #9 Left rib mass biopsy with US guidance, POD# 5 Left thoracotomy, Anterior thoracic corpectomy, fusion T10-12.     #Left rib mass biopsy, poorly differentiated carcinoma with neuroendocrine features  #Left thoracotomy, Anterior thoracic corpectomy, fusion T10-12  - Given findings of poorly differentiated carcinoma, will need to discuss treatment options/prognosis with patient and family () present)  - Heme/Onc and Palliative following and aware    #Symptomatic anemia   - s/p 1 unit of PRBC yesterday, Hgb stable today    #Pneumonia, LLL opacity  - Continue Cefepime for tx of presumed pneumonia    #Hypothyroidism  - Continue Synthroid     #Hypertension  BP medications held yesterday due to soft pressures. Resume Toprol  Resume Verapamil tomorrow    #Severe protein-calorie malnutrition     DISPO: BC when stable for discharge  DNR/DNI with NIV

## 2023-12-07 LAB
ANION GAP SERPL CALC-SCNC: 5 MMOL/L — SIGNIFICANT CHANGE UP (ref 5–17)
ANION GAP SERPL CALC-SCNC: 5 MMOL/L — SIGNIFICANT CHANGE UP (ref 5–17)
BUN SERPL-MCNC: 11 MG/DL — SIGNIFICANT CHANGE UP (ref 7–23)
BUN SERPL-MCNC: 11 MG/DL — SIGNIFICANT CHANGE UP (ref 7–23)
CALCIUM SERPL-MCNC: 8 MG/DL — LOW (ref 8.5–10.1)
CALCIUM SERPL-MCNC: 8 MG/DL — LOW (ref 8.5–10.1)
CHLORIDE SERPL-SCNC: 106 MMOL/L — SIGNIFICANT CHANGE UP (ref 96–108)
CHLORIDE SERPL-SCNC: 106 MMOL/L — SIGNIFICANT CHANGE UP (ref 96–108)
CO2 SERPL-SCNC: 28 MMOL/L — SIGNIFICANT CHANGE UP (ref 22–31)
CO2 SERPL-SCNC: 28 MMOL/L — SIGNIFICANT CHANGE UP (ref 22–31)
CREAT SERPL-MCNC: 0.55 MG/DL — SIGNIFICANT CHANGE UP (ref 0.5–1.3)
CREAT SERPL-MCNC: 0.55 MG/DL — SIGNIFICANT CHANGE UP (ref 0.5–1.3)
EGFR: 92 ML/MIN/1.73M2 — SIGNIFICANT CHANGE UP
EGFR: 92 ML/MIN/1.73M2 — SIGNIFICANT CHANGE UP
GLUCOSE SERPL-MCNC: 80 MG/DL — SIGNIFICANT CHANGE UP (ref 70–99)
GLUCOSE SERPL-MCNC: 80 MG/DL — SIGNIFICANT CHANGE UP (ref 70–99)
HCT VFR BLD CALC: 25.7 % — LOW (ref 34.5–45)
HCT VFR BLD CALC: 25.7 % — LOW (ref 34.5–45)
HGB BLD-MCNC: 8.4 G/DL — LOW (ref 11.5–15.5)
HGB BLD-MCNC: 8.4 G/DL — LOW (ref 11.5–15.5)
MCHC RBC-ENTMCNC: 30.3 PG — SIGNIFICANT CHANGE UP (ref 27–34)
MCHC RBC-ENTMCNC: 30.3 PG — SIGNIFICANT CHANGE UP (ref 27–34)
MCHC RBC-ENTMCNC: 32.7 GM/DL — SIGNIFICANT CHANGE UP (ref 32–36)
MCHC RBC-ENTMCNC: 32.7 GM/DL — SIGNIFICANT CHANGE UP (ref 32–36)
MCV RBC AUTO: 92.8 FL — SIGNIFICANT CHANGE UP (ref 80–100)
MCV RBC AUTO: 92.8 FL — SIGNIFICANT CHANGE UP (ref 80–100)
PLATELET # BLD AUTO: 147 K/UL — LOW (ref 150–400)
PLATELET # BLD AUTO: 147 K/UL — LOW (ref 150–400)
POTASSIUM SERPL-MCNC: 3.5 MMOL/L — SIGNIFICANT CHANGE UP (ref 3.5–5.3)
POTASSIUM SERPL-MCNC: 3.5 MMOL/L — SIGNIFICANT CHANGE UP (ref 3.5–5.3)
POTASSIUM SERPL-SCNC: 3.5 MMOL/L — SIGNIFICANT CHANGE UP (ref 3.5–5.3)
POTASSIUM SERPL-SCNC: 3.5 MMOL/L — SIGNIFICANT CHANGE UP (ref 3.5–5.3)
RBC # BLD: 2.77 M/UL — LOW (ref 3.8–5.2)
RBC # BLD: 2.77 M/UL — LOW (ref 3.8–5.2)
RBC # FLD: 16.3 % — HIGH (ref 10.3–14.5)
RBC # FLD: 16.3 % — HIGH (ref 10.3–14.5)
SODIUM SERPL-SCNC: 139 MMOL/L — SIGNIFICANT CHANGE UP (ref 135–145)
SODIUM SERPL-SCNC: 139 MMOL/L — SIGNIFICANT CHANGE UP (ref 135–145)
WBC # BLD: 9.57 K/UL — SIGNIFICANT CHANGE UP (ref 3.8–10.5)
WBC # BLD: 9.57 K/UL — SIGNIFICANT CHANGE UP (ref 3.8–10.5)
WBC # FLD AUTO: 9.57 K/UL — SIGNIFICANT CHANGE UP (ref 3.8–10.5)
WBC # FLD AUTO: 9.57 K/UL — SIGNIFICANT CHANGE UP (ref 3.8–10.5)

## 2023-12-07 PROCEDURE — 99232 SBSQ HOSP IP/OBS MODERATE 35: CPT

## 2023-12-07 PROCEDURE — 99233 SBSQ HOSP IP/OBS HIGH 50: CPT

## 2023-12-07 RX ADMIN — Medication 25 MILLIGRAM(S): at 10:34

## 2023-12-07 RX ADMIN — Medication 50 MICROGRAM(S): at 06:00

## 2023-12-07 RX ADMIN — CARBIDOPA AND LEVODOPA 1 TABLET(S): 25; 100 TABLET ORAL at 22:39

## 2023-12-07 RX ADMIN — CARBIDOPA AND LEVODOPA 1 TABLET(S): 25; 100 TABLET ORAL at 18:40

## 2023-12-07 RX ADMIN — CEFEPIME 2000 MILLIGRAM(S): 1 INJECTION, POWDER, FOR SOLUTION INTRAMUSCULAR; INTRAVENOUS at 22:40

## 2023-12-07 RX ADMIN — DONEPEZIL HYDROCHLORIDE 10 MILLIGRAM(S): 10 TABLET, FILM COATED ORAL at 22:39

## 2023-12-07 RX ADMIN — BUPROPION HYDROCHLORIDE 100 MILLIGRAM(S): 150 TABLET, EXTENDED RELEASE ORAL at 10:33

## 2023-12-07 RX ADMIN — CEFEPIME 2000 MILLIGRAM(S): 1 INJECTION, POWDER, FOR SOLUTION INTRAMUSCULAR; INTRAVENOUS at 05:57

## 2023-12-07 RX ADMIN — ATORVASTATIN CALCIUM 40 MILLIGRAM(S): 80 TABLET, FILM COATED ORAL at 22:39

## 2023-12-07 RX ADMIN — Medication 325 MILLIGRAM(S): at 10:33

## 2023-12-07 RX ADMIN — PANTOPRAZOLE SODIUM 40 MILLIGRAM(S): 20 TABLET, DELAYED RELEASE ORAL at 10:33

## 2023-12-07 RX ADMIN — CEFEPIME 2000 MILLIGRAM(S): 1 INJECTION, POWDER, FOR SOLUTION INTRAMUSCULAR; INTRAVENOUS at 15:37

## 2023-12-07 RX ADMIN — CARBIDOPA AND LEVODOPA 1 TABLET(S): 25; 100 TABLET ORAL at 05:59

## 2023-12-07 RX ADMIN — PRAMIPEXOLE DIHYDROCHLORIDE 0.12 MILLIGRAM(S): 0.12 TABLET ORAL at 22:40

## 2023-12-07 RX ADMIN — SENNA PLUS 2 TABLET(S): 8.6 TABLET ORAL at 22:40

## 2023-12-07 RX ADMIN — CARBIDOPA AND LEVODOPA 1 TABLET(S): 25; 100 TABLET ORAL at 13:40

## 2023-12-07 RX ADMIN — POLYETHYLENE GLYCOL 3350 17 GRAM(S): 17 POWDER, FOR SOLUTION ORAL at 10:34

## 2023-12-07 NOTE — PROGRESS NOTE ADULT - SUBJECTIVE AND OBJECTIVE BOX
HPI:    84 y/o F w/ MH of HTN, dyslipidemia, GERD, parkinson's, neuropathy, iron deficiency anemia, loop recorder, p/w LE weakness. Patient has been having B/L LE weakness for a little less than 1 year. Symptoms have been getting progressively worse to the point where patient is unable to ambulate, also with  LE paresthesias and back pain. and 40 lbs unintentional weight loss. She was sent for CT-C/A/P- which revealed multiple bone mets and possible liver mets and path fracture of T11 vertebral body.  She was sent to ED for further evaluation.     11/25/23- Seen at bedside, along with Dr. Loera, alert, awake, in no acute distress. Reports left lower extremities with tingling and numbness, denies cauda equina symptoms. Reports difficulty ambulating even with a walker.     11/27/23: Seen at bedside with attending Dr Loera and Hospitalist Dr Hills, currently awaiting IR bx procedure hopefully today     11/28/23: Seen at bedside with nursing staff, no acute distress, pain controlled, s/p rib Bx with IR yesterday    12/6/2023- Seen at bedside, OOB to chair. Reports feels weak, with cough, unable to have a productive cough    12/72023- Seen at bedside, frail appearing, coughing and unable to expectorate. A copy pf her pathology results given to her upon her request. Also informed her that I spoke with Julius and he is aware of her pathology results.      PAST MEDICAL & SURGICAL HISTORY:    GERD (gastroesophageal reflux disease)    HTN (hypertension)    HLD (hyperlipidemia)    Mood disorder    H/O gastric bypass    History of cholecystectomy    History of total knee replacement, left    Iron Deficiency Anemia    CRI    Barrette Esophagus      FAMILY HISTORY:    COPD- Brother  CAD- Sister  Stroke- Mother  Parkinson-Father      MEDICATIONS  (STANDING):    atorvastatin 40 milliGRAM(s) Oral at bedtime  buPROPion . 100 milliGRAM(s) Oral daily  carbidopa/levodopa  25/100 1 Tablet(s) Oral four times a day  cefepime  Injectable. 2000 milliGRAM(s) IV Push every 8 hours  donepezil 10 milliGRAM(s) Oral at bedtime  ferrous    sulfate 325 milliGRAM(s) Oral daily  influenza  Vaccine (HIGH DOSE) 0.7 milliLiter(s) IntraMuscular once  levothyroxine 50 MICROGram(s) Oral daily  metoprolol succinate ER 25 milliGRAM(s) Oral daily  naloxone Injectable 0.4 milliGRAM(s) IV Push once  pantoprazole    Tablet 40 milliGRAM(s) Oral before breakfast  polyethylene glycol 3350 17 Gram(s) Oral daily  pramipexole 0.125 milliGRAM(s) Oral at bedtime  senna 2 Tablet(s) Oral at bedtime    MEDICATIONS  (PRN):    acetaminophen     Tablet .. 650 milliGRAM(s) Oral every 6 hours PRN Mild Pain (1 - 3), Moderate Pain (4 - 6)  bisacodyl 5 milliGRAM(s) Oral daily PRN Constipation      ALLERGIES:    Originally Entered as [Moderate Rash] reaction to [SURGICAL TAPE] (Unknown)  Vitamin K (Other (Severe))  Fosamax (Unknown)      REVIEW OF SYSTEMS:    Constitutional, Eyes, ENT, Cardiovascular, Respiratory, Gastrointestinal, Genitourinary, Musculoskeletal, Integumentary, Neurological, Psychiatric, Endocrine, Heme/Lymph and Allergic/Immunologic review of systems are otherwise negative except as noted in HPI.       VITAL SIGNS:    T(C): 37.2 (06 Dec 2023 07:27), Max: 37.2 (06 Dec 2023 07:27)  T(F): 98.9 (06 Dec 2023 07:27), Max: 98.9 (06 Dec 2023 07:27)  HR: 100 (06 Dec 2023 07:27) (90 - 113)  BP: 130/69 (06 Dec 2023 07:27) (104/66 - 141/81)  BP(mean): 83 (05 Dec 2023 18:00) (77 - 83)  RR: 17 (06 Dec 2023 07:27) (11 - 17)  SpO2: 96% (06 Dec 2023 07:27) (91% - 97%)    Parameters below as of 06 Dec 2023 07:27  Patient On (Oxygen Delivery Method): room air    PHYSICAL EXAM:    CONSTITUTIONAL : NAD, but frail elderly.  NERVOUS SYSTEM:  Alert & Oriented X 2, with mild weakness in left lower extremity and reported tingling.  HEAD:  Atraumatic, Normocephalic  EYES:  conjunctiva and sclera clear  HEENT: Moist mucous membranes, Supple neck , No JVD  CHEST: decreased B/L BS  BREAST: With no palpable mass.  HEART: Regular rate and rhythm  ABDOMEN: Soft, Non-tender, Non-distended; Bowel sounds present  GENITOURINARY- Voiding, no suprapubic tenderness  EXTREMITIES:  2+ Peripheral Pulses, No clubbing, cyanosis, no edema  MUSCULOSKELETAL:- active ROM  SKIN: ecchymosis on upper extremities on hands    LABS:                                     8.4    9.57  )-----------( 147      ( 07 Dec 2023 08:37 )             25.7     12-07    139  |  106  |  11  ----------------------------<  80  3.5   |  28  |  0.55    Ca    8.0<L>      07 Dec 2023 08:37  Phos  2.5     12-06  Mg     1.9     12-06          RADIOLOGY & ADDITIONAL STUDIES:      EXAM:  MR SPINE LUMBAR WAW IC       PROCEDURE DATE:  11/25/2023      INTERPRETATION:  CLINICAL INFORMATION: Metastases    ADDITIONAL CLINICAL INFORMATION: Cancer C80.1    TECHNIQUE: Multiplanar, multisequence MRI was performed of the lumbar   spine.  IV Contrast: Gadavist  8 cc administered   2 cc discarded    CORRELATION: CT chest and abdomen 11/21/2023    FINDINGS:    ALIGNMENT: The alignment is normal.  VERTEBRA: There is a mild compression fracture deformity of T12 as well   as pathological compression fracture deformities of T11 and L5. There is   diffuse marrow infiltration of the T11 and L5 vertebral bodies with   extension to the posterior elements with hypointense T1 and hyperintense   T2/STIR signal with avid enhancement compatible with metastatic disease.   The loss of the cortical margins is better seen on the prior CT. There is   ventral epidural soft tissue component at T11 indenting and possibly   compressing the spinal cord. Axial images were not obtained at this   level. There is diffuse ventral epidural soft tissue at L5 severely   compressing the thecal sac as well as the L5 and S1 nerve roots. There   also is diffuse involvement of the left sacrum at the S1 and S2   levels extending to the left SI joint. There is minimum epidural soft   tissue along the lateral margin of the sacral foramen at the S1-S2 level.   There are additional smaller metastases involving L1, L2 and L4. There   are focal areas of hyperintenseT1 and T2 signal within the T2 12 and L1   vertebral bodies which may represent hemangiomas.  DISC SPACES: Maintained.  CANAL: The distal cord and conus are normal in signal. Conus terminates   at L1.  VISUALIZED INTRAPELVIC/INTRA-ABDOMINAL SOFT TISSUES: Normal.  PARASPINAL SOFT TISSUES: Normal.    INDIVIDUAL LEVELS:    L1-L2: No disc herniation, spinal canal stenosis or neural foramen   narrowing.    L2-L3: No disc herniation. Mild degenerative facet disease. No spinal   canal stenosis or neural foramen narrowing.    L3-L4: No disc herniation. Mild degenerative facet disease. No spinal   canal stenosis or neural foramen narrowing.    IMPRESSION: Bony metastases at T11,L1, L2, L4, L5 and left sacrum.   Pathologic fractures of T11 and L5. Possible cord compression at T11 and   diffuse thecal sac compression at L5.          EXAM:  MR SPINE THORACIC WAW IC     EXAM:  MR SPINE CERVICAL WAW IC      PROCEDURE DATE:  11/25/2023      INTERPRETATION:  History: 84 y/o F with a PMhx of anemia, GERD, HTN, HLD,   mood disorder, gastric bypass, and cholecystectomy presents to the ED SIB   MD c/o weakness. The pt had outpatient w/u for worsening anemia with   weakness. CT and XR showing lytic legions in spine with epidural   extensions leading to increased weakness and difficulty ambulating. PCP   sent pt to ED for further evaluation. Pt reports weight loss over months.   Denies any sort of active bleeding no blood in stool. Denies CP, or SOB.   C/o lower back pain. Some numbness and tingling to LLE. Denies bowl or   bladder incontinence. Denies numbness consistent with saddle anesthesia.   She had  outpatient CT C/A/P showing multiple bone mets and possible liver mets   and path  fracture of T11 vertebral body. Patient also C/o LE  paresthesias and back pain.    Description: A MRI of the cervical spine and a MRI of the thoracic spine   with and without gadolinium contrast were performed with multiplanar   multisequence technique.    8 cc intravenous Gadavist gadolinium contrast was administered, 2 cc   contrast was discarded.    Comparison is made to the thoracic and lumbar spine region from the chest   abdomen pelvic CT from 11/21/2023, and cervical spine CT from 08/08/2022    Cervical spine MRI:    Bulky dorsal paraspinal tumor in the upper thoracic region is partially   visualized. Please see thoracic spine MRI component of this report.    No focal cervical spine bony lesions are visualized. There is no evidence   for epidural tumor extension in the cervical spine.    Small broad disc bulges are present at the C4-C5, C5-C6, C6-C7 levels   with associated mild central canal stenosis. Multilevel ligamentum flavum   hypertrophy multilevel facet degenerative changes are noted.    There is no cervical spinal cord mass or syrinx. No abnormal intraspinal   enhancement is noted in the cervical spine. There is no cervical spinal   cord compression.    Thoracic spine MRI:    A large metastasis replaces the T11 vertebral body with extension into   the pedicles, with an associated mild to moderate pathologic compression   fracture deformity and retropulsion of bone. Epidural tumor extension is   noted. Tumor extension into the left greater than right neural foramina   at this levels also noted. The compression fracture with retropulsion of   bone and epidural tumor extension results in mild to moderate thoracic   spinal cord compression. A left-sided rib metastasis with soft tissue   extension of tumor is partially visualized at this level.    Additional metastases are noted involving the T9 and L2 vertebral bodies   without associated epidural tumor extension    A moderate sized hemangioma involves the T8 vertebral body with   associated increased T1 signal, increased STIR signal, and enhancement.    A 3.5 cm x 3.2cm x 3.8 cm bulky paraspinal metastasis involves the right   posterior elements at the T3 and T4 levels with extension into the   paraspinal musculature and abutment and possible involvement of the   posterior ribs at these levels. No significant intraspinal extension of   tumor at this level is appreciated.    Nonspecific bilateral pleural effusions and pleural thickening is noted.   Multiple enhancing liver lesions are partially visualized. Please see   separate chest abdomen pelvic CT report.    IMPRESSION:    Cervical spine:    No evidence for metastatic disease to the cervical spine or cervical   spinal cord compression.    Thoracic spine:    Multifocal metastatic disease is noted as described.    A large metastasis replaces the T11 vertebral body with an associated   mild to moderate pathologic compression fracture deformity and   retropulsion of bone. Epidural tumor extension is noted. The compression   fracture with retropulsion of bone and epidural tumor extension results   in mild to moderate thoracic spinal cord compression.              EXAM:  CT BRAIN    PROCEDURE DATE:  11/24/2023      INTERPRETATION:  CLINICAL INFORMATION:  Mets to liver, spine.  Unknown   primary.  R/O Brain mets, bleed.    TECHNIQUE:  Axial CT images were acquired through the head.  Intravenous contrast: None  Two-dimensional reformats were generated.    COMPARISON STUDY: CT head 8/8/2022    FINDINGS:    There is no CT evidence of acute intracranial hemorrhage, mass effect,   midline shift, or acute, large territorial infarct.  The ventricles and   sulci are prominent compatible with age-appropriate brain parenchymal   volume loss. Mild patchy periventricular white matter hypoattenuation is   nonspecific, although likely due to chronic microangiopathy. The basal   cisterns are patent. There is a partially empty sella.    The mastoid air cells and middle ear cavities are grossly clear. The   visualized paranasal sinuses are well aerated.    The calvarium and skull base are grossly intact.    IMPRESSION:  No acute intracranial hemorrhage or mass effect.    Please note that contrast-enhanced MRI of the brain is more sensitive in   the detection of intracranial metastases.      EXAM: [id number] - CT ABDOMEN AND PELVIS  -   EXAM: [id number] - CT CHEST  -     PROCEDURE DATE:  11/21/2023    INTERPRETATION:  CLINICAL INFORMATION: Anemia and 40 pound weight loss.    COMPARISON: None.    CONTRAST/COMPLICATIONS:  IV Contrast: NONE  Oral Contrast: NONE  Complications: None reported at time of study completion    PROCEDURE:  CT of the Chest, Abdomen and Pelvis was performed.  Sagittal and coronal reformats were performed.    FINDINGS:  CHEST:  LUNGS AND LARGE AIRWAYS: Patent central airways. There is atelectasis at the right lung base.  PLEURA: No pleural effusion.  VESSELS: Coronary artery calcifications and atherosclerotic calcifications within the thoracic aorta.  HEART: Heart size is normal. No pericardial effusion.  MEDIASTINUM AND CRIS: No lymphadenopathy.  CHEST WALL AND LOWER NECK: There is a loop recorder left anterior chest wall. There is a 0.8 cm right lobe thyroid nodule.    ABDOMEN AND PELVIS:  LIVER: Bilobar indeterminate hepatic lesions are nonetheless suspicious for metastatic disease. The largest lesion is in the hepatic dome measuring 4.8 cm. There is a mass interposed between the caudate lobe and left lobe of liver measuring 3.4 cm on series 2 image 68.  BILE DUCTS: Normal caliber.  GALLBLADDER: Removed.  SPLEEN: Within normal limits.  PANCREAS: Within normal limits.  ADRENALS: There is a 1.9 cm right adrenal adenoma.  KIDNEYS/URETERS: Within normal limits.    BLADDER: Within normal limits.  REPRODUCTIVE ORGANS: Uterus and adnexa within normal limits.    BOWEL: No bowel obstruction. 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 There is herniation of the gastric pouch and small amount of the jejunum distal to the gastrojejunal anastomosis into the mediastinum through the esophageal hiatus. There is also herniation of a portion of the excluded stomach through the esophageal hiatus.  PERITONEUM: No ascites.  VESSELS: Within normal limits.  RETROPERITONEUM/LYMPH NODES: No lymphadenopathy.  ABDOMINAL WALL: Within normal limits.  BONES: There is a lytic lesion with posterior cortical destruction involving the L5 vertebral body with epidural extension. There is mild loss of height of the T12 vertebral body. There is a lytic lesion and pathologic fracture through the T11 vertebral body with likely epidural extension and extension into the left pedicle. There is heterogeneous appearance of the T8-9 vertebral bodies with a lytic lesion in T9 vertebral body. There is a lytic lesion with soft tissue component in the right fourth rib with associated pathologic fracture. There is an old fracture of the left humeral neck. There is a lytic lesion in the left do sacrum. There is a lytic lesion in the right fourth transverse process as well as lytic lesion with pathologic fracture in the left 10th rib. There is a 4.6 x 3.8 cm expansile lesion in the left third rib.    IMPRESSION:  Multiple lytic osseous lesions highly suspicious for metastatic disease. Lesions in the L5 and T11 vertebral bodies have epidural extension.  Pathologic fracture T11 vertebral body. Some of the rib lesions demonstrate pathologic fractures. Please see above comments.    Multiple hepatic lesions are not characterized without intravenous contrast but nonetheless are suspicious for metastatic disease.      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  (Electronic Signature)  Reported on: 12/05/23 15:12 EST, [facility], [address] [place] [address]  Phone: [phone]   Fax: [phone]  _________________________________________________________________    Comment    Immunohistochemical stains for cytokeratin, CAM 5.2 and CK7 are positive.  CD56 is positive.  Synaptophysin is focally positive.  Chromogranin,  TTF-1, CDX-2, CK 20, JUDY-3, PAX-8, Melan-A, CD45, p63, p40, HepPar 1 and  CK17 are negative.  Histologically, the tumor is composed of sheets of malignant cells  with a high nuclear to cytoplasmic ratio, necrosis, and individual  cell necrosis, reminiscent of small cell carcinoma.  Positivity for  keratins confirms that the tumor is a carcinoma.  CD 56, a neuroendocrine  marker, is positive.  Synaptophysin, a neuroendocrine marker, is focally  positive.  Unfortunately, the immunohistochemical profile does not  definitively determine the primary site.         HPI:    82 y/o F w/ MH of HTN, dyslipidemia, GERD, parkinson's, neuropathy, iron deficiency anemia, loop recorder, p/w LE weakness. Patient has been having B/L LE weakness for a little less than 1 year. Symptoms have been getting progressively worse to the point where patient is unable to ambulate, also with  LE paresthesias and back pain. and 40 lbs unintentional weight loss. She was sent for CT-C/A/P- which revealed multiple bone mets and possible liver mets and path fracture of T11 vertebral body.  She was sent to ED for further evaluation.     11/25/23- Seen at bedside, along with Dr. Loera, alert, awake, in no acute distress. Reports left lower extremities with tingling and numbness, denies cauda equina symptoms. Reports difficulty ambulating even with a walker.     11/27/23: Seen at bedside with attending Dr Loera and Hospitalist Dr Hills, currently awaiting IR bx procedure hopefully today     11/28/23: Seen at bedside with nursing staff, no acute distress, pain controlled, s/p rib Bx with IR yesterday    12/6/2023- Seen at bedside, OOB to chair. Reports feels weak, with cough, unable to have a productive cough    12/72023- Seen at bedside, frail appearing, coughing and unable to expectorate. A copy pf her pathology results given to her upon her request. Also informed her that I spoke with Julius and he is aware of her pathology results.      PAST MEDICAL & SURGICAL HISTORY:    GERD (gastroesophageal reflux disease)    HTN (hypertension)    HLD (hyperlipidemia)    Mood disorder    H/O gastric bypass    History of cholecystectomy    History of total knee replacement, left    Iron Deficiency Anemia    CRI    Barrette Esophagus      FAMILY HISTORY:    COPD- Brother  CAD- Sister  Stroke- Mother  Parkinson-Father      MEDICATIONS  (STANDING):    atorvastatin 40 milliGRAM(s) Oral at bedtime  buPROPion . 100 milliGRAM(s) Oral daily  carbidopa/levodopa  25/100 1 Tablet(s) Oral four times a day  cefepime  Injectable. 2000 milliGRAM(s) IV Push every 8 hours  donepezil 10 milliGRAM(s) Oral at bedtime  ferrous    sulfate 325 milliGRAM(s) Oral daily  influenza  Vaccine (HIGH DOSE) 0.7 milliLiter(s) IntraMuscular once  levothyroxine 50 MICROGram(s) Oral daily  metoprolol succinate ER 25 milliGRAM(s) Oral daily  naloxone Injectable 0.4 milliGRAM(s) IV Push once  pantoprazole    Tablet 40 milliGRAM(s) Oral before breakfast  polyethylene glycol 3350 17 Gram(s) Oral daily  pramipexole 0.125 milliGRAM(s) Oral at bedtime  senna 2 Tablet(s) Oral at bedtime    MEDICATIONS  (PRN):    acetaminophen     Tablet .. 650 milliGRAM(s) Oral every 6 hours PRN Mild Pain (1 - 3), Moderate Pain (4 - 6)  bisacodyl 5 milliGRAM(s) Oral daily PRN Constipation      ALLERGIES:    Originally Entered as [Moderate Rash] reaction to [SURGICAL TAPE] (Unknown)  Vitamin K (Other (Severe))  Fosamax (Unknown)      REVIEW OF SYSTEMS:    Constitutional, Eyes, ENT, Cardiovascular, Respiratory, Gastrointestinal, Genitourinary, Musculoskeletal, Integumentary, Neurological, Psychiatric, Endocrine, Heme/Lymph and Allergic/Immunologic review of systems are otherwise negative except as noted in HPI.       VITAL SIGNS:    T(C): 37.2 (06 Dec 2023 07:27), Max: 37.2 (06 Dec 2023 07:27)  T(F): 98.9 (06 Dec 2023 07:27), Max: 98.9 (06 Dec 2023 07:27)  HR: 100 (06 Dec 2023 07:27) (90 - 113)  BP: 130/69 (06 Dec 2023 07:27) (104/66 - 141/81)  BP(mean): 83 (05 Dec 2023 18:00) (77 - 83)  RR: 17 (06 Dec 2023 07:27) (11 - 17)  SpO2: 96% (06 Dec 2023 07:27) (91% - 97%)    Parameters below as of 06 Dec 2023 07:27  Patient On (Oxygen Delivery Method): room air    PHYSICAL EXAM:    CONSTITUTIONAL : NAD, but frail elderly.  NERVOUS SYSTEM:  Alert & Oriented X 2, with mild weakness in left lower extremity and reported tingling.  HEAD:  Atraumatic, Normocephalic  EYES:  conjunctiva and sclera clear  HEENT: Moist mucous membranes, Supple neck , No JVD  CHEST: decreased B/L BS  BREAST: With no palpable mass.  HEART: Regular rate and rhythm  ABDOMEN: Soft, Non-tender, Non-distended; Bowel sounds present  GENITOURINARY- Voiding, no suprapubic tenderness  EXTREMITIES:  2+ Peripheral Pulses, No clubbing, cyanosis, no edema  MUSCULOSKELETAL:- active ROM  SKIN: ecchymosis on upper extremities on hands    LABS:                                     8.4    9.57  )-----------( 147      ( 07 Dec 2023 08:37 )             25.7     12-07    139  |  106  |  11  ----------------------------<  80  3.5   |  28  |  0.55    Ca    8.0<L>      07 Dec 2023 08:37  Phos  2.5     12-06  Mg     1.9     12-06          RADIOLOGY & ADDITIONAL STUDIES:      EXAM:  MR SPINE LUMBAR WAW IC       PROCEDURE DATE:  11/25/2023      INTERPRETATION:  CLINICAL INFORMATION: Metastases    ADDITIONAL CLINICAL INFORMATION: Cancer C80.1    TECHNIQUE: Multiplanar, multisequence MRI was performed of the lumbar   spine.  IV Contrast: Gadavist  8 cc administered   2 cc discarded    CORRELATION: CT chest and abdomen 11/21/2023    FINDINGS:    ALIGNMENT: The alignment is normal.  VERTEBRA: There is a mild compression fracture deformity of T12 as well   as pathological compression fracture deformities of T11 and L5. There is   diffuse marrow infiltration of the T11 and L5 vertebral bodies with   extension to the posterior elements with hypointense T1 and hyperintense   T2/STIR signal with avid enhancement compatible with metastatic disease.   The loss of the cortical margins is better seen on the prior CT. There is   ventral epidural soft tissue component at T11 indenting and possibly   compressing the spinal cord. Axial images were not obtained at this   level. There is diffuse ventral epidural soft tissue at L5 severely   compressing the thecal sac as well as the L5 and S1 nerve roots. There   also is diffuse involvement of the left sacrum at the S1 and S2   levels extending to the left SI joint. There is minimum epidural soft   tissue along the lateral margin of the sacral foramen at the S1-S2 level.   There are additional smaller metastases involving L1, L2 and L4. There   are focal areas of hyperintenseT1 and T2 signal within the T2 12 and L1   vertebral bodies which may represent hemangiomas.  DISC SPACES: Maintained.  CANAL: The distal cord and conus are normal in signal. Conus terminates   at L1.  VISUALIZED INTRAPELVIC/INTRA-ABDOMINAL SOFT TISSUES: Normal.  PARASPINAL SOFT TISSUES: Normal.    INDIVIDUAL LEVELS:    L1-L2: No disc herniation, spinal canal stenosis or neural foramen   narrowing.    L2-L3: No disc herniation. Mild degenerative facet disease. No spinal   canal stenosis or neural foramen narrowing.    L3-L4: No disc herniation. Mild degenerative facet disease. No spinal   canal stenosis or neural foramen narrowing.    IMPRESSION: Bony metastases at T11,L1, L2, L4, L5 and left sacrum.   Pathologic fractures of T11 and L5. Possible cord compression at T11 and   diffuse thecal sac compression at L5.          EXAM:  MR SPINE THORACIC WAW IC     EXAM:  MR SPINE CERVICAL WAW IC      PROCEDURE DATE:  11/25/2023      INTERPRETATION:  History: 82 y/o F with a PMhx of anemia, GERD, HTN, HLD,   mood disorder, gastric bypass, and cholecystectomy presents to the ED SIB   MD c/o weakness. The pt had outpatient w/u for worsening anemia with   weakness. CT and XR showing lytic legions in spine with epidural   extensions leading to increased weakness and difficulty ambulating. PCP   sent pt to ED for further evaluation. Pt reports weight loss over months.   Denies any sort of active bleeding no blood in stool. Denies CP, or SOB.   C/o lower back pain. Some numbness and tingling to LLE. Denies bowl or   bladder incontinence. Denies numbness consistent with saddle anesthesia.   She had  outpatient CT C/A/P showing multiple bone mets and possible liver mets   and path  fracture of T11 vertebral body. Patient also C/o LE  paresthesias and back pain.    Description: A MRI of the cervical spine and a MRI of the thoracic spine   with and without gadolinium contrast were performed with multiplanar   multisequence technique.    8 cc intravenous Gadavist gadolinium contrast was administered, 2 cc   contrast was discarded.    Comparison is made to the thoracic and lumbar spine region from the chest   abdomen pelvic CT from 11/21/2023, and cervical spine CT from 08/08/2022    Cervical spine MRI:    Bulky dorsal paraspinal tumor in the upper thoracic region is partially   visualized. Please see thoracic spine MRI component of this report.    No focal cervical spine bony lesions are visualized. There is no evidence   for epidural tumor extension in the cervical spine.    Small broad disc bulges are present at the C4-C5, C5-C6, C6-C7 levels   with associated mild central canal stenosis. Multilevel ligamentum flavum   hypertrophy multilevel facet degenerative changes are noted.    There is no cervical spinal cord mass or syrinx. No abnormal intraspinal   enhancement is noted in the cervical spine. There is no cervical spinal   cord compression.    Thoracic spine MRI:    A large metastasis replaces the T11 vertebral body with extension into   the pedicles, with an associated mild to moderate pathologic compression   fracture deformity and retropulsion of bone. Epidural tumor extension is   noted. Tumor extension into the left greater than right neural foramina   at this levels also noted. The compression fracture with retropulsion of   bone and epidural tumor extension results in mild to moderate thoracic   spinal cord compression. A left-sided rib metastasis with soft tissue   extension of tumor is partially visualized at this level.    Additional metastases are noted involving the T9 and L2 vertebral bodies   without associated epidural tumor extension    A moderate sized hemangioma involves the T8 vertebral body with   associated increased T1 signal, increased STIR signal, and enhancement.    A 3.5 cm x 3.2cm x 3.8 cm bulky paraspinal metastasis involves the right   posterior elements at the T3 and T4 levels with extension into the   paraspinal musculature and abutment and possible involvement of the   posterior ribs at these levels. No significant intraspinal extension of   tumor at this level is appreciated.    Nonspecific bilateral pleural effusions and pleural thickening is noted.   Multiple enhancing liver lesions are partially visualized. Please see   separate chest abdomen pelvic CT report.    IMPRESSION:    Cervical spine:    No evidence for metastatic disease to the cervical spine or cervical   spinal cord compression.    Thoracic spine:    Multifocal metastatic disease is noted as described.    A large metastasis replaces the T11 vertebral body with an associated   mild to moderate pathologic compression fracture deformity and   retropulsion of bone. Epidural tumor extension is noted. The compression   fracture with retropulsion of bone and epidural tumor extension results   in mild to moderate thoracic spinal cord compression.              EXAM:  CT BRAIN    PROCEDURE DATE:  11/24/2023      INTERPRETATION:  CLINICAL INFORMATION:  Mets to liver, spine.  Unknown   primary.  R/O Brain mets, bleed.    TECHNIQUE:  Axial CT images were acquired through the head.  Intravenous contrast: None  Two-dimensional reformats were generated.    COMPARISON STUDY: CT head 8/8/2022    FINDINGS:    There is no CT evidence of acute intracranial hemorrhage, mass effect,   midline shift, or acute, large territorial infarct.  The ventricles and   sulci are prominent compatible with age-appropriate brain parenchymal   volume loss. Mild patchy periventricular white matter hypoattenuation is   nonspecific, although likely due to chronic microangiopathy. The basal   cisterns are patent. There is a partially empty sella.    The mastoid air cells and middle ear cavities are grossly clear. The   visualized paranasal sinuses are well aerated.    The calvarium and skull base are grossly intact.    IMPRESSION:  No acute intracranial hemorrhage or mass effect.    Please note that contrast-enhanced MRI of the brain is more sensitive in   the detection of intracranial metastases.      EXAM: [id number] - CT ABDOMEN AND PELVIS  -   EXAM: [id number] - CT CHEST  -     PROCEDURE DATE:  11/21/2023    INTERPRETATION:  CLINICAL INFORMATION: Anemia and 40 pound weight loss.    COMPARISON: None.    CONTRAST/COMPLICATIONS:  IV Contrast: NONE  Oral Contrast: NONE  Complications: None reported at time of study completion    PROCEDURE:  CT of the Chest, Abdomen and Pelvis was performed.  Sagittal and coronal reformats were performed.    FINDINGS:  CHEST:  LUNGS AND LARGE AIRWAYS: Patent central airways. There is atelectasis at the right lung base.  PLEURA: No pleural effusion.  VESSELS: Coronary artery calcifications and atherosclerotic calcifications within the thoracic aorta.  HEART: Heart size is normal. No pericardial effusion.  MEDIASTINUM AND CRIS: No lymphadenopathy.  CHEST WALL AND LOWER NECK: There is a loop recorder left anterior chest wall. There is a 0.8 cm right lobe thyroid nodule.    ABDOMEN AND PELVIS:  LIVER: Bilobar indeterminate hepatic lesions are nonetheless suspicious for metastatic disease. The largest lesion is in the hepatic dome measuring 4.8 cm. There is a mass interposed between the caudate lobe and left lobe of liver measuring 3.4 cm on series 2 image 68.  BILE DUCTS: Normal caliber.  GALLBLADDER: Removed.  SPLEEN: Within normal limits.  PANCREAS: Within normal limits.  ADRENALS: There is a 1.9 cm right adrenal adenoma.  KIDNEYS/URETERS: Within normal limits.    BLADDER: Within normal limits.  REPRODUCTIVE ORGANS: Uterus and adnexa within normal limits.    BOWEL: No bowel obstruction. 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 There is herniation of the gastric pouch and small amount of the jejunum distal to the gastrojejunal anastomosis into the mediastinum through the esophageal hiatus. There is also herniation of a portion of the excluded stomach through the esophageal hiatus.  PERITONEUM: No ascites.  VESSELS: Within normal limits.  RETROPERITONEUM/LYMPH NODES: No lymphadenopathy.  ABDOMINAL WALL: Within normal limits.  BONES: There is a lytic lesion with posterior cortical destruction involving the L5 vertebral body with epidural extension. There is mild loss of height of the T12 vertebral body. There is a lytic lesion and pathologic fracture through the T11 vertebral body with likely epidural extension and extension into the left pedicle. There is heterogeneous appearance of the T8-9 vertebral bodies with a lytic lesion in T9 vertebral body. There is a lytic lesion with soft tissue component in the right fourth rib with associated pathologic fracture. There is an old fracture of the left humeral neck. There is a lytic lesion in the left do sacrum. There is a lytic lesion in the right fourth transverse process as well as lytic lesion with pathologic fracture in the left 10th rib. There is a 4.6 x 3.8 cm expansile lesion in the left third rib.    IMPRESSION:  Multiple lytic osseous lesions highly suspicious for metastatic disease. Lesions in the L5 and T11 vertebral bodies have epidural extension.  Pathologic fracture T11 vertebral body. Some of the rib lesions demonstrate pathologic fractures. Please see above comments.    Multiple hepatic lesions are not characterized without intravenous contrast but nonetheless are suspicious for metastatic disease.      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  (Electronic Signature)  Reported on: 12/05/23 15:12 EST, [facility], [address] [place] [address]  Phone: [phone]   Fax: [phone]  _________________________________________________________________    Comment    Immunohistochemical stains for cytokeratin, CAM 5.2 and CK7 are positive.  CD56 is positive.  Synaptophysin is focally positive.  Chromogranin,  TTF-1, CDX-2, CK 20, JUDY-3, PAX-8, Melan-A, CD45, p63, p40, HepPar 1 and  CK17 are negative.  Histologically, the tumor is composed of sheets of malignant cells  with a high nuclear to cytoplasmic ratio, necrosis, and individual  cell necrosis, reminiscent of small cell carcinoma.  Positivity for  keratins confirms that the tumor is a carcinoma.  CD 56, a neuroendocrine  marker, is positive.  Synaptophysin, a neuroendocrine marker, is focally  positive.  Unfortunately, the immunohistochemical profile does not  definitively determine the primary site.

## 2023-12-07 NOTE — PROGRESS NOTE ADULT - SUBJECTIVE AND OBJECTIVE BOX
HOSPITALIST PROGRESS NOTE    SUBJECTIVE / INTERVAL HPI: Patient seen and examined at bedside. No complaints at this time. She states her  has not visited because he has a cold at home. Plan for family meeting including Heme/Onc, Palliative and family tomorrow.     VITAL SIGNS:  Vital Signs Last 24 Hrs  T(C): 36.8 (07 Dec 2023 07:36), Max: 37.4 (06 Dec 2023 15:01)  T(F): 98.2 (07 Dec 2023 07:36), Max: 99.3 (06 Dec 2023 15:01)  HR: 91 (07 Dec 2023 07:36) (91 - 112)  BP: 136/84 (07 Dec 2023 07:36) (98/57 - 136/84)  BP(mean): --  RR: 17 (07 Dec 2023 07:36) (17 - 17)  SpO2: 95% (07 Dec 2023 07:36) (95% - 100%)    Parameters below as of 07 Dec 2023 07:36  Patient On (Oxygen Delivery Method): room air      PHYSICAL EXAM:  General: No distress, non-toxic   HEENT: NC/AT; PERRL, anicteric sclera; MMM  Neck: Supple  Cardiovascular: +S1/S2, RRR, no murmurs, rubs, gallops  Respiratory: CTA B/L; no W/R/R  Gastrointestinal: soft, NT/ND; +BSx4  Extremities: WWP; +anasarca. Generalized weakness throughout   Vascular: 2+ radial, DP/PT pulses B/L  Neurological: AAOx2-3; no focal deficits    MEDICATIONS:  MEDICATIONS  (STANDING):  atorvastatin 40 milliGRAM(s) Oral at bedtime  buPROPion . 100 milliGRAM(s) Oral daily  carbidopa/levodopa  25/100 1 Tablet(s) Oral four times a day  cefepime  Injectable. 2000 milliGRAM(s) IV Push every 8 hours  donepezil 10 milliGRAM(s) Oral at bedtime  ferrous    sulfate 325 milliGRAM(s) Oral daily  influenza  Vaccine (HIGH DOSE) 0.7 milliLiter(s) IntraMuscular once  levothyroxine 50 MICROGram(s) Oral daily  metoprolol succinate ER 25 milliGRAM(s) Oral daily  naloxone Injectable 0.4 milliGRAM(s) IV Push once  pantoprazole    Tablet 40 milliGRAM(s) Oral before breakfast  polyethylene glycol 3350 17 Gram(s) Oral daily  pramipexole 0.125 milliGRAM(s) Oral at bedtime  senna 2 Tablet(s) Oral at bedtime    MEDICATIONS  (PRN):  acetaminophen     Tablet .. 650 milliGRAM(s) Oral every 6 hours PRN Mild Pain (1 - 3), Moderate Pain (4 - 6)  bisacodyl 5 milliGRAM(s) Oral daily PRN Constipation      ALLERGIES:  Allergies    Originally Entered as [Moderate Rash] reaction to [SURGICAL TAPE] (Unknown)  Vitamin K (Other (Severe))  Fosamax (Unknown)    Intolerances        LABS:                        8.4    9.57  )-----------( 147      ( 07 Dec 2023 08:37 )             25.7     12-07    139  |  106  |  11  ----------------------------<  80  3.5   |  28  |  0.55    Ca    8.0<L>      07 Dec 2023 08:37  Phos  2.5     12-06  Mg     1.9     12-06        Urinalysis Basic - ( 07 Dec 2023 08:37 )    Color: x / Appearance: x / SG: x / pH: x  Gluc: 80 mg/dL / Ketone: x  / Bili: x / Urobili: x   Blood: x / Protein: x / Nitrite: x   Leuk Esterase: x / RBC: x / WBC x   Sq Epi: x / Non Sq Epi: x / Bacteria: x      CAPILLARY BLOOD GLUCOSE    RADIOLOGY & ADDITIONAL TESTS: Reviewed.

## 2023-12-07 NOTE — PROGRESS NOTE ADULT - ASSESSMENT
84 y/o F w/ PMH of HTN, dyslipidemia, GERD, parkinson's, neuropathy, anemia, loop recorder, p/w LE weakness. Patient has been having B/L LE weakness for a little less than 1 year. Symptoms have been getting progressively worse to the point where patient is unable to ambulate. Patient also has been getting worked up for anemia and approximately 40lb unintentional weight loss. She had outpatient CT C/A/P showing multiple bone mets and possible liver mets and path fracture of T11 vertebral body. Patient sent to ED for further evaluation. Patient evaluated by ortho with recommendation for MRI. Now POD #9 Left rib mass biopsy with US guidance, POD# 5 Left thoracotomy, Anterior thoracic corpectomy, fusion T10-12.     #Left rib mass biopsy, poorly differentiated carcinoma with neuroendocrine features  #Left thoracotomy, Anterior thoracic corpectomy, fusion T10-12  - Given findings of poorly differentiated carcinoma, will need to discuss treatment options/prognosis with patient and family () present). Family meeting tomorrow  - Heme/Onc and Palliative following and aware    #Symptomatic anemia   - s/p 1 unit of PRBC 12/4, Hgb stable today    #Pneumonia, LLL opacity  - Continue Cefepime for tx of presumed pneumonia    #Hypothyroidism  - Continue Synthroid     #Hypertension  BP medications held yesterday due to soft pressures. Resume Toprol  Resume Verapamil tomorrow if pressures stable     #Severe protein-calorie malnutrition     **PLAN FOR FAMILY MEETING TO DISCUSS GOC/PROGNOSIS/TX TOMORROW**  DNR/DNI with NIV

## 2023-12-07 NOTE — PROGRESS NOTE ADULT - ASSESSMENT
82 y/o F w/ PMH of HTN, dyslipidemia, GERD, parkinson's, neuropathy, anemia, loop recorder, p/w LE weakness.    POD#10 Left rib mass biopsy with US guidance  POD#6 Left thoracotomy, Anterior thoracic corpectomy, fusion T10-12.    - Pain control PRN  - Cont PT/OT, ambulate as tolerated.  BC recommended.   - Cont medical management per hospitalist.   - DVT ppx: SCDs, no chemoprophylaxis until cleared by Ortho attending    d/w Dr Ceballos.  84 y/o F w/ PMH of HTN, dyslipidemia, GERD, parkinson's, neuropathy, anemia, loop recorder, p/w LE weakness.    POD#10 Left rib mass biopsy with US guidance  POD#6 Left thoracotomy, Anterior thoracic corpectomy, fusion T10-12.    - Pain control PRN  - Cont PT/OT, ambulate as tolerated.  BC recommended.   - Cont medical management per hospitalist.   - DVT ppx: SCDs, no chemoprophylaxis until cleared by Ortho attending    d/w Dr Ceballos.

## 2023-12-07 NOTE — PROVIDER CONTACT NOTE (OTHER) - BACKGROUND
Patient has been having increasing B/L LE weakness currently the patient is unable to ambulate. Pt had a dave removed on 12/5 and has been able to void spontaneously.

## 2023-12-07 NOTE — PROGRESS NOTE ADULT - SUBJECTIVE AND OBJECTIVE BOX
Patient is a 83y old  Female who presents with a chief complaint of LE weakness (07 Dec 2023 11:49)    HPI:  82 yo female PMH HTN, dyslipidemia, GERD, parkinson's, neuropathy, anemia, loop recorder, p/w LE weakness. Patient has been having B/L LE weakness for a little less than 1 year. Symptoms have been getting progressively worse to the point where patient is unable to ambulate. Patient also has been getting worked up for anemia and approximately 40lb unintentional weight loss. She had outpatient CT C/A/P showing multiple bone mets and possible liver mets and path fracture of T11 vertebral body. She is now s/p Left rib mass biopsy with US guidance 11/27/23 and Left thoracotomy, Anterior thoracic corpectomy, fusion T10-12 12/1/23.    12/6 - POD#5 Patient seen this afternoon in bedside chair, Chest tube removed yesterday by surgery, follow-up CXR reviewed by surgical team.  Patient endorses difficulty breathing, but no obvious signs of distress, states she felt the same yesterday.  Vitals stable.  Incentive spirometry at bedside.      12/7 - POD#6 Pt seen and examined earlier today, in NAD. Appears comfortable, denies new complaints, no overnight events         acetaminophen     Tablet .. 650 milliGRAM(s) Oral every 6 hours PRN  atorvastatin 40 milliGRAM(s) Oral at bedtime  bisacodyl 5 milliGRAM(s) Oral daily PRN  buPROPion . 100 milliGRAM(s) Oral daily  carbidopa/levodopa  25/100 1 Tablet(s) Oral four times a day  cefepime  Injectable. 2000 milliGRAM(s) IV Push every 8 hours  donepezil 10 milliGRAM(s) Oral at bedtime  ferrous    sulfate 325 milliGRAM(s) Oral daily  influenza  Vaccine (HIGH DOSE) 0.7 milliLiter(s) IntraMuscular once  levothyroxine 50 MICROGram(s) Oral daily  metoprolol succinate ER 25 milliGRAM(s) Oral daily  naloxone Injectable 0.4 milliGRAM(s) IV Push once  pantoprazole    Tablet 40 milliGRAM(s) Oral before breakfast  polyethylene glycol 3350 17 Gram(s) Oral daily  pramipexole 0.125 milliGRAM(s) Oral at bedtime  senna 2 Tablet(s) Oral at bedtime        Vital Signs Last 24 Hrs  T(C): 36.8 (07 Dec 2023 07:36), Max: 37.4 (06 Dec 2023 15:01)  T(F): 98.2 (07 Dec 2023 07:36), Max: 99.3 (06 Dec 2023 15:01)  HR: 91 (07 Dec 2023 07:36) (91 - 112)  BP: 136/84 (07 Dec 2023 07:36) (98/57 - 136/84)  RR: 17 (07 Dec 2023 07:36) (17 - 17)  SpO2: 95% (07 Dec 2023 07:36) (95% - 100%)    Parameters below as of 07 Dec 2023 07:36  Patient On (Oxygen Delivery Method): room air      PHYSICAL EXAM:  General: No Acute Distress   Neurological: Awake, alert oriented to person, place and time, Following Commands, Speech Fluent, Moving all extremities, BUE 4/5. edematous, BLE 3-4/5 pain limited, positional limited in chair.    Pulmonary: Clear to Auscultation  Cardiovascular: S1, S2, Regular Rate and Rhythm   Gastrointestinal: Obese, soft, Nontender, Nondistended   Incision: Drsg C/D/I                            8.4    9.57  )-----------( 147      ( 07 Dec 2023 08:37 )             25.7     139  |  106  |  11  ----------------------------<  80  3.5   |  28  |  0.55    Ca    8.0<L>      07 Dec 2023 08:37  Phos  2.5     12-06  Mg     1.9     12-06

## 2023-12-07 NOTE — PROVIDER CONTACT NOTE (OTHER) - SITUATION
Pt had a dave removed on 12/5 and has been able to void spontaneously. Pt denies discomfort however is currently unable to void. Pt was bladder scanned which resulted in 475 cc.

## 2023-12-08 LAB
ANION GAP SERPL CALC-SCNC: 5 MMOL/L — SIGNIFICANT CHANGE UP (ref 5–17)
ANION GAP SERPL CALC-SCNC: 5 MMOL/L — SIGNIFICANT CHANGE UP (ref 5–17)
BUN SERPL-MCNC: 12 MG/DL — SIGNIFICANT CHANGE UP (ref 7–23)
BUN SERPL-MCNC: 12 MG/DL — SIGNIFICANT CHANGE UP (ref 7–23)
CALCIUM SERPL-MCNC: 8.1 MG/DL — LOW (ref 8.5–10.1)
CALCIUM SERPL-MCNC: 8.1 MG/DL — LOW (ref 8.5–10.1)
CHLORIDE SERPL-SCNC: 110 MMOL/L — HIGH (ref 96–108)
CHLORIDE SERPL-SCNC: 110 MMOL/L — HIGH (ref 96–108)
CO2 SERPL-SCNC: 25 MMOL/L — SIGNIFICANT CHANGE UP (ref 22–31)
CO2 SERPL-SCNC: 25 MMOL/L — SIGNIFICANT CHANGE UP (ref 22–31)
CREAT SERPL-MCNC: 0.44 MG/DL — LOW (ref 0.5–1.3)
CREAT SERPL-MCNC: 0.44 MG/DL — LOW (ref 0.5–1.3)
EGFR: 97 ML/MIN/1.73M2 — SIGNIFICANT CHANGE UP
EGFR: 97 ML/MIN/1.73M2 — SIGNIFICANT CHANGE UP
GLUCOSE SERPL-MCNC: 67 MG/DL — LOW (ref 70–99)
GLUCOSE SERPL-MCNC: 67 MG/DL — LOW (ref 70–99)
HCT VFR BLD CALC: 26.7 % — LOW (ref 34.5–45)
HCT VFR BLD CALC: 26.7 % — LOW (ref 34.5–45)
HGB BLD-MCNC: 8.7 G/DL — LOW (ref 11.5–15.5)
HGB BLD-MCNC: 8.7 G/DL — LOW (ref 11.5–15.5)
MCHC RBC-ENTMCNC: 30.3 PG — SIGNIFICANT CHANGE UP (ref 27–34)
MCHC RBC-ENTMCNC: 30.3 PG — SIGNIFICANT CHANGE UP (ref 27–34)
MCHC RBC-ENTMCNC: 32.6 GM/DL — SIGNIFICANT CHANGE UP (ref 32–36)
MCHC RBC-ENTMCNC: 32.6 GM/DL — SIGNIFICANT CHANGE UP (ref 32–36)
MCV RBC AUTO: 93 FL — SIGNIFICANT CHANGE UP (ref 80–100)
MCV RBC AUTO: 93 FL — SIGNIFICANT CHANGE UP (ref 80–100)
PLATELET # BLD AUTO: 142 K/UL — LOW (ref 150–400)
PLATELET # BLD AUTO: 142 K/UL — LOW (ref 150–400)
POTASSIUM SERPL-MCNC: 3.7 MMOL/L — SIGNIFICANT CHANGE UP (ref 3.5–5.3)
POTASSIUM SERPL-MCNC: 3.7 MMOL/L — SIGNIFICANT CHANGE UP (ref 3.5–5.3)
POTASSIUM SERPL-SCNC: 3.7 MMOL/L — SIGNIFICANT CHANGE UP (ref 3.5–5.3)
POTASSIUM SERPL-SCNC: 3.7 MMOL/L — SIGNIFICANT CHANGE UP (ref 3.5–5.3)
RBC # BLD: 2.87 M/UL — LOW (ref 3.8–5.2)
RBC # BLD: 2.87 M/UL — LOW (ref 3.8–5.2)
RBC # FLD: 16.3 % — HIGH (ref 10.3–14.5)
RBC # FLD: 16.3 % — HIGH (ref 10.3–14.5)
SODIUM SERPL-SCNC: 140 MMOL/L — SIGNIFICANT CHANGE UP (ref 135–145)
SODIUM SERPL-SCNC: 140 MMOL/L — SIGNIFICANT CHANGE UP (ref 135–145)
SURGICAL PATHOLOGY STUDY: SIGNIFICANT CHANGE UP
SURGICAL PATHOLOGY STUDY: SIGNIFICANT CHANGE UP
WBC # BLD: 10.39 K/UL — SIGNIFICANT CHANGE UP (ref 3.8–10.5)
WBC # BLD: 10.39 K/UL — SIGNIFICANT CHANGE UP (ref 3.8–10.5)
WBC # FLD AUTO: 10.39 K/UL — SIGNIFICANT CHANGE UP (ref 3.8–10.5)
WBC # FLD AUTO: 10.39 K/UL — SIGNIFICANT CHANGE UP (ref 3.8–10.5)

## 2023-12-08 PROCEDURE — 99233 SBSQ HOSP IP/OBS HIGH 50: CPT

## 2023-12-08 PROCEDURE — 99498 ADVNCD CARE PLAN ADDL 30 MIN: CPT

## 2023-12-08 PROCEDURE — 99232 SBSQ HOSP IP/OBS MODERATE 35: CPT

## 2023-12-08 PROCEDURE — 99497 ADVNCD CARE PLAN 30 MIN: CPT

## 2023-12-08 RX ORDER — ENOXAPARIN SODIUM 100 MG/ML
40 INJECTION SUBCUTANEOUS EVERY 24 HOURS
Refills: 0 | Status: DISCONTINUED | OUTPATIENT
Start: 2023-12-08 | End: 2023-12-16

## 2023-12-08 RX ADMIN — ENOXAPARIN SODIUM 40 MILLIGRAM(S): 100 INJECTION SUBCUTANEOUS at 22:11

## 2023-12-08 RX ADMIN — CARBIDOPA AND LEVODOPA 1 TABLET(S): 25; 100 TABLET ORAL at 11:42

## 2023-12-08 RX ADMIN — CEFEPIME 2000 MILLIGRAM(S): 1 INJECTION, POWDER, FOR SOLUTION INTRAMUSCULAR; INTRAVENOUS at 05:51

## 2023-12-08 RX ADMIN — Medication 50 MICROGRAM(S): at 05:51

## 2023-12-08 RX ADMIN — PANTOPRAZOLE SODIUM 40 MILLIGRAM(S): 20 TABLET, DELAYED RELEASE ORAL at 11:42

## 2023-12-08 RX ADMIN — CARBIDOPA AND LEVODOPA 1 TABLET(S): 25; 100 TABLET ORAL at 22:17

## 2023-12-08 RX ADMIN — CARBIDOPA AND LEVODOPA 1 TABLET(S): 25; 100 TABLET ORAL at 18:49

## 2023-12-08 RX ADMIN — CEFEPIME 2000 MILLIGRAM(S): 1 INJECTION, POWDER, FOR SOLUTION INTRAMUSCULAR; INTRAVENOUS at 22:11

## 2023-12-08 RX ADMIN — CEFEPIME 2000 MILLIGRAM(S): 1 INJECTION, POWDER, FOR SOLUTION INTRAMUSCULAR; INTRAVENOUS at 13:19

## 2023-12-08 RX ADMIN — ATORVASTATIN CALCIUM 40 MILLIGRAM(S): 80 TABLET, FILM COATED ORAL at 22:11

## 2023-12-08 RX ADMIN — BUPROPION HYDROCHLORIDE 100 MILLIGRAM(S): 150 TABLET, EXTENDED RELEASE ORAL at 11:42

## 2023-12-08 RX ADMIN — CARBIDOPA AND LEVODOPA 1 TABLET(S): 25; 100 TABLET ORAL at 05:51

## 2023-12-08 RX ADMIN — DONEPEZIL HYDROCHLORIDE 10 MILLIGRAM(S): 10 TABLET, FILM COATED ORAL at 22:11

## 2023-12-08 RX ADMIN — Medication 325 MILLIGRAM(S): at 11:43

## 2023-12-08 RX ADMIN — PRAMIPEXOLE DIHYDROCHLORIDE 0.12 MILLIGRAM(S): 0.12 TABLET ORAL at 22:11

## 2023-12-08 RX ADMIN — POLYETHYLENE GLYCOL 3350 17 GRAM(S): 17 POWDER, FOR SOLUTION ORAL at 11:43

## 2023-12-08 RX ADMIN — Medication 25 MILLIGRAM(S): at 11:43

## 2023-12-08 NOTE — PROGRESS NOTE ADULT - ASSESSMENT
82 y/o F w/ PMH of HTN, dyslipidemia, GERD, parkinson's, neuropathy, anemia, loop recorder, p/w LE weakness.    POD#11 Left rib mass biopsy with US guidance  POD#7 Left thoracotomy, Anterior thoracic corpectomy, fusion T10-12.    - Pain control PRN  - Cont PT/OT, ambulate as tolerated.  BC recommended.   - Cont medical management per hospitalist.   - DVT ppx: SCDs, no chemoprophylaxis until cleared by Ortho attending    D/w Dr Ceballos 82 y/o F w/ PMH of HTN, dyslipidemia, GERD, parkinson's, neuropathy, anemia, loop recorder, p/w LE weakness.    POD#11 Left rib mass biopsy with US guidance  POD#7 Left thoracotomy, Anterior thoracic corpectomy, fusion T10-12.    - Pain control PRN  - Cont PT/OT, ambulate as tolerated.  BC recommended.   - Cont medical management per hospitalist.   - SCDS, ok to start lovenox DVT chemoprophylaxis      D/w Dr Ceballos

## 2023-12-08 NOTE — PROGRESS NOTE ADULT - ASSESSMENT
Pt is a 83y old Female with hx of HTN, dyslipidemia, GERD, parkinson's, neuropathy, anemia, loop recorder, p/w LE weakness. Patient has been having B/L LE weakness for a little less than 1 year. Symptoms have been getting progressively worse to the point where patient is unable to ambulate. Patient also has been getting worked up for anemia and approximately 40lb unintentional weight loss. She had outpatient CT C/A/P showing multiple bone mets and possible liver mets and path fracture of T11 vertebral body. Patient sent to ED for further evaluation. Patient evaluated by ortho with recommendation for MRI. Patient has a loop recorder in placed by Dr. Crawley, and patient is unsure if it is compatible with MRI. Will consult Dr. Crawley for input prior to MRI. Patient also C/o LE paresthesias and back pain. Palliative medicine consulted to help establish GOC and advance care planning.     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   -  bone mets   - radiology reviewed   - oncology consult appreciated - not recommending treatement due to patients performance status   - Orthopedics consult appreciated   - s/p surgery     Process of Care  --Reviewed dx/treatment problems and alignment with Goals of Care    Physical Aspects of Care  --Pain  worsening pain  - Oxycodone 5mg q3hrs for moderate pain every three hours   - oxycodone 10mg for severe pain every 3 hours   - morphine 2mg IV for breakthrough pain   - explained to patient  that PO oxycodone will hopefully last longer so that symptoms are better managed - will continue to follow     --Bowel Regimen  denies constipation    --Dyspnea  No SOB at this time  comfortable and in NAD    --Nausea Vomiting  denies    --Weakness  PT as tolerated     Psychological and Psychiatric Aspects of Care:   --Greif/Bereavment: emotional support provided  --Hx of psychiatric dx: none  -Pastoral Care Available PRN     Social Aspects of Care  -SW involved     Cultural Aspects  -Primary Language: English    Goals of Care:     We discussed Palliative Care team being a supportive team when a patient has ongoing illnesses.  We also discussed that it is not an end of life care service, but can help navigate symptoms and emotional support througout their hospital stay here.    Ethical and Legal Aspects:   NA    Capacity- pt does not appear to have limited capacity - states  is who helps her make decisions but would also like to have her granddaughter involved in decision making     Code Status- DNR/I   MOLST- on chart   Dispo Plan- ongoing      Discussed With: Keenan Mesa     Case coordinated with attending and SW and RN     Time Spent: 75 minutes including the care, coordination and counseling of this patient, 50% of which was spent coordinating and counseling.

## 2023-12-08 NOTE — PROGRESS NOTE ADULT - ASSESSMENT
82 y/o F w/ PMH of HTN, Dyslipidemia, GERD, Barrette Esophagus, Parkinson's, Neuropathy, CRI, Loop recorder, IRINEO, progressively worsening B/L LE weakness, paresthesia, back pain, difficulty ambulating, and 40 lbs unintentional weight loss, p/w multiple bone and possible liver mets with path fracture of T11.    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 Lesions in the L5 and T11 vertebral bodies have epidural extension.  Pathologic fracture T11 vertebral body. Some of the rib lesions demonstrate pathologic fractures. Multiple hepatic lesions suspicious for metastatic disease.  - MR imaging with bone metastases at T11, L1, L2, L4, L5 and left sacrum. Pathologic fractures of T11 and L5. Possible cord compression at T11 and diffuse thecal sac compression at L5.  A large metastasis replaces the T11 vertebral body with an associated mild to moderate pathologic compression fracture deformity and retropulsion of bone. Epidural tumor extension is noted. The compression fracture with retropulsion of bone and epidural tumor extension results in mild to moderate thoracic spinal cord compression.  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 The immunohistochemical profile does not definitively determine the primary site.    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 This would be continued for four to six cycles, with immune checkpoint inhibitor continuing until progression or unacceptable toxicity.   - Case discussed with [facility] Dr Coker; advised palliative RT to T11 and L5 adjuvantly    - Communicated the results of the bx to the patient and spouse ---> given pathology results, patient would need urgent systemic treatment. However, given patient's deconditioned health and poor perfomance status, she would be unlikely to be able to tolerate combination chemoimmunotherapy ---> Single agent chemotherapy options include topotecan, irinotecan, and others, but overall, long term GOC needs to be addressed   - Palliative following  - Continue supportive measures unless otherwise determined by GOC      # Normocytic Anemia     - Hgb remains low but stable, not requiring PRBC   - Multifactorial with hx iron deficiency, B12 deficiency, CRI, and anemia of chronic disease s/t malignancy  - Follows outpatient, received x1 iron infusion 10/09/23  - B12 deficiency ---> started methylcobalamin 1000mcg , repeat level normal / >2000  - Outpatient initial presentation with anemia, and renal insufficiency labs were sent to r/o MM 06/2023 ---> negative  - Given CRI, plan was to start YANET injections outpatient with Dr Loera, once iron stores repleted  - Continue to trend serial CBCs    82 y/o F w/ PMH of HTN, Dyslipidemia, GERD, Barrette Esophagus, Parkinson's, Neuropathy, CRI, Loop recorder, IRINEO, progressively worsening B/L LE weakness, paresthesia, back pain, difficulty ambulating, and 40 lbs unintentional weight loss, p/w multiple bone and possible liver mets with path fracture of T11.    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 Lesions in the L5 and T11 vertebral bodies have epidural extension.  Pathologic fracture T11 vertebral body. Some of the rib lesions demonstrate pathologic fractures. Multiple hepatic lesions suspicious for metastatic disease.  - MR imaging with bone metastases at T11, L1, L2, L4, L5 and left sacrum. Pathologic fractures of T11 and L5. Possible cord compression at T11 and diffuse thecal sac compression at L5.  A large metastasis replaces the T11 vertebral body with an associated mild to moderate pathologic compression fracture deformity and retropulsion of bone. Epidural tumor extension is noted. The compression fracture with retropulsion of bone and epidural tumor extension results in mild to moderate thoracic spinal cord compression.  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 The immunohistochemical profile does not definitively determine the primary site.    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 This would be continued for four to six cycles, with immune checkpoint inhibitor continuing until progression or unacceptable toxicity.   - Case discussed with [facility] Dr Coker; advised palliative RT to T11 and L5 adjuvantly    - Communicated the results of the bx to the patient and spouse ---> given pathology results, patient would need urgent systemic treatment. However, given patient's deconditioned health and poor perfomance status, she would be unlikely to be able to tolerate combination chemoimmunotherapy ---> Single agent chemotherapy options include topotecan, irinotecan, and others, but overall, long term GOC needs to be addressed   - Palliative following  - Continue supportive measures unless otherwise determined by GOC      # Normocytic Anemia     - Hgb remains low but stable, not requiring PRBC   - Multifactorial with hx iron deficiency, B12 deficiency, CRI, and anemia of chronic disease s/t malignancy  - Follows outpatient, received x1 iron infusion 10/09/23  - B12 deficiency ---> started methylcobalamin 1000mcg , repeat level normal / >2000  - Outpatient initial presentation with anemia, and renal insufficiency labs were sent to r/o MM 06/2023 ---> negative  - Given CRI, plan was to start YANET injections outpatient with Dr Loera, once iron stores repleted  - Continue to trend serial CBCs    82 y/o F w/ PMH of HTN, Dyslipidemia, GERD, Barrette Esophagus, Parkinson's, Neuropathy, CRI, Loop recorder, IRINEO, progressively worsening B/L LE weakness, paresthesia, back pain, difficulty ambulating, and 40 lbs unintentional weight loss, p/w multiple bone and possible liver mets with path fracture of T11.    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 Lesions in the L5 and T11 vertebral bodies have epidural extension.  Pathologic fracture T11 vertebral body. Some of the rib lesions demonstrate pathologic fractures. Multiple hepatic lesions suspicious for metastatic disease.  - MR imaging with bone metastases at T11, L1, L2, L4, L5 and left sacrum. Pathologic fractures of T11 and L5. Possible cord compression at T11 and diffuse thecal sac compression at L5.  A large metastasis replaces the T11 vertebral body with an associated mild to moderate pathologic compression fracture deformity and retropulsion of bone. Epidural tumor extension is noted. The compression fracture with retropulsion of bone and epidural tumor extension results in mild to moderate thoracic spinal cord compression.  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 The immunohistochemical profile does not definitively determine the primary site.    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 This would be continued for four to six cycles, with immune checkpoint inhibitor continuing until progression or unacceptable toxicity.   - Case discussed with [facility] Dr Coker; advised palliative RT to T11 and L5 adjuvantly    - Communicated pathology results to the patient and spouse ---> patient would need urgent systemic treatment. However, given patient's deconditioned health and poor performance status, she would be unlikely to be able to tolerate combination chemoimmunotherapy ---> Single agent chemotherapy options include topotecan, irinotecan, and others, but overall, long term GOC needs to be addressed   - Palliative following  - Continue supportive measures unless otherwise determined by GOC      # Normocytic Anemia     - Hgb remains low but stable, not requiring PRBC   - Multifactorial with hx iron deficiency, B12 deficiency, CRI, and anemia of chronic disease s/t malignancy  - Follows outpatient, received x1 iron infusion 10/09/23  - B12 deficiency ---> started methylcobalamin 1000mcg , repeat level normal / >2000  - Outpatient initial presentation with anemia, and renal insufficiency labs were sent to r/o MM 06/2023 ---> negative  - Given CRI, plan was to start YANET injections outpatient with Dr Loera, once iron stores repleted  - Continue to trend serial CBCs    84 y/o F w/ PMH of HTN, Dyslipidemia, GERD, Barrette Esophagus, Parkinson's, Neuropathy, CRI, Loop recorder, IRINEO, progressively worsening B/L LE weakness, paresthesia, back pain, difficulty ambulating, and 40 lbs unintentional weight loss, p/w multiple bone and possible liver mets with path fracture of T11.    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 Lesions in the L5 and T11 vertebral bodies have epidural extension.  Pathologic fracture T11 vertebral body. Some of the rib lesions demonstrate pathologic fractures. Multiple hepatic lesions suspicious for metastatic disease.  - MR imaging with bone metastases at T11, L1, L2, L4, L5 and left sacrum. Pathologic fractures of T11 and L5. Possible cord compression at T11 and diffuse thecal sac compression at L5.  A large metastasis replaces the T11 vertebral body with an associated mild to moderate pathologic compression fracture deformity and retropulsion of bone. Epidural tumor extension is noted. The compression fracture with retropulsion of bone and epidural tumor extension results in mild to moderate thoracic spinal cord compression.  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 The immunohistochemical profile does not definitively determine the primary site.    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 This would be continued for four to six cycles, with immune checkpoint inhibitor continuing until progression or unacceptable toxicity.   - Case discussed with [facility] Dr Coker; advised palliative RT to T11 and L5 adjuvantly    - Communicated pathology results to the patient and spouse ---> patient would need urgent systemic treatment. However, given patient's deconditioned health and poor performance status, she would be unlikely to be able to tolerate combination chemoimmunotherapy ---> Single agent chemotherapy options include topotecan, irinotecan, and others, but overall, long term GOC needs to be addressed   - Palliative following  - Continue supportive measures unless otherwise determined by GOC      # Normocytic Anemia     - Hgb remains low but stable, not requiring PRBC   - Multifactorial with hx iron deficiency, B12 deficiency, CRI, and anemia of chronic disease s/t malignancy  - Follows outpatient, received x1 iron infusion 10/09/23  - B12 deficiency ---> started methylcobalamin 1000mcg , repeat level normal / >2000  - Outpatient initial presentation with anemia, and renal insufficiency labs were sent to r/o MM 06/2023 ---> negative  - Given CRI, plan was to start YANET injections outpatient with Dr Loera, once iron stores repleted  - Continue to trend serial CBCs    82 y/o F w/ PMH of HTN, Dyslipidemia, GERD, Barrette Esophagus, Parkinson's, Neuropathy, CRI, Loop recorder, IRINEO, progressively worsening B/L LE weakness, paresthesia, back pain, difficulty ambulating, and 40 lbs unintentional weight loss, p/w multiple bone and possible liver mets with path fracture of T11.    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 Lesions in the L5 and T11 vertebral bodies have epidural extension.  Pathologic fracture T11 vertebral body. Some of the rib lesions demonstrate pathologic fractures. Multiple hepatic lesions suspicious for metastatic disease.  - MR imaging with bone metastases at T11, L1, L2, L4, L5 and left sacrum. Pathologic fractures of T11 and L5. Possible cord compression at T11 and diffuse thecal sac compression at L5.  A large metastasis replaces the T11 vertebral body with an associated mild to moderate pathologic compression fracture deformity and retropulsion of bone. Epidural tumor extension is noted. The compression fracture with retropulsion of bone and epidural tumor extension results in mild to moderate thoracic spinal cord compression.  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 The immunohistochemical profile does not definitively determine the primary site.  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 This would be continued for four to six cycles, with immune checkpoint inhibitor continuing until progression or unacceptable toxicity.   - Case discussed with [facility] Dr Coker; advised palliative RT to T11 and L5 adjuvantly  - Communicated pathology results to the patient and spouse ---> patient would require systemic treatment. However, given patient's deconditioned health and poor performance status, unlikely to be able to tolerate combination chemoimmunotherapy.  Single agent chemotherapy options include topotecan, irinotecan, and others, but overall long term GOC needs to be addressed.  - Palliative following  - Continue supportive measures unless otherwise determined by GOC      # Normocytic Anemia     - Hgb remains low but stable, not requiring PRBC   - Multifactorial with hx iron deficiency, B12 deficiency, CRI, and anemia of chronic disease s/t malignancy  - Follows outpatient, received x1 iron infusion 10/09/23  - B12 deficiency ---> started methylcobalamin 1000mcg , repeat level normal / >2000  - Outpatient initial presentation with anemia, and renal insufficiency labs were sent to r/o MM 06/2023 ---> negative  - Given CRI, plan was to start YANET injections outpatient with Dr Loera, once iron stores repleted  - Continue to trend serial CBCs    82 y/o F w/ PMH of HTN, Dyslipidemia, GERD, Barrette Esophagus, Parkinson's, Neuropathy, CRI, Loop recorder, IRINEO, progressively worsening B/L LE weakness, paresthesia, back pain, difficulty ambulating, and 40 lbs unintentional weight loss, p/w multiple bone and possible liver mets with path fracture of T11.    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 Lesions in the L5 and T11 vertebral bodies have epidural extension.  Pathologic fracture T11 vertebral body. Some of the rib lesions demonstrate pathologic fractures. Multiple hepatic lesions suspicious for metastatic disease.  - MR imaging with bone metastases at T11, L1, L2, L4, L5 and left sacrum. Pathologic fractures of T11 and L5. Possible cord compression at T11 and diffuse thecal sac compression at L5.  A large metastasis replaces the T11 vertebral body with an associated mild to moderate pathologic compression fracture deformity and retropulsion of bone. Epidural tumor extension is noted. The compression fracture with retropulsion of bone and epidural tumor extension results in mild to moderate thoracic spinal cord compression.  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 The immunohistochemical profile does not definitively determine the primary site.  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 This would be continued for four to six cycles, with immune checkpoint inhibitor continuing until progression or unacceptable toxicity.   - Case discussed with [facility] Dr Coker; advised palliative RT to T11 and L5 adjuvantly  - Communicated pathology results to the patient and spouse ---> patient would require systemic treatment. However, given patient's deconditioned health and poor performance status, unlikely to be able to tolerate combination chemoimmunotherapy.  Single agent chemotherapy options include topotecan, irinotecan, and others, but overall long term GOC needs to be addressed.  - Palliative following  - Continue supportive measures unless otherwise determined by GOC      # Normocytic Anemia     - Hgb remains low but stable, not requiring PRBC   - Multifactorial with hx iron deficiency, B12 deficiency, CRI, and anemia of chronic disease s/t malignancy  - Follows outpatient, received x1 iron infusion 10/09/23  - B12 deficiency ---> started methylcobalamin 1000mcg , repeat level normal / >2000  - Outpatient initial presentation with anemia, and renal insufficiency labs were sent to r/o MM 06/2023 ---> negative  - Given CRI, plan was to start YANET injections outpatient with Dr Loera, once iron stores repleted  - Continue to trend serial CBCs    82 y/o F w/ PMH of HTN, Dyslipidemia, GERD, Barrette Esophagus, Parkinson's, Neuropathy, CRI, Loop recorder, IRINEO, progressively worsening B/L LE weakness, paresthesia, back pain, difficulty ambulating, and 40 lbs unintentional weight loss, p/w multiple bone and possible liver mets with path fracture of T11.    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 Lesions in the L5 and T11 vertebral bodies have epidural extension.  Pathologic fracture T11 vertebral body. Some of the rib lesions demonstrate pathologic fractures. Multiple hepatic lesions suspicious for metastatic disease.  - MR imaging with bone metastases at T11, L1, L2, L4, L5 and left sacrum. Pathologic fractures of T11 and L5. Possible cord compression at T11 and diffuse thecal sac compression at L5.  A large metastasis replaces the T11 vertebral body with an associated mild to moderate pathologic compression fracture deformity and retropulsion of bone. Epidural tumor extension is noted. The compression fracture with retropulsion of bone and epidural tumor extension results in mild to moderate thoracic spinal cord compression.  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 The immunohistochemical profile does not definitively determine the primary site.  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 This would be continued for four to six cycles, with immune checkpoint inhibitor continuing until progression or unacceptable toxicity.   - Case discussed with [facility] Dr Coker; advised palliative RT to T11 and L5 adjuvantly  - Communicated pathology results to the patient and spouse ---> patient would require systemic treatment. However, given patient's deconditioned health and poor performance status, unlikely to be able to tolerate combination chemoimmunotherapy.  Single agent chemotherapy options include topotecan, irinotecan, and others, but overall long term GOC needs to be addressed.  - Palliative following.  Patient's spouse has been sick with a cold and unable to come to the hospital for family meeting. Palliative arranged for family meeting on Monday to discuss GOC/Prongosis given biopsy results of metastatic disease.  DNR/DNI.  - Continue supportive measures unless otherwise determined by GOC      # Normocytic Anemia     - Hgb remains low but stable, not requiring PRBC   - Multifactorial with hx iron deficiency, B12 deficiency, CRI, and anemia of chronic disease s/t malignancy  - Follows outpatient, received x1 iron infusion 10/09/23  - B12 deficiency ---> started methylcobalamin 1000mcg , repeat level normal / >2000  - Outpatient initial presentation with anemia, and renal insufficiency labs were sent to r/o MM 06/2023 ---> negative  - Given CRI, plan was to start YANET injections outpatient with Dr Loera, once iron stores repleted  - Continue to trend serial CBCs    82 y/o F w/ PMH of HTN, Dyslipidemia, GERD, Barrette Esophagus, Parkinson's, Neuropathy, CRI, Loop recorder, IRINEO, progressively worsening B/L LE weakness, paresthesia, back pain, difficulty ambulating, and 40 lbs unintentional weight loss, p/w multiple bone and possible liver mets with path fracture of T11.    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 Lesions in the L5 and T11 vertebral bodies have epidural extension.  Pathologic fracture T11 vertebral body. Some of the rib lesions demonstrate pathologic fractures. Multiple hepatic lesions suspicious for metastatic disease.  - MR imaging with bone metastases at T11, L1, L2, L4, L5 and left sacrum. Pathologic fractures of T11 and L5. Possible cord compression at T11 and diffuse thecal sac compression at L5.  A large metastasis replaces the T11 vertebral body with an associated mild to moderate pathologic compression fracture deformity and retropulsion of bone. Epidural tumor extension is noted. The compression fracture with retropulsion of bone and epidural tumor extension results in mild to moderate thoracic spinal cord compression.  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 The immunohistochemical profile does not definitively determine the primary site.  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 This would be continued for four to six cycles, with immune checkpoint inhibitor continuing until progression or unacceptable toxicity.   - Case discussed with [facility] Dr Coker; advised palliative RT to T11 and L5 adjuvantly  - Communicated pathology results to the patient and spouse ---> patient would require systemic treatment. However, given patient's deconditioned health and poor performance status, unlikely to be able to tolerate combination chemoimmunotherapy.  Single agent chemotherapy options include topotecan, irinotecan, and others, but overall long term GOC needs to be addressed.  - Palliative following.  Patient's spouse has been sick with a cold and unable to come to the hospital for family meeting. Palliative arranged for family meeting on Monday to discuss GOC/Prongosis given biopsy results of metastatic disease.  DNR/DNI.  - Continue supportive measures unless otherwise determined by GOC      # Normocytic Anemia     - Hgb remains low but stable, not requiring PRBC   - Multifactorial with hx iron deficiency, B12 deficiency, CRI, and anemia of chronic disease s/t malignancy  - Follows outpatient, received x1 iron infusion 10/09/23  - B12 deficiency ---> started methylcobalamin 1000mcg , repeat level normal / >2000  - Outpatient initial presentation with anemia, and renal insufficiency labs were sent to r/o MM 06/2023 ---> negative  - Given CRI, plan was to start YANET injections outpatient with Dr Loera, once iron stores repleted  - Continue to trend serial CBCs

## 2023-12-08 NOTE — PROGRESS NOTE ADULT - ASSESSMENT
82 y/o F w/ PMH of HTN, dyslipidemia, GERD, parkinson's, neuropathy, anemia, loop recorder, p/w LE weakness. Patient has been having B/L LE weakness for a little less than 1 year. Symptoms have been getting progressively worse to the point where patient is unable to ambulate. Patient also has been getting worked up for anemia and approximately 40lb unintentional weight loss. She had outpatient CT C/A/P showing multiple bone mets and possible liver mets and path fracture of T11 vertebral body. Patient sent to ED for further evaluation. Patient evaluated by ortho with recommendation for MRI. Now POD #9 Left rib mass biopsy with US guidance, POD# 5 Left thoracotomy, Anterior thoracic corpectomy, fusion T10-12.     #Left rib mass biopsy, poorly differentiated carcinoma with neuroendocrine features  #Left thoracotomy, Anterior thoracic corpectomy, fusion T10-12  - Given findings of poorly differentiated carcinoma, will need to discuss treatment options/prognosis with patient and family () present). Family meeting tomorrow  - Heme/Onc and Palliative following and aware    #Symptomatic anemia   - s/p 1 unit of PRBC 12/4, Hgb stable     #Pneumonia, LLL opacity  - Continue Cefepime for tx of presumed pneumonia    #Urinary retention  Failed TOV, replaced Fuentes 12/8    #Hypothyroidism  - Continue Synthroid     #Hypertension  BP medications held yesterday due to soft pressures. Resume Toprol  Resume Verapamil tomorrow    #Severe protein-calorie malnutrition     GOC: Patient's spouse has been sick with a cold and unable to come to the hospital for family meeting. Palliative arranged for family meeting on Monday to discuss GOC/Prongosis given biopsy results of metastatic disease.    DNR/DNI with NIV  DVT ppx: started Lovenox today  84 y/o F w/ PMH of HTN, dyslipidemia, GERD, parkinson's, neuropathy, anemia, loop recorder, p/w LE weakness. Patient has been having B/L LE weakness for a little less than 1 year. Symptoms have been getting progressively worse to the point where patient is unable to ambulate. Patient also has been getting worked up for anemia and approximately 40lb unintentional weight loss. She had outpatient CT C/A/P showing multiple bone mets and possible liver mets and path fracture of T11 vertebral body. Patient sent to ED for further evaluation. Patient evaluated by ortho with recommendation for MRI. Now POD #9 Left rib mass biopsy with US guidance, POD# 5 Left thoracotomy, Anterior thoracic corpectomy, fusion T10-12.     #Left rib mass biopsy, poorly differentiated carcinoma with neuroendocrine features  #Left thoracotomy, Anterior thoracic corpectomy, fusion T10-12  - Given findings of poorly differentiated carcinoma, will need to discuss treatment options/prognosis with patient and family () present). Family meeting tomorrow  - Heme/Onc and Palliative following and aware    #Symptomatic anemia   - s/p 1 unit of PRBC 12/4, Hgb stable     #Pneumonia, LLL opacity  - Continue Cefepime for tx of presumed pneumonia    #Urinary retention  Failed TOV, replaced Fuentes 12/8    #Hypothyroidism  - Continue Synthroid     #Hypertension  BP medications held yesterday due to soft pressures. Resume Toprol  Resume Verapamil tomorrow    #Severe protein-calorie malnutrition     GOC: Patient's spouse has been sick with a cold and unable to come to the hospital for family meeting. Palliative arranged for family meeting on Monday to discuss GOC/Prongosis given biopsy results of metastatic disease.    DNR/DNI with NIV  DVT ppx: started Lovenox today

## 2023-12-08 NOTE — PROGRESS NOTE ADULT - CONVERSATION DETAILS
I spoke with the patient at bedside, who is more alert and oriented today states she was told that she has cancer and that at this time treatment may not be the best option for her but other than that she states she is having a hard time recalling the conversation. She states that she would like to wait to have any official meeting until Monday when her  Julius can come back to the hospital and is feeling better as he has been home sick himself.   The patient shared that she filled out a new HCP requesting her granddaughter Emy also be involved - but that she only wants her to get updates not to make decisions if she is unable to.  I discussed with the patient that when filling out an HCP that states that Emy would become a decision maker if the patient were unable to make her own decisions. The patient shared that she would like to fill out a new HCP naming Julius again as she would want her  to be making decisions but was hopeful that Emy would be involved in the conversations, which I stated we could arrange.   Spoke with granddaughter Emy who states that she will be available for Monday for the [facility] meeting either in person or on the phone, She states that she also wants her grandfather to make the decisions if her grandmother is unable to but she just wants to be able to support and help them with the information.   A new HCP was filled out reflecting Julius as a decision maker if the patient were unable to.  Emotional Support provided will continue to follow the patient. I spoke with the patient at bedside, who is more alert and oriented today states she was told that she has cancer and that at this time treatment may not be the best option for her but other than that she states she is having a hard time recalling the conversation. She states that she would like to wait to have any official meeting until Monday when her  Julius can come back to the hospital and is feeling better as he has been home sick himself.   The patient shared that she filled out a new HCP requesting her granddaughter Emy also be involved - but that she only wants her to get updates not to make decisions if she is unable to.  I discussed with the patient that when filling out an HCP that states that Emy would become a decision maker if the patient were unable to make her own decisions. The patient shared that she would like to fill out a new HCP naming Julius again as she would want her  to be making decisions but was hopeful that Emy would be involved in the conversations, which I stated we could arrange.   Spoke with granddaughter Emy who states that she will be available for Monday for the [facility] meeting either in person or on the phone, She states that she also wants her grandfather to make the decisions if her grandmother is unable to but she just wants to be able to support and help them with the information.   A new HCP was filled out reflecting Julius as a decision maker if the patient were unable to.  Emotional Support provided will continue to follow the patient.

## 2023-12-08 NOTE — PROGRESS NOTE ADULT - SUBJECTIVE AND OBJECTIVE BOX
HPI:  84 yo female PMH HTN, dyslipidemia, GERD, parkinson's, neuropathy, anemia, loop recorder, p/w LE weakness. Patient has been having B/L LE weakness for a little less than 1 year. Symptoms have been getting progressively worse to the point where patient is unable to ambulate. Patient also has been getting worked up for anemia and approximately 40lb unintentional weight loss. She had outpatient CT C/A/P showing multiple bone mets and possible liver mets and path fracture of T11 vertebral body. She is now s/p Left rib mass biopsy with US guidance 11/27/23 and Left thoracotomy, Anterior thoracic corpectomy, fusion T10-12 12/1/23.    12/6 - POD#5 Patient seen this afternoon in bedside chair, Chest tube removed yesterday by surgery, follow-up CXR reviewed by surgical team.  Patient endorses difficulty breathing, but no obvious signs of distress, states she felt the same yesterday.  Vitals stable.  Incentive spirometry at bedside.      12/7 - POD#6 Pt seen and examined earlier today, in NAD. Appears comfortable, denies new complaints, no overnight events     12/8- POD#7 Pt seen and examined, no acute events overnight. Sitting up being assisted with breakfast, no new complaints.     Vital Signs Last 24 Hrs  T(C): 36.4 (08 Dec 2023 08:24), Max: 37.1 (07 Dec 2023 15:25)  T(F): 97.6 (08 Dec 2023 08:24), Max: 98.7 (07 Dec 2023 15:25)  HR: 80 (08 Dec 2023 08:24) (78 - 85)  BP: 130/63 (08 Dec 2023 08:24) (118/47 - 130/63)  BP(mean): --  RR: 17 (08 Dec 2023 08:24) (17 - 17)  SpO2: 93% (08 Dec 2023 08:24) (92% - 93%)    Parameters below as of 08 Dec 2023 08:24  Patient On (Oxygen Delivery Method): room air        MEDICATIONS  (STANDING):  atorvastatin 40 milliGRAM(s) Oral at bedtime  buPROPion . 100 milliGRAM(s) Oral daily  carbidopa/levodopa  25/100 1 Tablet(s) Oral four times a day  cefepime  Injectable. 2000 milliGRAM(s) IV Push every 8 hours  donepezil 10 milliGRAM(s) Oral at bedtime  ferrous    sulfate 325 milliGRAM(s) Oral daily  influenza  Vaccine (HIGH DOSE) 0.7 milliLiter(s) IntraMuscular once  levothyroxine 50 MICROGram(s) Oral daily  metoprolol succinate ER 25 milliGRAM(s) Oral daily  naloxone Injectable 0.4 milliGRAM(s) IV Push once  pantoprazole    Tablet 40 milliGRAM(s) Oral before breakfast  polyethylene glycol 3350 17 Gram(s) Oral daily  pramipexole 0.125 milliGRAM(s) Oral at bedtime  senna 2 Tablet(s) Oral at bedtime    MEDICATIONS  (PRN):  acetaminophen     Tablet .. 650 milliGRAM(s) Oral every 6 hours PRN Mild Pain (1 - 3), Moderate Pain (4 - 6)  bisacodyl 5 milliGRAM(s) Oral daily PRN Constipation      PHYSICAL EXAM:  General: No Acute Distress   Neurological: Awake, alert oriented to person, place and time, Following Commands, Speech Fluent, Moving all extremities, BUE 4/5. edematous, BLE 3/5 pain limited  Pulmonary: Clear to Auscultation  Cardiovascular: S1, S2, Regular Rate and Rhythm   Gastrointestinal: Obese, soft, Nontender, Nondistended   Incision: incisions C/D/I dressing changed          LABS:                         8.7    10.39 )-----------( 142      ( 08 Dec 2023 07:08 )             26.7     12-08    140  |  110<H>  |  12  ----------------------------<  67<L>  3.7   |  25  |  0.44<L>    Ca    8.1<L>      08 Dec 2023 07:08         HPI:  82 yo female PMH HTN, dyslipidemia, GERD, parkinson's, neuropathy, anemia, loop recorder, p/w LE weakness. Patient has been having B/L LE weakness for a little less than 1 year. Symptoms have been getting progressively worse to the point where patient is unable to ambulate. Patient also has been getting worked up for anemia and approximately 40lb unintentional weight loss. She had outpatient CT C/A/P showing multiple bone mets and possible liver mets and path fracture of T11 vertebral body. She is now s/p Left rib mass biopsy with US guidance 11/27/23 and Left thoracotomy, Anterior thoracic corpectomy, fusion T10-12 12/1/23.    12/6 - POD#5 Patient seen this afternoon in bedside chair, Chest tube removed yesterday by surgery, follow-up CXR reviewed by surgical team.  Patient endorses difficulty breathing, but no obvious signs of distress, states she felt the same yesterday.  Vitals stable.  Incentive spirometry at bedside.      12/7 - POD#6 Pt seen and examined earlier today, in NAD. Appears comfortable, denies new complaints, no overnight events     12/8- POD#7 Pt seen and examined, no acute events overnight. Sitting up being assisted with breakfast, no new complaints.     Vital Signs Last 24 Hrs  T(C): 36.4 (08 Dec 2023 08:24), Max: 37.1 (07 Dec 2023 15:25)  T(F): 97.6 (08 Dec 2023 08:24), Max: 98.7 (07 Dec 2023 15:25)  HR: 80 (08 Dec 2023 08:24) (78 - 85)  BP: 130/63 (08 Dec 2023 08:24) (118/47 - 130/63)  BP(mean): --  RR: 17 (08 Dec 2023 08:24) (17 - 17)  SpO2: 93% (08 Dec 2023 08:24) (92% - 93%)    Parameters below as of 08 Dec 2023 08:24  Patient On (Oxygen Delivery Method): room air        MEDICATIONS  (STANDING):  atorvastatin 40 milliGRAM(s) Oral at bedtime  buPROPion . 100 milliGRAM(s) Oral daily  carbidopa/levodopa  25/100 1 Tablet(s) Oral four times a day  cefepime  Injectable. 2000 milliGRAM(s) IV Push every 8 hours  donepezil 10 milliGRAM(s) Oral at bedtime  ferrous    sulfate 325 milliGRAM(s) Oral daily  influenza  Vaccine (HIGH DOSE) 0.7 milliLiter(s) IntraMuscular once  levothyroxine 50 MICROGram(s) Oral daily  metoprolol succinate ER 25 milliGRAM(s) Oral daily  naloxone Injectable 0.4 milliGRAM(s) IV Push once  pantoprazole    Tablet 40 milliGRAM(s) Oral before breakfast  polyethylene glycol 3350 17 Gram(s) Oral daily  pramipexole 0.125 milliGRAM(s) Oral at bedtime  senna 2 Tablet(s) Oral at bedtime    MEDICATIONS  (PRN):  acetaminophen     Tablet .. 650 milliGRAM(s) Oral every 6 hours PRN Mild Pain (1 - 3), Moderate Pain (4 - 6)  bisacodyl 5 milliGRAM(s) Oral daily PRN Constipation      PHYSICAL EXAM:  General: No Acute Distress   Neurological: Awake, alert oriented to person, place and time, Following Commands, Speech Fluent, Moving all extremities, BUE 4/5. edematous, BLE 3/5 pain limited  Pulmonary: Clear to Auscultation  Cardiovascular: S1, S2, Regular Rate and Rhythm   Gastrointestinal: Obese, soft, Nontender, Nondistended   Incision: incisions C/D/I dressing changed          LABS:                         8.7    10.39 )-----------( 142      ( 08 Dec 2023 07:08 )             26.7     12-08    140  |  110<H>  |  12  ----------------------------<  67<L>  3.7   |  25  |  0.44<L>    Ca    8.1<L>      08 Dec 2023 07:08

## 2023-12-08 NOTE — PROGRESS NOTE ADULT - SUBJECTIVE AND OBJECTIVE BOX
HPI: pt seen and examined this AM,  still under tem weather family meeting scheduled for Monday Patient states pain ok at This time       PAIN: ( )Yes   ( )No  Level:  Location:  Intensity:    /10  Quality:  Aggravating Factors:  Alleviating Factors:  Radiation:  Duration/Timing:  Impact on ADLs:    DYSPNEA: ( ) Yes  ( ) No  Level:        Review of Systems:    Anxiety-  Depression-  Physical Discomfort-  Dyspnea-  Constipation-  Diarrhea-  Nausea-  Vomiting-  Anorexia-  Weight Loss-   Cough-  Secretions-  Fatigue-  Weakness-  Delirium-    All other systems reviewed and negative  Unable to obtain/Limited due to:        PHYSICAL EXAM:    Vital Signs Last 24 Hrs  T(C): 36.4 (08 Dec 2023 08:24), Max: 37.1 (07 Dec 2023 15:25)  T(F): 97.6 (08 Dec 2023 08:24), Max: 98.7 (07 Dec 2023 15:25)  HR: 80 (08 Dec 2023 08:24) (78 - 85)  BP: 130/63 (08 Dec 2023 08:24) (118/47 - 130/63)  BP(mean): --  RR: 17 (08 Dec 2023 08:24) (17 - 17)  SpO2: 93% (08 Dec 2023 08:24) (92% - 93%)    Parameters below as of 08 Dec 2023 08:24  Patient On (Oxygen Delivery Method): room air      Daily     Daily     PPSV2:   %  FAST:    General:  HEENT:  Lungs:  Cardiac:  GI:  :  Ext:  Neuro:      LABS:                        8.7    10.39 )-----------( 142      ( 08 Dec 2023 07:08 )             26.7     12-08    140  |  110<H>  |  12  ----------------------------<  67<L>  3.7   |  25  |  0.44<L>    Ca    8.1<L>      08 Dec 2023 07:08        Albumin:     Allergies    Originally Entered as [Moderate Rash] reaction to [SURGICAL TAPE] (Unknown)  Vitamin K (Other (Severe))  Fosamax (Unknown)    Intolerances      MEDICATIONS  (STANDING):  atorvastatin 40 milliGRAM(s) Oral at bedtime  buPROPion . 100 milliGRAM(s) Oral daily  carbidopa/levodopa  25/100 1 Tablet(s) Oral four times a day  cefepime  Injectable. 2000 milliGRAM(s) IV Push every 8 hours  donepezil 10 milliGRAM(s) Oral at bedtime  enoxaparin Injectable 40 milliGRAM(s) SubCutaneous every 24 hours  ferrous    sulfate 325 milliGRAM(s) Oral daily  influenza  Vaccine (HIGH DOSE) 0.7 milliLiter(s) IntraMuscular once  levothyroxine 50 MICROGram(s) Oral daily  metoprolol succinate ER 25 milliGRAM(s) Oral daily  naloxone Injectable 0.4 milliGRAM(s) IV Push once  pantoprazole    Tablet 40 milliGRAM(s) Oral before breakfast  polyethylene glycol 3350 17 Gram(s) Oral daily  pramipexole 0.125 milliGRAM(s) Oral at bedtime  senna 2 Tablet(s) Oral at bedtime    MEDICATIONS  (PRN):  acetaminophen     Tablet .. 650 milliGRAM(s) Oral every 6 hours PRN Mild Pain (1 - 3), Moderate Pain (4 - 6)  bisacodyl 5 milliGRAM(s) Oral daily PRN Constipation      RADIOLOGY: HPI: pt seen and examined this AM,  still under tem weather family meeting scheduled for Monday Patient states pain ok at This time. HCP filled out reflecting  Julius as decision maker, but would also like granddaughter Emy also to get medical updates please call her at [phone].       PAIN: (x )Yes   ( )No  Level: severe   Location: back   medication helping to relieve   - unable to further describe     DYSPNEA: ( ) Yes  (x ) No  Level:    Review of Systems:    Physical Discomfort- at times   Dyspnea- not at this time   Constipation- unsure   Fatigue- yes   Weakness- yes     All other systems reviewed and negative    PHYSICAL EXAM:    Vital Signs Last 24 Hrs  T(C): 36.4 (08 Dec 2023 08:24), Max: 37.1 (07 Dec 2023 15:25)  T(F): 97.6 (08 Dec 2023 08:24), Max: 98.7 (07 Dec 2023 15:25)  HR: 80 (08 Dec 2023 08:24) (78 - 85)  BP: 130/63 (08 Dec 2023 08:24) (118/47 - 130/63)  RR: 17 (08 Dec 2023 08:24) (17 - 17)  SpO2: 93% (08 Dec 2023 08:24) (92% - 93%)    Parameters below as of 08 Dec 2023 08:24  Patient On (Oxygen Delivery Method): room air    PPSV2: 20-30  %  FAST:    General: elderly female in bed, NAD   Mental Status: alert but confused oriented to self only   HEENT:  mmm   Lungs: diminished breath sounds   Cardiac: s1s2 +   GI: nontender, nondistended +BS   : +voiding   Ext: SANCHEZ weakness lower extremities   Neuro: weakness       LABS:                        8.7    10.39 )-----------( 142      ( 08 Dec 2023 07:08 )             26.7     12-08    140  |  110<H>  |  12  ----------------------------<  67<L>  3.7   |  25  |  0.44<L>    Ca    8.1<L>      08 Dec 2023 07:08        Albumin:     Allergies    Originally Entered as [Moderate Rash] reaction to [SURGICAL TAPE] (Unknown)  Vitamin K (Other (Severe))  Fosamax (Unknown)    Intolerances      MEDICATIONS  (STANDING):  atorvastatin 40 milliGRAM(s) Oral at bedtime  buPROPion . 100 milliGRAM(s) Oral daily  carbidopa/levodopa  25/100 1 Tablet(s) Oral four times a day  cefepime  Injectable. 2000 milliGRAM(s) IV Push every 8 hours  donepezil 10 milliGRAM(s) Oral at bedtime  enoxaparin Injectable 40 milliGRAM(s) SubCutaneous every 24 hours  ferrous    sulfate 325 milliGRAM(s) Oral daily  influenza  Vaccine (HIGH DOSE) 0.7 milliLiter(s) IntraMuscular once  levothyroxine 50 MICROGram(s) Oral daily  metoprolol succinate ER 25 milliGRAM(s) Oral daily  naloxone Injectable 0.4 milliGRAM(s) IV Push once  pantoprazole    Tablet 40 milliGRAM(s) Oral before breakfast  polyethylene glycol 3350 17 Gram(s) Oral daily  pramipexole 0.125 milliGRAM(s) Oral at bedtime  senna 2 Tablet(s) Oral at bedtime    MEDICATIONS  (PRN):  acetaminophen     Tablet .. 650 milliGRAM(s) Oral every 6 hours PRN Mild Pain (1 - 3), Moderate Pain (4 - 6)  bisacodyl 5 milliGRAM(s) Oral daily PRN Constipation         HPI: pt seen and examined this AM,  still under tem weather family meeting scheduled for Monday Patient states pain ok at This time. HCP filled out reflecting  Julius as decision maker, but would also like granddaughter Emy also to get medical updates please call her at [phone].       PAIN: (x )Yes   ( )No  Level: severe   Location: back   medication helping to relieve   - unable to further describe     DYSPNEA: ( ) Yes  (x ) No  Level:    Review of Systems:    Physical Discomfort- at times   Dyspnea- not at this time   Constipation- unsure   Fatigue- yes   Weakness- yes     All other systems reviewed and negative    PHYSICAL EXAM:    Vital Signs Last 24 Hrs  T(C): 36.4 (08 Dec 2023 08:24), Max: 37.1 (07 Dec 2023 15:25)  T(F): 97.6 (08 Dec 2023 08:24), Max: 98.7 (07 Dec 2023 15:25)  HR: 80 (08 Dec 2023 08:24) (78 - 85)  BP: 130/63 (08 Dec 2023 08:24) (118/47 - 130/63)  RR: 17 (08 Dec 2023 08:24) (17 - 17)  SpO2: 93% (08 Dec 2023 08:24) (92% - 93%)    Parameters below as of 08 Dec 2023 08:24  Patient On (Oxygen Delivery Method): room air    PPSV2: 20-30  %  FAST:    General: elderly female in bed, NAD   Mental Status: alert but confused oriented to self only   HEENT:  mmm   Lungs: diminished breath sounds   Cardiac: s1s2 +   GI: nontender, nondistended +BS   : +voiding   Ext: SANCHEZ weakness lower extremities   Neuro: weakness       LABS:                        8.7    10.39 )-----------( 142      ( 08 Dec 2023 07:08 )             26.7     12-08    140  |  110<H>  |  12  ----------------------------<  67<L>  3.7   |  25  |  0.44<L>    Ca    8.1<L>      08 Dec 2023 07:08        Albumin:     Allergies    Originally Entered as [Moderate Rash] reaction to [SURGICAL TAPE] (Unknown)  Vitamin K (Other (Severe))  Fosamax (Unknown)    Intolerances      MEDICATIONS  (STANDING):  atorvastatin 40 milliGRAM(s) Oral at bedtime  buPROPion . 100 milliGRAM(s) Oral daily  carbidopa/levodopa  25/100 1 Tablet(s) Oral four times a day  cefepime  Injectable. 2000 milliGRAM(s) IV Push every 8 hours  donepezil 10 milliGRAM(s) Oral at bedtime  enoxaparin Injectable 40 milliGRAM(s) SubCutaneous every 24 hours  ferrous    sulfate 325 milliGRAM(s) Oral daily  influenza  Vaccine (HIGH DOSE) 0.7 milliLiter(s) IntraMuscular once  levothyroxine 50 MICROGram(s) Oral daily  metoprolol succinate ER 25 milliGRAM(s) Oral daily  naloxone Injectable 0.4 milliGRAM(s) IV Push once  pantoprazole    Tablet 40 milliGRAM(s) Oral before breakfast  polyethylene glycol 3350 17 Gram(s) Oral daily  pramipexole 0.125 milliGRAM(s) Oral at bedtime  senna 2 Tablet(s) Oral at bedtime    MEDICATIONS  (PRN):  acetaminophen     Tablet .. 650 milliGRAM(s) Oral every 6 hours PRN Mild Pain (1 - 3), Moderate Pain (4 - 6)  bisacodyl 5 milliGRAM(s) Oral daily PRN Constipation

## 2023-12-08 NOTE — PROGRESS NOTE ADULT - SUBJECTIVE AND OBJECTIVE BOX
HPI:    84 y/o F w/ MH of HTN, dyslipidemia, GERD, parkinson's, neuropathy, iron deficiency anemia, loop recorder, p/w LE weakness. Patient has been having B/L LE weakness for a little less than 1 year. Symptoms have been getting progressively worse to the point where patient is unable to ambulate, also with  LE paresthesias and back pain. and 40 lbs unintentional weight loss. She was sent for CT-C/A/P- which revealed multiple bone mets and possible liver mets and path fracture of T11 vertebral body.  She was sent to ED for further evaluation.     11/25/23- Seen at bedside, along with Dr. Loera, alert, awake, in no acute distress. Reports left lower extremities with tingling and numbness, denies cauda equina symptoms. Reports difficulty ambulating even with a walker.     11/27/23: Seen at bedside with attending Dr Loera and Hospitalist Dr Hills, currently awaiting IR bx procedure hopefully today     11/28/23: Seen at bedside with nursing staff, no acute distress, pain controlled, s/p rib Bx with IR yesterday    12/6/2023- Seen at bedside, OOB to chair. Reports feels weak, with cough, unable to have a productive cough    12/72023- Seen at bedside, frail appearing, coughing and unable to expectorate. A copy pf her pathology results given to her upon her request. Also informed her that I spoke with Julius and he is aware of her pathology results.      12/08/23:        PAST MEDICAL & SURGICAL HISTORY:    GERD (gastroesophageal reflux disease)    HTN (hypertension)    HLD (hyperlipidemia)    Mood disorder    H/O gastric bypass    History of cholecystectomy    History of total knee replacement, left    Iron Deficiency Anemia    CRI    Barrette Esophagus      FAMILY HISTORY:    COPD- Brother  CAD- Sister  Stroke- Mother  Parkinson-Father      MEDICATIONS  (STANDING):    atorvastatin 40 milliGRAM(s) Oral at bedtime  buPROPion . 100 milliGRAM(s) Oral daily  carbidopa/levodopa  25/100 1 Tablet(s) Oral four times a day  cefepime  Injectable. 2000 milliGRAM(s) IV Push every 8 hours  donepezil 10 milliGRAM(s) Oral at bedtime  enoxaparin Injectable 40 milliGRAM(s) SubCutaneous every 24 hours  ferrous    sulfate 325 milliGRAM(s) Oral daily  influenza  Vaccine (HIGH DOSE) 0.7 milliLiter(s) IntraMuscular once  levothyroxine 50 MICROGram(s) Oral daily  metoprolol succinate ER 25 milliGRAM(s) Oral daily  naloxone Injectable 0.4 milliGRAM(s) IV Push once  pantoprazole    Tablet 40 milliGRAM(s) Oral before breakfast  polyethylene glycol 3350 17 Gram(s) Oral daily  pramipexole 0.125 milliGRAM(s) Oral at bedtime  senna 2 Tablet(s) Oral at bedtime      MEDICATIONS  (PRN):    acetaminophen     Tablet .. 650 milliGRAM(s) Oral every 6 hours PRN Mild Pain (1 - 3), Moderate Pain (4 - 6)  bisacodyl 5 milliGRAM(s) Oral daily PRN Constipation        ALLERGIES:    Originally Entered as [Moderate Rash] reaction to [SURGICAL TAPE] (Unknown)  Vitamin K (Other (Severe))  Fosamax (Unknown)      REVIEW OF SYSTEMS:    Constitutional, Eyes, ENT, Cardiovascular, Respiratory, Gastrointestinal, Genitourinary, Musculoskeletal, Integumentary, Neurological, Psychiatric, Endocrine, Heme/Lymph and Allergic/Immunologic review of systems are otherwise negative except as noted in HPI.       VITAL SIGNS:    ICU Vital Signs Last 24 Hrs  T(C): 36.4 (08 Dec 2023 08:24), Max: 37.1 (07 Dec 2023 15:25)  T(F): 97.6 (08 Dec 2023 08:24), Max: 98.7 (07 Dec 2023 15:25)  HR: 80 (08 Dec 2023 08:24) (78 - 85)  BP: 130/63 (08 Dec 2023 08:24) (118/47 - 130/63)  BP(mean): --  ABP: --  ABP(mean): --  RR: 17 (08 Dec 2023 08:24) (17 - 17)  SpO2: 93% (08 Dec 2023 08:24) (92% - 93%)    O2 Parameters below as of 08 Dec 2023 08:24  Patient On (Oxygen Delivery Method): room air      PHYSICAL EXAM:    CONSTITUTIONAL: ill appearing, no acute distress  EYES: PERRLA and symmetric, EOMI  ENMT: Oral mucosa with moist membranes. Normal dentition; no pharyngeal injection or exudates  NECK: Supple, symmetric and without tracheal deviation   RESP: No respiratory distress, no use of accessory muscles  CV: RRR  GI: Soft, NT, ND, no rebound, no guarding; no palpable masses; no hepatosplenomegaly  LYMPH: No cervical LAD or tenderness; no axillary LAD or tenderness; no inguinal LAD or tenderness  MSK: Limited ROM lower extremities  SKIN: No rashes or ulcers noted  NEURO: Awake, alert to person, place; poorly assessed         LABS:                           8.7    10.39 )-----------( 142      ( 08 Dec 2023 07:08 )             26.7     12-08    140  |  110<H>  |  12  ----------------------------<  67<L>  3.7   |  25  |  0.44<L>    Ca    8.1<L>      08 Dec 2023 07:08        RADIOLOGY & ADDITIONAL STUDIES:      EXAM:  MR SPINE LUMBAR WAW IC       PROCEDURE DATE:  11/25/2023      INTERPRETATION:  CLINICAL INFORMATION: Metastases    ADDITIONAL CLINICAL INFORMATION: Cancer C80.1    TECHNIQUE: Multiplanar, multisequence MRI was performed of the lumbar   spine.  IV Contrast: Gadavist  8 cc administered   2 cc discarded    CORRELATION: CT chest and abdomen 11/21/2023    FINDINGS:    ALIGNMENT: The alignment is normal.  VERTEBRA: There is a mild compression fracture deformity of T12 as well   as pathological compression fracture deformities of T11 and L5. There is   diffuse marrow infiltration of the T11 and L5 vertebral bodies with   extension to the posterior elements with hypointense T1 and hyperintense   T2/STIR signal with avid enhancement compatible with metastatic disease.   The loss of the cortical margins is better seen on the prior CT. There is   ventral epidural soft tissue component at T11 indenting and possibly   compressing the spinal cord. Axial images were not obtained at this   level. There is diffuse ventral epidural soft tissue at L5 severely   compressing the thecal sac as well as the L5 and S1 nerve roots. There   also is diffuse involvement of the left sacrum at the S1 and S2   levels extending to the left SI joint. There is minimum epidural soft   tissue along the lateral margin of the sacral foramen at the S1-S2 level.   There are additional smaller metastases involving L1, L2 and L4. There   are focal areas of hyperintenseT1 and T2 signal within the T2 12 and L1   vertebral bodies which may represent hemangiomas.  DISC SPACES: Maintained.  CANAL: The distal cord and conus are normal in signal. Conus terminates   at L1.  VISUALIZED INTRAPELVIC/INTRA-ABDOMINAL SOFT TISSUES: Normal.  PARASPINAL SOFT TISSUES: Normal.    INDIVIDUAL LEVELS:    L1-L2: No disc herniation, spinal canal stenosis or neural foramen   narrowing.    L2-L3: No disc herniation. Mild degenerative facet disease. No spinal   canal stenosis or neural foramen narrowing.    L3-L4: No disc herniation. Mild degenerative facet disease. No spinal   canal stenosis or neural foramen narrowing.    IMPRESSION: Bony metastases at T11,L1, L2, L4, L5 and left sacrum.   Pathologic fractures of T11 and L5. Possible cord compression at T11 and   diffuse thecal sac compression at L5.          EXAM:  MR SPINE THORACIC WAW IC     EXAM:  MR SPINE CERVICAL WAW IC      PROCEDURE DATE:  11/25/2023      INTERPRETATION:  History: 84 y/o F with a PMhx of anemia, GERD, HTN, HLD,   mood disorder, gastric bypass, and cholecystectomy presents to the ED SIB   MD c/o weakness. The pt had outpatient w/u for worsening anemia with   weakness. CT and XR showing lytic legions in spine with epidural   extensions leading to increased weakness and difficulty ambulating. PCP   sent pt to ED for further evaluation. Pt reports weight loss over months.   Denies any sort of active bleeding no blood in stool. Denies CP, or SOB.   C/o lower back pain. Some numbness and tingling to LLE. Denies bowl or   bladder incontinence. Denies numbness consistent with saddle anesthesia.   She had  outpatient CT C/A/P showing multiple bone mets and possible liver mets   and path  fracture of T11 vertebral body. Patient also C/o LE  paresthesias and back pain.    Description: A MRI of the cervical spine and a MRI of the thoracic spine   with and without gadolinium contrast were performed with multiplanar   multisequence technique.    8 cc intravenous Gadavist gadolinium contrast was administered, 2 cc   contrast was discarded.    Comparison is made to the thoracic and lumbar spine region from the chest   abdomen pelvic CT from 11/21/2023, and cervical spine CT from 08/08/2022    Cervical spine MRI:    Bulky dorsal paraspinal tumor in the upper thoracic region is partially   visualized. Please see thoracic spine MRI component of this report.    No focal cervical spine bony lesions are visualized. There is no evidence   for epidural tumor extension in the cervical spine.    Small broad disc bulges are present at the C4-C5, C5-C6, C6-C7 levels   with associated mild central canal stenosis. Multilevel ligamentum flavum   hypertrophy multilevel facet degenerative changes are noted.    There is no cervical spinal cord mass or syrinx. No abnormal intraspinal   enhancement is noted in the cervical spine. There is no cervical spinal   cord compression.    Thoracic spine MRI:    A large metastasis replaces the T11 vertebral body with extension into   the pedicles, with an associated mild to moderate pathologic compression   fracture deformity and retropulsion of bone. Epidural tumor extension is   noted. Tumor extension into the left greater than right neural foramina   at this levels also noted. The compression fracture with retropulsion of   bone and epidural tumor extension results in mild to moderate thoracic   spinal cord compression. A left-sided rib metastasis with soft tissue   extension of tumor is partially visualized at this level.    Additional metastases are noted involving the T9 and L2 vertebral bodies   without associated epidural tumor extension    A moderate sized hemangioma involves the T8 vertebral body with   associated increased T1 signal, increased STIR signal, and enhancement.    A 3.5 cm x 3.2cm x 3.8 cm bulky paraspinal metastasis involves the right   posterior elements at the T3 and T4 levels with extension into the   paraspinal musculature and abutment and possible involvement of the   posterior ribs at these levels. No significant intraspinal extension of   tumor at this level is appreciated.    Nonspecific bilateral pleural effusions and pleural thickening is noted.   Multiple enhancing liver lesions are partially visualized. Please see   separate chest abdomen pelvic CT report.    IMPRESSION:    Cervical spine:    No evidence for metastatic disease to the cervical spine or cervical   spinal cord compression.    Thoracic spine:    Multifocal metastatic disease is noted as described.    A large metastasis replaces the T11 vertebral body with an associated   mild to moderate pathologic compression fracture deformity and   retropulsion of bone. Epidural tumor extension is noted. The compression   fracture with retropulsion of bone and epidural tumor extension results   in mild to moderate thoracic spinal cord compression.        EXAM:  CT BRAIN    PROCEDURE DATE:  11/24/2023      INTERPRETATION:  CLINICAL INFORMATION:  Mets to liver, spine.  Unknown   primary.  R/O Brain mets, bleed.    TECHNIQUE:  Axial CT images were acquired through the head.  Intravenous contrast: None  Two-dimensional reformats were generated.    COMPARISON STUDY: CT head 8/8/2022    FINDINGS:    There is no CT evidence of acute intracranial hemorrhage, mass effect,   midline shift, or acute, large territorial infarct.  The ventricles and   sulci are prominent compatible with age-appropriate brain parenchymal   volume loss. Mild patchy periventricular white matter hypoattenuation is   nonspecific, although likely due to chronic microangiopathy. The basal   cisterns are patent. There is a partially empty sella.    The mastoid air cells and middle ear cavities are grossly clear. The   visualized paranasal sinuses are well aerated.    The calvarium and skull base are grossly intact.    IMPRESSION:  No acute intracranial hemorrhage or mass effect.    Please note that contrast-enhanced MRI of the brain is more sensitive in   the detection of intracranial metastases.      EXAM: [id number] - CT ABDOMEN AND PELVIS  -   EXAM: [id number] - CT CHEST  -     PROCEDURE DATE:  11/21/2023    INTERPRETATION:  CLINICAL INFORMATION: Anemia and 40 pound weight loss.    COMPARISON: None.    CONTRAST/COMPLICATIONS:  IV Contrast: NONE  Oral Contrast: NONE  Complications: None reported at time of study completion    PROCEDURE:  CT of the Chest, Abdomen and Pelvis was performed.  Sagittal and coronal reformats were performed.    FINDINGS:  CHEST:  LUNGS AND LARGE AIRWAYS: Patent central airways. There is atelectasis at the right lung base.  PLEURA: No pleural effusion.  VESSELS: Coronary artery calcifications and atherosclerotic calcifications within the thoracic aorta.  HEART: Heart size is normal. No pericardial effusion.  MEDIASTINUM AND CRIS: No lymphadenopathy.  CHEST WALL AND LOWER NECK: There is a loop recorder left anterior chest wall. There is a 0.8 cm right lobe thyroid nodule.    ABDOMEN AND PELVIS:  LIVER: Bilobar indeterminate hepatic lesions are nonetheless suspicious for metastatic disease. The largest lesion is in the hepatic dome measuring 4.8 cm. There is a mass interposed between the caudate lobe and left lobe of liver measuring 3.4 cm on series 2 image 68.  BILE DUCTS: Normal caliber.  GALLBLADDER: Removed.  SPLEEN: Within normal limits.  PANCREAS: Within normal limits.  ADRENALS: There is a 1.9 cm right adrenal adenoma.  KIDNEYS/URETERS: Within normal limits.    BLADDER: Within normal limits.  REPRODUCTIVE ORGANS: Uterus and adnexa within normal limits.    BOWEL: No bowel obstruction. 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 There is herniation of the gastric pouch and small amount of the jejunum distal to the gastrojejunal anastomosis into the mediastinum through the esophageal hiatus. There is also herniation of a portion of the excluded stomach through the esophageal hiatus.  PERITONEUM: No ascites.  VESSELS: Within normal limits.  RETROPERITONEUM/LYMPH NODES: No lymphadenopathy.  ABDOMINAL WALL: Within normal limits.  BONES: There is a lytic lesion with posterior cortical destruction involving the L5 vertebral body with epidural extension. There is mild loss of height of the T12 vertebral body. There is a lytic lesion and pathologic fracture through the T11 vertebral body with likely epidural extension and extension into the left pedicle. There is heterogeneous appearance of the T8-9 vertebral bodies with a lytic lesion in T9 vertebral body. There is a lytic lesion with soft tissue component in the right fourth rib with associated pathologic fracture. There is an old fracture of the left humeral neck. There is a lytic lesion in the left do sacrum. There is a lytic lesion in the right fourth transverse process as well as lytic lesion with pathologic fracture in the left 10th rib. There is a 4.6 x 3.8 cm expansile lesion in the left third rib.    IMPRESSION:  Multiple lytic osseous lesions highly suspicious for metastatic disease. Lesions in the L5 and T11 vertebral bodies have epidural extension.  Pathologic fracture T11 vertebral body. Some of the rib lesions demonstrate pathologic fractures. Please see above comments.    Multiple hepatic lesions are not characterized without intravenous contrast but nonetheless are suspicious for metastatic disease.            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  (Electronic Signature)  Reported on: 12/05/23 15:12 [facility], [address] [place] [address]  Phone: [phone]   Fax: [phone]  _________________________________________________________________    Comment    Immunohistochemical stains for cytokeratin, CAM 5.2 and CK7 are positive.  CD56 is positive.  Synaptophysin is focally positive.  Chromogranin,  TTF-1, CDX-2, CK 20, JUDY-3, PAX-8, Melan-A, CD45, p63, p40, HepPar 1 and  CK17 are negative.  Histologically, the tumor is composed of sheets of malignant cells  with a high nuclear to cytoplasmic ratio, necrosis, and individual  cell necrosis, reminiscent of small cell carcinoma.  Positivity for  keratins confirms that the tumor is a carcinoma.  CD 56, a neuroendocrine  marker, is positive.  Synaptophysin, a neuroendocrine marker, is focally  positive.  Unfortunately, the immunohistochemical profile does not  definitively determine the primary site.         HPI:    84 y/o F w/ MH of HTN, dyslipidemia, GERD, parkinson's, neuropathy, iron deficiency anemia, loop recorder, p/w LE weakness. Patient has been having B/L LE weakness for a little less than 1 year. Symptoms have been getting progressively worse to the point where patient is unable to ambulate, also with  LE paresthesias and back pain. and 40 lbs unintentional weight loss. She was sent for CT-C/A/P- which revealed multiple bone mets and possible liver mets and path fracture of T11 vertebral body.  She was sent to ED for further evaluation.     11/25/23- Seen at bedside, along with Dr. Loera, alert, awake, in no acute distress. Reports left lower extremities with tingling and numbness, denies cauda equina symptoms. Reports difficulty ambulating even with a walker.     11/27/23: Seen at bedside with attending Dr Loera and Hospitalist Dr Hills, currently awaiting IR bx procedure hopefully today     11/28/23: Seen at bedside with nursing staff, no acute distress, pain controlled, s/p rib Bx with IR yesterday    12/6/2023- Seen at bedside, OOB to chair. Reports feels weak, with cough, unable to have a productive cough    12/72023- Seen at bedside, frail appearing, coughing and unable to expectorate. A copy pf her pathology results given to her upon her request. Also informed her that I spoke with Julius and he is aware of her pathology results.      12/08/23:        PAST MEDICAL & SURGICAL HISTORY:    GERD (gastroesophageal reflux disease)    HTN (hypertension)    HLD (hyperlipidemia)    Mood disorder    H/O gastric bypass    History of cholecystectomy    History of total knee replacement, left    Iron Deficiency Anemia    CRI    Barrette Esophagus      FAMILY HISTORY:    COPD- Brother  CAD- Sister  Stroke- Mother  Parkinson-Father      MEDICATIONS  (STANDING):    atorvastatin 40 milliGRAM(s) Oral at bedtime  buPROPion . 100 milliGRAM(s) Oral daily  carbidopa/levodopa  25/100 1 Tablet(s) Oral four times a day  cefepime  Injectable. 2000 milliGRAM(s) IV Push every 8 hours  donepezil 10 milliGRAM(s) Oral at bedtime  enoxaparin Injectable 40 milliGRAM(s) SubCutaneous every 24 hours  ferrous    sulfate 325 milliGRAM(s) Oral daily  influenza  Vaccine (HIGH DOSE) 0.7 milliLiter(s) IntraMuscular once  levothyroxine 50 MICROGram(s) Oral daily  metoprolol succinate ER 25 milliGRAM(s) Oral daily  naloxone Injectable 0.4 milliGRAM(s) IV Push once  pantoprazole    Tablet 40 milliGRAM(s) Oral before breakfast  polyethylene glycol 3350 17 Gram(s) Oral daily  pramipexole 0.125 milliGRAM(s) Oral at bedtime  senna 2 Tablet(s) Oral at bedtime      MEDICATIONS  (PRN):    acetaminophen     Tablet .. 650 milliGRAM(s) Oral every 6 hours PRN Mild Pain (1 - 3), Moderate Pain (4 - 6)  bisacodyl 5 milliGRAM(s) Oral daily PRN Constipation        ALLERGIES:    Originally Entered as [Moderate Rash] reaction to [SURGICAL TAPE] (Unknown)  Vitamin K (Other (Severe))  Fosamax (Unknown)      REVIEW OF SYSTEMS:    Constitutional, Eyes, ENT, Cardiovascular, Respiratory, Gastrointestinal, Genitourinary, Musculoskeletal, Integumentary, Neurological, Psychiatric, Endocrine, Heme/Lymph and Allergic/Immunologic review of systems are otherwise negative except as noted in HPI.       VITAL SIGNS:    ICU Vital Signs Last 24 Hrs  T(C): 36.4 (08 Dec 2023 08:24), Max: 37.1 (07 Dec 2023 15:25)  T(F): 97.6 (08 Dec 2023 08:24), Max: 98.7 (07 Dec 2023 15:25)  HR: 80 (08 Dec 2023 08:24) (78 - 85)  BP: 130/63 (08 Dec 2023 08:24) (118/47 - 130/63)  BP(mean): --  ABP: --  ABP(mean): --  RR: 17 (08 Dec 2023 08:24) (17 - 17)  SpO2: 93% (08 Dec 2023 08:24) (92% - 93%)    O2 Parameters below as of 08 Dec 2023 08:24  Patient On (Oxygen Delivery Method): room air      PHYSICAL EXAM:    CONSTITUTIONAL: ill appearing, no acute distress  EYES: PERRLA and symmetric, EOMI  ENMT: Oral mucosa with moist membranes. Normal dentition; no pharyngeal injection or exudates  NECK: Supple, symmetric and without tracheal deviation   RESP: No respiratory distress, no use of accessory muscles  CV: RRR  GI: Soft, NT, ND, no rebound, no guarding; no palpable masses; no hepatosplenomegaly  LYMPH: No cervical LAD or tenderness; no axillary LAD or tenderness; no inguinal LAD or tenderness  MSK: Limited ROM lower extremities  SKIN: No rashes or ulcers noted  NEURO: Awake, alert to person, place; poorly assessed         LABS:                           8.7    10.39 )-----------( 142      ( 08 Dec 2023 07:08 )             26.7     12-08    140  |  110<H>  |  12  ----------------------------<  67<L>  3.7   |  25  |  0.44<L>    Ca    8.1<L>      08 Dec 2023 07:08        RADIOLOGY & ADDITIONAL STUDIES:      EXAM:  MR SPINE LUMBAR WAW IC       PROCEDURE DATE:  11/25/2023      INTERPRETATION:  CLINICAL INFORMATION: Metastases    ADDITIONAL CLINICAL INFORMATION: Cancer C80.1    TECHNIQUE: Multiplanar, multisequence MRI was performed of the lumbar   spine.  IV Contrast: Gadavist  8 cc administered   2 cc discarded    CORRELATION: CT chest and abdomen 11/21/2023    FINDINGS:    ALIGNMENT: The alignment is normal.  VERTEBRA: There is a mild compression fracture deformity of T12 as well   as pathological compression fracture deformities of T11 and L5. There is   diffuse marrow infiltration of the T11 and L5 vertebral bodies with   extension to the posterior elements with hypointense T1 and hyperintense   T2/STIR signal with avid enhancement compatible with metastatic disease.   The loss of the cortical margins is better seen on the prior CT. There is   ventral epidural soft tissue component at T11 indenting and possibly   compressing the spinal cord. Axial images were not obtained at this   level. There is diffuse ventral epidural soft tissue at L5 severely   compressing the thecal sac as well as the L5 and S1 nerve roots. There   also is diffuse involvement of the left sacrum at the S1 and S2   levels extending to the left SI joint. There is minimum epidural soft   tissue along the lateral margin of the sacral foramen at the S1-S2 level.   There are additional smaller metastases involving L1, L2 and L4. There   are focal areas of hyperintenseT1 and T2 signal within the T2 12 and L1   vertebral bodies which may represent hemangiomas.  DISC SPACES: Maintained.  CANAL: The distal cord and conus are normal in signal. Conus terminates   at L1.  VISUALIZED INTRAPELVIC/INTRA-ABDOMINAL SOFT TISSUES: Normal.  PARASPINAL SOFT TISSUES: Normal.    INDIVIDUAL LEVELS:    L1-L2: No disc herniation, spinal canal stenosis or neural foramen   narrowing.    L2-L3: No disc herniation. Mild degenerative facet disease. No spinal   canal stenosis or neural foramen narrowing.    L3-L4: No disc herniation. Mild degenerative facet disease. No spinal   canal stenosis or neural foramen narrowing.    IMPRESSION: Bony metastases at T11,L1, L2, L4, L5 and left sacrum.   Pathologic fractures of T11 and L5. Possible cord compression at T11 and   diffuse thecal sac compression at L5.          EXAM:  MR SPINE THORACIC WAW IC     EXAM:  MR SPINE CERVICAL WAW IC      PROCEDURE DATE:  11/25/2023      INTERPRETATION:  History: 84 y/o F with a PMhx of anemia, GERD, HTN, HLD,   mood disorder, gastric bypass, and cholecystectomy presents to the ED SIB   MD c/o weakness. The pt had outpatient w/u for worsening anemia with   weakness. CT and XR showing lytic legions in spine with epidural   extensions leading to increased weakness and difficulty ambulating. PCP   sent pt to ED for further evaluation. Pt reports weight loss over months.   Denies any sort of active bleeding no blood in stool. Denies CP, or SOB.   C/o lower back pain. Some numbness and tingling to LLE. Denies bowl or   bladder incontinence. Denies numbness consistent with saddle anesthesia.   She had  outpatient CT C/A/P showing multiple bone mets and possible liver mets   and path  fracture of T11 vertebral body. Patient also C/o LE  paresthesias and back pain.    Description: A MRI of the cervical spine and a MRI of the thoracic spine   with and without gadolinium contrast were performed with multiplanar   multisequence technique.    8 cc intravenous Gadavist gadolinium contrast was administered, 2 cc   contrast was discarded.    Comparison is made to the thoracic and lumbar spine region from the chest   abdomen pelvic CT from 11/21/2023, and cervical spine CT from 08/08/2022    Cervical spine MRI:    Bulky dorsal paraspinal tumor in the upper thoracic region is partially   visualized. Please see thoracic spine MRI component of this report.    No focal cervical spine bony lesions are visualized. There is no evidence   for epidural tumor extension in the cervical spine.    Small broad disc bulges are present at the C4-C5, C5-C6, C6-C7 levels   with associated mild central canal stenosis. Multilevel ligamentum flavum   hypertrophy multilevel facet degenerative changes are noted.    There is no cervical spinal cord mass or syrinx. No abnormal intraspinal   enhancement is noted in the cervical spine. There is no cervical spinal   cord compression.    Thoracic spine MRI:    A large metastasis replaces the T11 vertebral body with extension into   the pedicles, with an associated mild to moderate pathologic compression   fracture deformity and retropulsion of bone. Epidural tumor extension is   noted. Tumor extension into the left greater than right neural foramina   at this levels also noted. The compression fracture with retropulsion of   bone and epidural tumor extension results in mild to moderate thoracic   spinal cord compression. A left-sided rib metastasis with soft tissue   extension of tumor is partially visualized at this level.    Additional metastases are noted involving the T9 and L2 vertebral bodies   without associated epidural tumor extension    A moderate sized hemangioma involves the T8 vertebral body with   associated increased T1 signal, increased STIR signal, and enhancement.    A 3.5 cm x 3.2cm x 3.8 cm bulky paraspinal metastasis involves the right   posterior elements at the T3 and T4 levels with extension into the   paraspinal musculature and abutment and possible involvement of the   posterior ribs at these levels. No significant intraspinal extension of   tumor at this level is appreciated.    Nonspecific bilateral pleural effusions and pleural thickening is noted.   Multiple enhancing liver lesions are partially visualized. Please see   separate chest abdomen pelvic CT report.    IMPRESSION:    Cervical spine:    No evidence for metastatic disease to the cervical spine or cervical   spinal cord compression.    Thoracic spine:    Multifocal metastatic disease is noted as described.    A large metastasis replaces the T11 vertebral body with an associated   mild to moderate pathologic compression fracture deformity and   retropulsion of bone. Epidural tumor extension is noted. The compression   fracture with retropulsion of bone and epidural tumor extension results   in mild to moderate thoracic spinal cord compression.        EXAM:  CT BRAIN    PROCEDURE DATE:  11/24/2023      INTERPRETATION:  CLINICAL INFORMATION:  Mets to liver, spine.  Unknown   primary.  R/O Brain mets, bleed.    TECHNIQUE:  Axial CT images were acquired through the head.  Intravenous contrast: None  Two-dimensional reformats were generated.    COMPARISON STUDY: CT head 8/8/2022    FINDINGS:    There is no CT evidence of acute intracranial hemorrhage, mass effect,   midline shift, or acute, large territorial infarct.  The ventricles and   sulci are prominent compatible with age-appropriate brain parenchymal   volume loss. Mild patchy periventricular white matter hypoattenuation is   nonspecific, although likely due to chronic microangiopathy. The basal   cisterns are patent. There is a partially empty sella.    The mastoid air cells and middle ear cavities are grossly clear. The   visualized paranasal sinuses are well aerated.    The calvarium and skull base are grossly intact.    IMPRESSION:  No acute intracranial hemorrhage or mass effect.    Please note that contrast-enhanced MRI of the brain is more sensitive in   the detection of intracranial metastases.      EXAM: [id number] - CT ABDOMEN AND PELVIS  -   EXAM: [id number] - CT CHEST  -     PROCEDURE DATE:  11/21/2023    INTERPRETATION:  CLINICAL INFORMATION: Anemia and 40 pound weight loss.    COMPARISON: None.    CONTRAST/COMPLICATIONS:  IV Contrast: NONE  Oral Contrast: NONE  Complications: None reported at time of study completion    PROCEDURE:  CT of the Chest, Abdomen and Pelvis was performed.  Sagittal and coronal reformats were performed.    FINDINGS:  CHEST:  LUNGS AND LARGE AIRWAYS: Patent central airways. There is atelectasis at the right lung base.  PLEURA: No pleural effusion.  VESSELS: Coronary artery calcifications and atherosclerotic calcifications within the thoracic aorta.  HEART: Heart size is normal. No pericardial effusion.  MEDIASTINUM AND CRIS: No lymphadenopathy.  CHEST WALL AND LOWER NECK: There is a loop recorder left anterior chest wall. There is a 0.8 cm right lobe thyroid nodule.    ABDOMEN AND PELVIS:  LIVER: Bilobar indeterminate hepatic lesions are nonetheless suspicious for metastatic disease. The largest lesion is in the hepatic dome measuring 4.8 cm. There is a mass interposed between the caudate lobe and left lobe of liver measuring 3.4 cm on series 2 image 68.  BILE DUCTS: Normal caliber.  GALLBLADDER: Removed.  SPLEEN: Within normal limits.  PANCREAS: Within normal limits.  ADRENALS: There is a 1.9 cm right adrenal adenoma.  KIDNEYS/URETERS: Within normal limits.    BLADDER: Within normal limits.  REPRODUCTIVE ORGANS: Uterus and adnexa within normal limits.    BOWEL: No bowel obstruction. 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 There is herniation of the gastric pouch and small amount of the jejunum distal to the gastrojejunal anastomosis into the mediastinum through the esophageal hiatus. There is also herniation of a portion of the excluded stomach through the esophageal hiatus.  PERITONEUM: No ascites.  VESSELS: Within normal limits.  RETROPERITONEUM/LYMPH NODES: No lymphadenopathy.  ABDOMINAL WALL: Within normal limits.  BONES: There is a lytic lesion with posterior cortical destruction involving the L5 vertebral body with epidural extension. There is mild loss of height of the T12 vertebral body. There is a lytic lesion and pathologic fracture through the T11 vertebral body with likely epidural extension and extension into the left pedicle. There is heterogeneous appearance of the T8-9 vertebral bodies with a lytic lesion in T9 vertebral body. There is a lytic lesion with soft tissue component in the right fourth rib with associated pathologic fracture. There is an old fracture of the left humeral neck. There is a lytic lesion in the left do sacrum. There is a lytic lesion in the right fourth transverse process as well as lytic lesion with pathologic fracture in the left 10th rib. There is a 4.6 x 3.8 cm expansile lesion in the left third rib.    IMPRESSION:  Multiple lytic osseous lesions highly suspicious for metastatic disease. Lesions in the L5 and T11 vertebral bodies have epidural extension.  Pathologic fracture T11 vertebral body. Some of the rib lesions demonstrate pathologic fractures. Please see above comments.    Multiple hepatic lesions are not characterized without intravenous contrast but nonetheless are suspicious for metastatic disease.            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  (Electronic Signature)  Reported on: 12/05/23 15:12 [facility], [address] [place] [address]  Phone: [phone]   Fax: [phone]  _________________________________________________________________    Comment    Immunohistochemical stains for cytokeratin, CAM 5.2 and CK7 are positive.  CD56 is positive.  Synaptophysin is focally positive.  Chromogranin,  TTF-1, CDX-2, CK 20, JUDY-3, PAX-8, Melan-A, CD45, p63, p40, HepPar 1 and  CK17 are negative.  Histologically, the tumor is composed of sheets of malignant cells  with a high nuclear to cytoplasmic ratio, necrosis, and individual  cell necrosis, reminiscent of small cell carcinoma.  Positivity for  keratins confirms that the tumor is a carcinoma.  CD 56, a neuroendocrine  marker, is positive.  Synaptophysin, a neuroendocrine marker, is focally  positive.  Unfortunately, the immunohistochemical profile does not  definitively determine the primary site.         HPI:    82 y/o F w/ MH of HTN, dyslipidemia, GERD, parkinson's, neuropathy, iron deficiency anemia, loop recorder, p/w LE weakness. Patient has been having B/L LE weakness for a little less than 1 year. Symptoms have been getting progressively worse to the point where patient is unable to ambulate, also with  LE paresthesias and back pain. and 40 lbs unintentional weight loss. She was sent for CT-C/A/P- which revealed multiple bone mets and possible liver mets and path fracture of T11 vertebral body.  She was sent to ED for further evaluation.     11/25/23- Seen at bedside, along with Dr. Loera, alert, awake, in no acute distress. Reports left lower extremities with tingling and numbness, denies cauda equina symptoms. Reports difficulty ambulating even with a walker.     11/27/23: Seen at bedside with attending Dr Loera and Hospitalist Dr Hills, currently awaiting IR bx procedure hopefully today     11/28/23: Seen at bedside with nursing staff, no acute distress, pain controlled, s/p rib Bx with IR yesterday    12/6/2023- Seen at bedside, OOB to chair. Reports feels weak, with cough, unable to have a productive cough    12/72023- Seen at bedside, frail appearing, coughing and unable to expectorate. A copy pf her pathology results given to her upon her request. Also informed her that I spoke with Julius and he is aware of her pathology results.      12/08/23: Seen at bedside, sleeping, no acute distress         PAST MEDICAL & SURGICAL HISTORY:    GERD (gastroesophageal reflux disease)    HTN (hypertension)    HLD (hyperlipidemia)    Mood disorder    H/O gastric bypass    History of cholecystectomy    History of total knee replacement, left    Iron Deficiency Anemia    CRI    Barrette Esophagus      FAMILY HISTORY:    COPD- Brother  CAD- Sister  Stroke- Mother  Parkinson-Father      MEDICATIONS  (STANDING):    atorvastatin 40 milliGRAM(s) Oral at bedtime  buPROPion . 100 milliGRAM(s) Oral daily  carbidopa/levodopa  25/100 1 Tablet(s) Oral four times a day  cefepime  Injectable. 2000 milliGRAM(s) IV Push every 8 hours  donepezil 10 milliGRAM(s) Oral at bedtime  enoxaparin Injectable 40 milliGRAM(s) SubCutaneous every 24 hours  ferrous    sulfate 325 milliGRAM(s) Oral daily  influenza  Vaccine (HIGH DOSE) 0.7 milliLiter(s) IntraMuscular once  levothyroxine 50 MICROGram(s) Oral daily  metoprolol succinate ER 25 milliGRAM(s) Oral daily  naloxone Injectable 0.4 milliGRAM(s) IV Push once  pantoprazole    Tablet 40 milliGRAM(s) Oral before breakfast  polyethylene glycol 3350 17 Gram(s) Oral daily  pramipexole 0.125 milliGRAM(s) Oral at bedtime  senna 2 Tablet(s) Oral at bedtime      MEDICATIONS  (PRN):    acetaminophen     Tablet .. 650 milliGRAM(s) Oral every 6 hours PRN Mild Pain (1 - 3), Moderate Pain (4 - 6)  bisacodyl 5 milliGRAM(s) Oral daily PRN Constipation        ALLERGIES:    Originally Entered as [Moderate Rash] reaction to [SURGICAL TAPE] (Unknown)  Vitamin K (Other (Severe))  Fosamax (Unknown)      REVIEW OF SYSTEMS:    Constitutional, Eyes, ENT, Cardiovascular, Respiratory, Gastrointestinal, Genitourinary, Musculoskeletal, Integumentary, Neurological, Psychiatric, Endocrine, Heme/Lymph and Allergic/Immunologic review of systems are otherwise negative except as noted in HPI.       VITAL SIGNS:    ICU Vital Signs Last 24 Hrs  T(C): 36.4 (08 Dec 2023 08:24), Max: 37.1 (07 Dec 2023 15:25)  T(F): 97.6 (08 Dec 2023 08:24), Max: 98.7 (07 Dec 2023 15:25)  HR: 80 (08 Dec 2023 08:24) (78 - 85)  BP: 130/63 (08 Dec 2023 08:24) (118/47 - 130/63)  BP(mean): --  ABP: --  ABP(mean): --  RR: 17 (08 Dec 2023 08:24) (17 - 17)  SpO2: 93% (08 Dec 2023 08:24) (92% - 93%)    O2 Parameters below as of 08 Dec 2023 08:24  Patient On (Oxygen Delivery Method): room air      PHYSICAL EXAM:    CONSTITUTIONAL: ill appearing, no acute distress  EYES: PERRLA and symmetric, EOMI  ENMT: Oral mucosa with moist membranes. Normal dentition; no pharyngeal injection or exudates  NECK: Supple, symmetric and without tracheal deviation   RESP: No respiratory distress, no use of accessory muscles  CV: RRR  GI: Soft, NT, ND, no rebound, no guarding; no palpable masses; no hepatosplenomegaly  LYMPH: No cervical LAD or tenderness; no axillary LAD or tenderness; no inguinal LAD or tenderness  MSK: Limited ROM lower extremities  SKIN: No rashes or ulcers noted  NEURO: Awake, alert to person, place; poorly assessed         LABS:                           8.7    10.39 )-----------( 142      ( 08 Dec 2023 07:08 )             26.7     12-08    140  |  110<H>  |  12  ----------------------------<  67<L>  3.7   |  25  |  0.44<L>    Ca    8.1<L>      08 Dec 2023 07:08        RADIOLOGY & ADDITIONAL STUDIES:      EXAM:  MR SPINE LUMBAR WAW IC       PROCEDURE DATE:  11/25/2023      INTERPRETATION:  CLINICAL INFORMATION: Metastases    ADDITIONAL CLINICAL INFORMATION: Cancer C80.1    TECHNIQUE: Multiplanar, multisequence MRI was performed of the lumbar   spine.  IV Contrast: Gadavist  8 cc administered   2 cc discarded    CORRELATION: CT chest and abdomen 11/21/2023    FINDINGS:    ALIGNMENT: The alignment is normal.  VERTEBRA: There is a mild compression fracture deformity of T12 as well   as pathological compression fracture deformities of T11 and L5. There is   diffuse marrow infiltration of the T11 and L5 vertebral bodies with   extension to the posterior elements with hypointense T1 and hyperintense   T2/STIR signal with avid enhancement compatible with metastatic disease.   The loss of the cortical margins is better seen on the prior CT. There is   ventral epidural soft tissue component at T11 indenting and possibly   compressing the spinal cord. Axial images were not obtained at this   level. There is diffuse ventral epidural soft tissue at L5 severely   compressing the thecal sac as well as the L5 and S1 nerve roots. There   also is diffuse involvement of the left sacrum at the S1 and S2   levels extending to the left SI joint. There is minimum epidural soft   tissue along the lateral margin of the sacral foramen at the S1-S2 level.   There are additional smaller metastases involving L1, L2 and L4. There   are focal areas of hyperintenseT1 and T2 signal within the T2 12 and L1   vertebral bodies which may represent hemangiomas.  DISC SPACES: Maintained.  CANAL: The distal cord and conus are normal in signal. Conus terminates   at L1.  VISUALIZED INTRAPELVIC/INTRA-ABDOMINAL SOFT TISSUES: Normal.  PARASPINAL SOFT TISSUES: Normal.    INDIVIDUAL LEVELS:    L1-L2: No disc herniation, spinal canal stenosis or neural foramen   narrowing.    L2-L3: No disc herniation. Mild degenerative facet disease. No spinal   canal stenosis or neural foramen narrowing.    L3-L4: No disc herniation. Mild degenerative facet disease. No spinal   canal stenosis or neural foramen narrowing.    IMPRESSION: Bony metastases at T11,L1, L2, L4, L5 and left sacrum.   Pathologic fractures of T11 and L5. Possible cord compression at T11 and   diffuse thecal sac compression at L5.          EXAM:  MR SPINE THORACIC WAW IC     EXAM:  MR SPINE CERVICAL WAW IC      PROCEDURE DATE:  11/25/2023      INTERPRETATION:  History: 82 y/o F with a PMhx of anemia, GERD, HTN, HLD,   mood disorder, gastric bypass, and cholecystectomy presents to the ED SIB   MD c/o weakness. The pt had outpatient w/u for worsening anemia with   weakness. CT and XR showing lytic legions in spine with epidural   extensions leading to increased weakness and difficulty ambulating. PCP   sent pt to ED for further evaluation. Pt reports weight loss over months.   Denies any sort of active bleeding no blood in stool. Denies CP, or SOB.   C/o lower back pain. Some numbness and tingling to LLE. Denies bowl or   bladder incontinence. Denies numbness consistent with saddle anesthesia.   She had  outpatient CT C/A/P showing multiple bone mets and possible liver mets   and path  fracture of T11 vertebral body. Patient also C/o LE  paresthesias and back pain.    Description: A MRI of the cervical spine and a MRI of the thoracic spine   with and without gadolinium contrast were performed with multiplanar   multisequence technique.    8 cc intravenous Gadavist gadolinium contrast was administered, 2 cc   contrast was discarded.    Comparison is made to the thoracic and lumbar spine region from the chest   abdomen pelvic CT from 11/21/2023, and cervical spine CT from 08/08/2022    Cervical spine MRI:    Bulky dorsal paraspinal tumor in the upper thoracic region is partially   visualized. Please see thoracic spine MRI component of this report.    No focal cervical spine bony lesions are visualized. There is no evidence   for epidural tumor extension in the cervical spine.    Small broad disc bulges are present at the C4-C5, C5-C6, C6-C7 levels   with associated mild central canal stenosis. Multilevel ligamentum flavum   hypertrophy multilevel facet degenerative changes are noted.    There is no cervical spinal cord mass or syrinx. No abnormal intraspinal   enhancement is noted in the cervical spine. There is no cervical spinal   cord compression.    Thoracic spine MRI:    A large metastasis replaces the T11 vertebral body with extension into   the pedicles, with an associated mild to moderate pathologic compression   fracture deformity and retropulsion of bone. Epidural tumor extension is   noted. Tumor extension into the left greater than right neural foramina   at this levels also noted. The compression fracture with retropulsion of   bone and epidural tumor extension results in mild to moderate thoracic   spinal cord compression. A left-sided rib metastasis with soft tissue   extension of tumor is partially visualized at this level.    Additional metastases are noted involving the T9 and L2 vertebral bodies   without associated epidural tumor extension    A moderate sized hemangioma involves the T8 vertebral body with   associated increased T1 signal, increased STIR signal, and enhancement.    A 3.5 cm x 3.2cm x 3.8 cm bulky paraspinal metastasis involves the right   posterior elements at the T3 and T4 levels with extension into the   paraspinal musculature and abutment and possible involvement of the   posterior ribs at these levels. No significant intraspinal extension of   tumor at this level is appreciated.    Nonspecific bilateral pleural effusions and pleural thickening is noted.   Multiple enhancing liver lesions are partially visualized. Please see   separate chest abdomen pelvic CT report.    IMPRESSION:    Cervical spine:    No evidence for metastatic disease to the cervical spine or cervical   spinal cord compression.    Thoracic spine:    Multifocal metastatic disease is noted as described.    A large metastasis replaces the T11 vertebral body with an associated   mild to moderate pathologic compression fracture deformity and   retropulsion of bone. Epidural tumor extension is noted. The compression   fracture with retropulsion of bone and epidural tumor extension results   in mild to moderate thoracic spinal cord compression.        EXAM:  CT BRAIN    PROCEDURE DATE:  11/24/2023      INTERPRETATION:  CLINICAL INFORMATION:  Mets to liver, spine.  Unknown   primary.  R/O Brain mets, bleed.    TECHNIQUE:  Axial CT images were acquired through the head.  Intravenous contrast: None  Two-dimensional reformats were generated.    COMPARISON STUDY: CT head 8/8/2022    FINDINGS:    There is no CT evidence of acute intracranial hemorrhage, mass effect,   midline shift, or acute, large territorial infarct.  The ventricles and   sulci are prominent compatible with age-appropriate brain parenchymal   volume loss. Mild patchy periventricular white matter hypoattenuation is   nonspecific, although likely due to chronic microangiopathy. The basal   cisterns are patent. There is a partially empty sella.    The mastoid air cells and middle ear cavities are grossly clear. The   visualized paranasal sinuses are well aerated.    The calvarium and skull base are grossly intact.    IMPRESSION:  No acute intracranial hemorrhage or mass effect.    Please note that contrast-enhanced MRI of the brain is more sensitive in   the detection of intracranial metastases.      EXAM: [id number] - CT ABDOMEN AND PELVIS  -   EXAM: [id number] - CT CHEST  -     PROCEDURE DATE:  11/21/2023    INTERPRETATION:  CLINICAL INFORMATION: Anemia and 40 pound weight loss.    COMPARISON: None.    CONTRAST/COMPLICATIONS:  IV Contrast: NONE  Oral Contrast: NONE  Complications: None reported at time of study completion    PROCEDURE:  CT of the Chest, Abdomen and Pelvis was performed.  Sagittal and coronal reformats were performed.    FINDINGS:  CHEST:  LUNGS AND LARGE AIRWAYS: Patent central airways. There is atelectasis at the right lung base.  PLEURA: No pleural effusion.  VESSELS: Coronary artery calcifications and atherosclerotic calcifications within the thoracic aorta.  HEART: Heart size is normal. No pericardial effusion.  MEDIASTINUM AND CRIS: No lymphadenopathy.  CHEST WALL AND LOWER NECK: There is a loop recorder left anterior chest wall. There is a 0.8 cm right lobe thyroid nodule.    ABDOMEN AND PELVIS:  LIVER: Bilobar indeterminate hepatic lesions are nonetheless suspicious for metastatic disease. The largest lesion is in the hepatic dome measuring 4.8 cm. There is a mass interposed between the caudate lobe and left lobe of liver measuring 3.4 cm on series 2 image 68.  BILE DUCTS: Normal caliber.  GALLBLADDER: Removed.  SPLEEN: Within normal limits.  PANCREAS: Within normal limits.  ADRENALS: There is a 1.9 cm right adrenal adenoma.  KIDNEYS/URETERS: Within normal limits.    BLADDER: Within normal limits.  REPRODUCTIVE ORGANS: Uterus and adnexa within normal limits.    BOWEL: No bowel obstruction. 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 There is herniation of the gastric pouch and small amount of the jejunum distal to the gastrojejunal anastomosis into the mediastinum through the esophageal hiatus. There is also herniation of a portion of the excluded stomach through the esophageal hiatus.  PERITONEUM: No ascites.  VESSELS: Within normal limits.  RETROPERITONEUM/LYMPH NODES: No lymphadenopathy.  ABDOMINAL WALL: Within normal limits.  BONES: There is a lytic lesion with posterior cortical destruction involving the L5 vertebral body with epidural extension. There is mild loss of height of the T12 vertebral body. There is a lytic lesion and pathologic fracture through the T11 vertebral body with likely epidural extension and extension into the left pedicle. There is heterogeneous appearance of the T8-9 vertebral bodies with a lytic lesion in T9 vertebral body. There is a lytic lesion with soft tissue component in the right fourth rib with associated pathologic fracture. There is an old fracture of the left humeral neck. There is a lytic lesion in the left do sacrum. There is a lytic lesion in the right fourth transverse process as well as lytic lesion with pathologic fracture in the left 10th rib. There is a 4.6 x 3.8 cm expansile lesion in the left third rib.    IMPRESSION:  Multiple lytic osseous lesions highly suspicious for metastatic disease. Lesions in the L5 and T11 vertebral bodies have epidural extension.  Pathologic fracture T11 vertebral body. Some of the rib lesions demonstrate pathologic fractures. Please see above comments.    Multiple hepatic lesions are not characterized without intravenous contrast but nonetheless are suspicious for metastatic disease.            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  (Electronic Signature)  Reported on: 12/05/23 15:12 [facility], [address] [place] [address]  Phone: [phone]   Fax: [phone]  _________________________________________________________________    Comment    Immunohistochemical stains for cytokeratin, CAM 5.2 and CK7 are positive.  CD56 is positive.  Synaptophysin is focally positive.  Chromogranin,  TTF-1, CDX-2, CK 20, JUDY-3, PAX-8, Melan-A, CD45, p63, p40, HepPar 1 and  CK17 are negative.  Histologically, the tumor is composed of sheets of malignant cells  with a high nuclear to cytoplasmic ratio, necrosis, and individual  cell necrosis, reminiscent of small cell carcinoma.  Positivity for  keratins confirms that the tumor is a carcinoma.  CD 56, a neuroendocrine  marker, is positive.  Synaptophysin, a neuroendocrine marker, is focally  positive.  Unfortunately, the immunohistochemical profile does not  definitively determine the primary site.         HPI:    82 y/o F w/ MH of HTN, dyslipidemia, GERD, parkinson's, neuropathy, iron deficiency anemia, loop recorder, p/w LE weakness. Patient has been having B/L LE weakness for a little less than 1 year. Symptoms have been getting progressively worse to the point where patient is unable to ambulate, also with  LE paresthesias and back pain. and 40 lbs unintentional weight loss. She was sent for CT-C/A/P- which revealed multiple bone mets and possible liver mets and path fracture of T11 vertebral body.  She was sent to ED for further evaluation.     11/25/23- Seen at bedside, along with Dr. Loera, alert, awake, in no acute distress. Reports left lower extremities with tingling and numbness, denies cauda equina symptoms. Reports difficulty ambulating even with a walker.     11/27/23: Seen at bedside with attending Dr Loera and Hospitalist Dr Hills, currently awaiting IR bx procedure hopefully today     11/28/23: Seen at bedside with nursing staff, no acute distress, pain controlled, s/p rib Bx with IR yesterday    12/6/2023- Seen at bedside, OOB to chair. Reports feels weak, with cough, unable to have a productive cough    12/72023- Seen at bedside, frail appearing, coughing and unable to expectorate. A copy pf her pathology results given to her upon her request. Also informed her that I spoke with Julius and he is aware of her pathology results.      12/08/23: Seen at bedside, sleeping, no acute distress         PAST MEDICAL & SURGICAL HISTORY:    GERD (gastroesophageal reflux disease)    HTN (hypertension)    HLD (hyperlipidemia)    Mood disorder    H/O gastric bypass    History of cholecystectomy    History of total knee replacement, left    Iron Deficiency Anemia    CRI    Barrette Esophagus      FAMILY HISTORY:    COPD- Brother  CAD- Sister  Stroke- Mother  Parkinson-Father      MEDICATIONS  (STANDING):    atorvastatin 40 milliGRAM(s) Oral at bedtime  buPROPion . 100 milliGRAM(s) Oral daily  carbidopa/levodopa  25/100 1 Tablet(s) Oral four times a day  cefepime  Injectable. 2000 milliGRAM(s) IV Push every 8 hours  donepezil 10 milliGRAM(s) Oral at bedtime  enoxaparin Injectable 40 milliGRAM(s) SubCutaneous every 24 hours  ferrous    sulfate 325 milliGRAM(s) Oral daily  influenza  Vaccine (HIGH DOSE) 0.7 milliLiter(s) IntraMuscular once  levothyroxine 50 MICROGram(s) Oral daily  metoprolol succinate ER 25 milliGRAM(s) Oral daily  naloxone Injectable 0.4 milliGRAM(s) IV Push once  pantoprazole    Tablet 40 milliGRAM(s) Oral before breakfast  polyethylene glycol 3350 17 Gram(s) Oral daily  pramipexole 0.125 milliGRAM(s) Oral at bedtime  senna 2 Tablet(s) Oral at bedtime      MEDICATIONS  (PRN):    acetaminophen     Tablet .. 650 milliGRAM(s) Oral every 6 hours PRN Mild Pain (1 - 3), Moderate Pain (4 - 6)  bisacodyl 5 milliGRAM(s) Oral daily PRN Constipation        ALLERGIES:    Originally Entered as [Moderate Rash] reaction to [SURGICAL TAPE] (Unknown)  Vitamin K (Other (Severe))  Fosamax (Unknown)      REVIEW OF SYSTEMS:    Constitutional, Eyes, ENT, Cardiovascular, Respiratory, Gastrointestinal, Genitourinary, Musculoskeletal, Integumentary, Neurological, Psychiatric, Endocrine, Heme/Lymph and Allergic/Immunologic review of systems are otherwise negative except as noted in HPI.       VITAL SIGNS:    ICU Vital Signs Last 24 Hrs  T(C): 36.4 (08 Dec 2023 08:24), Max: 37.1 (07 Dec 2023 15:25)  T(F): 97.6 (08 Dec 2023 08:24), Max: 98.7 (07 Dec 2023 15:25)  HR: 80 (08 Dec 2023 08:24) (78 - 85)  BP: 130/63 (08 Dec 2023 08:24) (118/47 - 130/63)  BP(mean): --  ABP: --  ABP(mean): --  RR: 17 (08 Dec 2023 08:24) (17 - 17)  SpO2: 93% (08 Dec 2023 08:24) (92% - 93%)    O2 Parameters below as of 08 Dec 2023 08:24  Patient On (Oxygen Delivery Method): room air      PHYSICAL EXAM:    CONSTITUTIONAL: ill appearing, no acute distress  EYES: PERRLA and symmetric, EOMI  ENMT: Oral mucosa with moist membranes. Normal dentition; no pharyngeal injection or exudates  NECK: Supple, symmetric and without tracheal deviation   RESP: No respiratory distress, no use of accessory muscles  CV: RRR  GI: Soft, NT, ND, no rebound, no guarding; no palpable masses; no hepatosplenomegaly  LYMPH: No cervical LAD or tenderness; no axillary LAD or tenderness; no inguinal LAD or tenderness  MSK: Limited ROM lower extremities  SKIN: No rashes or ulcers noted  NEURO: Awake, alert to person, place; poorly assessed         LABS:                           8.7    10.39 )-----------( 142      ( 08 Dec 2023 07:08 )             26.7     12-08    140  |  110<H>  |  12  ----------------------------<  67<L>  3.7   |  25  |  0.44<L>    Ca    8.1<L>      08 Dec 2023 07:08        RADIOLOGY & ADDITIONAL STUDIES:      EXAM:  MR SPINE LUMBAR WAW IC       PROCEDURE DATE:  11/25/2023      INTERPRETATION:  CLINICAL INFORMATION: Metastases    ADDITIONAL CLINICAL INFORMATION: Cancer C80.1    TECHNIQUE: Multiplanar, multisequence MRI was performed of the lumbar   spine.  IV Contrast: Gadavist  8 cc administered   2 cc discarded    CORRELATION: CT chest and abdomen 11/21/2023    FINDINGS:    ALIGNMENT: The alignment is normal.  VERTEBRA: There is a mild compression fracture deformity of T12 as well   as pathological compression fracture deformities of T11 and L5. There is   diffuse marrow infiltration of the T11 and L5 vertebral bodies with   extension to the posterior elements with hypointense T1 and hyperintense   T2/STIR signal with avid enhancement compatible with metastatic disease.   The loss of the cortical margins is better seen on the prior CT. There is   ventral epidural soft tissue component at T11 indenting and possibly   compressing the spinal cord. Axial images were not obtained at this   level. There is diffuse ventral epidural soft tissue at L5 severely   compressing the thecal sac as well as the L5 and S1 nerve roots. There   also is diffuse involvement of the left sacrum at the S1 and S2   levels extending to the left SI joint. There is minimum epidural soft   tissue along the lateral margin of the sacral foramen at the S1-S2 level.   There are additional smaller metastases involving L1, L2 and L4. There   are focal areas of hyperintenseT1 and T2 signal within the T2 12 and L1   vertebral bodies which may represent hemangiomas.  DISC SPACES: Maintained.  CANAL: The distal cord and conus are normal in signal. Conus terminates   at L1.  VISUALIZED INTRAPELVIC/INTRA-ABDOMINAL SOFT TISSUES: Normal.  PARASPINAL SOFT TISSUES: Normal.    INDIVIDUAL LEVELS:    L1-L2: No disc herniation, spinal canal stenosis or neural foramen   narrowing.    L2-L3: No disc herniation. Mild degenerative facet disease. No spinal   canal stenosis or neural foramen narrowing.    L3-L4: No disc herniation. Mild degenerative facet disease. No spinal   canal stenosis or neural foramen narrowing.    IMPRESSION: Bony metastases at T11,L1, L2, L4, L5 and left sacrum.   Pathologic fractures of T11 and L5. Possible cord compression at T11 and   diffuse thecal sac compression at L5.          EXAM:  MR SPINE THORACIC WAW IC     EXAM:  MR SPINE CERVICAL WAW IC      PROCEDURE DATE:  11/25/2023      INTERPRETATION:  History: 82 y/o F with a PMhx of anemia, GERD, HTN, HLD,   mood disorder, gastric bypass, and cholecystectomy presents to the ED SIB   MD c/o weakness. The pt had outpatient w/u for worsening anemia with   weakness. CT and XR showing lytic legions in spine with epidural   extensions leading to increased weakness and difficulty ambulating. PCP   sent pt to ED for further evaluation. Pt reports weight loss over months.   Denies any sort of active bleeding no blood in stool. Denies CP, or SOB.   C/o lower back pain. Some numbness and tingling to LLE. Denies bowl or   bladder incontinence. Denies numbness consistent with saddle anesthesia.   She had  outpatient CT C/A/P showing multiple bone mets and possible liver mets   and path  fracture of T11 vertebral body. Patient also C/o LE  paresthesias and back pain.    Description: A MRI of the cervical spine and a MRI of the thoracic spine   with and without gadolinium contrast were performed with multiplanar   multisequence technique.    8 cc intravenous Gadavist gadolinium contrast was administered, 2 cc   contrast was discarded.    Comparison is made to the thoracic and lumbar spine region from the chest   abdomen pelvic CT from 11/21/2023, and cervical spine CT from 08/08/2022    Cervical spine MRI:    Bulky dorsal paraspinal tumor in the upper thoracic region is partially   visualized. Please see thoracic spine MRI component of this report.    No focal cervical spine bony lesions are visualized. There is no evidence   for epidural tumor extension in the cervical spine.    Small broad disc bulges are present at the C4-C5, C5-C6, C6-C7 levels   with associated mild central canal stenosis. Multilevel ligamentum flavum   hypertrophy multilevel facet degenerative changes are noted.    There is no cervical spinal cord mass or syrinx. No abnormal intraspinal   enhancement is noted in the cervical spine. There is no cervical spinal   cord compression.    Thoracic spine MRI:    A large metastasis replaces the T11 vertebral body with extension into   the pedicles, with an associated mild to moderate pathologic compression   fracture deformity and retropulsion of bone. Epidural tumor extension is   noted. Tumor extension into the left greater than right neural foramina   at this levels also noted. The compression fracture with retropulsion of   bone and epidural tumor extension results in mild to moderate thoracic   spinal cord compression. A left-sided rib metastasis with soft tissue   extension of tumor is partially visualized at this level.    Additional metastases are noted involving the T9 and L2 vertebral bodies   without associated epidural tumor extension    A moderate sized hemangioma involves the T8 vertebral body with   associated increased T1 signal, increased STIR signal, and enhancement.    A 3.5 cm x 3.2cm x 3.8 cm bulky paraspinal metastasis involves the right   posterior elements at the T3 and T4 levels with extension into the   paraspinal musculature and abutment and possible involvement of the   posterior ribs at these levels. No significant intraspinal extension of   tumor at this level is appreciated.    Nonspecific bilateral pleural effusions and pleural thickening is noted.   Multiple enhancing liver lesions are partially visualized. Please see   separate chest abdomen pelvic CT report.    IMPRESSION:    Cervical spine:    No evidence for metastatic disease to the cervical spine or cervical   spinal cord compression.    Thoracic spine:    Multifocal metastatic disease is noted as described.    A large metastasis replaces the T11 vertebral body with an associated   mild to moderate pathologic compression fracture deformity and   retropulsion of bone. Epidural tumor extension is noted. The compression   fracture with retropulsion of bone and epidural tumor extension results   in mild to moderate thoracic spinal cord compression.        EXAM:  CT BRAIN    PROCEDURE DATE:  11/24/2023      INTERPRETATION:  CLINICAL INFORMATION:  Mets to liver, spine.  Unknown   primary.  R/O Brain mets, bleed.    TECHNIQUE:  Axial CT images were acquired through the head.  Intravenous contrast: None  Two-dimensional reformats were generated.    COMPARISON STUDY: CT head 8/8/2022    FINDINGS:    There is no CT evidence of acute intracranial hemorrhage, mass effect,   midline shift, or acute, large territorial infarct.  The ventricles and   sulci are prominent compatible with age-appropriate brain parenchymal   volume loss. Mild patchy periventricular white matter hypoattenuation is   nonspecific, although likely due to chronic microangiopathy. The basal   cisterns are patent. There is a partially empty sella.    The mastoid air cells and middle ear cavities are grossly clear. The   visualized paranasal sinuses are well aerated.    The calvarium and skull base are grossly intact.    IMPRESSION:  No acute intracranial hemorrhage or mass effect.    Please note that contrast-enhanced MRI of the brain is more sensitive in   the detection of intracranial metastases.      EXAM: [id number] - CT ABDOMEN AND PELVIS  -   EXAM: [id number] - CT CHEST  -     PROCEDURE DATE:  11/21/2023    INTERPRETATION:  CLINICAL INFORMATION: Anemia and 40 pound weight loss.    COMPARISON: None.    CONTRAST/COMPLICATIONS:  IV Contrast: NONE  Oral Contrast: NONE  Complications: None reported at time of study completion    PROCEDURE:  CT of the Chest, Abdomen and Pelvis was performed.  Sagittal and coronal reformats were performed.    FINDINGS:  CHEST:  LUNGS AND LARGE AIRWAYS: Patent central airways. There is atelectasis at the right lung base.  PLEURA: No pleural effusion.  VESSELS: Coronary artery calcifications and atherosclerotic calcifications within the thoracic aorta.  HEART: Heart size is normal. No pericardial effusion.  MEDIASTINUM AND CRSI: No lymphadenopathy.  CHEST WALL AND LOWER NECK: There is a loop recorder left anterior chest wall. There is a 0.8 cm right lobe thyroid nodule.    ABDOMEN AND PELVIS:  LIVER: Bilobar indeterminate hepatic lesions are nonetheless suspicious for metastatic disease. The largest lesion is in the hepatic dome measuring 4.8 cm. There is a mass interposed between the caudate lobe and left lobe of liver measuring 3.4 cm on series 2 image 68.  BILE DUCTS: Normal caliber.  GALLBLADDER: Removed.  SPLEEN: Within normal limits.  PANCREAS: Within normal limits.  ADRENALS: There is a 1.9 cm right adrenal adenoma.  KIDNEYS/URETERS: Within normal limits.    BLADDER: Within normal limits.  REPRODUCTIVE ORGANS: Uterus and adnexa within normal limits.    BOWEL: No bowel obstruction. 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 There is herniation of the gastric pouch and small amount of the jejunum distal to the gastrojejunal anastomosis into the mediastinum through the esophageal hiatus. There is also herniation of a portion of the excluded stomach through the esophageal hiatus.  PERITONEUM: No ascites.  VESSELS: Within normal limits.  RETROPERITONEUM/LYMPH NODES: No lymphadenopathy.  ABDOMINAL WALL: Within normal limits.  BONES: There is a lytic lesion with posterior cortical destruction involving the L5 vertebral body with epidural extension. There is mild loss of height of the T12 vertebral body. There is a lytic lesion and pathologic fracture through the T11 vertebral body with likely epidural extension and extension into the left pedicle. There is heterogeneous appearance of the T8-9 vertebral bodies with a lytic lesion in T9 vertebral body. There is a lytic lesion with soft tissue component in the right fourth rib with associated pathologic fracture. There is an old fracture of the left humeral neck. There is a lytic lesion in the left do sacrum. There is a lytic lesion in the right fourth transverse process as well as lytic lesion with pathologic fracture in the left 10th rib. There is a 4.6 x 3.8 cm expansile lesion in the left third rib.    IMPRESSION:  Multiple lytic osseous lesions highly suspicious for metastatic disease. Lesions in the L5 and T11 vertebral bodies have epidural extension.  Pathologic fracture T11 vertebral body. Some of the rib lesions demonstrate pathologic fractures. Please see above comments.    Multiple hepatic lesions are not characterized without intravenous contrast but nonetheless are suspicious for metastatic disease.            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  (Electronic Signature)  Reported on: 12/05/23 15:12 [facility], [address] [place] [address]  Phone: [phone]   Fax: [phone]  _________________________________________________________________    Comment    Immunohistochemical stains for cytokeratin, CAM 5.2 and CK7 are positive.  CD56 is positive.  Synaptophysin is focally positive.  Chromogranin,  TTF-1, CDX-2, CK 20, JUDY-3, PAX-8, Melan-A, CD45, p63, p40, HepPar 1 and  CK17 are negative.  Histologically, the tumor is composed of sheets of malignant cells  with a high nuclear to cytoplasmic ratio, necrosis, and individual  cell necrosis, reminiscent of small cell carcinoma.  Positivity for  keratins confirms that the tumor is a carcinoma.  CD 56, a neuroendocrine  marker, is positive.  Synaptophysin, a neuroendocrine marker, is focally  positive.  Unfortunately, the immunohistochemical profile does not  definitively determine the primary site.

## 2023-12-08 NOTE — PROGRESS NOTE ADULT - NS ATTEND OPT1 GEN_ALL_CORE
I independently performed the documented:
I attest my time as attending is greater than 50% of the total combined time spent on qualifying patient care activities by the PA/NP and attending.
I independently performed the documented:
I independently performed the documented:
I attest my time as attending is greater than 50% of the total combined time spent on qualifying patient care activities by the PA/NP and attending.

## 2023-12-08 NOTE — PROGRESS NOTE ADULT - SUBJECTIVE AND OBJECTIVE BOX
HOSPITALIST PROGRESS NOTE    SUBJECTIVE / INTERVAL HPI: Patient seen and examined at bedside. No acute complaints at this time.     ROS negative.     VITAL SIGNS:  Vital Signs Last 24 Hrs  T(C): 36.4 (08 Dec 2023 08:24), Max: 36.7 (07 Dec 2023 23:00)  T(F): 97.6 (08 Dec 2023 08:24), Max: 98.1 (07 Dec 2023 23:00)  HR: 80 (08 Dec 2023 08:24) (80 - 85)  BP: 130/63 (08 Dec 2023 08:24) (124/75 - 130/63)  BP(mean): --  RR: 17 (08 Dec 2023 08:24) (17 - 17)  SpO2: 93% (08 Dec 2023 08:24) (92% - 93%)    Parameters below as of 08 Dec 2023 08:24  Patient On (Oxygen Delivery Method): room air    PHYSICAL EXAM:  General: No distress, non-toxic   HEENT: NC/AT; PERRL, anicteric sclera; MMM  Neck: Supple  Cardiovascular: +S1/S2, RRR, no murmurs, rubs, gallops  Respiratory: CTA B/L; no W/R/R  Gastrointestinal: soft, NT/ND; +BSx4  Extremities: WWP; +anasarca. Generalized weakness throughout   Vascular: 2+ radial, DP/PT pulses B/L  Neurological: AAOx2-3; no focal deficits    MEDICATIONS:  MEDICATIONS  (STANDING):  atorvastatin 40 milliGRAM(s) Oral at bedtime  buPROPion . 100 milliGRAM(s) Oral daily  carbidopa/levodopa  25/100 1 Tablet(s) Oral four times a day  cefepime  Injectable. 2000 milliGRAM(s) IV Push every 8 hours  donepezil 10 milliGRAM(s) Oral at bedtime  enoxaparin Injectable 40 milliGRAM(s) SubCutaneous every 24 hours  ferrous    sulfate 325 milliGRAM(s) Oral daily  influenza  Vaccine (HIGH DOSE) 0.7 milliLiter(s) IntraMuscular once  levothyroxine 50 MICROGram(s) Oral daily  metoprolol succinate ER 25 milliGRAM(s) Oral daily  naloxone Injectable 0.4 milliGRAM(s) IV Push once  pantoprazole    Tablet 40 milliGRAM(s) Oral before breakfast  polyethylene glycol 3350 17 Gram(s) Oral daily  pramipexole 0.125 milliGRAM(s) Oral at bedtime  senna 2 Tablet(s) Oral at bedtime    MEDICATIONS  (PRN):  acetaminophen     Tablet .. 650 milliGRAM(s) Oral every 6 hours PRN Mild Pain (1 - 3), Moderate Pain (4 - 6)  bisacodyl 5 milliGRAM(s) Oral daily PRN Constipation      ALLERGIES:  Allergies    Originally Entered as [Moderate Rash] reaction to [SURGICAL TAPE] (Unknown)  Vitamin K (Other (Severe))  Fosamax (Unknown)    Intolerances        LABS:                        8.7    10.39 )-----------( 142      ( 08 Dec 2023 07:08 )             26.7     12-08    140  |  110<H>  |  12  ----------------------------<  67<L>  3.7   |  25  |  0.44<L>    Ca    8.1<L>      08 Dec 2023 07:08        Urinalysis Basic - ( 08 Dec 2023 07:08 )    Color: x / Appearance: x / SG: x / pH: x  Gluc: 67 mg/dL / Ketone: x  / Bili: x / Urobili: x   Blood: x / Protein: x / Nitrite: x   Leuk Esterase: x / RBC: x / WBC x   Sq Epi: x / Non Sq Epi: x / Bacteria: x      CAPILLARY BLOOD GLUCOSE            RADIOLOGY & ADDITIONAL TESTS: Reviewed.

## 2023-12-09 LAB
ANION GAP SERPL CALC-SCNC: 4 MMOL/L — LOW (ref 5–17)
ANION GAP SERPL CALC-SCNC: 4 MMOL/L — LOW (ref 5–17)
BUN SERPL-MCNC: 15 MG/DL — SIGNIFICANT CHANGE UP (ref 7–23)
BUN SERPL-MCNC: 15 MG/DL — SIGNIFICANT CHANGE UP (ref 7–23)
CALCIUM SERPL-MCNC: 8.2 MG/DL — LOW (ref 8.5–10.1)
CALCIUM SERPL-MCNC: 8.2 MG/DL — LOW (ref 8.5–10.1)
CHLORIDE SERPL-SCNC: 110 MMOL/L — HIGH (ref 96–108)
CHLORIDE SERPL-SCNC: 110 MMOL/L — HIGH (ref 96–108)
CO2 SERPL-SCNC: 28 MMOL/L — SIGNIFICANT CHANGE UP (ref 22–31)
CO2 SERPL-SCNC: 28 MMOL/L — SIGNIFICANT CHANGE UP (ref 22–31)
CREAT SERPL-MCNC: 0.54 MG/DL — SIGNIFICANT CHANGE UP (ref 0.5–1.3)
CREAT SERPL-MCNC: 0.54 MG/DL — SIGNIFICANT CHANGE UP (ref 0.5–1.3)
EGFR: 92 ML/MIN/1.73M2 — SIGNIFICANT CHANGE UP
EGFR: 92 ML/MIN/1.73M2 — SIGNIFICANT CHANGE UP
GLUCOSE SERPL-MCNC: 70 MG/DL — SIGNIFICANT CHANGE UP (ref 70–99)
GLUCOSE SERPL-MCNC: 70 MG/DL — SIGNIFICANT CHANGE UP (ref 70–99)
HCT VFR BLD CALC: 26.5 % — LOW (ref 34.5–45)
HCT VFR BLD CALC: 26.5 % — LOW (ref 34.5–45)
HGB BLD-MCNC: 8.5 G/DL — LOW (ref 11.5–15.5)
HGB BLD-MCNC: 8.5 G/DL — LOW (ref 11.5–15.5)
MCHC RBC-ENTMCNC: 29.8 PG — SIGNIFICANT CHANGE UP (ref 27–34)
MCHC RBC-ENTMCNC: 29.8 PG — SIGNIFICANT CHANGE UP (ref 27–34)
MCHC RBC-ENTMCNC: 32.1 GM/DL — SIGNIFICANT CHANGE UP (ref 32–36)
MCHC RBC-ENTMCNC: 32.1 GM/DL — SIGNIFICANT CHANGE UP (ref 32–36)
MCV RBC AUTO: 93 FL — SIGNIFICANT CHANGE UP (ref 80–100)
MCV RBC AUTO: 93 FL — SIGNIFICANT CHANGE UP (ref 80–100)
PLATELET # BLD AUTO: 142 K/UL — LOW (ref 150–400)
PLATELET # BLD AUTO: 142 K/UL — LOW (ref 150–400)
POTASSIUM SERPL-MCNC: 3.7 MMOL/L — SIGNIFICANT CHANGE UP (ref 3.5–5.3)
POTASSIUM SERPL-MCNC: 3.7 MMOL/L — SIGNIFICANT CHANGE UP (ref 3.5–5.3)
POTASSIUM SERPL-SCNC: 3.7 MMOL/L — SIGNIFICANT CHANGE UP (ref 3.5–5.3)
POTASSIUM SERPL-SCNC: 3.7 MMOL/L — SIGNIFICANT CHANGE UP (ref 3.5–5.3)
RBC # BLD: 2.85 M/UL — LOW (ref 3.8–5.2)
RBC # BLD: 2.85 M/UL — LOW (ref 3.8–5.2)
RBC # FLD: 16.2 % — HIGH (ref 10.3–14.5)
RBC # FLD: 16.2 % — HIGH (ref 10.3–14.5)
SODIUM SERPL-SCNC: 142 MMOL/L — SIGNIFICANT CHANGE UP (ref 135–145)
SODIUM SERPL-SCNC: 142 MMOL/L — SIGNIFICANT CHANGE UP (ref 135–145)
WBC # BLD: 10.25 K/UL — SIGNIFICANT CHANGE UP (ref 3.8–10.5)
WBC # BLD: 10.25 K/UL — SIGNIFICANT CHANGE UP (ref 3.8–10.5)
WBC # FLD AUTO: 10.25 K/UL — SIGNIFICANT CHANGE UP (ref 3.8–10.5)
WBC # FLD AUTO: 10.25 K/UL — SIGNIFICANT CHANGE UP (ref 3.8–10.5)

## 2023-12-09 PROCEDURE — 99233 SBSQ HOSP IP/OBS HIGH 50: CPT

## 2023-12-09 RX ORDER — ALBUTEROL 90 UG/1
2.5 AEROSOL, METERED ORAL ONCE
Refills: 0 | Status: COMPLETED | OUTPATIENT
Start: 2023-12-09 | End: 2023-12-09

## 2023-12-09 RX ADMIN — CARBIDOPA AND LEVODOPA 1 TABLET(S): 25; 100 TABLET ORAL at 06:15

## 2023-12-09 RX ADMIN — SENNA PLUS 2 TABLET(S): 8.6 TABLET ORAL at 21:16

## 2023-12-09 RX ADMIN — CEFEPIME 2000 MILLIGRAM(S): 1 INJECTION, POWDER, FOR SOLUTION INTRAMUSCULAR; INTRAVENOUS at 15:17

## 2023-12-09 RX ADMIN — BUPROPION HYDROCHLORIDE 100 MILLIGRAM(S): 150 TABLET, EXTENDED RELEASE ORAL at 12:27

## 2023-12-09 RX ADMIN — ATORVASTATIN CALCIUM 40 MILLIGRAM(S): 80 TABLET, FILM COATED ORAL at 21:16

## 2023-12-09 RX ADMIN — CARBIDOPA AND LEVODOPA 1 TABLET(S): 25; 100 TABLET ORAL at 12:26

## 2023-12-09 RX ADMIN — CEFEPIME 2000 MILLIGRAM(S): 1 INJECTION, POWDER, FOR SOLUTION INTRAMUSCULAR; INTRAVENOUS at 21:16

## 2023-12-09 RX ADMIN — PANTOPRAZOLE SODIUM 40 MILLIGRAM(S): 20 TABLET, DELAYED RELEASE ORAL at 09:55

## 2023-12-09 RX ADMIN — PRAMIPEXOLE DIHYDROCHLORIDE 0.12 MILLIGRAM(S): 0.12 TABLET ORAL at 21:17

## 2023-12-09 RX ADMIN — Medication 325 MILLIGRAM(S): at 09:56

## 2023-12-09 RX ADMIN — CARBIDOPA AND LEVODOPA 1 TABLET(S): 25; 100 TABLET ORAL at 17:47

## 2023-12-09 RX ADMIN — Medication 50 MICROGRAM(S): at 06:16

## 2023-12-09 RX ADMIN — DONEPEZIL HYDROCHLORIDE 10 MILLIGRAM(S): 10 TABLET, FILM COATED ORAL at 21:17

## 2023-12-09 RX ADMIN — ALBUTEROL 2.5 MILLIGRAM(S): 90 AEROSOL, METERED ORAL at 14:44

## 2023-12-09 RX ADMIN — CEFEPIME 2000 MILLIGRAM(S): 1 INJECTION, POWDER, FOR SOLUTION INTRAMUSCULAR; INTRAVENOUS at 06:16

## 2023-12-09 RX ADMIN — ENOXAPARIN SODIUM 40 MILLIGRAM(S): 100 INJECTION SUBCUTANEOUS at 21:16

## 2023-12-09 RX ADMIN — CARBIDOPA AND LEVODOPA 1 TABLET(S): 25; 100 TABLET ORAL at 23:25

## 2023-12-09 RX ADMIN — Medication 25 MILLIGRAM(S): at 09:56

## 2023-12-09 NOTE — PROGRESS NOTE ADULT - SUBJECTIVE AND OBJECTIVE BOX
HOSPITALIST PROGRESS NOTE    SUBJECTIVE / INTERVAL HPI: Patient seen and examined at bedside. No acute complaints at this time.   12/9 - no cp palps sob abdo pain, cough, mld wheeze noted, ordered nebs, otherwise comfortable, lying in bed, using her IS       PHYSICAL EXAM:  Vital Signs Last 24 Hrs  T(C): 36.6 (09 Dec 2023 16:33), Max: 37.4 (09 Dec 2023 07:50)  T(F): 97.8 (09 Dec 2023 16:33), Max: 99.4 (09 Dec 2023 07:50)  HR: 73 (09 Dec 2023 16:33) (73 - 75)  BP: 116/71 (09 Dec 2023 16:33) (116/71 - 154/85)  RR: 18 (09 Dec 2023 16:33) (17 - 18)  SpO2: 92% (09 Dec 2023 16:33) (92% - 93%)  Parameters below as of 09 Dec 2023 16:33  Patient On (Oxygen Delivery Method): room air, generalized weakness   GENERAL: NAD, able to lie flat in bed  HEAD:  Atraumatic, Normocephalic  EYES: EOMI, PERRLA, normal sclera  ENT: Moist mucous membranes  NECK: Supple, No JVD, no nuchal rigidity  CHEST/LUNG: Clear to auscultation bilaterally; No rales, rhonchi, wheezing, or rubs. Unlabored respirations  HEART: Regular rate and rhythm; No murmurs, rubs, or gallops  ABDOMEN: Bowel sounds present; Soft, Nontender, Nondistended. No hepatomegaly  EXTREMITIES:  no pitting bilaterally  NERVOUS SYSTEM:  Alert & Oriented X3, speech clear. No focal motor or sensory deficits  MSK: FROM all 4 extremities, full and equal strength  SKIN: No rashes or lesions    LABS: All Labs Reviewed:                        8.5    10.25 )-----------( 142      ( 09 Dec 2023 08:03 )             26.5     12-09    142  |  110<H>  |  15  ----------------------------<  70  3.7   |  28  |  0.54    Ca    8.2<L>      09 Dec 2023 08:03      RADIOLOGY/EKG:  < from: Xray Chest 1 View-PORTABLE IMMEDIATE (Xray Chest 1 View-PORTABLE IMMEDIATE .) (12.05.23 @ 12:28) >  IMPRESSION:  1. Left chest tube removal without pneumothorax and no free pleural   effusion is seen.  2. Suspected loculatedpleural fluid, perhaps loculated hemothorax,   versus pleural-based mass in the peripheral left upper thorax, better   visualized on the current exam although likely unchanged in retrospect.  3. Normal heart size with atherosclerosis and cardiac looprecorder but   no CHF.  4. Multiple recent left-sided rib fractures are seen.  5. Additional chronic skeletal findings as outlined above.    < end of copied text >        acetaminophen     Tablet .. 650 milliGRAM(s) Oral every 6 hours PRN  atorvastatin 40 milliGRAM(s) Oral at bedtime  bisacodyl 5 milliGRAM(s) Oral daily PRN  buPROPion . 100 milliGRAM(s) Oral daily  carbidopa/levodopa  25/100 1 Tablet(s) Oral four times a day  cefepime  Injectable. 2000 milliGRAM(s) IV Push every 8 hours  donepezil 10 milliGRAM(s) Oral at bedtime  enoxaparin Injectable 40 milliGRAM(s) SubCutaneous every 24 hours  ferrous    sulfate 325 milliGRAM(s) Oral daily  influenza  Vaccine (HIGH DOSE) 0.7 milliLiter(s) IntraMuscular once  levothyroxine 50 MICROGram(s) Oral daily  metoprolol succinate ER 25 milliGRAM(s) Oral daily  naloxone Injectable 0.4 milliGRAM(s) IV Push once  pantoprazole    Tablet 40 milliGRAM(s) Oral before breakfast  polyethylene glycol 3350 17 Gram(s) Oral daily  pramipexole 0.125 milliGRAM(s) Oral at bedtime  senna 2 Tablet(s) Oral at bedtime

## 2023-12-09 NOTE — PROGRESS NOTE ADULT - ASSESSMENT
82 y/o F w/ PMH of HTN, dyslipidemia, GERD, parkinson's, neuropathy, anemia, loop recorder, p/w LE weakness. Patient has been having B/L LE weakness for a little less than 1 year. Symptoms have been getting progressively worse to the point where patient is unable to ambulate. Patient also has been getting worked up for anemia and approximately 40lb unintentional weight loss. She had outpatient CT C/A/P showing multiple bone mets and possible liver mets and path fracture of T11 vertebral body. Patient sent to ED for further evaluation. Patient evaluated by ortho with recommendation for MRI. Now POD #9 Left rib mass biopsy with US guidance, POD# 5 Left thoracotomy, Anterior thoracic corpectomy, fusion T10-12.     #Left rib mass biopsy, poorly differentiated carcinoma with neuroendocrine features  #Left thoracotomy, Anterior thoracic corpectomy, fusion T10-12  - Given findings of poorly differentiated carcinoma, will need to discuss treatment options/prognosis with patient and family () present). Family meeting tomorrow  - Heme/Onc and Palliative following and aware    #Symptomatic anemia   - s/p 1 unit of PRBC 12/4, Hgb stable     #Pneumonia, LLL opacity  - Continue Cefepime for tx of presumed pneumonia    #Urinary retention  Failed TOV, replaced Fuentes 12/8    #Hypothyroidism  - Continue Synthroid     #Hypertension  BP medications held yesterday due to soft pressures. Resume Toprol  Resume Verapamil tomorrow    #Severe protein-calorie malnutrition     GOC: Patient's spouse has been sick with a cold and unable to come to the hospital for family meeting. Palliative arranged for family meeting on Monday to discuss GOC/Prongosis given biopsy results of metastatic disease.    DNR/DNI with NIV  DVT ppx: Lovenox  84 y/o F w/ PMH of HTN, dyslipidemia, GERD, parkinson's, neuropathy, anemia, loop recorder, p/w LE weakness. Patient has been having B/L LE weakness for a little less than 1 year. Symptoms have been getting progressively worse to the point where patient is unable to ambulate. Patient also has been getting worked up for anemia and approximately 40lb unintentional weight loss. She had outpatient CT C/A/P showing multiple bone mets and possible liver mets and path fracture of T11 vertebral body. Patient sent to ED for further evaluation. Patient evaluated by ortho with recommendation for MRI. Now POD #9 Left rib mass biopsy with US guidance, POD# 5 Left thoracotomy, Anterior thoracic corpectomy, fusion T10-12.     #Left rib mass biopsy, poorly differentiated carcinoma with neuroendocrine features  #Left thoracotomy, Anterior thoracic corpectomy, fusion T10-12  - Given findings of poorly differentiated carcinoma, will need to discuss treatment options/prognosis with patient and family () present). Family meeting tomorrow  - Heme/Onc and Palliative following and aware    #Symptomatic anemia   - s/p 1 unit of PRBC 12/4, Hgb stable     #Pneumonia, LLL opacity  - Continue Cefepime for tx of presumed pneumonia    #Urinary retention  Failed TOV, replaced Fuentes 12/8    #Hypothyroidism  - Continue Synthroid     #Hypertension  BP medications held yesterday due to soft pressures. Resume Toprol  Resume Verapamil tomorrow    #Severe protein-calorie malnutrition     GOC: Patient's spouse has been sick with a cold and unable to come to the hospital for family meeting. Palliative arranged for family meeting on Monday to discuss GOC/Prongosis given biopsy results of metastatic disease.    DNR/DNI with NIV  DVT ppx: Lovenox

## 2023-12-10 LAB
ANION GAP SERPL CALC-SCNC: 6 MMOL/L — SIGNIFICANT CHANGE UP (ref 5–17)
ANION GAP SERPL CALC-SCNC: 6 MMOL/L — SIGNIFICANT CHANGE UP (ref 5–17)
BUN SERPL-MCNC: 13 MG/DL — SIGNIFICANT CHANGE UP (ref 7–23)
BUN SERPL-MCNC: 13 MG/DL — SIGNIFICANT CHANGE UP (ref 7–23)
CALCIUM SERPL-MCNC: 8.1 MG/DL — LOW (ref 8.5–10.1)
CALCIUM SERPL-MCNC: 8.1 MG/DL — LOW (ref 8.5–10.1)
CHLORIDE SERPL-SCNC: 110 MMOL/L — HIGH (ref 96–108)
CHLORIDE SERPL-SCNC: 110 MMOL/L — HIGH (ref 96–108)
CO2 SERPL-SCNC: 26 MMOL/L — SIGNIFICANT CHANGE UP (ref 22–31)
CO2 SERPL-SCNC: 26 MMOL/L — SIGNIFICANT CHANGE UP (ref 22–31)
CREAT SERPL-MCNC: 0.49 MG/DL — LOW (ref 0.5–1.3)
CREAT SERPL-MCNC: 0.49 MG/DL — LOW (ref 0.5–1.3)
EGFR: 95 ML/MIN/1.73M2 — SIGNIFICANT CHANGE UP
EGFR: 95 ML/MIN/1.73M2 — SIGNIFICANT CHANGE UP
GLUCOSE SERPL-MCNC: 82 MG/DL — SIGNIFICANT CHANGE UP (ref 70–99)
GLUCOSE SERPL-MCNC: 82 MG/DL — SIGNIFICANT CHANGE UP (ref 70–99)
HCT VFR BLD CALC: 24.9 % — LOW (ref 34.5–45)
HCT VFR BLD CALC: 24.9 % — LOW (ref 34.5–45)
HGB BLD-MCNC: 8.2 G/DL — LOW (ref 11.5–15.5)
HGB BLD-MCNC: 8.2 G/DL — LOW (ref 11.5–15.5)
MAGNESIUM SERPL-MCNC: 2 MG/DL — SIGNIFICANT CHANGE UP (ref 1.6–2.6)
MAGNESIUM SERPL-MCNC: 2 MG/DL — SIGNIFICANT CHANGE UP (ref 1.6–2.6)
MCHC RBC-ENTMCNC: 30.7 PG — SIGNIFICANT CHANGE UP (ref 27–34)
MCHC RBC-ENTMCNC: 30.7 PG — SIGNIFICANT CHANGE UP (ref 27–34)
MCHC RBC-ENTMCNC: 32.9 GM/DL — SIGNIFICANT CHANGE UP (ref 32–36)
MCHC RBC-ENTMCNC: 32.9 GM/DL — SIGNIFICANT CHANGE UP (ref 32–36)
MCV RBC AUTO: 93.3 FL — SIGNIFICANT CHANGE UP (ref 80–100)
MCV RBC AUTO: 93.3 FL — SIGNIFICANT CHANGE UP (ref 80–100)
PLATELET # BLD AUTO: 164 K/UL — SIGNIFICANT CHANGE UP (ref 150–400)
PLATELET # BLD AUTO: 164 K/UL — SIGNIFICANT CHANGE UP (ref 150–400)
POTASSIUM SERPL-MCNC: 3.3 MMOL/L — LOW (ref 3.5–5.3)
POTASSIUM SERPL-MCNC: 3.3 MMOL/L — LOW (ref 3.5–5.3)
POTASSIUM SERPL-SCNC: 3.3 MMOL/L — LOW (ref 3.5–5.3)
POTASSIUM SERPL-SCNC: 3.3 MMOL/L — LOW (ref 3.5–5.3)
RBC # BLD: 2.67 M/UL — LOW (ref 3.8–5.2)
RBC # BLD: 2.67 M/UL — LOW (ref 3.8–5.2)
RBC # FLD: 16.5 % — HIGH (ref 10.3–14.5)
RBC # FLD: 16.5 % — HIGH (ref 10.3–14.5)
SODIUM SERPL-SCNC: 142 MMOL/L — SIGNIFICANT CHANGE UP (ref 135–145)
SODIUM SERPL-SCNC: 142 MMOL/L — SIGNIFICANT CHANGE UP (ref 135–145)
WBC # BLD: 9.64 K/UL — SIGNIFICANT CHANGE UP (ref 3.8–10.5)
WBC # BLD: 9.64 K/UL — SIGNIFICANT CHANGE UP (ref 3.8–10.5)
WBC # FLD AUTO: 9.64 K/UL — SIGNIFICANT CHANGE UP (ref 3.8–10.5)
WBC # FLD AUTO: 9.64 K/UL — SIGNIFICANT CHANGE UP (ref 3.8–10.5)

## 2023-12-10 PROCEDURE — 99232 SBSQ HOSP IP/OBS MODERATE 35: CPT

## 2023-12-10 RX ORDER — POTASSIUM CHLORIDE 20 MEQ
40 PACKET (EA) ORAL EVERY 4 HOURS
Refills: 0 | Status: COMPLETED | OUTPATIENT
Start: 2023-12-10 | End: 2023-12-10

## 2023-12-10 RX ORDER — OXYCODONE HYDROCHLORIDE 5 MG/1
10 TABLET ORAL EVERY 4 HOURS
Refills: 0 | Status: DISCONTINUED | OUTPATIENT
Start: 2023-12-10 | End: 2023-12-16

## 2023-12-10 RX ORDER — OXYCODONE HYDROCHLORIDE 5 MG/1
5 TABLET ORAL EVERY 4 HOURS
Refills: 0 | Status: DISCONTINUED | OUTPATIENT
Start: 2023-12-10 | End: 2023-12-16

## 2023-12-10 RX ADMIN — Medication 40 MILLIEQUIVALENT(S): at 21:29

## 2023-12-10 RX ADMIN — Medication 25 MILLIGRAM(S): at 10:35

## 2023-12-10 RX ADMIN — Medication 40 MILLIEQUIVALENT(S): at 19:19

## 2023-12-10 RX ADMIN — Medication 600 MILLIGRAM(S): at 19:23

## 2023-12-10 RX ADMIN — PANTOPRAZOLE SODIUM 40 MILLIGRAM(S): 20 TABLET, DELAYED RELEASE ORAL at 10:35

## 2023-12-10 RX ADMIN — CARBIDOPA AND LEVODOPA 1 TABLET(S): 25; 100 TABLET ORAL at 06:04

## 2023-12-10 RX ADMIN — ENOXAPARIN SODIUM 40 MILLIGRAM(S): 100 INJECTION SUBCUTANEOUS at 21:29

## 2023-12-10 RX ADMIN — CEFEPIME 2000 MILLIGRAM(S): 1 INJECTION, POWDER, FOR SOLUTION INTRAMUSCULAR; INTRAVENOUS at 21:30

## 2023-12-10 RX ADMIN — CEFEPIME 2000 MILLIGRAM(S): 1 INJECTION, POWDER, FOR SOLUTION INTRAMUSCULAR; INTRAVENOUS at 13:46

## 2023-12-10 RX ADMIN — Medication 325 MILLIGRAM(S): at 10:34

## 2023-12-10 RX ADMIN — Medication 50 MICROGRAM(S): at 06:04

## 2023-12-10 RX ADMIN — CEFEPIME 2000 MILLIGRAM(S): 1 INJECTION, POWDER, FOR SOLUTION INTRAMUSCULAR; INTRAVENOUS at 06:03

## 2023-12-10 RX ADMIN — CARBIDOPA AND LEVODOPA 1 TABLET(S): 25; 100 TABLET ORAL at 23:16

## 2023-12-10 RX ADMIN — PRAMIPEXOLE DIHYDROCHLORIDE 0.12 MILLIGRAM(S): 0.12 TABLET ORAL at 21:30

## 2023-12-10 RX ADMIN — BUPROPION HYDROCHLORIDE 100 MILLIGRAM(S): 150 TABLET, EXTENDED RELEASE ORAL at 10:34

## 2023-12-10 RX ADMIN — CARBIDOPA AND LEVODOPA 1 TABLET(S): 25; 100 TABLET ORAL at 17:43

## 2023-12-10 RX ADMIN — ATORVASTATIN CALCIUM 40 MILLIGRAM(S): 80 TABLET, FILM COATED ORAL at 21:29

## 2023-12-10 RX ADMIN — DONEPEZIL HYDROCHLORIDE 10 MILLIGRAM(S): 10 TABLET, FILM COATED ORAL at 21:29

## 2023-12-10 RX ADMIN — OXYCODONE HYDROCHLORIDE 10 MILLIGRAM(S): 5 TABLET ORAL at 21:29

## 2023-12-10 RX ADMIN — CARBIDOPA AND LEVODOPA 1 TABLET(S): 25; 100 TABLET ORAL at 13:47

## 2023-12-10 NOTE — PROGRESS NOTE ADULT - ASSESSMENT
82 y/o F w/ PMH of HTN, dyslipidemia, GERD, parkinson's, neuropathy, anemia, loop recorder admitted for:     #Left rib mass biopsy, poorly differentiated carcinoma with neuroendocrine features  # s/p Left thoracotomy, Anterior thoracic corpectomy, fusion T10-12  - Given findings of poorly differentiated carcinoma, will need to discuss treatment options/prognosis with patient and family () present). Family meeting tomorrow  - Heme/Onc and Palliative following and aware, recs appreciated     #Symptomatic anemia   - s/p 1 unit of PRBC 12/4, Hgb stable     #  LLL Pneumonia, suspected GNR bacteria   - Continue Cefepime for tx of presumed pneumonia  - Start Mucinex  - Incentive spintometer   - Control pain   - NC as needed       #Urinary retention  Failed TOV, replaced Fuentes 12/8    #Hypothyroidism  - Continue Synthroid     #Hypertension  C/w  Toprol  Verapamil on hold     #Severe protein-calorie malnutrition     GOC:  Palliative arranged for family meeting on Monday to discuss GOC/Prongosis given biopsy results of metastatic disease.    DNR/DNI with NIV  DVT ppx: Lovenox    84 y/o F w/ PMH of HTN, dyslipidemia, GERD, parkinson's, neuropathy, anemia, loop recorder admitted for:     #Left rib mass biopsy, poorly differentiated carcinoma with neuroendocrine features  # s/p Left thoracotomy, Anterior thoracic corpectomy, fusion T10-12  - Given findings of poorly differentiated carcinoma, will need to discuss treatment options/prognosis with patient and family () present). Family meeting tomorrow  - Heme/Onc and Palliative following and aware, recs appreciated     #Symptomatic anemia   - s/p 1 unit of PRBC 12/4, Hgb stable     #  LLL Pneumonia, suspected GNR bacteria   - Continue Cefepime for tx of presumed pneumonia  - Start Mucinex  - Incentive spintometer   - Control pain   - NC as needed       #Urinary retention  Failed TOV, replaced Fuentes 12/8    #Hypothyroidism  - Continue Synthroid     #Hypertension  C/w  Toprol  Verapamil on hold     #Severe protein-calorie malnutrition     GOC:  Palliative arranged for family meeting on Monday to discuss GOC/Prongosis given biopsy results of metastatic disease.    DNR/DNI with NIV  DVT ppx: Lovenox

## 2023-12-10 NOTE — PROGRESS NOTE ADULT - SUBJECTIVE AND OBJECTIVE BOX
CC: LE weakness (10 Dec 2023 12:13)    HPI:  82 y/o F w/ PMH of HTN, dyslipidemia, GERD, parkinson's, neuropathy, anemia, loop recorder, p/w LE weakness. Patient has been having B/L LE weakness for a little less than 1 year. Symptoms have been getting progressively worse to the point where patient is unable to ambulate. Patient also has been getting worked up for anemia and approximately 40lb unintentional weight loss. She had outpatient CT C/A/P showing multiple bone mets and possible liver mets and path fracture of T11 vertebral body. Patient sent to ED for further evaluation. Patient evaluated by ortho with recommendation for MRI. Patient has a loop recorder in placed by Dr. Crawley, and patient is unsure if it is compatible with MRI. Will consult Dr. Crawley for input prior to MRI. Patient also C/o LE paresthesias and back pain.       INTERVAL HPI/OVERNIGHT EVENTS: Pt was seen and examined, reports feels SOB and unable to cough to bring up phlegm de to pain at site of procedure.  pouse at bedside POC discussed, awaiting for family meeting tomorrow     Vital Signs Last 24 Hrs  T(C): 36.5 (10 Dec 2023 23:00), Max: 37 (10 Dec 2023 16:19)  T(F): 97.7 (10 Dec 2023 23:00), Max: 98.6 (10 Dec 2023 16:19)  HR: 76 (10 Dec 2023 23:00) (76 - 81)  BP: 131/76 (10 Dec 2023 23:00) (115/73 - 135/67)  RR: 19 (10 Dec 2023 23:00) (18 - 19)  SpO2: 100% (10 Dec 2023 23:00) (91% - 100%)        REVIEW OF SYSTEMS:    All other review of systems is negative unless indicated above.      PHYSICAL EXAM:  General: in no acute distress  Eyes: EOMI; conjunctiva and sclera clear  Head: Normocephalic; atraumatic  ENMT: No nasal discharge; airway clear  Neck: Supple; non tender; no masses  Respiratory:  diminished BS  at bases, poor effort No wheezes  Cardiovascular: Regular rate and rhythm. S1 and S2 Normal; No murmurs, gallops or rubs  Gastrointestinal: Soft non-tender non-distended; Normal bowel sounds  Genitourinary: No  suprapubic  tenderness  Extremities: No  edema  Neurological: Alert and oriented x 3, non focal, speech is clear   Skin: Warm and dry. No acute rash  Musculoskeletal: Normal muscle tone, without deformities  Psychiatric: Cooperative and appropriate    LABS:                        8.2    9.64  )-----------( 164      ( 10 Dec 2023 07:19 )             24.9     10 Dec 2023 08:10    142    |  110    |  13     ----------------------------<  82     3.3     |  26     |  0.49     Ca    8.1        10 Dec 2023 08:10  Mg     2.0       10 Dec 2023 08:10          Urinalysis Basic - ( 10 Dec 2023 08:10 )    Color: x / Appearance: x / SG: x / pH: x  Gluc: 82 mg/dL / Ketone: x  / Bili: x / Urobili: x   Blood: x / Protein: x / Nitrite: x   Leuk Esterase: x / RBC: x / WBC x   Sq Epi: x / Non Sq Epi: x / Bacteria: x        MEDICATIONS  (STANDING):  atorvastatin 40 milliGRAM(s) Oral at bedtime  buPROPion . 100 milliGRAM(s) Oral daily  carbidopa/levodopa  25/100 1 Tablet(s) Oral four times a day  cefepime  Injectable. 2000 milliGRAM(s) IV Push every 8 hours  donepezil 10 milliGRAM(s) Oral at bedtime  enoxaparin Injectable 40 milliGRAM(s) SubCutaneous every 24 hours  ferrous    sulfate 325 milliGRAM(s) Oral daily  guaiFENesin  milliGRAM(s) Oral every 12 hours  influenza  Vaccine (HIGH DOSE) 0.7 milliLiter(s) IntraMuscular once  levothyroxine 50 MICROGram(s) Oral daily  metoprolol succinate ER 25 milliGRAM(s) Oral daily  naloxone Injectable 0.4 milliGRAM(s) IV Push once  pantoprazole    Tablet 40 milliGRAM(s) Oral before breakfast  polyethylene glycol 3350 17 Gram(s) Oral daily  pramipexole 0.125 milliGRAM(s) Oral at bedtime  senna 2 Tablet(s) Oral at bedtime    MEDICATIONS  (PRN):  acetaminophen     Tablet .. 650 milliGRAM(s) Oral every 6 hours PRN Mild Pain (1 - 3), Moderate Pain (4 - 6)  bisacodyl 5 milliGRAM(s) Oral daily PRN Constipation  oxyCODONE    IR 5 milliGRAM(s) Oral every 4 hours PRN Moderate Pain (4 - 6)  oxyCODONE    IR 10 milliGRAM(s) Oral every 4 hours PRN Severe Pain (7 - 10)      RADIOLOGY & ADDITIONAL TESTS:

## 2023-12-10 NOTE — PROGRESS NOTE ADULT - SUBJECTIVE AND OBJECTIVE BOX
POST OPERATIVE DAY #:  9  STATUS POST:   Thoracotomy, anterior corpectomy T11, ASF with cage/instr T10-12                   SUBJECTIVE: Patient seen and examined. Comfortable. No new complaints.    OBJECTIVE:     Vital Signs Last 24 Hrs  T(C): 36.9 (10 Dec 2023 07:36), Max: 36.9 (10 Dec 2023 07:36)  T(F): 98.4 (10 Dec 2023 07:36), Max: 98.4 (10 Dec 2023 07:36)  HR: 81 (10 Dec 2023 07:36) (73 - 81)  BP: 135/67 (10 Dec 2023 07:36) (116/71 - 135/67)  BP(mean): --  RR: 18 (10 Dec 2023 07:36) (17 - 18)  SpO2: 91% (10 Dec 2023 08:41) (91% - 92%)    Parameters below as of 10 Dec 2023 08:41  Patient On (Oxygen Delivery Method): room air        Awake and alert  HEENT: unremarkable  Neck: supple  Chest:          Incision: clean/dry/intact      Bilateral Upper Extremities:         Good Motor Strength         Sensation intact  Bilateral Lower Extremities:          Neuro stable                   I&O's Detail    09 Dec 2023 07:01  -  10 Dec 2023 07:00  --------------------------------------------------------  IN:  Total IN: 0 mL    OUT:    Indwelling Catheter - Urethral (mL): 1200 mL  Total OUT: 1200 mL    Total NET: -1200 mL          LABS:                        8.2    9.64  )-----------( 164      ( 10 Dec 2023 07:19 )             24.9     12-10    142  |  110<H>  |  13  ----------------------------<  82  3.3<L>   |  26  |  0.49<L>    Ca    8.1<L>      10 Dec 2023 08:10  Mg     2.0     12-10        A/P :  83y Female s/p  Thoracotomy, anterior corpectomy T11, ASF with cage/instr T10-12           POD # 9  -    Pain control  -    DVT ppx: SCDs    -    OOB as tolerated  -    Physical Therapy, re; ambulation  -    Continue present treatment  -    Continue treatment as per RT, Oncology  -    Spine well decompressed/stabilzed. No additional spinal surgery anticipated at this time

## 2023-12-11 LAB
ANION GAP SERPL CALC-SCNC: 4 MMOL/L — LOW (ref 5–17)
ANION GAP SERPL CALC-SCNC: 4 MMOL/L — LOW (ref 5–17)
BUN SERPL-MCNC: 13 MG/DL — SIGNIFICANT CHANGE UP (ref 7–23)
BUN SERPL-MCNC: 13 MG/DL — SIGNIFICANT CHANGE UP (ref 7–23)
CALCIUM SERPL-MCNC: 8.6 MG/DL — SIGNIFICANT CHANGE UP (ref 8.5–10.1)
CALCIUM SERPL-MCNC: 8.6 MG/DL — SIGNIFICANT CHANGE UP (ref 8.5–10.1)
CHLORIDE SERPL-SCNC: 109 MMOL/L — HIGH (ref 96–108)
CHLORIDE SERPL-SCNC: 109 MMOL/L — HIGH (ref 96–108)
CO2 SERPL-SCNC: 27 MMOL/L — SIGNIFICANT CHANGE UP (ref 22–31)
CO2 SERPL-SCNC: 27 MMOL/L — SIGNIFICANT CHANGE UP (ref 22–31)
CREAT SERPL-MCNC: 0.6 MG/DL — SIGNIFICANT CHANGE UP (ref 0.5–1.3)
CREAT SERPL-MCNC: 0.6 MG/DL — SIGNIFICANT CHANGE UP (ref 0.5–1.3)
EGFR: 90 ML/MIN/1.73M2 — SIGNIFICANT CHANGE UP
EGFR: 90 ML/MIN/1.73M2 — SIGNIFICANT CHANGE UP
GLUCOSE SERPL-MCNC: 83 MG/DL — SIGNIFICANT CHANGE UP (ref 70–99)
GLUCOSE SERPL-MCNC: 83 MG/DL — SIGNIFICANT CHANGE UP (ref 70–99)
HCT VFR BLD CALC: 26.6 % — LOW (ref 34.5–45)
HCT VFR BLD CALC: 26.6 % — LOW (ref 34.5–45)
HGB BLD-MCNC: 8.4 G/DL — LOW (ref 11.5–15.5)
HGB BLD-MCNC: 8.4 G/DL — LOW (ref 11.5–15.5)
MCHC RBC-ENTMCNC: 29.7 PG — SIGNIFICANT CHANGE UP (ref 27–34)
MCHC RBC-ENTMCNC: 29.7 PG — SIGNIFICANT CHANGE UP (ref 27–34)
MCHC RBC-ENTMCNC: 31.6 GM/DL — LOW (ref 32–36)
MCHC RBC-ENTMCNC: 31.6 GM/DL — LOW (ref 32–36)
MCV RBC AUTO: 94 FL — SIGNIFICANT CHANGE UP (ref 80–100)
MCV RBC AUTO: 94 FL — SIGNIFICANT CHANGE UP (ref 80–100)
PLATELET # BLD AUTO: 112 K/UL — LOW (ref 150–400)
PLATELET # BLD AUTO: 112 K/UL — LOW (ref 150–400)
POTASSIUM SERPL-MCNC: 4.4 MMOL/L — SIGNIFICANT CHANGE UP (ref 3.5–5.3)
POTASSIUM SERPL-MCNC: 4.4 MMOL/L — SIGNIFICANT CHANGE UP (ref 3.5–5.3)
POTASSIUM SERPL-SCNC: 4.4 MMOL/L — SIGNIFICANT CHANGE UP (ref 3.5–5.3)
POTASSIUM SERPL-SCNC: 4.4 MMOL/L — SIGNIFICANT CHANGE UP (ref 3.5–5.3)
RBC # BLD: 2.83 M/UL — LOW (ref 3.8–5.2)
RBC # BLD: 2.83 M/UL — LOW (ref 3.8–5.2)
RBC # FLD: 16.3 % — HIGH (ref 10.3–14.5)
RBC # FLD: 16.3 % — HIGH (ref 10.3–14.5)
SODIUM SERPL-SCNC: 140 MMOL/L — SIGNIFICANT CHANGE UP (ref 135–145)
SODIUM SERPL-SCNC: 140 MMOL/L — SIGNIFICANT CHANGE UP (ref 135–145)
WBC # BLD: 9.87 K/UL — SIGNIFICANT CHANGE UP (ref 3.8–10.5)
WBC # BLD: 9.87 K/UL — SIGNIFICANT CHANGE UP (ref 3.8–10.5)
WBC # FLD AUTO: 9.87 K/UL — SIGNIFICANT CHANGE UP (ref 3.8–10.5)
WBC # FLD AUTO: 9.87 K/UL — SIGNIFICANT CHANGE UP (ref 3.8–10.5)

## 2023-12-11 PROCEDURE — 99233 SBSQ HOSP IP/OBS HIGH 50: CPT

## 2023-12-11 PROCEDURE — 71045 X-RAY EXAM CHEST 1 VIEW: CPT | Mod: 26

## 2023-12-11 PROCEDURE — 99497 ADVNCD CARE PLAN 30 MIN: CPT

## 2023-12-11 RX ORDER — IPRATROPIUM/ALBUTEROL SULFATE 18-103MCG
3 AEROSOL WITH ADAPTER (GRAM) INHALATION EVERY 6 HOURS
Refills: 0 | Status: DISCONTINUED | OUTPATIENT
Start: 2023-12-11 | End: 2023-12-16

## 2023-12-11 RX ADMIN — CARBIDOPA AND LEVODOPA 1 TABLET(S): 25; 100 TABLET ORAL at 18:07

## 2023-12-11 RX ADMIN — PANTOPRAZOLE SODIUM 40 MILLIGRAM(S): 20 TABLET, DELAYED RELEASE ORAL at 11:50

## 2023-12-11 RX ADMIN — Medication 600 MILLIGRAM(S): at 21:54

## 2023-12-11 RX ADMIN — PRAMIPEXOLE DIHYDROCHLORIDE 0.12 MILLIGRAM(S): 0.12 TABLET ORAL at 21:53

## 2023-12-11 RX ADMIN — SENNA PLUS 2 TABLET(S): 8.6 TABLET ORAL at 21:54

## 2023-12-11 RX ADMIN — OXYCODONE HYDROCHLORIDE 5 MILLIGRAM(S): 5 TABLET ORAL at 11:50

## 2023-12-11 RX ADMIN — Medication 325 MILLIGRAM(S): at 11:50

## 2023-12-11 RX ADMIN — OXYCODONE HYDROCHLORIDE 10 MILLIGRAM(S): 5 TABLET ORAL at 21:55

## 2023-12-11 RX ADMIN — Medication 25 MILLIGRAM(S): at 11:51

## 2023-12-11 RX ADMIN — CEFEPIME 2000 MILLIGRAM(S): 1 INJECTION, POWDER, FOR SOLUTION INTRAMUSCULAR; INTRAVENOUS at 06:00

## 2023-12-11 RX ADMIN — Medication 50 MICROGRAM(S): at 06:00

## 2023-12-11 RX ADMIN — OXYCODONE HYDROCHLORIDE 10 MILLIGRAM(S): 5 TABLET ORAL at 05:59

## 2023-12-11 RX ADMIN — CEFEPIME 2000 MILLIGRAM(S): 1 INJECTION, POWDER, FOR SOLUTION INTRAMUSCULAR; INTRAVENOUS at 21:59

## 2023-12-11 RX ADMIN — BUPROPION HYDROCHLORIDE 100 MILLIGRAM(S): 150 TABLET, EXTENDED RELEASE ORAL at 11:50

## 2023-12-11 RX ADMIN — CARBIDOPA AND LEVODOPA 1 TABLET(S): 25; 100 TABLET ORAL at 06:00

## 2023-12-11 RX ADMIN — CARBIDOPA AND LEVODOPA 1 TABLET(S): 25; 100 TABLET ORAL at 11:50

## 2023-12-11 RX ADMIN — CARBIDOPA AND LEVODOPA 1 TABLET(S): 25; 100 TABLET ORAL at 22:00

## 2023-12-11 RX ADMIN — Medication 600 MILLIGRAM(S): at 11:49

## 2023-12-11 RX ADMIN — DONEPEZIL HYDROCHLORIDE 10 MILLIGRAM(S): 10 TABLET, FILM COATED ORAL at 21:54

## 2023-12-11 RX ADMIN — POLYETHYLENE GLYCOL 3350 17 GRAM(S): 17 POWDER, FOR SOLUTION ORAL at 19:03

## 2023-12-11 RX ADMIN — ATORVASTATIN CALCIUM 40 MILLIGRAM(S): 80 TABLET, FILM COATED ORAL at 21:54

## 2023-12-11 RX ADMIN — ENOXAPARIN SODIUM 40 MILLIGRAM(S): 100 INJECTION SUBCUTANEOUS at 21:54

## 2023-12-11 RX ADMIN — CEFEPIME 2000 MILLIGRAM(S): 1 INJECTION, POWDER, FOR SOLUTION INTRAMUSCULAR; INTRAVENOUS at 16:33

## 2023-12-11 NOTE — PROGRESS NOTE ADULT - ASSESSMENT
84 y/o F w/ PMH of HTN, dyslipidemia, GERD, parkinson's, neuropathy, anemia, loop recorder admitted for:     #Left rib mass biopsy, poorly differentiated carcinoma with neuroendocrine features  # s/p Left thoracotomy, Anterior thoracic corpectomy, fusion T10-12  - Given findings of poorly differentiated carcinoma, will need to discuss treatment options/prognosis with patient and family () present). Family meeting tomorrow  - Heme/Onc and Palliative following and aware, recs appreciated     #Symptomatic anemia   - s/p 1 unit of PRBC 12/4, Hgb stable     #  LLL Pneumonia, suspected GNR bacteria   - Continue Cefepime for tx of presumed pneumonia day 6   - C/w  Mucinex  - Incentive spintometer   - start Duonebs and Chest PT   - OOB to chair as tolerated   - Control pain   - C/w NC to keep O2 sats >92%  - Repeat CXR done, personally reviewed, no consolidation, no significant effusions, LLL atelectasis       #Urinary retention  Failed TOV, replaced Fuentes 12/8    #Hypothyroidism  - Continue Synthroid     #Hypertension  C/w  Toprol  Verapamil on hold     #Severe protein-calorie malnutrition     GOC:  Palliative meeting  today, family  is not ready to  do comfort yet, would like pt to go to rehab when stable     DNR/DNI with NIV  DVT ppx: Lovenox    82 y/o F w/ PMH of HTN, dyslipidemia, GERD, parkinson's, neuropathy, anemia, loop recorder admitted for:     #Left rib mass biopsy, poorly differentiated carcinoma with neuroendocrine features  # s/p Left thoracotomy, Anterior thoracic corpectomy, fusion T10-12  - Given findings of poorly differentiated carcinoma, will need to discuss treatment options/prognosis with patient and family () present). Family meeting tomorrow  - Heme/Onc and Palliative following and aware, recs appreciated     #Symptomatic anemia   - s/p 1 unit of PRBC 12/4, Hgb stable     #  LLL Pneumonia, suspected GNR bacteria   - Continue Cefepime for tx of presumed pneumonia day 6   - C/w  Mucinex  - Incentive spintometer   - start Duonebs and Chest PT   - OOB to chair as tolerated   - Control pain   - C/w NC to keep O2 sats >92%  - Repeat CXR done, personally reviewed, no consolidation, no significant effusions, LLL atelectasis       #Urinary retention  Failed TOV, replaced Fuentes 12/8    #Hypothyroidism  - Continue Synthroid     #Hypertension  C/w  Toprol  Verapamil on hold     #Severe protein-calorie malnutrition     GOC:  Palliative meeting  today, family  is not ready to  do comfort yet, would like pt to go to rehab when stable     DNR/DNI with NIV  DVT ppx: Lovenox

## 2023-12-11 NOTE — PROGRESS NOTE ADULT - ASSESSMENT
84 y/o F w/ PMH of HTN, dyslipidemia, GERD, parkinson's, neuropathy, anemia, loop recorder, p/w LE weakness.    POD#14 Left rib mass biopsy with US guidance  POD#10 Left thoracotomy, Anterior thoracic corpectomy, fusion T10-12.    - Pain control PRN  - Cont PT/OT, ambulate as tolerated.  BC recommended.   - Cont medical management per hospitalist.   - SCDS, lovenox DVT chemoprophylaxis  - Family meeting today    D/w Dr Ceballos 82 y/o F w/ PMH of HTN, dyslipidemia, GERD, parkinson's, neuropathy, anemia, loop recorder, p/w LE weakness.    POD#14 Left rib mass biopsy with US guidance  POD#10 Left thoracotomy, Anterior thoracic corpectomy, fusion T10-12.    - Pain control PRN  - Cont PT/OT, ambulate as tolerated.  BC recommended.   - Cont medical management per hospitalist.   - SCDS, lovenox DVT chemoprophylaxis  - Family meeting today    D/w Dr Ceballos

## 2023-12-11 NOTE — GOALS OF CARE CONVERSATION - ADVANCED CARE PLANNING - CONVERSATION DETAILS
Palliative team met with Pt and spouse at the bedside to further discuss GOC and determine plans.  Pt was confused at times during our conversation, capacity noted to wax and wane, she also had just received pain medication as well.  Spouse shared what they have discussed with the medical team including that at this time Pt is unable to participate in treatment for her cancer as she is weak and debilitated.  Spouse shared the hope for Pt to regain strength as he is hopeful that she will be able to participate in treatment in the future.  We discussed if Pt does not improve changing the focus to comfort when treatment is not an option.  Spouse shared that his wifes comfort is most important to him and he is aware she may not be able to participate in treatment but desires BC in hopes it may be an option in the future.  Spouses feelings explored.  Support provided.    Plan for BC at [place] when able.  Emotional support provided.  DNR/DNI in place.  Will continue to follow up and offer support.  Spouse aware of palliative team availability.  Our team to continue to follow. Palliative team met with Pt and spouse at the bedside to further discuss GOC and determine plans.  Pt was confused at times during our conversation, capacity noted to wax and wane, she also had just received pain medication as well.  Spouse shared what they have discussed with the medical team including that at this time Pt is unable to participate in treatment for her cancer as she is weak and debilitated.  Spouse shared the hope for Pt to regain strength as he is hopeful that she will be able to participate in treatment in the future.  We discussed if Pt does not improve changing the focus to comfort when treatment is not an option.  Spouse shared that his wifes comfort is most important to him and he is aware she may not be able to participate in treatment but desires BC in hopes it may be an option in the future.  Spouses feelings explored.  Support provided.    Plan for BC at [place] when able.  Emotional support provided.  DNR/DNI in place.  Will continue to follow up and offer support.  Spouse aware of palliative team availability.  Our team to continue to follow.

## 2023-12-11 NOTE — PROGRESS NOTE ADULT - SUBJECTIVE AND OBJECTIVE BOX
CC: LE weakness (10 Dec 2023 12:13)    HPI:  82 y/o F w/ PMH of HTN, dyslipidemia, GERD, parkinson's, neuropathy, anemia, loop recorder, p/w LE weakness. Patient has been having B/L LE weakness for a little less than 1 year. Symptoms have been getting progressively worse to the point where patient is unable to ambulate. Patient also has been getting worked up for anemia and approximately 40lb unintentional weight loss. She had outpatient CT C/A/P showing multiple bone mets and possible liver mets and path fracture of T11 vertebral body. Patient sent to ED for further evaluation. Patient evaluated by ortho with recommendation for MRI. Patient has a loop recorder in placed by Dr. Crawley, and patient is unsure if it is compatible with MRI. Will consult Dr. Crawley for input prior to MRI. Patient also C/o LE paresthesias and back pain.       INTERVAL HPI/OVERNIGHT EVENTS: Pt was seen and examined, has difficulty bring up phlegm, coughing, feels SOB. On O2. felt better after Chest PT, was able to bring up  phlegm       Vital Signs Last 24 Hrs  T(C): 36.6 (11 Dec 2023 16:00), Max: 36.6 (11 Dec 2023 16:00)  T(F): 97.8 (11 Dec 2023 16:00), Max: 97.8 (11 Dec 2023 16:00)  HR: 85 (11 Dec 2023 16:00) (73 - 85)  BP: 126/89 (11 Dec 2023 16:00) (126/89 - 131/69)  RR: 18 (11 Dec 2023 16:00) (18 - 18)  SpO2: 95% (11 Dec 2023 16:39) (92% - 98%)    Parameters below as of 11 Dec 2023 16:39  Patient On (Oxygen Delivery Method): nasal cannula  O2 Flow (L/min): 4      REVIEW OF SYSTEMS:    All other review of systems is negative unless indicated above.      PHYSICAL EXAM:  General: in no acute distress  Eyes: EOMI; conjunctiva and sclera clear  Head: Normocephalic; atraumatic  ENMT: No nasal discharge; airway clear  Neck: Supple; non tender; no masses  Respiratory:  diminished BS  at bases, poor effort No wheezes  Cardiovascular: Regular rate and rhythm. S1 and S2 Normal; No murmurs, gallops or rubs  Gastrointestinal: Soft non-tender non-distended; Normal bowel sounds  Genitourinary: No  suprapubic  tenderness  Extremities: No  edema  Neurological: Alert and oriented x 3, non focal, speech is clear   Skin: Warm and dry. No acute rash  Musculoskeletal: Normal muscle tone, without deformities  Psychiatric: Cooperative and appropriate    LABS:                        8.4    9.87  )-----------( 112      ( 11 Dec 2023 07:53 )             26.6     12-11    140  |  109<H>  |  13  ----------------------------<  83  4.4   |  27  |  0.60    Ca    8.6      11 Dec 2023 07:53  Mg     2.0     12-10                            8.2    9.64  )-----------( 164      ( 10 Dec 2023 07:19 )             24.9     10 Dec 2023 08:10    142    |  110    |  13     ----------------------------<  82     3.3     |  26     |  0.49     Ca    8.1        10 Dec 2023 08:10  Mg     2.0       10 Dec 2023 08:10          Urinalysis Basic - ( 10 Dec 2023 08:10 )    Color: x / Appearance: x / SG: x / pH: x  Gluc: 82 mg/dL / Ketone: x  / Bili: x / Urobili: x   Blood: x / Protein: x / Nitrite: x   Leuk Esterase: x / RBC: x / WBC x   Sq Epi: x / Non Sq Epi: x / Bacteria: x        MEDICATIONS  (STANDING):  atorvastatin 40 milliGRAM(s) Oral at bedtime  buPROPion . 100 milliGRAM(s) Oral daily  carbidopa/levodopa  25/100 1 Tablet(s) Oral four times a day  cefepime  Injectable. 2000 milliGRAM(s) IV Push every 8 hours  donepezil 10 milliGRAM(s) Oral at bedtime  enoxaparin Injectable 40 milliGRAM(s) SubCutaneous every 24 hours  ferrous    sulfate 325 milliGRAM(s) Oral daily  guaiFENesin  milliGRAM(s) Oral every 12 hours  influenza  Vaccine (HIGH DOSE) 0.7 milliLiter(s) IntraMuscular once  levothyroxine 50 MICROGram(s) Oral daily  metoprolol succinate ER 25 milliGRAM(s) Oral daily  naloxone Injectable 0.4 milliGRAM(s) IV Push once  pantoprazole    Tablet 40 milliGRAM(s) Oral before breakfast  polyethylene glycol 3350 17 Gram(s) Oral daily  pramipexole 0.125 milliGRAM(s) Oral at bedtime  senna 2 Tablet(s) Oral at bedtime    MEDICATIONS  (PRN):  acetaminophen     Tablet .. 650 milliGRAM(s) Oral every 6 hours PRN Mild Pain (1 - 3), Moderate Pain (4 - 6)  bisacodyl 5 milliGRAM(s) Oral daily PRN Constipation  oxyCODONE    IR 5 milliGRAM(s) Oral every 4 hours PRN Moderate Pain (4 - 6)  oxyCODONE    IR 10 milliGRAM(s) Oral every 4 hours PRN Severe Pain (7 - 10)      RADIOLOGY & ADDITIONAL TESTS:    ACC: [id number] EXAM:  XR CHEST PORTABLE IMMED 1V   ORDERED BY: [employer]ONE [employer]AND     PROCEDURE DATE:  12/11/2023          INTERPRETATION:  AP chest on December 11, 2023 11:53 AM. Patient is short   of breath and hypoxic.    Heart magnified by technique. Left loop recorder and spinal hardware   around thoracolumbar junction again noted. Old fracture deformity left   upper humerus again seen.    Left mid upper outer pleural based loculated fluid is again noted.    Scarlike changes on a chronic right rib fractures and right upper outer   chest again seen.    Above findings are similar to December 5.    The present film shows some mild atelectatic changes at left base.    IMPRESSION: Mild left base atelectatic changes are new since prior. Other   findings stable.   CC: LE weakness (10 Dec 2023 12:13)    HPI:  84 y/o F w/ PMH of HTN, dyslipidemia, GERD, parkinson's, neuropathy, anemia, loop recorder, p/w LE weakness. Patient has been having B/L LE weakness for a little less than 1 year. Symptoms have been getting progressively worse to the point where patient is unable to ambulate. Patient also has been getting worked up for anemia and approximately 40lb unintentional weight loss. She had outpatient CT C/A/P showing multiple bone mets and possible liver mets and path fracture of T11 vertebral body. Patient sent to ED for further evaluation. Patient evaluated by ortho with recommendation for MRI. Patient has a loop recorder in placed by Dr. Crawley, and patient is unsure if it is compatible with MRI. Will consult Dr. Crawley for input prior to MRI. Patient also C/o LE paresthesias and back pain.       INTERVAL HPI/OVERNIGHT EVENTS: Pt was seen and examined, has difficulty bring up phlegm, coughing, feels SOB. On O2. felt better after Chest PT, was able to bring up  phlegm       Vital Signs Last 24 Hrs  T(C): 36.6 (11 Dec 2023 16:00), Max: 36.6 (11 Dec 2023 16:00)  T(F): 97.8 (11 Dec 2023 16:00), Max: 97.8 (11 Dec 2023 16:00)  HR: 85 (11 Dec 2023 16:00) (73 - 85)  BP: 126/89 (11 Dec 2023 16:00) (126/89 - 131/69)  RR: 18 (11 Dec 2023 16:00) (18 - 18)  SpO2: 95% (11 Dec 2023 16:39) (92% - 98%)    Parameters below as of 11 Dec 2023 16:39  Patient On (Oxygen Delivery Method): nasal cannula  O2 Flow (L/min): 4      REVIEW OF SYSTEMS:    All other review of systems is negative unless indicated above.      PHYSICAL EXAM:  General: in no acute distress  Eyes: EOMI; conjunctiva and sclera clear  Head: Normocephalic; atraumatic  ENMT: No nasal discharge; airway clear  Neck: Supple; non tender; no masses  Respiratory:  diminished BS  at bases, poor effort No wheezes  Cardiovascular: Regular rate and rhythm. S1 and S2 Normal; No murmurs, gallops or rubs  Gastrointestinal: Soft non-tender non-distended; Normal bowel sounds  Genitourinary: No  suprapubic  tenderness  Extremities: No  edema  Neurological: Alert and oriented x 3, non focal, speech is clear   Skin: Warm and dry. No acute rash  Musculoskeletal: Normal muscle tone, without deformities  Psychiatric: Cooperative and appropriate    LABS:                        8.4    9.87  )-----------( 112      ( 11 Dec 2023 07:53 )             26.6     12-11    140  |  109<H>  |  13  ----------------------------<  83  4.4   |  27  |  0.60    Ca    8.6      11 Dec 2023 07:53  Mg     2.0     12-10                            8.2    9.64  )-----------( 164      ( 10 Dec 2023 07:19 )             24.9     10 Dec 2023 08:10    142    |  110    |  13     ----------------------------<  82     3.3     |  26     |  0.49     Ca    8.1        10 Dec 2023 08:10  Mg     2.0       10 Dec 2023 08:10          Urinalysis Basic - ( 10 Dec 2023 08:10 )    Color: x / Appearance: x / SG: x / pH: x  Gluc: 82 mg/dL / Ketone: x  / Bili: x / Urobili: x   Blood: x / Protein: x / Nitrite: x   Leuk Esterase: x / RBC: x / WBC x   Sq Epi: x / Non Sq Epi: x / Bacteria: x        MEDICATIONS  (STANDING):  atorvastatin 40 milliGRAM(s) Oral at bedtime  buPROPion . 100 milliGRAM(s) Oral daily  carbidopa/levodopa  25/100 1 Tablet(s) Oral four times a day  cefepime  Injectable. 2000 milliGRAM(s) IV Push every 8 hours  donepezil 10 milliGRAM(s) Oral at bedtime  enoxaparin Injectable 40 milliGRAM(s) SubCutaneous every 24 hours  ferrous    sulfate 325 milliGRAM(s) Oral daily  guaiFENesin  milliGRAM(s) Oral every 12 hours  influenza  Vaccine (HIGH DOSE) 0.7 milliLiter(s) IntraMuscular once  levothyroxine 50 MICROGram(s) Oral daily  metoprolol succinate ER 25 milliGRAM(s) Oral daily  naloxone Injectable 0.4 milliGRAM(s) IV Push once  pantoprazole    Tablet 40 milliGRAM(s) Oral before breakfast  polyethylene glycol 3350 17 Gram(s) Oral daily  pramipexole 0.125 milliGRAM(s) Oral at bedtime  senna 2 Tablet(s) Oral at bedtime    MEDICATIONS  (PRN):  acetaminophen     Tablet .. 650 milliGRAM(s) Oral every 6 hours PRN Mild Pain (1 - 3), Moderate Pain (4 - 6)  bisacodyl 5 milliGRAM(s) Oral daily PRN Constipation  oxyCODONE    IR 5 milliGRAM(s) Oral every 4 hours PRN Moderate Pain (4 - 6)  oxyCODONE    IR 10 milliGRAM(s) Oral every 4 hours PRN Severe Pain (7 - 10)      RADIOLOGY & ADDITIONAL TESTS:    ACC: [id number] EXAM:  XR CHEST PORTABLE IMMED 1V   ORDERED BY: STONE [facility]AND     PROCEDURE DATE:  12/11/2023          INTERPRETATION:  AP chest on December 11, 2023 11:53 AM. Patient is short   of breath and hypoxic.    Heart magnified by technique. Left loop recorder and spinal hardware   around thoracolumbar junction again noted. Old fracture deformity left   upper humerus again seen.    Left mid upper outer pleural based loculated fluid is again noted.    Scarlike changes on a chronic right rib fractures and right upper outer   chest again seen.    Above findings are similar to December 5.    The present film shows some mild atelectatic changes at left base.    IMPRESSION: Mild left base atelectatic changes are new since prior. Other   findings stable.

## 2023-12-11 NOTE — PROGRESS NOTE ADULT - SUBJECTIVE AND OBJECTIVE BOX
HPI: pt seen and examined with  at bedside, patient states comfortable at this time sitting up in chair, patient more confused again today, as per  usually after she gets pain medications, GOC meeting held with patients      PAIN: (x )Yes   ( )No  Level: severe   Location: back   medication helping to relieve   - unable to further describe     DYSPNEA: ( ) Yes  (x ) No  Level:    Review of Systems:    Physical Discomfort- at times   Dyspnea- not at this time   Constipation- unsure   Fatigue- yes   Weakness- yes     All other systems reviewed and negative    PHYSICAL EXAM:    Vital Signs Last 24 Hrs  T(C): 36.2 (11 Dec 2023 07:38), Max: 37 (10 Dec 2023 16:19)  T(F): 97.2 (11 Dec 2023 07:38), Max: 98.6 (10 Dec 2023 16:19)  HR: 73 (11 Dec 2023 07:38) (73 - 80)  BP: 131/69 (11 Dec 2023 07:38) (115/73 - 131/76)  RR: 18 (11 Dec 2023 07:38) (18 - 19)  SpO2: 97% (11 Dec 2023 07:38) (92% - 100%)    Parameters below as of 11 Dec 2023 07:38  Patient On (Oxygen Delivery Method): nasal cannula    PPSV2: 20-30  %  FAST:    General: elderly female in bed, NAD   Mental Status: alert but confused oriented to self only   HEENT:  mmm   Lungs: diminished breath sounds   Cardiac: s1s2 +   GI: nontender, nondistended +BS   : +voiding   Ext: SANCHEZ weakness lower extremities   Neuro: weakness     LABS:                        8.4    9.87  )-----------( 112      ( 11 Dec 2023 07:53 )             26.6     12-11    140  |  109<H>  |  13  ----------------------------<  83  4.4   |  27  |  0.60    Ca    8.6      11 Dec 2023 07:53  Mg     2.0     12-10        Albumin:     Allergies    Originally Entered as [Moderate Rash] reaction to [SURGICAL TAPE] (Unknown)  Vitamin K (Other (Severe))  Fosamax (Unknown)    Intolerances      MEDICATIONS  (STANDING):  atorvastatin 40 milliGRAM(s) Oral at bedtime  buPROPion . 100 milliGRAM(s) Oral daily  carbidopa/levodopa  25/100 1 Tablet(s) Oral four times a day  cefepime  Injectable. 2000 milliGRAM(s) IV Push every 8 hours  donepezil 10 milliGRAM(s) Oral at bedtime  enoxaparin Injectable 40 milliGRAM(s) SubCutaneous every 24 hours  ferrous    sulfate 325 milliGRAM(s) Oral daily  guaiFENesin  milliGRAM(s) Oral every 12 hours  influenza  Vaccine (HIGH DOSE) 0.7 milliLiter(s) IntraMuscular once  levothyroxine 50 MICROGram(s) Oral daily  metoprolol succinate ER 25 milliGRAM(s) Oral daily  naloxone Injectable 0.4 milliGRAM(s) IV Push once  pantoprazole    Tablet 40 milliGRAM(s) Oral before breakfast  polyethylene glycol 3350 17 Gram(s) Oral daily  pramipexole 0.125 milliGRAM(s) Oral at bedtime  senna 2 Tablet(s) Oral at bedtime    MEDICATIONS  (PRN):  acetaminophen     Tablet .. 650 milliGRAM(s) Oral every 6 hours PRN Mild Pain (1 - 3), Moderate Pain (4 - 6)  bisacodyl 5 milliGRAM(s) Oral daily PRN Constipation  oxyCODONE    IR 5 milliGRAM(s) Oral every 4 hours PRN Moderate Pain (4 - 6)  oxyCODONE    IR 10 milliGRAM(s) Oral every 4 hours PRN Severe Pain (7 - 10)      RADIOLOGY:

## 2023-12-11 NOTE — PROGRESS NOTE ADULT - ASSESSMENT
Pt is a 83y old Female with hx of HTN, dyslipidemia, GERD, parkinson's, neuropathy, anemia, loop recorder, p/w LE weakness. Patient has been having B/L LE weakness for a little less than 1 year. Symptoms have been getting progressively worse to the point where patient is unable to ambulate. Patient also has been getting worked up for anemia and approximately 40lb unintentional weight loss. She had outpatient CT C/A/P showing multiple bone mets and possible liver mets and path fracture of T11 vertebral body. Patient sent to ED for further evaluation. Patient evaluated by ortho with recommendation for MRI. Patient has a loop recorder in placed by Dr. Crawley, and patient is unsure if it is compatible with MRI. Will consult Dr. Crawley for input prior to MRI. Patient also C/o LE paresthesias and back pain. Palliative medicine consulted to help establish GOC and advance care planning.     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   -  bone mets   - radiology reviewed   - oncology consult appreciated - not recommending treatement due to patients performance status   - Orthopedics consult appreciated   - s/p surgery       Process of Care  --Reviewed dx/treatment problems and alignment with Goals of Care    Physical Aspects of Care  --Pain  worsening pain  - Oxycodone 5mg q3hrs for moderate pain every three hours   - oxycodone 10mg for severe pain every 3 hours   - morphine 2mg IV for breakthrough pain   - explained to patient  that PO oxycodone will hopefully last longer so that symptoms are better managed - will continue to follow     --Bowel Regimen  denies constipation    --Dyspnea  No SOB at this time  comfortable and in NAD    --Nausea Vomiting  denies    --Weakness  PT as tolerated     Psychological and Psychiatric Aspects of Care:   --Greif/Bereavment: emotional support provided  --Hx of psychiatric dx: none  -Pastoral Care Available PRN     Social Aspects of Care  -SW involved     Cultural Aspects  -Primary Language: English    Goals of Care:     We discussed Palliative Care team being a supportive team when a patient has ongoing illnesses.  We also discussed that it is not an end of life care service, but can help navigate symptoms and emotional support throughout their hospital stay here.    Ethical and Legal Aspects:   NA    Capacity- pt does not appear to have limited capacity - states  is who helps her make decisions but would also like to have her granddaughter involved in decision making     Code Status- DNR/I   MOLST- on chart   Dispo Plan- ongoing      Discussed With: Dr. Jenkins     Case coordinated with attending and SW and RN     Time Spent: 70 minutes including the care, coordination and counseling of this patient, 50% of which was spent coordinating and counseling.

## 2023-12-11 NOTE — PROGRESS NOTE ADULT - WHAT MATTERS MOST
to find out diagnosis and hopefully quality of life
wants to see if patient can get stronger but does not want her to suffer
pt not suffering

## 2023-12-11 NOTE — PROGRESS NOTE ADULT - SUBJECTIVE AND OBJECTIVE BOX
HPI:  84 yo female PMH HTN, dyslipidemia, GERD, parkinson's, neuropathy, anemia, loop recorder, p/w LE weakness. Patient has been having B/L LE weakness for a little less than 1 year. Symptoms have been getting progressively worse to the point where patient is unable to ambulate. Patient also has been getting worked up for anemia and approximately 40lb unintentional weight loss. She had outpatient CT C/A/P showing multiple bone mets and possible liver mets and path fracture of T11 vertebral body. She is now s/p Left rib mass biopsy with US guidance 11/27/23 and Left thoracotomy, Anterior thoracic corpectomy, fusion T10-12 12/1/23.    12/6 - POD#5 Patient seen this afternoon in bedside chair, Chest tube removed yesterday by surgery, follow-up CXR reviewed by surgical team.  Patient endorses difficulty breathing, but no obvious signs of distress, states she felt the same yesterday.  Vitals stable.  Incentive spirometry at bedside.      12/7 - POD#6 Pt seen and examined earlier today, in NAD. Appears comfortable, denies new complaints, no overnight events     12/8- POD#7 Pt seen and examined, no acute events overnight. Sitting up being assisted with breakfast, no new complaints.     12/11- POD#10 Patient seen and examined, no acute events overnight. Family meeting today.    Vital Signs Last 24 Hrs  T(C): 36.2 (11 Dec 2023 07:38), Max: 37 (10 Dec 2023 16:19)  T(F): 97.2 (11 Dec 2023 07:38), Max: 98.6 (10 Dec 2023 16:19)  HR: 73 (11 Dec 2023 07:38) (73 - 80)  BP: 131/69 (11 Dec 2023 07:38) (115/73 - 131/76)  BP(mean): --  RR: 18 (11 Dec 2023 07:38) (18 - 19)  SpO2: 97% (11 Dec 2023 07:38) (92% - 100%)    Parameters below as of 11 Dec 2023 07:38  Patient On (Oxygen Delivery Method): nasal cannula        MEDICATIONS  (STANDING):  atorvastatin 40 milliGRAM(s) Oral at bedtime  buPROPion . 100 milliGRAM(s) Oral daily  carbidopa/levodopa  25/100 1 Tablet(s) Oral four times a day  cefepime  Injectable. 2000 milliGRAM(s) IV Push every 8 hours  donepezil 10 milliGRAM(s) Oral at bedtime  enoxaparin Injectable 40 milliGRAM(s) SubCutaneous every 24 hours  ferrous    sulfate 325 milliGRAM(s) Oral daily  guaiFENesin  milliGRAM(s) Oral every 12 hours  influenza  Vaccine (HIGH DOSE) 0.7 milliLiter(s) IntraMuscular once  levothyroxine 50 MICROGram(s) Oral daily  metoprolol succinate ER 25 milliGRAM(s) Oral daily  naloxone Injectable 0.4 milliGRAM(s) IV Push once  pantoprazole    Tablet 40 milliGRAM(s) Oral before breakfast  polyethylene glycol 3350 17 Gram(s) Oral daily  pramipexole 0.125 milliGRAM(s) Oral at bedtime  senna 2 Tablet(s) Oral at bedtime    MEDICATIONS  (PRN):  acetaminophen     Tablet .. 650 milliGRAM(s) Oral every 6 hours PRN Mild Pain (1 - 3), Moderate Pain (4 - 6)  bisacodyl 5 milliGRAM(s) Oral daily PRN Constipation  oxyCODONE    IR 5 milliGRAM(s) Oral every 4 hours PRN Moderate Pain (4 - 6)  oxyCODONE    IR 10 milliGRAM(s) Oral every 4 hours PRN Severe Pain (7 - 10)      PHYSICAL EXAM:  General: No Acute Distress   Neurological: Awake, alert oriented to person, place and time, Following Commands, Speech Fluent, Moving all extremities, BUE 4/5. edematous, BLE 3/5 pain limited  Pulmonary: Clear to Auscultation  Cardiovascular: S1, S2, Regular Rate and Rhythm   Gastrointestinal: Obese, soft, Nontender, Nondistended   Incision: incisions C/D/I          LABS:                         8.4    9.87  )-----------( 112      ( 11 Dec 2023 07:53 )             26.6     12-11    140  |  109<H>  |  13  ----------------------------<  83  4.4   |  27  |  0.60    Ca    8.6      11 Dec 2023 07:53  Mg     2.0     12-10

## 2023-12-11 NOTE — PROGRESS NOTE ADULT - CONVERSATION DETAILS
Palliative team met with Pt and spouse at the bedside to further discuss GOC and determine plans.  Pt was confused at times during our conversation, capacity noted to wax and wane, she also had just received pain medication as well.  Spouse shared what they have discussed with the medical team including that at this time Pt is unable to participate in treatment for her cancer as she is weak and debilitated.  Spouse shared the hope for Pt to regain strength as he is hopeful that she will be able to participate in treatment in the future.  We discussed if Pt does not improve changing the focus to comfort when treatment is not an option.  Spouse shared that his wifes comfort is most important to him and he is aware she may not be able to participate in treatment but desires BC in hopes it may be an option in the future.  Spouses feelings explored.  Support provided.    Plan for BC at [place] when able.  Emotional support provided.  DNR/DNI in place.  Will continue to follow up and offer support.  Spouse aware of palliative team availability.  Our team to continue to follow. Palliative team met with Pt and spouse at the bedside to further discuss GOC and determine plans.  Pt was confused at times during our conversation, capacity noted to wax and wane, she also had just received pain medication as well.  Spouse shared what they have discussed with the medical team including that at this time Pt is unable to participate in treatment for her cancer as she is weak and debilitated.  Spouse shared the hope for Pt to regain strength as he is hopeful that she will be able to participate in treatment in the future.  We discussed if Pt does not improve changing the focus to comfort when treatment is not an option.  Spouse shared that his wifes comfort is most important to him and he is aware she may not be able to participate in treatment but desires BC in hopes it may be an option in the future.  Spouses feelings explored.  Support provided.    Plan for BC at [place] when able.  Emotional support provided.  DNR/DNI in place.  Will continue to follow up and offer support.  Spouse aware of palliative team availability.  Our team to continue to follow.

## 2023-12-12 LAB
HCT VFR BLD CALC: 26.9 % — LOW (ref 34.5–45)
HCT VFR BLD CALC: 26.9 % — LOW (ref 34.5–45)
HGB BLD-MCNC: 8.7 G/DL — LOW (ref 11.5–15.5)
HGB BLD-MCNC: 8.7 G/DL — LOW (ref 11.5–15.5)
MCHC RBC-ENTMCNC: 30 PG — SIGNIFICANT CHANGE UP (ref 27–34)
MCHC RBC-ENTMCNC: 30 PG — SIGNIFICANT CHANGE UP (ref 27–34)
MCHC RBC-ENTMCNC: 32.3 GM/DL — SIGNIFICANT CHANGE UP (ref 32–36)
MCHC RBC-ENTMCNC: 32.3 GM/DL — SIGNIFICANT CHANGE UP (ref 32–36)
MCV RBC AUTO: 92.8 FL — SIGNIFICANT CHANGE UP (ref 80–100)
MCV RBC AUTO: 92.8 FL — SIGNIFICANT CHANGE UP (ref 80–100)
PLATELET # BLD AUTO: 88 K/UL — LOW (ref 150–400)
PLATELET # BLD AUTO: 88 K/UL — LOW (ref 150–400)
RBC # BLD: 2.9 M/UL — LOW (ref 3.8–5.2)
RBC # BLD: 2.9 M/UL — LOW (ref 3.8–5.2)
RBC # FLD: 15.9 % — HIGH (ref 10.3–14.5)
RBC # FLD: 15.9 % — HIGH (ref 10.3–14.5)
WBC # BLD: 9.85 K/UL — SIGNIFICANT CHANGE UP (ref 3.8–10.5)
WBC # BLD: 9.85 K/UL — SIGNIFICANT CHANGE UP (ref 3.8–10.5)
WBC # FLD AUTO: 9.85 K/UL — SIGNIFICANT CHANGE UP (ref 3.8–10.5)
WBC # FLD AUTO: 9.85 K/UL — SIGNIFICANT CHANGE UP (ref 3.8–10.5)

## 2023-12-12 PROCEDURE — 99233 SBSQ HOSP IP/OBS HIGH 50: CPT

## 2023-12-12 RX ADMIN — CARBIDOPA AND LEVODOPA 1 TABLET(S): 25; 100 TABLET ORAL at 11:19

## 2023-12-12 RX ADMIN — Medication 3 MILLILITER(S): at 09:48

## 2023-12-12 RX ADMIN — CARBIDOPA AND LEVODOPA 1 TABLET(S): 25; 100 TABLET ORAL at 18:36

## 2023-12-12 RX ADMIN — CARBIDOPA AND LEVODOPA 1 TABLET(S): 25; 100 TABLET ORAL at 21:09

## 2023-12-12 RX ADMIN — ATORVASTATIN CALCIUM 40 MILLIGRAM(S): 80 TABLET, FILM COATED ORAL at 21:06

## 2023-12-12 RX ADMIN — POLYETHYLENE GLYCOL 3350 17 GRAM(S): 17 POWDER, FOR SOLUTION ORAL at 10:07

## 2023-12-12 RX ADMIN — PRAMIPEXOLE DIHYDROCHLORIDE 0.12 MILLIGRAM(S): 0.12 TABLET ORAL at 21:05

## 2023-12-12 RX ADMIN — OXYCODONE HYDROCHLORIDE 10 MILLIGRAM(S): 5 TABLET ORAL at 11:19

## 2023-12-12 RX ADMIN — SENNA PLUS 2 TABLET(S): 8.6 TABLET ORAL at 21:05

## 2023-12-12 RX ADMIN — CEFEPIME 2000 MILLIGRAM(S): 1 INJECTION, POWDER, FOR SOLUTION INTRAMUSCULAR; INTRAVENOUS at 21:05

## 2023-12-12 RX ADMIN — CEFEPIME 2000 MILLIGRAM(S): 1 INJECTION, POWDER, FOR SOLUTION INTRAMUSCULAR; INTRAVENOUS at 13:58

## 2023-12-12 RX ADMIN — CEFEPIME 2000 MILLIGRAM(S): 1 INJECTION, POWDER, FOR SOLUTION INTRAMUSCULAR; INTRAVENOUS at 05:55

## 2023-12-12 RX ADMIN — Medication 3 MILLILITER(S): at 19:54

## 2023-12-12 RX ADMIN — ENOXAPARIN SODIUM 40 MILLIGRAM(S): 100 INJECTION SUBCUTANEOUS at 21:09

## 2023-12-12 RX ADMIN — CARBIDOPA AND LEVODOPA 1 TABLET(S): 25; 100 TABLET ORAL at 05:55

## 2023-12-12 RX ADMIN — Medication 325 MILLIGRAM(S): at 10:07

## 2023-12-12 RX ADMIN — Medication 600 MILLIGRAM(S): at 21:06

## 2023-12-12 RX ADMIN — Medication 600 MILLIGRAM(S): at 10:07

## 2023-12-12 RX ADMIN — Medication 25 MILLIGRAM(S): at 10:07

## 2023-12-12 RX ADMIN — Medication 3 MILLILITER(S): at 03:23

## 2023-12-12 RX ADMIN — DONEPEZIL HYDROCHLORIDE 10 MILLIGRAM(S): 10 TABLET, FILM COATED ORAL at 21:06

## 2023-12-12 RX ADMIN — Medication 50 MICROGRAM(S): at 05:55

## 2023-12-12 RX ADMIN — BUPROPION HYDROCHLORIDE 100 MILLIGRAM(S): 150 TABLET, EXTENDED RELEASE ORAL at 10:07

## 2023-12-12 RX ADMIN — PANTOPRAZOLE SODIUM 40 MILLIGRAM(S): 20 TABLET, DELAYED RELEASE ORAL at 10:06

## 2023-12-12 NOTE — PROGRESS NOTE ADULT - ASSESSMENT
84 y/o F w/ PMH of HTN, dyslipidemia, GERD, parkinson's, neuropathy, anemia, loop recorder admitted for:     #Left rib mass biopsy, poorly differentiated carcinoma with neuroendocrine features  # s/p Left thoracotomy, Anterior thoracic corpectomy, fusion T10-12  - Given findings of poorly differentiated carcinoma, will need to discuss treatment options/prognosis with patient and family () present). Family meeting tomorrow  - Heme/Onc and Palliative following and aware, recs appreciated no plans for chemo due to pts functional status     #Symptomatic anemia   - s/p 1 unit of PRBC 12/4, Hgb stable     #  LLL Pneumonia, suspected GNR bacteria   - Continue Cefepime for tx of presumed pneumonia day 6   - C/w  Mucinex  - Incentive spintometer   - start Duonebs and Chest PT   - OOB to chair as tolerated   - Control pain   - C/w NC to keep O2 sats >92%  - Repeat CXR done, personally reviewed, no consolidation, no significant effusions, LLL atelectasis       #Urinary retention  Failed TOV, replaced Fuentes 12/8    #Hypothyroidism  - Continue Synthroid     #Hypertension  C/w  Toprol  Verapamil on hold     #Severe protein-calorie malnutrition     GOC:  Palliative meeting , family  is not ready to  do comfort yet, would like pt to go to rehab when stable     DNR/DNI with NIV  DVT ppx: Lovenox       Dispo: C/w current care, OOB daily, incentive spiromtry, chest PT [facility] planning to BC

## 2023-12-12 NOTE — PROGRESS NOTE ADULT - SUBJECTIVE AND OBJECTIVE BOX
CC: LE weakness (10 Dec 2023 12:13)    HPI:  84 y/o F w/ PMH of HTN, dyslipidemia, GERD, parkinson's, neuropathy, anemia, loop recorder, p/w LE weakness. Patient has been having B/L LE weakness for a little less than 1 year. Symptoms have been getting progressively worse to the point where patient is unable to ambulate. Patient also has been getting worked up for anemia and approximately 40lb unintentional weight loss. She had outpatient CT C/A/P showing multiple bone mets and possible liver mets and path fracture of T11 vertebral body. Patient sent to ED for further evaluation. Patient evaluated by ortho with recommendation for MRI. Patient has a loop recorder in placed by Dr. Crawley, and patient is unsure if it is compatible with MRI. Will consult Dr. Crawley for input prior to MRI. Patient also C/o LE paresthesias and back pain.       INTERVAL HPI/OVERNIGHT EVENTS: Pt was seen and examined, still cough and SOB, also feels weak. Plan discussed. Pt was transferred from OOB to chair       Vital Signs Last 24 Hrs  T(C): 36.4 (12 Dec 2023 23:05), Max: 36.8 (12 Dec 2023 16:51)  T(F): 97.5 (12 Dec 2023 23:05), Max: 98.2 (12 Dec 2023 16:51)  HR: 80 (12 Dec 2023 23:05) (80 - 93)  BP: 109/73 (12 Dec 2023 23:05) (109/73 - 137/87)  RR: 17 (12 Dec 2023 23:05) (17 - 17)  SpO2: 100% (12 Dec 2023 23:05) (96% - 100%)    Parameters below as of 12 Dec 2023 23:05  Patient On (Oxygen Delivery Method): nasal cannula  O2 Flow (L/min): 3      REVIEW OF SYSTEMS:    All other review of systems is negative unless indicated above.      PHYSICAL EXAM:  General: in no acute distress  Eyes: EOMI; conjunctiva and sclera clear  Head: Normocephalic; atraumatic  ENMT: No nasal discharge; airway clear  Neck: Supple; non tender; no masses  Respiratory:  diminished BS  at bases, poor effort No wheezes  Cardiovascular: Regular rate and rhythm. S1 and S2 Normal; No murmurs, gallops or rubs  Gastrointestinal: Soft non-tender non-distended; Normal bowel sounds  Genitourinary: No  suprapubic  tenderness  Extremities: No  edema  Neurological: Alert and oriented x 3, non focal, speech is clear   Skin: Warm and dry. No acute rash  Musculoskeletal: Normal muscle tone, without deformities  Psychiatric: Cooperative and appropriate    LABS:                        8.4    9.87  )-----------( 112      ( 11 Dec 2023 07:53 )             26.6     12-11    140  |  109<H>  |  13  ----------------------------<  83  4.4   |  27  |  0.60    Ca    8.6      11 Dec 2023 07:53  Mg     2.0     12-10                            8.2    9.64  )-----------( 164      ( 10 Dec 2023 07:19 )             24.9     10 Dec 2023 08:10    142    |  110    |  13     ----------------------------<  82     3.3     |  26     |  0.49     Ca    8.1        10 Dec 2023 08:10  Mg     2.0       10 Dec 2023 08:10          Urinalysis Basic - ( 10 Dec 2023 08:10 )    Color: x / Appearance: x / SG: x / pH: x  Gluc: 82 mg/dL / Ketone: x  / Bili: x / Urobili: x   Blood: x / Protein: x / Nitrite: x   Leuk Esterase: x / RBC: x / WBC x   Sq Epi: x / Non Sq Epi: x / Bacteria: x        MEDICATIONS  (STANDING):  atorvastatin 40 milliGRAM(s) Oral at bedtime  buPROPion . 100 milliGRAM(s) Oral daily  carbidopa/levodopa  25/100 1 Tablet(s) Oral four times a day  cefepime  Injectable. 2000 milliGRAM(s) IV Push every 8 hours  donepezil 10 milliGRAM(s) Oral at bedtime  enoxaparin Injectable 40 milliGRAM(s) SubCutaneous every 24 hours  ferrous    sulfate 325 milliGRAM(s) Oral daily  guaiFENesin  milliGRAM(s) Oral every 12 hours  influenza  Vaccine (HIGH DOSE) 0.7 milliLiter(s) IntraMuscular once  levothyroxine 50 MICROGram(s) Oral daily  metoprolol succinate ER 25 milliGRAM(s) Oral daily  naloxone Injectable 0.4 milliGRAM(s) IV Push once  pantoprazole    Tablet 40 milliGRAM(s) Oral before breakfast  polyethylene glycol 3350 17 Gram(s) Oral daily  pramipexole 0.125 milliGRAM(s) Oral at bedtime  senna 2 Tablet(s) Oral at bedtime    MEDICATIONS  (PRN):  acetaminophen     Tablet .. 650 milliGRAM(s) Oral every 6 hours PRN Mild Pain (1 - 3), Moderate Pain (4 - 6)  bisacodyl 5 milliGRAM(s) Oral daily PRN Constipation  oxyCODONE    IR 5 milliGRAM(s) Oral every 4 hours PRN Moderate Pain (4 - 6)  oxyCODONE    IR 10 milliGRAM(s) Oral every 4 hours PRN Severe Pain (7 - 10)      RADIOLOGY & ADDITIONAL TESTS:    ACC: [id number] EXAM:  XR CHEST PORTABLE IMMED 1V   ORDERED BY: STONE BRAND     PROCEDURE DATE:  12/11/2023          INTERPRETATION:  AP chest on December 11, 2023 11:53 AM. Patient is short   of breath and hypoxic.    Heart magnified by technique. Left loop recorder and spinal hardware   around thoracolumbar junction again noted. Old fracture deformity left   upper humerus again seen.    Left mid upper outer pleural based loculated fluid is again noted.    Scarlike changes on a chronic right rib fractures and right upper outer   chest again seen.    Above findings are similar to December 5.    The present film shows some mild atelectatic changes at left base.    IMPRESSION: Mild left base atelectatic changes are new since prior. Other   findings stable.   CC: LE weakness (10 Dec 2023 12:13)    HPI:  82 y/o F w/ PMH of HTN, dyslipidemia, GERD, parkinson's, neuropathy, anemia, loop recorder, p/w LE weakness. Patient has been having B/L LE weakness for a little less than 1 year. Symptoms have been getting progressively worse to the point where patient is unable to ambulate. Patient also has been getting worked up for anemia and approximately 40lb unintentional weight loss. She had outpatient CT C/A/P showing multiple bone mets and possible liver mets and path fracture of T11 vertebral body. Patient sent to ED for further evaluation. Patient evaluated by ortho with recommendation for MRI. Patient has a loop recorder in placed by Dr. Crawley, and patient is unsure if it is compatible with MRI. Will consult Dr. Crawley for input prior to MRI. Patient also C/o LE paresthesias and back pain.       INTERVAL HPI/OVERNIGHT EVENTS: Pt was seen and examined, still cough and SOB, also feels weak. Plan discussed. Pt was transferred from OOB to chair       Vital Signs Last 24 Hrs  T(C): 36.4 (12 Dec 2023 23:05), Max: 36.8 (12 Dec 2023 16:51)  T(F): 97.5 (12 Dec 2023 23:05), Max: 98.2 (12 Dec 2023 16:51)  HR: 80 (12 Dec 2023 23:05) (80 - 93)  BP: 109/73 (12 Dec 2023 23:05) (109/73 - 137/87)  RR: 17 (12 Dec 2023 23:05) (17 - 17)  SpO2: 100% (12 Dec 2023 23:05) (96% - 100%)    Parameters below as of 12 Dec 2023 23:05  Patient On (Oxygen Delivery Method): nasal cannula  O2 Flow (L/min): 3      REVIEW OF SYSTEMS:    All other review of systems is negative unless indicated above.      PHYSICAL EXAM:  General: in no acute distress  Eyes: EOMI; conjunctiva and sclera clear  Head: Normocephalic; atraumatic  ENMT: No nasal discharge; airway clear  Neck: Supple; non tender; no masses  Respiratory:  diminished BS  at bases, poor effort No wheezes  Cardiovascular: Regular rate and rhythm. S1 and S2 Normal; No murmurs, gallops or rubs  Gastrointestinal: Soft non-tender non-distended; Normal bowel sounds  Genitourinary: No  suprapubic  tenderness  Extremities: No  edema  Neurological: Alert and oriented x 3, non focal, speech is clear   Skin: Warm and dry. No acute rash  Musculoskeletal: Normal muscle tone, without deformities  Psychiatric: Cooperative and appropriate    LABS:                        8.4    9.87  )-----------( 112      ( 11 Dec 2023 07:53 )             26.6     12-11    140  |  109<H>  |  13  ----------------------------<  83  4.4   |  27  |  0.60    Ca    8.6      11 Dec 2023 07:53  Mg     2.0     12-10                            8.2    9.64  )-----------( 164      ( 10 Dec 2023 07:19 )             24.9     10 Dec 2023 08:10    142    |  110    |  13     ----------------------------<  82     3.3     |  26     |  0.49     Ca    8.1        10 Dec 2023 08:10  Mg     2.0       10 Dec 2023 08:10          Urinalysis Basic - ( 10 Dec 2023 08:10 )    Color: x / Appearance: x / SG: x / pH: x  Gluc: 82 mg/dL / Ketone: x  / Bili: x / Urobili: x   Blood: x / Protein: x / Nitrite: x   Leuk Esterase: x / RBC: x / WBC x   Sq Epi: x / Non Sq Epi: x / Bacteria: x        MEDICATIONS  (STANDING):  atorvastatin 40 milliGRAM(s) Oral at bedtime  buPROPion . 100 milliGRAM(s) Oral daily  carbidopa/levodopa  25/100 1 Tablet(s) Oral four times a day  cefepime  Injectable. 2000 milliGRAM(s) IV Push every 8 hours  donepezil 10 milliGRAM(s) Oral at bedtime  enoxaparin Injectable 40 milliGRAM(s) SubCutaneous every 24 hours  ferrous    sulfate 325 milliGRAM(s) Oral daily  guaiFENesin  milliGRAM(s) Oral every 12 hours  influenza  Vaccine (HIGH DOSE) 0.7 milliLiter(s) IntraMuscular once  levothyroxine 50 MICROGram(s) Oral daily  metoprolol succinate ER 25 milliGRAM(s) Oral daily  naloxone Injectable 0.4 milliGRAM(s) IV Push once  pantoprazole    Tablet 40 milliGRAM(s) Oral before breakfast  polyethylene glycol 3350 17 Gram(s) Oral daily  pramipexole 0.125 milliGRAM(s) Oral at bedtime  senna 2 Tablet(s) Oral at bedtime    MEDICATIONS  (PRN):  acetaminophen     Tablet .. 650 milliGRAM(s) Oral every 6 hours PRN Mild Pain (1 - 3), Moderate Pain (4 - 6)  bisacodyl 5 milliGRAM(s) Oral daily PRN Constipation  oxyCODONE    IR 5 milliGRAM(s) Oral every 4 hours PRN Moderate Pain (4 - 6)  oxyCODONE    IR 10 milliGRAM(s) Oral every 4 hours PRN Severe Pain (7 - 10)      RADIOLOGY & ADDITIONAL TESTS:    ACC: [id number] EXAM:  XR CHEST PORTABLE IMMED 1V   ORDERED BY: STONE BRAND     PROCEDURE DATE:  12/11/2023          INTERPRETATION:  AP chest on December 11, 2023 11:53 AM. Patient is short   of breath and hypoxic.    Heart magnified by technique. Left loop recorder and spinal hardware   around thoracolumbar junction again noted. Old fracture deformity left   upper humerus again seen.    Left mid upper outer pleural based loculated fluid is again noted.    Scarlike changes on a chronic right rib fractures and right upper outer   chest again seen.    Above findings are similar to December 5.    The present film shows some mild atelectatic changes at left base.    IMPRESSION: Mild left base atelectatic changes are new since prior. Other   findings stable.

## 2023-12-12 NOTE — PROGRESS NOTE ADULT - SUBJECTIVE AND OBJECTIVE BOX
Neuro / Ortho Spine:    83y old Female with hx of HTN, dyslipidemia, GERD, parkinson's, neuropathy, anemia, loop recorder, p/w LE weakness. 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  Patient has been having B/L LE weakness for a little less than 1 year. Symptoms have been getting progressively worse to the point where patient is unable to ambulate. Patient also has been getting worked up for anemia and approximately 40lb unintentional weight loss. She had outpatient CT C/A/P showing multiple bone mets and possible liver mets and path fracture of T11 vertebral body. She had outpatient CT C/A/P showing multiple bone mets and possible liver mets and path fracture of T11 vertebral body. She is now s/p Left rib mass biopsy with US guidance 11/27/23 and Left thoracotomy, Anterior thoracic corpectomy, fusion T10-12 12/1/23.    12/6 - POD#5 Patient seen this afternoon in bedside chair, Chest tube removed yesterday by surgery, follow-up CXR reviewed by surgical team.  Patient endorses difficulty breathing, but no obvious signs of distress, states she felt the same yesterday.  Vitals stable.  Incentive spirometry at bedside.      12/7 - POD#6 Pt seen and examined earlier today, in NAD. Appears comfortable, denies new complaints, no overnight events     12/8- POD#7 Pt seen and examined, no acute events overnight. Sitting up being assisted with breakfast, no new complaints.     12/11- POD#10 Patient seen and examined, no acute events overnight. Family meeting today.    12/12- POD#11 Patient seen and examined, no acute events overnight. Family meeting with palliative took place.  Goal is for BC and encouragement for pts improvement from recent diagnosis and recent surgical procedure.   is HCP.    PHYSICAL EXAM:  General: No Acute Distress   Neurological: Awake, alert oriented to person, place and time, Following Commands, Speech Fluent, Moving all extremities  Motyor:  BUE 4/5 & edematous, BLE 3/5 pain limited  Pulmonary: Clear to Auscultation  Cardiovascular: S1, S2, Regular Rate and Rhythm   Gastrointestinal: Obese, soft, Nontender, Nondistended   Incision: incisions C/D/I  sensate intact in both LE's to LT/PP  DTR's / Coord adeq - deconditioning noted    A/P:  84 y/o F w/ PMH of HTN, dyslipidemia, GERD, parkinson's, neuropathy, anemia, loop recorder, p/w LE weakness.  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    POD#15 Left rib mass biopsy with US guidance  POD#11 Left thoracotomy, Anterior thoracic corpectomy, fusion T10-12.    - Pain control PRN  - Cont PT/OT, ambulate as tolerated.  BC recommended and being pursued.  - Cont medical management per hospitalist.   - SCDS, lovenox DVT chemoprophylaxis  - Family meeting and palliative notes appreciated    D/w Dr Ceballos   Neuro / Ortho Spine:    83y old Female with hx of HTN, dyslipidemia, GERD, parkinson's, neuropathy, anemia, loop recorder, p/w LE weakness. 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  Patient has been having B/L LE weakness for a little less than 1 year. Symptoms have been getting progressively worse to the point where patient is unable to ambulate. Patient also has been getting worked up for anemia and approximately 40lb unintentional weight loss. She had outpatient CT C/A/P showing multiple bone mets and possible liver mets and path fracture of T11 vertebral body. She had outpatient CT C/A/P showing multiple bone mets and possible liver mets and path fracture of T11 vertebral body. She is now s/p Left rib mass biopsy with US guidance 11/27/23 and Left thoracotomy, Anterior thoracic corpectomy, fusion T10-12 12/1/23.    12/6 - POD#5 Patient seen this afternoon in bedside chair, Chest tube removed yesterday by surgery, follow-up CXR reviewed by surgical team.  Patient endorses difficulty breathing, but no obvious signs of distress, states she felt the same yesterday.  Vitals stable.  Incentive spirometry at bedside.      12/7 - POD#6 Pt seen and examined earlier today, in NAD. Appears comfortable, denies new complaints, no overnight events     12/8- POD#7 Pt seen and examined, no acute events overnight. Sitting up being assisted with breakfast, no new complaints.     12/11- POD#10 Patient seen and examined, no acute events overnight. Family meeting today.    12/12- POD#11 Patient seen and examined, no acute events overnight. Family meeting with palliative took place.  Goal is for BC and encouragement for pts improvement from recent diagnosis and recent surgical procedure.   is HCP.    PHYSICAL EXAM:  General: No Acute Distress   Neurological: Awake, alert oriented to person, place and time, Following Commands, Speech Fluent, Moving all extremities  Motyor:  BUE 4/5 & edematous, BLE 3/5 pain limited  Pulmonary: Clear to Auscultation  Cardiovascular: S1, S2, Regular Rate and Rhythm   Gastrointestinal: Obese, soft, Nontender, Nondistended   Incision: incisions C/D/I  sensate intact in both LE's to LT/PP  DTR's / Coord adeq - deconditioning noted    A/P:  82 y/o F w/ PMH of HTN, dyslipidemia, GERD, parkinson's, neuropathy, anemia, loop recorder, p/w LE weakness.  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    POD#15 Left rib mass biopsy with US guidance  POD#11 Left thoracotomy, Anterior thoracic corpectomy, fusion T10-12.    - Pain control PRN  - Cont PT/OT, ambulate as tolerated.  BC recommended and being pursued.  - Cont medical management per hospitalist.   - SCDS, lovenox DVT chemoprophylaxis  - Family meeting and palliative notes appreciated    D/w Dr Ceballos

## 2023-12-13 PROCEDURE — 99232 SBSQ HOSP IP/OBS MODERATE 35: CPT

## 2023-12-13 RX ADMIN — Medication 600 MILLIGRAM(S): at 22:30

## 2023-12-13 RX ADMIN — CEFEPIME 2000 MILLIGRAM(S): 1 INJECTION, POWDER, FOR SOLUTION INTRAMUSCULAR; INTRAVENOUS at 22:30

## 2023-12-13 RX ADMIN — Medication 600 MILLIGRAM(S): at 11:51

## 2023-12-13 RX ADMIN — CEFEPIME 2000 MILLIGRAM(S): 1 INJECTION, POWDER, FOR SOLUTION INTRAMUSCULAR; INTRAVENOUS at 14:52

## 2023-12-13 RX ADMIN — CARBIDOPA AND LEVODOPA 1 TABLET(S): 25; 100 TABLET ORAL at 06:00

## 2023-12-13 RX ADMIN — BUPROPION HYDROCHLORIDE 100 MILLIGRAM(S): 150 TABLET, EXTENDED RELEASE ORAL at 11:52

## 2023-12-13 RX ADMIN — Medication 50 MICROGRAM(S): at 06:00

## 2023-12-13 RX ADMIN — Medication 25 MILLIGRAM(S): at 11:51

## 2023-12-13 RX ADMIN — Medication 325 MILLIGRAM(S): at 11:51

## 2023-12-13 RX ADMIN — CEFEPIME 2000 MILLIGRAM(S): 1 INJECTION, POWDER, FOR SOLUTION INTRAMUSCULAR; INTRAVENOUS at 06:00

## 2023-12-13 RX ADMIN — PRAMIPEXOLE DIHYDROCHLORIDE 0.12 MILLIGRAM(S): 0.12 TABLET ORAL at 22:31

## 2023-12-13 RX ADMIN — Medication 3 MILLILITER(S): at 21:48

## 2023-12-13 RX ADMIN — Medication 3 MILLILITER(S): at 02:02

## 2023-12-13 RX ADMIN — CARBIDOPA AND LEVODOPA 1 TABLET(S): 25; 100 TABLET ORAL at 11:52

## 2023-12-13 RX ADMIN — CARBIDOPA AND LEVODOPA 1 TABLET(S): 25; 100 TABLET ORAL at 17:52

## 2023-12-13 RX ADMIN — ATORVASTATIN CALCIUM 40 MILLIGRAM(S): 80 TABLET, FILM COATED ORAL at 22:30

## 2023-12-13 RX ADMIN — SENNA PLUS 2 TABLET(S): 8.6 TABLET ORAL at 22:30

## 2023-12-13 RX ADMIN — DONEPEZIL HYDROCHLORIDE 10 MILLIGRAM(S): 10 TABLET, FILM COATED ORAL at 22:31

## 2023-12-13 RX ADMIN — CARBIDOPA AND LEVODOPA 1 TABLET(S): 25; 100 TABLET ORAL at 22:46

## 2023-12-13 RX ADMIN — Medication 3 MILLILITER(S): at 08:52

## 2023-12-13 RX ADMIN — ENOXAPARIN SODIUM 40 MILLIGRAM(S): 100 INJECTION SUBCUTANEOUS at 22:30

## 2023-12-13 RX ADMIN — Medication 3 MILLILITER(S): at 14:50

## 2023-12-13 RX ADMIN — PANTOPRAZOLE SODIUM 40 MILLIGRAM(S): 20 TABLET, DELAYED RELEASE ORAL at 11:50

## 2023-12-13 NOTE — PROGRESS NOTE ADULT - SUBJECTIVE AND OBJECTIVE BOX
Neuro / Ortho Spine:    83y old Female with hx of HTN, dyslipidemia, GERD, parkinson's, neuropathy, anemia, loop recorder, p/w LE weakness. 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  Patient has been having B/L LE weakness for a little less than 1 year. Symptoms have been getting progressively worse to the point where patient is unable to ambulate. Patient also has been getting worked up for anemia and approximately 40lb unintentional weight loss. She had outpatient CT C/A/P showing multiple bone mets and possible liver mets and path fracture of T11 vertebral body. She had outpatient CT C/A/P showing multiple bone mets and possible liver mets and path fracture of T11 vertebral body. She is now s/p Left rib mass biopsy with US guidance 11/27/23 and Left thoracotomy, Anterior thoracic corpectomy, fusion T10-12 12/1/23.    12/6 - POD#5 Patient seen this afternoon in bedside chair, Chest tube removed yesterday by surgery, follow-up CXR reviewed by surgical team.  Patient endorses difficulty breathing, but no obvious signs of distress, states she felt the same yesterday.  Vitals stable.  Incentive spirometry at bedside.      12/7 - POD#6 Pt seen and examined earlier today, in NAD. Appears comfortable, denies new complaints, no overnight events     12/8- POD#7 Pt seen and examined, no acute events overnight. Sitting up being assisted with breakfast, no new complaints.     12/11- POD#10 Patient seen and examined, no acute events overnight. Family meeting today.    12/12- POD#11 Patient seen and examined, no acute events overnight. Family meeting with palliative took place.  Goal is for BC and encouragement for pts improvement from recent diagnosis and recent surgical procedure.   is HCP    12/13 POD#12 Patient seen and examined, no acute events, awaiting BC placement.  Further treatment of cancer deferred until patient is strong enough to participate.  Palliative following. DNR/DNI    PHYSICAL EXAM:  General: No Acute Distress   Neurological: Awake, alert oriented to person, place and time, Following Commands, Speech Fluent, Moving all extremities  Motyor:  BUE 4/5 & edematous, BLE 3/5 pain limited  Pulmonary: Clear to Auscultation  Cardiovascular: S1, S2, Regular Rate and Rhythm   Gastrointestinal: Obese, soft, Nontender, Nondistended   Incision: incisions C/D/I  sensate intact in both LE's to LT/PP  DTR's / Coord adeq - deconditioning noted

## 2023-12-13 NOTE — PROGRESS NOTE ADULT - ASSESSMENT
84 y/o F w/ PMH of HTN, dyslipidemia, GERD, parkinson's, neuropathy, anemia, loop recorder admitted for:     #Left rib mass biopsy, poorly differentiated carcinoma with neuroendocrine features  # s/p Left thoracotomy, Anterior thoracic corpectomy, fusion T10-12  - Given findings of poorly differentiated carcinoma, will need to discuss treatment options/prognosis with patient and family () present). Family meeting tomorrow  - Heme/Onc and Palliative following and aware, recs appreciated no plans for chemo due to pts functional status     #Symptomatic anemia   - s/p 1 unit of PRBC 12/4, Hgb stable     #  LLL Pneumonia, suspected GNR bacteria   - Continue Cefepime for tx of presumed pneumonia day 6   - C/w  Mucinex, add HYCODAN   - Incentive spintometer   - start Duonebs and Chest PT   - OOB to chair as tolerated   - Control pain   - C/w NC to keep O2 sats >92%  - Repeat CXR done, personally reviewed, no consolidation, no significant effusions, LLL atelectasis       #Urinary retention  Failed TOV, replaced Fuentes 12/8    #Hypothyroidism  - Continue Synthroid     #Hypertension  C/w  Toprol  Verapamil on hold     #Severe protein-calorie malnutrition     GOC:  Palliative meeting , family  is not ready to  do comfort yet, would like pt to go to rehab when stable     DNR/DNI with NIV  DVT ppx: Lovenox       Dispo: C/w current care, OOB daily, incentive spirometry, chest PT with nebs. add hycodan.  DC planning to BC  POC discussed with team at [facility]

## 2023-12-13 NOTE — PROGRESS NOTE ADULT - ASSESSMENT
84 y/o F w/ PMH of HTN, dyslipidemia, GERD, parkinson's, neuropathy, anemia, loop recorder, p/w LE weakness.  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    POD#14 Left rib mass biopsy with US guidance  POD#12 Left thoracotomy, Anterior thoracic corpectomy, fusion T10-12.    - Pain control PRN  - Cont PT/OT, ambulate as tolerated.  BC recommended and being pursued.  - Cont medical management per hospitalist.   - SCDS, lovenox DVT chemoprophylaxis  - Family meeting and palliative notes appreciated  - For Discharge: Patient can follow-up with Dr Ceballos outpatient in 1-2weeks upon discharge.    Surgical site without ssx of infection. Will sign-off for now, re-consult if needed.    D/w Dr Ceballos

## 2023-12-13 NOTE — PROGRESS NOTE ADULT - SUBJECTIVE AND OBJECTIVE BOX
CC: LE weakness (10 Dec 2023 12:13)  82 y/o F w/ PMH of HTN, dyslipidemia, GERD, parkinson's, neuropathy, anemia, loop recorder, p/w LE weakness. Patient has been having B/L LE weakness for a little less than 1 year. Symptoms have been getting progressively worse to the point where patient is unable to ambulate. Patient also has been getting worked up for anemia and approximately 40lb unintentional weight loss. She had outpatient CT C/A/P showing multiple bone mets and possible liver mets and path fracture of T11 vertebral body. Patient sent to ED for further evaluation. Patient evaluated by ortho with recommendation for MRI. Patient has a loop recorder in placed by Dr. Crawley, and patient is unsure if it is compatible with MRI. Will consult Dr. Crawley for input prior to MRI. Patient also C/o LE paresthesias and back pain.       INTERVAL HPI/OVERNIGHT EVENTS: Pt was seen and examined, still cough and SOB, also feels weak. Plan discussed. Pt was transferred from OOB to chair   12/13 - no cp palps sob but still with cough       PHYSICAL EXAM:  T(F): 99.3 (13 Dec 2023 15:41), Max: 99.3 (13 Dec 2023 15:41)  HR: 80 (13 Dec 2023 09:00) (80 - 86)  BP: 102/60 (13 Dec 2023 15:41) (102/60 - 123/73)  RR: 18 (13 Dec 2023 15:41) (17 - 18)  SpO2: 94% (13 Dec 2023 15:41) (94% - 100%)  Patient On (Oxygen Delivery Method): room air  General: in no acute distress  Eyes: EOMI; conjunctiva and sclera clear  Head: Normocephalic; atraumatic  ENMT: No nasal discharge; airway clear  Neck: Supple; non tender; no masses  Respiratory:  diminished BS  at bases, poor effort No wheezes  Cardiovascular: Regular rate and rhythm. S1 and S2 Normal; No murmurs, gallops or rubs  Gastrointestinal: Soft non-tender non-distended; Normal bowel sounds  Genitourinary: No  suprapubic  tenderness  Extremities: No  edema  Neurological: Alert and oriented x 3, non focal, speech is clear   Skin: Warm and dry. No acute rash  Musculoskeletal: Normal muscle tone, without deformities  Psychiatric: Cooperative and appropriate      INTERPRETATION:  AP chest on December 11, 2023 11:53 AM. Patient is short   of breath and hypoxic.  Heart magnified by technique. Left loop recorder and spinal hardware   around thoracolumbar junction again noted. Old fracture deformity left   upper humerus again seen.  Left mid upper outer pleural based loculated fluid is again noted.  Scarlike changes on a chronic right rib fractures and right upper outer   chest again seen.  Above findings are similar to December 5.  The present film shows some mild atelectatic changes at left base.  IMPRESSION: Mild left base atelectatic changes are new since prior. Other   findings stable.    LABS: All Labs Reviewed:                        8.7    9.85  )-----------( 88       ( 12 Dec 2023 10:00 )             26.9     MEDS:   acetaminophen     Tablet .. 650 milliGRAM(s) Oral every 6 hours PRN  albuterol/ipratropium for Nebulization 3 milliLiter(s) Nebulizer every 6 hours  atorvastatin 40 milliGRAM(s) Oral at bedtime  bisacodyl 5 milliGRAM(s) Oral daily PRN  buPROPion . 100 milliGRAM(s) Oral daily  carbidopa/levodopa  25/100 1 Tablet(s) Oral four times a day  cefepime  Injectable. 2000 milliGRAM(s) IV Push every 8 hours  donepezil 10 milliGRAM(s) Oral at bedtime  enoxaparin Injectable 40 milliGRAM(s) SubCutaneous every 24 hours  ferrous    sulfate 325 milliGRAM(s) Oral daily  guaiFENesin  milliGRAM(s) Oral every 12 hours  hydrocodone/homatropine Syrup 5 milliLiter(s) Oral every 8 hours PRN  influenza  Vaccine (HIGH DOSE) 0.7 milliLiter(s) IntraMuscular once  levothyroxine 50 MICROGram(s) Oral daily  metoprolol succinate ER 25 milliGRAM(s) Oral daily  naloxone Injectable 0.4 milliGRAM(s) IV Push once  oxyCODONE    IR 5 milliGRAM(s) Oral every 4 hours PRN  oxyCODONE    IR 10 milliGRAM(s) Oral every 4 hours PRN  pantoprazole    Tablet 40 milliGRAM(s) Oral before breakfast  polyethylene glycol 3350 17 Gram(s) Oral daily  pramipexole 0.125 milliGRAM(s) Oral at bedtime  senna 2 Tablet(s) Oral at bedtime

## 2023-12-14 LAB
ANION GAP SERPL CALC-SCNC: 7 MMOL/L — SIGNIFICANT CHANGE UP (ref 5–17)
ANION GAP SERPL CALC-SCNC: 7 MMOL/L — SIGNIFICANT CHANGE UP (ref 5–17)
BLD GP AB SCN SERPL QL: SIGNIFICANT CHANGE UP
BLD GP AB SCN SERPL QL: SIGNIFICANT CHANGE UP
BUN SERPL-MCNC: 21 MG/DL — SIGNIFICANT CHANGE UP (ref 7–23)
BUN SERPL-MCNC: 21 MG/DL — SIGNIFICANT CHANGE UP (ref 7–23)
CALCIUM SERPL-MCNC: 8.5 MG/DL — SIGNIFICANT CHANGE UP (ref 8.5–10.1)
CALCIUM SERPL-MCNC: 8.5 MG/DL — SIGNIFICANT CHANGE UP (ref 8.5–10.1)
CHLORIDE SERPL-SCNC: 108 MMOL/L — SIGNIFICANT CHANGE UP (ref 96–108)
CHLORIDE SERPL-SCNC: 108 MMOL/L — SIGNIFICANT CHANGE UP (ref 96–108)
CO2 SERPL-SCNC: 23 MMOL/L — SIGNIFICANT CHANGE UP (ref 22–31)
CO2 SERPL-SCNC: 23 MMOL/L — SIGNIFICANT CHANGE UP (ref 22–31)
CREAT SERPL-MCNC: 0.86 MG/DL — SIGNIFICANT CHANGE UP (ref 0.5–1.3)
CREAT SERPL-MCNC: 0.86 MG/DL — SIGNIFICANT CHANGE UP (ref 0.5–1.3)
EGFR: 68 ML/MIN/1.73M2 — SIGNIFICANT CHANGE UP
EGFR: 68 ML/MIN/1.73M2 — SIGNIFICANT CHANGE UP
GLUCOSE SERPL-MCNC: 76 MG/DL — SIGNIFICANT CHANGE UP (ref 70–99)
GLUCOSE SERPL-MCNC: 76 MG/DL — SIGNIFICANT CHANGE UP (ref 70–99)
HCT VFR BLD CALC: 23 % — LOW (ref 34.5–45)
HCT VFR BLD CALC: 23 % — LOW (ref 34.5–45)
HGB BLD-MCNC: 7.5 G/DL — LOW (ref 11.5–15.5)
HGB BLD-MCNC: 7.5 G/DL — LOW (ref 11.5–15.5)
MAGNESIUM SERPL-MCNC: 2 MG/DL — SIGNIFICANT CHANGE UP (ref 1.6–2.6)
MAGNESIUM SERPL-MCNC: 2 MG/DL — SIGNIFICANT CHANGE UP (ref 1.6–2.6)
MCHC RBC-ENTMCNC: 29.8 PG — SIGNIFICANT CHANGE UP (ref 27–34)
MCHC RBC-ENTMCNC: 29.8 PG — SIGNIFICANT CHANGE UP (ref 27–34)
MCHC RBC-ENTMCNC: 32.6 GM/DL — SIGNIFICANT CHANGE UP (ref 32–36)
MCHC RBC-ENTMCNC: 32.6 GM/DL — SIGNIFICANT CHANGE UP (ref 32–36)
MCV RBC AUTO: 91.3 FL — SIGNIFICANT CHANGE UP (ref 80–100)
MCV RBC AUTO: 91.3 FL — SIGNIFICANT CHANGE UP (ref 80–100)
PLATELET # BLD AUTO: 72 K/UL — LOW (ref 150–400)
PLATELET # BLD AUTO: 72 K/UL — LOW (ref 150–400)
POTASSIUM SERPL-MCNC: 3.9 MMOL/L — SIGNIFICANT CHANGE UP (ref 3.5–5.3)
POTASSIUM SERPL-MCNC: 3.9 MMOL/L — SIGNIFICANT CHANGE UP (ref 3.5–5.3)
POTASSIUM SERPL-SCNC: 3.9 MMOL/L — SIGNIFICANT CHANGE UP (ref 3.5–5.3)
POTASSIUM SERPL-SCNC: 3.9 MMOL/L — SIGNIFICANT CHANGE UP (ref 3.5–5.3)
RBC # BLD: 2.52 M/UL — LOW (ref 3.8–5.2)
RBC # BLD: 2.52 M/UL — LOW (ref 3.8–5.2)
RBC # FLD: 16.1 % — HIGH (ref 10.3–14.5)
RBC # FLD: 16.1 % — HIGH (ref 10.3–14.5)
SODIUM SERPL-SCNC: 138 MMOL/L — SIGNIFICANT CHANGE UP (ref 135–145)
SODIUM SERPL-SCNC: 138 MMOL/L — SIGNIFICANT CHANGE UP (ref 135–145)
WBC # BLD: 9.22 K/UL — SIGNIFICANT CHANGE UP (ref 3.8–10.5)
WBC # BLD: 9.22 K/UL — SIGNIFICANT CHANGE UP (ref 3.8–10.5)
WBC # FLD AUTO: 9.22 K/UL — SIGNIFICANT CHANGE UP (ref 3.8–10.5)
WBC # FLD AUTO: 9.22 K/UL — SIGNIFICANT CHANGE UP (ref 3.8–10.5)

## 2023-12-14 PROCEDURE — 99497 ADVNCD CARE PLAN 30 MIN: CPT

## 2023-12-14 PROCEDURE — 99232 SBSQ HOSP IP/OBS MODERATE 35: CPT

## 2023-12-14 RX ORDER — FUROSEMIDE 40 MG
20 TABLET ORAL ONCE
Refills: 0 | Status: COMPLETED | OUTPATIENT
Start: 2023-12-14 | End: 2023-12-14

## 2023-12-14 RX ORDER — ACETAMINOPHEN 500 MG
650 TABLET ORAL ONCE
Refills: 0 | Status: COMPLETED | OUTPATIENT
Start: 2023-12-14 | End: 2023-12-14

## 2023-12-14 RX ADMIN — CARBIDOPA AND LEVODOPA 1 TABLET(S): 25; 100 TABLET ORAL at 12:24

## 2023-12-14 RX ADMIN — CEFEPIME 2000 MILLIGRAM(S): 1 INJECTION, POWDER, FOR SOLUTION INTRAMUSCULAR; INTRAVENOUS at 21:52

## 2023-12-14 RX ADMIN — ATORVASTATIN CALCIUM 40 MILLIGRAM(S): 80 TABLET, FILM COATED ORAL at 21:52

## 2023-12-14 RX ADMIN — SENNA PLUS 2 TABLET(S): 8.6 TABLET ORAL at 21:52

## 2023-12-14 RX ADMIN — BUPROPION HYDROCHLORIDE 100 MILLIGRAM(S): 150 TABLET, EXTENDED RELEASE ORAL at 09:48

## 2023-12-14 RX ADMIN — Medication 600 MILLIGRAM(S): at 21:53

## 2023-12-14 RX ADMIN — PRAMIPEXOLE DIHYDROCHLORIDE 0.12 MILLIGRAM(S): 0.12 TABLET ORAL at 21:52

## 2023-12-14 RX ADMIN — CARBIDOPA AND LEVODOPA 1 TABLET(S): 25; 100 TABLET ORAL at 06:19

## 2023-12-14 RX ADMIN — Medication 650 MILLIGRAM(S): at 15:35

## 2023-12-14 RX ADMIN — Medication 600 MILLIGRAM(S): at 09:48

## 2023-12-14 RX ADMIN — Medication 325 MILLIGRAM(S): at 09:48

## 2023-12-14 RX ADMIN — CARBIDOPA AND LEVODOPA 1 TABLET(S): 25; 100 TABLET ORAL at 18:57

## 2023-12-14 RX ADMIN — DONEPEZIL HYDROCHLORIDE 10 MILLIGRAM(S): 10 TABLET, FILM COATED ORAL at 22:01

## 2023-12-14 RX ADMIN — PANTOPRAZOLE SODIUM 40 MILLIGRAM(S): 20 TABLET, DELAYED RELEASE ORAL at 09:48

## 2023-12-14 RX ADMIN — Medication 3 MILLILITER(S): at 08:30

## 2023-12-14 RX ADMIN — Medication 50 MICROGRAM(S): at 06:19

## 2023-12-14 RX ADMIN — Medication 3 MILLILITER(S): at 02:54

## 2023-12-14 RX ADMIN — Medication 3 MILLILITER(S): at 21:23

## 2023-12-14 RX ADMIN — CEFEPIME 2000 MILLIGRAM(S): 1 INJECTION, POWDER, FOR SOLUTION INTRAMUSCULAR; INTRAVENOUS at 06:19

## 2023-12-14 RX ADMIN — Medication 20 MILLIGRAM(S): at 12:38

## 2023-12-14 RX ADMIN — ENOXAPARIN SODIUM 40 MILLIGRAM(S): 100 INJECTION SUBCUTANEOUS at 21:52

## 2023-12-14 RX ADMIN — Medication 3 MILLILITER(S): at 14:47

## 2023-12-14 RX ADMIN — Medication 25 MILLIGRAM(S): at 09:48

## 2023-12-14 RX ADMIN — POLYETHYLENE GLYCOL 3350 17 GRAM(S): 17 POWDER, FOR SOLUTION ORAL at 09:49

## 2023-12-14 RX ADMIN — CEFEPIME 2000 MILLIGRAM(S): 1 INJECTION, POWDER, FOR SOLUTION INTRAMUSCULAR; INTRAVENOUS at 14:10

## 2023-12-14 NOTE — SWALLOW BEDSIDE ASSESSMENT ADULT - ORAL PHASE
Bolus formation/transfer were mildly to moderately prolonged/discontinuous/disorganized but felt to be mechanically functional with several of the above modified food textures. Piecemeal deglutition was evident. Mild+ tongue debris noted with minced/moist foods.

## 2023-12-14 NOTE — SWALLOW BEDSIDE ASSESSMENT ADULT - COMMENTS
The pt was admitted to  with bilateral LE weakness. 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 Hospital course is also remarkable for anemia, left pneumonia with GNR, dyspnea, urinary retention hypoalbuminemia and encephalopathy. This profile is superimposed upon a history of Parkinson's, mood disorder, HTN, HLD, GERD, hypothyroidism, past gastric bypass, previous choley and prior left TKR. The pt was admitted to  with bilateral LE weakness. 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 Hospital course is also remarkable for anemia, left pneumonia with GNR, dyspnea, urinary retention hypoalbuminemia and encephalopathy. This profile is superimposed upon a history of Parkinson's, mood disorder, HTN, HLD, presence of Loop Recorder, GERD, hypothyroidism, past gastric bypass, previous choley and prior left TKR.

## 2023-12-14 NOTE — SWALLOW BEDSIDE ASSESSMENT ADULT - SWALLOW EVAL: RECOMMENDED DIET
SUGGEST A MINCED AND MOIST DIET WITH MILDLY THICK LIQUIDS AS PT TOLERATED THESE FOOD CONSISTENCIES FROM AN OROPHARYNGEAL SWALLOWING PERSPECTIVE ON EXAM AND FOOD TEXTURES ON THIS DIET ACCOMMODATES HER DYSPHAGIA FEATURES.

## 2023-12-14 NOTE — SWALLOW BEDSIDE ASSESSMENT ADULT - ADDITIONAL RECOMMENDATIONS
HOSPITALIST AND NUTRITION FOLLOW UP. CONSIDER THE POTENTIAL BENEFIT OF A PALLIATIVE CARE CONSULT. HOSPITALIST, ONCOLOGY AND NUTRITION FOLLOW UP. CONSIDER THE POTENTIAL BENEFIT OF A PALLIATIVE CARE CONSULT.

## 2023-12-14 NOTE — SWALLOW BEDSIDE ASSESSMENT ADULT - SWALLOW EVAL: SECRETION MANAGEMENT
Limited probes revealed that pt's non nutritive cough was moist and produced with mildly to moderately decreased strength.

## 2023-12-14 NOTE — SWALLOW BEDSIDE ASSESSMENT ADULT - ASR SWALLOW RECOMMEND DIAG
DEFER MBS AS PT APPEARED CLINICALLY TOLERANT OF SUGGESTED FOOD CONSISTENCIES FROM AN OROPHARYNGEAL SWALLOWING PERSPECTIVE ON EXAM, DYSPHAGIA WITH A PROPENSITY TO FLUCTUATE IN SEVERITY GIVEN PROFILE AND CONSIDERING HER ALTERED MENTATION.

## 2023-12-14 NOTE — SWALLOW BEDSIDE ASSESSMENT ADULT - SWALLOW EVAL: DIAGNOSIS
1) Pt exhibits Oropharyngeal Dysphagia which subjectively appeared to be a grossly functional condition with a restricted inventory of modified food consistencies. Overt aspiration signs/coughing were demonstrated with thin liquids. 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

## 2023-12-14 NOTE — SWALLOW BEDSIDE ASSESSMENT ADULT - NS SPL SWALLOW CLINIC TRIAL FT
The patient exhibited Oropharyngeal Dysphagia which subjectively appeared to be a grossly functional condition with a restricted inventory of modified food consistencies. Overt aspiration signs/coughing were demonstrated with thin liquids. 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 The patient exhibited Oropharyngeal Dysphagia which subjectively appeared to be a grossly functional condition with a restricted inventory of modified food consistencies. Overt aspiration signs/coughing were demonstrated with thin liquids. 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 Coarser foods were not offered given the severity of pt's Oropharyngeal Dysphagia.

## 2023-12-14 NOTE — PROGRESS NOTE ADULT - SUBJECTIVE AND OBJECTIVE BOX
CC: LE weakness (10 Dec 2023 12:13)  82 y/o F w/ PMH of HTN, dyslipidemia, GERD, parkinson's, neuropathy, anemia, loop recorder, p/w LE weakness. Patient has been having B/L LE weakness for a little less than 1 year. Symptoms have been getting progressively worse to the point where patient is unable to ambulate. Patient also has been getting worked up for anemia and approximately 40lb unintentional weight loss. She had outpatient CT C/A/P showing multiple bone mets and possible liver mets and path fracture of T11 vertebral body. Patient sent to ED for further evaluation. Patient evaluated by ortho with recommendation for MRI. Patient has a loop recorder in placed by Dr. Crawley, and patient is unsure if it is compatible with MRI. Will consult Dr. Crawley for input prior to MRI. Patient also C/o LE paresthesias and back pain.       INTERVAL HPI/OVERNIGHT EVENTS: Pt was seen and examined, still cough and SOB, also feels weak. Plan discussed. Pt was transferred from OOB to chair   12/13 - no cp palps sob but still with cough   12/14 - no cp palps sob, frail, coughing       PHYSICAL EXAM:  T(F): 98.2 (14 Dec 2023 19:05), Max: 99.3 (14 Dec 2023 15:54)  HR: 106 (14 Dec 2023 19:05) (80 - 111)  BP: 121/76 (14 Dec 2023 19:05) (102/67 - 121/76)  RR: 20 (14 Dec 2023 19:05) (16 - 20)  SpO2: 97% (14 Dec 2023 19:05) (94% - 99%)  O2 Flow (L/min): 2  General: in no acute distress  Eyes: EOMI; conjunctiva and sclera clear  Head: Normocephalic; atraumatic  ENMT: No nasal discharge; airway clear  Neck: Supple; non tender; no masses  Respiratory:  diminished BS  at bases, poor effort No wheezes  Cardiovascular: Regular rate and rhythm. S1 and S2 Normal; No murmurs, gallops or rubs  Gastrointestinal: Soft non-tender non-distended; Normal bowel sounds  Genitourinary: No  suprapubic  tenderness  Extremities: No  edema  Neurological: Alert and oriented x 3, non focal, speech is clear   Skin: Warm and dry. No acute rash  Musculoskeletal: Normal muscle tone, without deformities  Psychiatric: Cooperative and appropriate      INTERPRETATION:  AP chest on December 11, 2023 11:53 AM. Patient is short   of breath and hypoxic.  Heart magnified by technique. Left loop recorder and spinal hardware   around thoracolumbar junction again noted. Old fracture deformity left   upper humerus again seen.  Left mid upper outer pleural based loculated fluid is again noted.  Scarlike changes on a chronic right rib fractures and right upper outer   chest again seen.  Above findings are similar to December 5.  The present film shows some mild atelectatic changes at left base.  IMPRESSION: Mild left base atelectatic changes are new since prior. Other   findings stable.    LABS: All Labs Reviewed:                        7.5    9.22  )-----------( 72       ( 14 Dec 2023 07:20 )             23.0     12-14    138  |  108  |  21  ----------------------------<  76  3.9   |  23  |  0.86    Ca    8.5      14 Dec 2023 07:20  Mg     2.0     12-14      MEDS:   acetaminophen     Tablet .. 650 milliGRAM(s) Oral every 6 hours PRN  albuterol/ipratropium for Nebulization 3 milliLiter(s) Nebulizer every 6 hours  atorvastatin 40 milliGRAM(s) Oral at bedtime  bisacodyl 5 milliGRAM(s) Oral daily PRN  buPROPion . 100 milliGRAM(s) Oral daily  carbidopa/levodopa  25/100 1 Tablet(s) Oral four times a day  cefepime  Injectable. 2000 milliGRAM(s) IV Push every 8 hours  donepezil 10 milliGRAM(s) Oral at bedtime  enoxaparin Injectable 40 milliGRAM(s) SubCutaneous every 24 hours  ferrous    sulfate 325 milliGRAM(s) Oral daily  guaiFENesin  milliGRAM(s) Oral every 12 hours  hydrocodone/homatropine Syrup 5 milliLiter(s) Oral every 8 hours PRN  influenza  Vaccine (HIGH DOSE) 0.7 milliLiter(s) IntraMuscular once  levothyroxine 50 MICROGram(s) Oral daily  metoprolol succinate ER 25 milliGRAM(s) Oral daily  naloxone Injectable 0.4 milliGRAM(s) IV Push once  oxyCODONE    IR 5 milliGRAM(s) Oral every 4 hours PRN  oxyCODONE    IR 10 milliGRAM(s) Oral every 4 hours PRN  pantoprazole    Tablet 40 milliGRAM(s) Oral before breakfast  polyethylene glycol 3350 17 Gram(s) Oral daily  pramipexole 0.125 milliGRAM(s) Oral at bedtime  senna 2 Tablet(s) Oral at bedtime

## 2023-12-14 NOTE — SWALLOW BEDSIDE ASSESSMENT ADULT - PHARYNGEAL PHASE
Swallow trigger was timely to mildly latent and laryngeal lift on palpation during swallowing trials was mildly to moderately decreased but felt to be grossly functional with several of the above modified food textures. Post prandial coughing was demonstrated with thin liquids, despite cues to employ compensatory swallowing maneuvers. NO behavioral aspiration signs exhibited with mildly thick liquids, puree foods and minced/moist foods.

## 2023-12-14 NOTE — SWALLOW BEDSIDE ASSESSMENT ADULT - ORAL PREPARATORY PHASE
The pt was passive/variably inattentive when PO was offered. Labial grading on utensils was functional nonetheless.

## 2023-12-14 NOTE — SWALLOW BEDSIDE ASSESSMENT ADULT - SLP GENERAL OBSERVATIONS
On encounter, the pt was noted to be somewhat congested and periodic exertional dyspnea was exhibited at times. The pt was alert. Her affect was flat. Pt was communicatively passive, variably inattentive and variably distractible at times. The pt inconsistently followed 1 step verbal commands approximately 35% of the time given repetition of stimuli/gestural cues. Additionally. the pt inconsistently verbalized during communicative probes. At these times, pt's motor speech integrity was functional, her speech output was intelligible and her verbalizations were linguistically intact. However, her utterances were variably brief, variably tangential and variably contextually inappropriate indicative of Cognitive Dysfunction. 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 On encounter, the pt was noted to be somewhat congested and periodic exertional dyspnea was exhibited at times. The pt was alert. Her affect was flat. Pt was communicatively passive, variably inattentive and variably distractible at times. The pt inconsistently followed 1 step verbal commands approximately 35% of the time given repetition of stimuli/gestural cues. Additionally. the pt inconsistently verbalized during communicative probes. At these times, pt's motor speech integrity was functional, her speech output was intelligible and her verbalizations were linguistically intact. However, her utterances were variably brief, variably tangential and variably contextually inappropriate indicative of Cognitive Dysfunction. 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

## 2023-12-14 NOTE — SWALLOW BEDSIDE ASSESSMENT ADULT - SWALLOW EVAL: PROGNOSIS
2) On encounter, the pt was noted to be somewhat congested and periodic exertional dyspnea was exhibited at times. The pt was alert. Her affect was flat. Pt was communicatively passive, variably inattentive and variably distractible at times. The pt inconsistently followed 1 step verbal commands approximately 35% of the time given repetition of stimuli/gestural cues. Additionally. the pt inconsistently verbalized during communicative probes. At these times, pt's motor speech integrity was functional, her speech output was intelligible and her verbalizations were linguistically intact. However, her utterances were variably brief, variably tangential and variably contextually inappropriate indicative of Cognitive Dysfunction. Pt's Cognitive Dysfunction is in setting of encephalopathy associated with multiple acute medical issues(i.e see history), mood disorder, and underlying Parkinson's. 2) On encounter, the pt was noted to be somewhat congested and periodic exertional dyspnea was exhibited at times. The pt was alert. Her affect was flat. Pt was communicatively passive, variably inattentive and variably distractible at times. The pt inconsistently followed 1 step verbal commands approximately 35% of the time given repetition of stimuli/gestural cues. Additionally, the pt inconsistently verbalized during communicative probes. At these times, pt's motor speech integrity was functional, her speech output was intelligible and her verbalizations were linguistically intact. However, her utterances were variably brief, variably tangential and variably contextually inappropriate indicative of Cognitive Dysfunction. Pt's Cognitive Dysfunction is in setting of encephalopathy associated with multiple acute medical issues(i.e see history), mood disorder, and underlying Parkinson's.

## 2023-12-14 NOTE — PROGRESS NOTE ADULT - ASSESSMENT
82 y/o F w/ PMH of HTN, dyslipidemia, GERD, parkinson's, neuropathy, anemia, loop recorder admitted for:     #Left rib mass biopsy, poorly differentiated carcinoma with neuroendocrine features  # s/p Left thoracotomy, Anterior thoracic corpectomy, fusion T10-12  - Given findings of poorly differentiated carcinoma, will need to discuss treatment options/prognosis with patient and family () present). Family meeting tomorrow  - Heme/Onc and Palliative following and aware, recs appreciated no plans for chemo due to pts functional status     #Symptomatic anemia   - s/p 1 unit of PRBC 12/4, Hgb stable   12/14 - H&H trending down, will give 1U PRBCs today  monitor AM labs     #  LLL Pneumonia, suspected GNR bacteria   - Continue Cefepime for tx of presumed pneumonia day 6   - C/w  Mucinex, add HYCODAN   - Incentive spintometer   - start Duonebs and Chest PT   - OOB to chair as tolerated   - Control pain   - C/w NC to keep O2 sats >92%  - Repeat CXR done, personally reviewed, no consolidation, no significant effusions, LLL atelectasis       #Urinary retention  Failed TOV, replaced Fuentes 12/8    #Hypothyroidism  - Continue Synthroid     #Hypertension  C/w  Toprol  Verapamil on hold     #Severe protein-calorie malnutrition     GOC:  Palliative meeting , family  is not ready to  do comfort yet, would like pt to go to rehab when stable     DNR/DNI with NIV  DVT ppx: Lovenox       Dispo: C/w current care, OOB daily, incentive spirometry, chest PT with nebs. increase hycodan.  DC planning to [facility]  POC discussed with team at [facility]DR[facility]  84 y/o F w/ PMH of HTN, dyslipidemia, GERD, parkinson's, neuropathy, anemia, loop recorder admitted for:     #Left rib mass biopsy, poorly differentiated carcinoma with neuroendocrine features  # s/p Left thoracotomy, Anterior thoracic corpectomy, fusion T10-12  - Given findings of poorly differentiated carcinoma, will need to discuss treatment options/prognosis with patient and family () present). Family meeting tomorrow  - Heme/Onc and Palliative following and aware, recs appreciated no plans for chemo due to pts functional status     #Symptomatic anemia   - s/p 1 unit of PRBC 12/4, Hgb stable   12/14 - H&H trending down, will give 1U PRBCs today  monitor AM labs     #  LLL Pneumonia, suspected GNR bacteria   - Continue Cefepime for tx of presumed pneumonia day 6   - C/w  Mucinex, add HYCODAN   - Incentive spintometer   - start Duonebs and Chest PT   - OOB to chair as tolerated   - Control pain   - C/w NC to keep O2 sats >92%  - Repeat CXR done, personally reviewed, no consolidation, no significant effusions, LLL atelectasis       #Urinary retention  Failed TOV, replaced Fuentes 12/8    #Hypothyroidism  - Continue Synthroid     #Hypertension  C/w  Toprol  Verapamil on hold     #Severe protein-calorie malnutrition     GOC:  Palliative meeting , family  is not ready to  do comfort yet, would like pt to go to rehab when stable     DNR/DNI with NIV  DVT ppx: Lovenox       Dispo: C/w current care, OOB daily, incentive spirometry, chest PT with nebs. increase hycodan.  DC planning to [facility]  POC discussed with team at IDRs  82 y/o F w/ PMH of HTN, dyslipidemia, GERD, parkinson's, neuropathy, anemia, loop recorder admitted for:     #Left rib mass biopsy, poorly differentiated carcinoma with neuroendocrine features  # s/p Left thoracotomy, Anterior thoracic corpectomy, fusion T10-12  - Given findings of poorly differentiated carcinoma, will need to discuss treatment options/prognosis with patient and family () present). Family meeting tomorrow  - Heme/Onc and Palliative following and aware, recs appreciated no plans for chemo due to pts functional status     #Symptomatic anemia   - s/p 1 unit of PRBC 12/4, Hgb stable   12/14 - H&H trending down, will give 1U PRBCs today  monitor AM labs     #  LLL Pneumonia, suspected GNR bacteria   - Continue Cefepime for tx of presumed pneumonia day 6   - C/w  Mucinex, add HYCODAN   - Incentive spintometer   - start Duonebs and Chest PT   - OOB to chair as tolerated   - Control pain   - C/w NC to keep O2 sats >92%  - Repeat CXR done, personally reviewed, no consolidation, no significant effusions, LLL atelectasis       #Urinary retention  Failed TOV, replaced Fuentes 12/8    #Hypothyroidism  - Continue Synthroid     #Hypertension  C/w  Toprol  Verapamil on hold     #Severe protein-calorie malnutrition     DNR/DNI with NIV  DVT ppx: Lovenox       GO conversation 12/14/23- discussed POC with patient's  Julius.   Advised him that I do not see an improvement in Mrs Bull,  he also feels the same way. We both agreed that she is too frail and unlikely to make it to any palliative chemo or radiation  he also notes that she is not eating, not talking, is very frail. he would like to focus on her comfort.   this means giving her IV morphine to treat her pain if needed or IV ativan.   we agreed that this is the best treatment we can offer her at this time.  he has been  to Mrs Bull for 64 years and this is understandably difficult for him but he does not want to prolong her suffering or subject her to treatments that will not help her get better. he wants ot focus on quality of life  he will be here at 12 noon tomorrow and will talk to the team about how we can move forward with this.   plan for IP hospice or  tranfer to rehab then transition to hospice.   time spent on ACP - 18 mins

## 2023-12-15 LAB
ANION GAP SERPL CALC-SCNC: 7 MMOL/L — SIGNIFICANT CHANGE UP (ref 5–17)
ANION GAP SERPL CALC-SCNC: 7 MMOL/L — SIGNIFICANT CHANGE UP (ref 5–17)
BUN SERPL-MCNC: 24 MG/DL — HIGH (ref 7–23)
BUN SERPL-MCNC: 24 MG/DL — HIGH (ref 7–23)
CALCIUM SERPL-MCNC: 8.7 MG/DL — SIGNIFICANT CHANGE UP (ref 8.5–10.1)
CALCIUM SERPL-MCNC: 8.7 MG/DL — SIGNIFICANT CHANGE UP (ref 8.5–10.1)
CHLORIDE SERPL-SCNC: 106 MMOL/L — SIGNIFICANT CHANGE UP (ref 96–108)
CHLORIDE SERPL-SCNC: 106 MMOL/L — SIGNIFICANT CHANGE UP (ref 96–108)
CO2 SERPL-SCNC: 25 MMOL/L — SIGNIFICANT CHANGE UP (ref 22–31)
CO2 SERPL-SCNC: 25 MMOL/L — SIGNIFICANT CHANGE UP (ref 22–31)
CREAT SERPL-MCNC: 1 MG/DL — SIGNIFICANT CHANGE UP (ref 0.5–1.3)
CREAT SERPL-MCNC: 1 MG/DL — SIGNIFICANT CHANGE UP (ref 0.5–1.3)
EGFR: 57 ML/MIN/1.73M2 — LOW
EGFR: 57 ML/MIN/1.73M2 — LOW
GLUCOSE SERPL-MCNC: 79 MG/DL — SIGNIFICANT CHANGE UP (ref 70–99)
GLUCOSE SERPL-MCNC: 79 MG/DL — SIGNIFICANT CHANGE UP (ref 70–99)
HCT VFR BLD CALC: 26.5 % — LOW (ref 34.5–45)
HCT VFR BLD CALC: 26.5 % — LOW (ref 34.5–45)
HGB BLD-MCNC: 8.9 G/DL — LOW (ref 11.5–15.5)
HGB BLD-MCNC: 8.9 G/DL — LOW (ref 11.5–15.5)
MCHC RBC-ENTMCNC: 30.1 PG — SIGNIFICANT CHANGE UP (ref 27–34)
MCHC RBC-ENTMCNC: 30.1 PG — SIGNIFICANT CHANGE UP (ref 27–34)
MCHC RBC-ENTMCNC: 33.6 GM/DL — SIGNIFICANT CHANGE UP (ref 32–36)
MCHC RBC-ENTMCNC: 33.6 GM/DL — SIGNIFICANT CHANGE UP (ref 32–36)
MCV RBC AUTO: 89.5 FL — SIGNIFICANT CHANGE UP (ref 80–100)
MCV RBC AUTO: 89.5 FL — SIGNIFICANT CHANGE UP (ref 80–100)
PLATELET # BLD AUTO: 70 K/UL — LOW (ref 150–400)
PLATELET # BLD AUTO: 70 K/UL — LOW (ref 150–400)
POTASSIUM SERPL-MCNC: 3.1 MMOL/L — LOW (ref 3.5–5.3)
POTASSIUM SERPL-MCNC: 3.1 MMOL/L — LOW (ref 3.5–5.3)
POTASSIUM SERPL-SCNC: 3.1 MMOL/L — LOW (ref 3.5–5.3)
POTASSIUM SERPL-SCNC: 3.1 MMOL/L — LOW (ref 3.5–5.3)
RBC # BLD: 2.96 M/UL — LOW (ref 3.8–5.2)
RBC # BLD: 2.96 M/UL — LOW (ref 3.8–5.2)
RBC # FLD: 15.6 % — HIGH (ref 10.3–14.5)
RBC # FLD: 15.6 % — HIGH (ref 10.3–14.5)
SODIUM SERPL-SCNC: 138 MMOL/L — SIGNIFICANT CHANGE UP (ref 135–145)
SODIUM SERPL-SCNC: 138 MMOL/L — SIGNIFICANT CHANGE UP (ref 135–145)
WBC # BLD: 8.24 K/UL — SIGNIFICANT CHANGE UP (ref 3.8–10.5)
WBC # BLD: 8.24 K/UL — SIGNIFICANT CHANGE UP (ref 3.8–10.5)
WBC # FLD AUTO: 8.24 K/UL — SIGNIFICANT CHANGE UP (ref 3.8–10.5)
WBC # FLD AUTO: 8.24 K/UL — SIGNIFICANT CHANGE UP (ref 3.8–10.5)

## 2023-12-15 PROCEDURE — 99232 SBSQ HOSP IP/OBS MODERATE 35: CPT

## 2023-12-15 RX ORDER — ROBINUL 0.2 MG/ML
0.1 INJECTION INTRAMUSCULAR; INTRAVENOUS THREE TIMES A DAY
Refills: 0 | Status: DISCONTINUED | OUTPATIENT
Start: 2023-12-15 | End: 2023-12-16

## 2023-12-15 RX ORDER — POTASSIUM CHLORIDE 20 MEQ
40 PACKET (EA) ORAL EVERY 4 HOURS
Refills: 0 | Status: COMPLETED | OUTPATIENT
Start: 2023-12-15 | End: 2023-12-15

## 2023-12-15 RX ADMIN — Medication 3 MILLILITER(S): at 21:45

## 2023-12-15 RX ADMIN — CARBIDOPA AND LEVODOPA 1 TABLET(S): 25; 100 TABLET ORAL at 21:45

## 2023-12-15 RX ADMIN — Medication 40 MILLIEQUIVALENT(S): at 18:21

## 2023-12-15 RX ADMIN — DONEPEZIL HYDROCHLORIDE 10 MILLIGRAM(S): 10 TABLET, FILM COATED ORAL at 21:45

## 2023-12-15 RX ADMIN — CARBIDOPA AND LEVODOPA 1 TABLET(S): 25; 100 TABLET ORAL at 18:21

## 2023-12-15 RX ADMIN — CEFEPIME 2000 MILLIGRAM(S): 1 INJECTION, POWDER, FOR SOLUTION INTRAMUSCULAR; INTRAVENOUS at 21:46

## 2023-12-15 RX ADMIN — Medication 325 MILLIGRAM(S): at 11:07

## 2023-12-15 RX ADMIN — CEFEPIME 2000 MILLIGRAM(S): 1 INJECTION, POWDER, FOR SOLUTION INTRAMUSCULAR; INTRAVENOUS at 15:28

## 2023-12-15 RX ADMIN — ENOXAPARIN SODIUM 40 MILLIGRAM(S): 100 INJECTION SUBCUTANEOUS at 21:44

## 2023-12-15 RX ADMIN — POLYETHYLENE GLYCOL 3350 17 GRAM(S): 17 POWDER, FOR SOLUTION ORAL at 11:07

## 2023-12-15 RX ADMIN — PRAMIPEXOLE DIHYDROCHLORIDE 0.12 MILLIGRAM(S): 0.12 TABLET ORAL at 21:46

## 2023-12-15 RX ADMIN — OXYCODONE HYDROCHLORIDE 5 MILLIGRAM(S): 5 TABLET ORAL at 11:20

## 2023-12-15 RX ADMIN — ATORVASTATIN CALCIUM 40 MILLIGRAM(S): 80 TABLET, FILM COATED ORAL at 21:46

## 2023-12-15 RX ADMIN — Medication 25 MILLIGRAM(S): at 11:07

## 2023-12-15 RX ADMIN — CARBIDOPA AND LEVODOPA 1 TABLET(S): 25; 100 TABLET ORAL at 01:57

## 2023-12-15 RX ADMIN — PANTOPRAZOLE SODIUM 40 MILLIGRAM(S): 20 TABLET, DELAYED RELEASE ORAL at 11:07

## 2023-12-15 RX ADMIN — CARBIDOPA AND LEVODOPA 1 TABLET(S): 25; 100 TABLET ORAL at 05:12

## 2023-12-15 RX ADMIN — Medication 3 MILLILITER(S): at 02:56

## 2023-12-15 RX ADMIN — CEFEPIME 2000 MILLIGRAM(S): 1 INJECTION, POWDER, FOR SOLUTION INTRAMUSCULAR; INTRAVENOUS at 05:12

## 2023-12-15 RX ADMIN — Medication 3 MILLILITER(S): at 08:28

## 2023-12-15 RX ADMIN — Medication 50 MICROGRAM(S): at 05:12

## 2023-12-15 RX ADMIN — Medication 600 MILLIGRAM(S): at 11:07

## 2023-12-15 RX ADMIN — CARBIDOPA AND LEVODOPA 1 TABLET(S): 25; 100 TABLET ORAL at 11:17

## 2023-12-15 RX ADMIN — SENNA PLUS 2 TABLET(S): 8.6 TABLET ORAL at 21:46

## 2023-12-15 RX ADMIN — Medication 600 MILLIGRAM(S): at 21:45

## 2023-12-15 RX ADMIN — Medication 40 MILLIEQUIVALENT(S): at 12:01

## 2023-12-15 RX ADMIN — Medication 3 MILLILITER(S): at 14:32

## 2023-12-15 RX ADMIN — BUPROPION HYDROCHLORIDE 100 MILLIGRAM(S): 150 TABLET, EXTENDED RELEASE ORAL at 11:08

## 2023-12-15 NOTE — PROGRESS NOTE ADULT - ASSESSMENT
84 y/o F w/ PMH of HTN, dyslipidemia, GERD, parkinson's, neuropathy, anemia, loop recorder admitted for:     #Left rib mass biopsy, poorly differentiated carcinoma with neuroendocrine features  # s/p Left thoracotomy, Anterior thoracic corpectomy, fusion T10-12  - D/w  Heme/Onc  no chemo is offered due to poor functional status  - D/w Palliative team, spouse agrees with comfort MOLST  wit care at [facility]       #Symptomatic anemia   - s/p 2unit of PRBC 12/4  and 12/14  - H/H improved appropriately        #  LLL Pneumonia, suspected GNR bacteria   - On  Cefepime for tx of presumed pneumonia will complete course today, receive 10 days Tx   - C/w  Mucinex, HYCODAN   - add glycopyrrolate   - Incentive spintometer   -C/w Duonebs and Chest PT   - OOB to chair as tolerated   - Control pain   - C/w NC to keep O2 sats >92%  - Repeat CXR  no consolidation, no significant effusions, LLL atelectasis       #Urinary retention  Failed TOV, replaced Fuentes 12/8    #Hypothyroidism  - Continue Synthroid     #Hypertension  C/w  Toprol  Verapamil on hold     #Severe protein-calorie malnutrition     DNR/DNI with NIV  DVT ppx: Lovenox       GOC:  DNR/DNI, comfort MOLTS    D/w CM and Palliative team, will transfer to [facility] with comfort care with possible transfer to inPt Hospice if symptoms will be worse          82 y/o F w/ PMH of HTN, dyslipidemia, GERD, parkinson's, neuropathy, anemia, loop recorder admitted for:     #Left rib mass biopsy, poorly differentiated carcinoma with neuroendocrine features  # s/p Left thoracotomy, Anterior thoracic corpectomy, fusion T10-12  - D/w  Heme/Onc  no chemo is offered due to poor functional status  - D/w Palliative team, spouse agrees with comfort MOLST  wit care at        #Symptomatic anemia   - s/p 2unit of PRBC 12/4  and 12/14  - H/H improved appropriately        #  LLL Pneumonia, suspected GNR bacteria   - On  Cefepime for tx of presumed pneumonia will complete course today, receive 10 days Tx   - C/w  Mucinex, HYCODAN   - add glycopyrrolate   - Incentive spintometer   -C/w Duonebs and Chest PT   - OOB to chair as tolerated   - Control pain   - C/w NC to keep O2 sats >92%  - Repeat CXR  no consolidation, no significant effusions, LLL atelectasis       #Urinary retention  Failed TOV, replaced Fuentes 12/8    #Hypothyroidism  - Continue Synthroid     #Hypertension  C/w  Toprol  Verapamil on hold     #Severe protein-calorie malnutrition     DNR/DNI with NIV  DVT ppx: Lovenox       GOC:  DNR/DNI, comfort MOLTS    D/w CM and Palliative team, will transfer to [facility] with comfort care with possible transfer to inPt Hospice if symptoms will be worse

## 2023-12-15 NOTE — PROGRESS NOTE ADULT - NUTRITIONAL ASSESSMENT
84 yo F with cord compression scheduled for surgical decompression of T11 later this week. Still awaiting pathology and advise palliative radiotherapy to T11 and L5 adjuvantly. Pt advised to FU as outpt. Case dw Dr. Koroma.
This patient has been assessed with a concern for Malnutrition and has been determined to have a diagnosis/diagnoses of Severe protein-calorie malnutrition.    This patient is being managed with:   Diet Regular-  Entered: Nov 27 2023  4:33PM  
This patient has been assessed with a concern for Malnutrition and has been determined to have a diagnosis/diagnoses of Severe protein-calorie malnutrition.    This patient is being managed with:   Diet NPO after Midnight-     NPO Start Date: 30-Nov-2023   NPO Start Time: 23:59  Entered: Nov 30 2023 11:04AM    Diet Regular-  Entered: Nov 27 2023  4:33PM  
This patient has been assessed with a concern for Malnutrition and has been determined to have a diagnosis/diagnoses of Severe protein-calorie malnutrition.    This patient is being managed with:   Diet Regular-  Entered: Nov 27 2023  4:33PM  
This patient has been assessed with a concern for Malnutrition and has been determined to have a diagnosis/diagnoses of Severe protein-calorie malnutrition.    This patient is being managed with:   Diet Regular-  Entered: Nov 27 2023  4:33PM  
This patient has been assessed with a concern for Malnutrition and has been determined to have a diagnosis/diagnoses of Severe protein-calorie malnutrition.    This patient is being managed with:   Diet Regular-  Supplement Feeding Modality:  Oral  Ensure Plus High Protein Cans or Servings Per Day:  1       Frequency:  Two Times a day  Entered: Nov 25 2023  1:23PM  
This patient has been assessed with a concern for Malnutrition and has been determined to have a diagnosis/diagnoses of Severe protein-calorie malnutrition.    This patient is being managed with:   Diet Minced and Moist-  Mildly Thick Liquids (MILDTHICKLIQS)  Supplement Feeding Modality:  Oral  Ensure Plus High Protein Cans or Servings Per Day:  3       Frequency:  Three Times a day  Entered: Dec 15 2023 10:12AM  
This patient has been assessed with a concern for Malnutrition and has been determined to have a diagnosis/diagnoses of Severe protein-calorie malnutrition.    This patient is being managed with:   Diet Regular-  Entered: Nov 27 2023  4:33PM  
This patient has been assessed with a concern for Malnutrition and has been determined to have a diagnosis/diagnoses of Severe protein-calorie malnutrition.    This patient is being managed with:   Diet Regular-  Supplement Feeding Modality:  Oral  Ensure Plus High Protein Cans or Servings Per Day:  1       Frequency:  Two Times a day  Entered: Nov 25 2023  1:23PM  
This patient has been assessed with a concern for Malnutrition and has been determined to have a diagnosis/diagnoses of Severe protein-calorie malnutrition.    This patient is being managed with:   Diet Regular-  Entered: Nov 27 2023  4:33PM  

## 2023-12-15 NOTE — CHART NOTE - NSCHARTNOTEFT_GEN_A_CORE
Pt seen and evaluated. Admitted this AM     VSS     Plan   -as documented in H&P   -f/u on MRI   -f/u on Heme/onc and orthospine   -f/u on UCx   -start Rocephin emperically
Called to assess pt as c/o chest pain  EKG STAT and troponin STAT ordered
Went to see patient as per Request of Dr. Hills to address GOC. Patient in IR at this time,  at bedside offered support. Will follow up with patient when returns from procedure
Called by RN that she was concerned that the left chest tube wound was draining and "dehiscing"  Left chest tube wound noted to be clean dry and healed with no drainage.    Reinforced to RN wound status.   No intervention necessary at this time.

## 2023-12-15 NOTE — PROGRESS NOTE ADULT - REASON FOR ADMISSION
LE weakness

## 2023-12-15 NOTE — GOALS OF CARE CONVERSATION - ADVANCED CARE PLANNING - CONVERSATION DETAILS
Ale STORY met with patient's spouse Julius to offer support and discuss goals of care further. Pt alert but unable to participate in discussion. JEZ provided much active listening as Julius reminisced on being with his wife for over 60 years. He became tearful when thinking about what he "is going to do without her." He spoke about their children, grandchildren and pet cats. Pt/spouse have a large supportive family.     Julius acknowledges receiving update from the medical team and recounts being told that his wife is too weak & debilitated to receive any cancer treatments. At this point, he just wants her to be kept as comfortable as possible. MOLST completed to reflect DNR/DNI with trial NIV, CMO/NFT/NO HD, determine use of IV fluids and abx. Julius confirms plan for pt to go to [facility] on comfort measures only.     JEZ offered to reach out to additional family members but Julius declined & said he will be making phone calls.    Emotional support provided. MCKINLEY on chart. Our team will continue to follow. Ale STORY met with patient's spouse Julius to offer support and discuss goals of care further. Pt alert but unable to participate in discussion. JEZ provided much active listening as Julius reminisced on being with his wife for over 60 years. He became tearful when thinking about what he "is going to do without her." He spoke about their children, grandchildren and pet cats. Pt/spouse have a large supportive family.     Julius acknowledges receiving update from the medical team and recounts being told that his wife is too weak & debilitated to receive any cancer treatments. At this point, he just wants her to be kept as comfortable as possible. MOLST completed to reflect DNR/DNI with trial NIV, CMO/NFT/NO HD, determine use of IV fluids and abx. Julius confirms plan for pt to go to [facility] on comfort measures only.     JEZ offered to reach out to additional family members but Julius declined & said he will be making phone calls.    Emotional support provided. MCKINLEY on chart. Our team will continue to follow.

## 2023-12-15 NOTE — PROGRESS NOTE ADULT - PROVIDER SPECIALTY LIST ADULT
Heme/Onc
Hospitalist
Hospitalist
Neurosurgery
Neurosurgery
Orthopedics
Palliative Care
Hospitalist
Orthopedics
SICU
Surgery
Heme/Onc
Hospitalist
Orthopedics
Palliative Care
Rad Onc
Surgery
SICU
Hospitalist
Hospitalist
Cardiology
Heme/Onc
Hospitalist
SICU
Surgery
Surgery
Heme/Onc
Hospitalist
Neurosurgery
Surgery
Hospitalist
Heme/Onc
Hospitalist
Neurosurgery
Neurosurgery
Cardiology
Neurosurgery
Critical Care
Palliative Care
Critical Care

## 2023-12-15 NOTE — PROGRESS NOTE ADULT - SUBJECTIVE AND OBJECTIVE BOX
CC: LE weakness (14 Dec 2023 21:37)    HPI:  82 y/o F w/ PMH of HTN, dyslipidemia, GERD, parkinson's, neuropathy, anemia, loop recorder, p/w LE weakness. Patient has been having B/L LE weakness for a little less than 1 year. Symptoms have been getting progressively worse to the point where patient is unable to ambulate. Patient also has been getting worked up for anemia and approximately 40lb unintentional weight loss. She had outpatient CT C/A/P showing multiple bone mets and possible liver mets and path fracture of T11 vertebral body. Patient sent to ED for further evaluation. Patient evaluated by ortho with recommendation for MRI. Patient has a loop recorder in placed by Dr. Crawley, and patient is unsure if it is compatible with MRI. Will consult Dr. Crawley for input prior to MRI. Patient also C/o LE paresthesias and back pain.     INTERVAL HPI/OVERNIGHT EVENTS:  chart reviewed, Pt was seen and examined, awake, looks weak, weak cough. Reports not in pain but when Chets Pt delivered and started to cough felt uncomfortable. HAs min PO intake as per RN      Vital Signs Last 24 Hrs  T(C): 37.2 (15 Dec 2023 15:03), Max: 37.2 (15 Dec 2023 15:03)  T(F): 99 (15 Dec 2023 15:03), Max: 99 (15 Dec 2023 15:03)  HR: 102 (15 Dec 2023 21:42) (78 - 107)  BP: 124/76 (15 Dec 2023 15:03) (124/76 - 148/78)  RR: 18 (15 Dec 2023 15:03) (18 - 18)  SpO2: 96% (15 Dec 2023 21:42) (95% - 97%)    Parameters below as of 15 Dec 2023 21:42  Patient On (Oxygen Delivery Method): nasal cannula      REVIEW OF SYSTEMS:  All other review of systems is negative unless indicated above.        PHYSICAL EXAM:  General:  frail elderly female,  in no acute distress  Eyes: EOMI; conjunctiva and sclera clear  Head: Normocephalic; atraumatic  ENMT: No nasal discharge; airway clear  Neck: Supple; non tender; no masses  Respiratory:  diminished BS  at bases, poor effort No wheezes  Cardiovascular: Regular rate and rhythm. S1 and S2 Normal; No murmurs, gallops or rubs  Gastrointestinal: Soft non-tender non-distended; Normal bowel sounds  Genitourinary: No  suprapubic  tenderness  Extremities: No  edema  Neurological: Alert and oriented x 2-3, non focal, speech is clear   Skin: Warm and dry. No acute rash  Musculoskeletal: Normal muscle tone, without deformities  Psychiatric: Cooperative        LABS:                       8.9    8.24  )-----------( 70       ( 15 Dec 2023 07:16 )             26.5     15 Dec 2023 07:16    138    |  106    |  24     ----------------------------<  79     3.1     |  25     |  1.00     Ca    8.7        15 Dec 2023 07:16  Mg     2.0       14 Dec 2023 07:20          Urinalysis Basic - ( 15 Dec 2023 07:16 )  Color: x / Appearance: x / SG: x / pH: x  Gluc: 79 mg/dL / Ketone: x  / Bili: x / Urobili: x   Blood: x / Protein: x / Nitrite: x   Leuk Esterase: x / RBC: x / WBC x   Sq Epi: x / Non Sq Epi: x / Bacteria: x      MEDICATIONS  (STANDING):  albuterol/ipratropium for Nebulization 3 milliLiter(s) Nebulizer every 6 hours  atorvastatin 40 milliGRAM(s) Oral at bedtime  buPROPion . 100 milliGRAM(s) Oral daily  carbidopa/levodopa  25/100 1 Tablet(s) Oral four times a day  cefepime  Injectable. 2000 milliGRAM(s) IV Push every 8 hours  donepezil 10 milliGRAM(s) Oral at bedtime  enoxaparin Injectable 40 milliGRAM(s) SubCutaneous every 24 hours  ferrous    sulfate 325 milliGRAM(s) Oral daily  guaiFENesin  milliGRAM(s) Oral every 12 hours  influenza  Vaccine (HIGH DOSE) 0.7 milliLiter(s) IntraMuscular once  levothyroxine 50 MICROGram(s) Oral daily  metoprolol succinate ER 25 milliGRAM(s) Oral daily  naloxone Injectable 0.4 milliGRAM(s) IV Push once  pantoprazole    Tablet 40 milliGRAM(s) Oral before breakfast  polyethylene glycol 3350 17 Gram(s) Oral daily  pramipexole 0.125 milliGRAM(s) Oral at bedtime  senna 2 Tablet(s) Oral at bedtime    MEDICATIONS  (PRN):  acetaminophen     Tablet .. 650 milliGRAM(s) Oral every 6 hours PRN Mild Pain (1 - 3), Moderate Pain (4 - 6)  bisacodyl 5 milliGRAM(s) Oral daily PRN Constipation  glycopyrrolate Oral Solution 0.1 milliGRAM(s) Oral three times a day PRN secretions  hydrocodone/homatropine Syrup 5 milliLiter(s) Oral every 8 hours PRN Cough  oxyCODONE    IR 5 milliGRAM(s) Oral every 4 hours PRN Moderate Pain (4 - 6)  oxyCODONE    IR 10 milliGRAM(s) Oral every 4 hours PRN Severe Pain (7 - 10)        RADIOLOGY & ADDITIONAL TESTS:    ACC: [id number] EXAM:  XR CHEST PORTABLE IMMED 1V   ORDERED BY: STONE BRAND     PROCEDURE DATE:  12/11/2023          INTERPRETATION:  AP chest on December 11, 2023 11:53 AM. Patient is short   of breath and hypoxic.    Heart magnified by technique. Left loop recorder and spinal hardware   around thoracolumbar junction again noted. Old fracture deformity left   upper humerus again seen.    Left mid upper outer pleural based loculated fluid is again noted.    Scarlike changes on a chronic right rib fractures and right upper outer   chest again seen.    Above findings are similar to December 5.    The present film shows some mild atelectatic changes at left base.    IMPRESSION: Mild left base atelectatic changes are new since prior. Other   findings stable.     CC: LE weakness (14 Dec 2023 21:37)    HPI:  84 y/o F w/ PMH of HTN, dyslipidemia, GERD, parkinson's, neuropathy, anemia, loop recorder, p/w LE weakness. Patient has been having B/L LE weakness for a little less than 1 year. Symptoms have been getting progressively worse to the point where patient is unable to ambulate. Patient also has been getting worked up for anemia and approximately 40lb unintentional weight loss. She had outpatient CT C/A/P showing multiple bone mets and possible liver mets and path fracture of T11 vertebral body. Patient sent to ED for further evaluation. Patient evaluated by ortho with recommendation for MRI. Patient has a loop recorder in placed by Dr. Crawley, and patient is unsure if it is compatible with MRI. Will consult Dr. Crawley for input prior to MRI. Patient also C/o LE paresthesias and back pain.     INTERVAL HPI/OVERNIGHT EVENTS:  chart reviewed, Pt was seen and examined, awake, looks weak, weak cough. Reports not in pain but when Chets Pt delivered and started to cough felt uncomfortable. HAs min PO intake as per RN      Vital Signs Last 24 Hrs  T(C): 37.2 (15 Dec 2023 15:03), Max: 37.2 (15 Dec 2023 15:03)  T(F): 99 (15 Dec 2023 15:03), Max: 99 (15 Dec 2023 15:03)  HR: 102 (15 Dec 2023 21:42) (78 - 107)  BP: 124/76 (15 Dec 2023 15:03) (124/76 - 148/78)  RR: 18 (15 Dec 2023 15:03) (18 - 18)  SpO2: 96% (15 Dec 2023 21:42) (95% - 97%)    Parameters below as of 15 Dec 2023 21:42  Patient On (Oxygen Delivery Method): nasal cannula      REVIEW OF SYSTEMS:  All other review of systems is negative unless indicated above.        PHYSICAL EXAM:  General:  frail elderly female,  in no acute distress  Eyes: EOMI; conjunctiva and sclera clear  Head: Normocephalic; atraumatic  ENMT: No nasal discharge; airway clear  Neck: Supple; non tender; no masses  Respiratory:  diminished BS  at bases, poor effort No wheezes  Cardiovascular: Regular rate and rhythm. S1 and S2 Normal; No murmurs, gallops or rubs  Gastrointestinal: Soft non-tender non-distended; Normal bowel sounds  Genitourinary: No  suprapubic  tenderness  Extremities: No  edema  Neurological: Alert and oriented x 2-3, non focal, speech is clear   Skin: Warm and dry. No acute rash  Musculoskeletal: Normal muscle tone, without deformities  Psychiatric: Cooperative        LABS:                       8.9    8.24  )-----------( 70       ( 15 Dec 2023 07:16 )             26.5     15 Dec 2023 07:16    138    |  106    |  24     ----------------------------<  79     3.1     |  25     |  1.00     Ca    8.7        15 Dec 2023 07:16  Mg     2.0       14 Dec 2023 07:20          Urinalysis Basic - ( 15 Dec 2023 07:16 )  Color: x / Appearance: x / SG: x / pH: x  Gluc: 79 mg/dL / Ketone: x  / Bili: x / Urobili: x   Blood: x / Protein: x / Nitrite: x   Leuk Esterase: x / RBC: x / WBC x   Sq Epi: x / Non Sq Epi: x / Bacteria: x      MEDICATIONS  (STANDING):  albuterol/ipratropium for Nebulization 3 milliLiter(s) Nebulizer every 6 hours  atorvastatin 40 milliGRAM(s) Oral at bedtime  buPROPion . 100 milliGRAM(s) Oral daily  carbidopa/levodopa  25/100 1 Tablet(s) Oral four times a day  cefepime  Injectable. 2000 milliGRAM(s) IV Push every 8 hours  donepezil 10 milliGRAM(s) Oral at bedtime  enoxaparin Injectable 40 milliGRAM(s) SubCutaneous every 24 hours  ferrous    sulfate 325 milliGRAM(s) Oral daily  guaiFENesin  milliGRAM(s) Oral every 12 hours  influenza  Vaccine (HIGH DOSE) 0.7 milliLiter(s) IntraMuscular once  levothyroxine 50 MICROGram(s) Oral daily  metoprolol succinate ER 25 milliGRAM(s) Oral daily  naloxone Injectable 0.4 milliGRAM(s) IV Push once  pantoprazole    Tablet 40 milliGRAM(s) Oral before breakfast  polyethylene glycol 3350 17 Gram(s) Oral daily  pramipexole 0.125 milliGRAM(s) Oral at bedtime  senna 2 Tablet(s) Oral at bedtime    MEDICATIONS  (PRN):  acetaminophen     Tablet .. 650 milliGRAM(s) Oral every 6 hours PRN Mild Pain (1 - 3), Moderate Pain (4 - 6)  bisacodyl 5 milliGRAM(s) Oral daily PRN Constipation  glycopyrrolate Oral Solution 0.1 milliGRAM(s) Oral three times a day PRN secretions  hydrocodone/homatropine Syrup 5 milliLiter(s) Oral every 8 hours PRN Cough  oxyCODONE    IR 5 milliGRAM(s) Oral every 4 hours PRN Moderate Pain (4 - 6)  oxyCODONE    IR 10 milliGRAM(s) Oral every 4 hours PRN Severe Pain (7 - 10)        RADIOLOGY & ADDITIONAL TESTS:    ACC: [id number] EXAM:  XR CHEST PORTABLE IMMED 1V   ORDERED BY: STONE BRAND     PROCEDURE DATE:  12/11/2023          INTERPRETATION:  AP chest on December 11, 2023 11:53 AM. Patient is short   of breath and hypoxic.    Heart magnified by technique. Left loop recorder and spinal hardware   around thoracolumbar junction again noted. Old fracture deformity left   upper humerus again seen.    Left mid upper outer pleural based loculated fluid is again noted.    Scarlike changes on a chronic right rib fractures and right upper outer   chest again seen.    Above findings are similar to December 5.    The present film shows some mild atelectatic changes at left base.    IMPRESSION: Mild left base atelectatic changes are new since prior. Other   findings stable.

## 2023-12-16 ENCOUNTER — TRANSCRIPTION ENCOUNTER (OUTPATIENT)
Age: 83
End: 2023-12-16

## 2023-12-16 VITALS
DIASTOLIC BLOOD PRESSURE: 72 MMHG | OXYGEN SATURATION: 97 % | TEMPERATURE: 99 F | RESPIRATION RATE: 18 BRPM | HEART RATE: 84 BPM | SYSTOLIC BLOOD PRESSURE: 125 MMHG

## 2023-12-16 PROCEDURE — 99239 HOSP IP/OBS DSCHRG MGMT >30: CPT

## 2023-12-16 RX ORDER — SENNA PLUS 8.6 MG/1
2 TABLET ORAL
Qty: 0 | Refills: 0 | DISCHARGE
Start: 2023-12-16

## 2023-12-16 RX ORDER — BREXPIPRAZOLE 0.25 MG/1
1 TABLET ORAL
Refills: 0 | DISCHARGE

## 2023-12-16 RX ORDER — ACETAMINOPHEN 500 MG
2 TABLET ORAL
Qty: 0 | Refills: 0 | DISCHARGE
Start: 2023-12-16

## 2023-12-16 RX ORDER — PRAMIPEXOLE DIHYDROCHLORIDE 0.12 MG/1
1 TABLET ORAL
Refills: 0 | DISCHARGE

## 2023-12-16 RX ORDER — HYDROCODONE BITARTRATE AND HOMATROPINE METHYLBROMIDE 5; 1.5 MG/5ML; MG/5ML
5 SOLUTION ORAL
Qty: 0 | Refills: 0 | DISCHARGE
Start: 2023-12-16

## 2023-12-16 RX ORDER — POLYETHYLENE GLYCOL 3350 17 G/17G
17 POWDER, FOR SOLUTION ORAL
Qty: 0 | Refills: 0 | DISCHARGE
Start: 2023-12-16

## 2023-12-16 RX ORDER — VERAPAMIL HCL 240 MG
1 CAPSULE, EXTENDED RELEASE PELLETS 24 HR ORAL
Refills: 0 | DISCHARGE

## 2023-12-16 RX ORDER — LOSARTAN POTASSIUM 100 MG/1
1 TABLET, FILM COATED ORAL
Refills: 0 | DISCHARGE

## 2023-12-16 RX ORDER — TOLTERODINE TARTRATE 1 MG/1
2 TABLET, FILM COATED ORAL
Refills: 0 | DISCHARGE

## 2023-12-16 RX ORDER — ROBINUL 0.2 MG/ML
0.5 INJECTION INTRAMUSCULAR; INTRAVENOUS
Qty: 0 | Refills: 0 | DISCHARGE
Start: 2023-12-16

## 2023-12-16 RX ORDER — OXYCODONE HYDROCHLORIDE 5 MG/1
1 TABLET ORAL
Qty: 0 | Refills: 0 | DISCHARGE
Start: 2023-12-16

## 2023-12-16 RX ORDER — VILAZODONE HYDROCHLORIDE 20 MG/1
1 TABLET, FILM COATED ORAL
Refills: 0 | DISCHARGE

## 2023-12-16 RX ORDER — IPRATROPIUM/ALBUTEROL SULFATE 18-103MCG
3 AEROSOL WITH ADAPTER (GRAM) INHALATION
Qty: 0 | Refills: 0 | DISCHARGE
Start: 2023-12-16

## 2023-12-16 RX ADMIN — Medication 50 MICROGRAM(S): at 06:11

## 2023-12-16 RX ADMIN — PANTOPRAZOLE SODIUM 40 MILLIGRAM(S): 20 TABLET, DELAYED RELEASE ORAL at 10:02

## 2023-12-16 RX ADMIN — Medication 25 MILLIGRAM(S): at 10:01

## 2023-12-16 RX ADMIN — CARBIDOPA AND LEVODOPA 1 TABLET(S): 25; 100 TABLET ORAL at 06:11

## 2023-12-16 RX ADMIN — BUPROPION HYDROCHLORIDE 100 MILLIGRAM(S): 150 TABLET, EXTENDED RELEASE ORAL at 10:02

## 2023-12-16 RX ADMIN — CARBIDOPA AND LEVODOPA 1 TABLET(S): 25; 100 TABLET ORAL at 12:49

## 2023-12-16 RX ADMIN — Medication 3 MILLILITER(S): at 09:12

## 2023-12-16 RX ADMIN — Medication 600 MILLIGRAM(S): at 10:02

## 2023-12-16 RX ADMIN — Medication 3 MILLILITER(S): at 02:08

## 2023-12-16 NOTE — DISCHARGE NOTE PROVIDER - NSDCFUADDINST_GEN_ALL_CORE_FT
Diet, Minced and Moist:   Mildly Thick Liquids (MILDTHICKLIQS)  Supplement Feeding Modality:  Oral  Ensure Plus High Protein Cans

## 2023-12-16 NOTE — DISCHARGE NOTE PROVIDER - NSDCCPCAREPLAN_GEN_ALL_CORE_FT
PRINCIPAL DISCHARGE DIAGNOSIS  Diagnosis: Adult failure to thrive  Assessment and Plan of Treatment: c/w feeding sas tolerated  MVI  protein supplement      SECONDARY DISCHARGE DIAGNOSES  Diagnosis: Pain from bone metastases  Assessment and Plan of Treatment:     Diagnosis: Anemia  Assessment and Plan of Treatment: received 2U blood transfusion   C/w  iron supplement    Diagnosis: Neuroendocrine carcinoma  Assessment and Plan of Treatment: no chemo planned for now   comfort oriented care  C/w pain meds as needed    Diagnosis: Gram-negative pneumonia  Assessment and Plan of Treatment: completed 10 days of cefepime IV   C/w duonebs and cough meds  C/w incentive spirometry

## 2023-12-16 NOTE — DISCHARGE NOTE PROVIDER - HOSPITAL COURSE
84 y/o F w/ PMH of HTN, dyslipidemia, GERD, parkinson's, neuropathy, anemia, loop recorder, p/w LE weakness. Patient has been having B/L LE weakness for a little less than 1 year. Symptoms have been getting progressively worse to the point where patient is unable to ambulate. Patient also has been getting worked up for anemia and approximately 40lb unintentional weight loss. She had outpatient CT C/A/P showing multiple bone mets and possible liver mets and path fracture of T11 vertebral body. Patient presented  to ED for further evaluation, had  MRI C/s: No evidence for metastatic disease to the cervical spine or cervical spinal cord compression.  -MRI: T/S: A large metastasis replaces the T11 vertebral body with an associated mild to moderate pathologic compression fracture deformity and retropulsion of bone. Epidural tumor extension is noted. The compression fracture with retropulsion of bone and epidural tumor extension results in mild to moderate thoracic spinal cord compression.  -MRI: L/S: Bony metastases at T11,L1, L2, L4, L5 and left sacrum. Pathologic fractures of T11 and L5. Possible cord compression at T11 and diffuse thecal sac compression at L5.  - MRI brain: no infarct or bleeding, no metastatic dz   Pt was evaluated by ortho, heme/onc and Radiation oncology.  s/p Left rib mass biopsy with IR, + poorly differentiated carcinoma with neuroendocrine features. Pt was taken to OR   S/p  Left thoracotomy, Anterior thoracic corpectomy, fusion T10-12. Postop course complicated by LLL Gr neg matty  PNA. Pt was Tx with cefepime x 10 days. Pt also had difficulty coughing resulting in atelectasis, hypoxia  and required NS. Tx with Abxs, Mucinex, neb tx and chest Pt. Pts functional status significantly declined, and first hem/onc recommended BC  before chemoTx.  Palliative team was following Pt and had multiple discussions about GOC, now family is ready for comfort oriented care, Plan for dc to SNF with further transition to inPt hospice if symptoms advance.   Today Pt awake and alert,  somewhat confused, tolerates   minced and moist diet with fare oral intake. Denies pain, but looks uncomfortable with movement. Jesus SOB   Vital Signs Last 24 Hrs  T(C): 37.1 (16 Dec 2023 08:43), Max: 37.2 (15 Dec 2023 15:03)  T(F): 98.7 (16 Dec 2023 08:43), Max: 99 (15 Dec 2023 15:03)  HR: 84 (16 Dec 2023 08:43) (83 - 103)  BP: 125/72 (16 Dec 2023 08:43) (124/76 - 126/80)  RR: 18 (16 Dec 2023 08:43) (18 - 18)  SpO2: 97% (16 Dec 2023 08:43) (94% - 98%)    Parameters below as of 16 Dec 2023 08:43  Patient On (Oxygen Delivery Method): nasal cannula  O2 Flow (L/min): 2      PHYSICAL EXAM:  General:  frail elderly female,  in no acute distress  Eyes: EOMI; conjunctiva and sclera clear  Head: Normocephalic; atraumatic  ENMT: No nasal discharge; airway clear  Neck: Supple; non tender; no masses  Respiratory:  diminished BS  at bases, poor effort No wheezes  Cardiovascular: Regular rate and rhythm. S1 and S2 Normal; No murmurs, gallops or rubs  Gastrointestinal: Soft non-tender non-distended; Normal bowel sounds  Genitourinary: No  suprapubic  tenderness  Extremities: No  edema  Neurological: Alert and oriented x 2,  speech is clear , no facial asymmetry   Skin: Warm and dry. No acute rash  Musculoskeletal: Normal muscle tone, without deformities  Psychiatric: Cooperative      A/P; 84 y/o F w/ PMH of HTN, dyslipidemia, GERD, parkinson's, neuropathy, anemia, loop recorder admitted for:     #Left rib mass biopsy, poorly differentiated carcinoma with neuroendocrine features  # s/p Left thoracotomy, Anterior thoracic corpectomy, fusion T10-12  - D/w  Heme/Onc  no chemo is offered due to poor functional status  - D/w Palliative team, spouse agrees with comfort MOLST  with care at SNF       #Symptomatic anemia   - s/p 2unit of PRBC 12/4  and 12/14  - H/H improved appropriately        #  LLL Pneumonia, suspected GNR bacteria   - S/p Cefepime for tx of presumed pneumonia will completed course of  10 days Tx   - C/w  Mucinex, HYCODAN   - added  glycopyrrolate PRN trial   - Incentive spintometer    Duonebs and Chest PT   - OOB to chair as tolerated   - Control pain   - C/w NC to keep O2 sats >92%  - Repeat CXR  no consolidation, no significant effusions, LLL atelectasis       #Urinary retention  Failed TOV, replaced Fuentes 12/8    #Hypothyroidism  - Continue Synthroid     #Hypertension  C/w  Toprol  Verapamil on hold     #Severe protein-calorie malnutrition   modified diet   supplements  MVI     DNR/DNI with NIV  DVT ppx: Lovenox       GOC:  DNR/DNI, comfort MOLTS    Dispo; stable for dc to [facility]   Fax dc summary to PCP  Total time 43 min   Pts spouse called updated on plan.

## 2023-12-16 NOTE — DISCHARGE NOTE PROVIDER - NSDCMRMEDTOKEN_GEN_ALL_CORE_FT
acetaminophen 325 mg oral tablet: 2 tab(s) orally every 6 hours As needed Mild Pain (1 - 3), Moderate Pain (4 - 6)  atorvastatin 40 mg oral tablet: 1 tab(s) orally once a day  bisacodyl 5 mg oral delayed release tablet: 1 tab(s) orally once a day As needed Constipation  buPROPion 100 mg oral tablet: 1 tab(s) orally once a day  carbidopa-levodopa 25 mg-100 mg oral tablet: 1 tab(s) orally 4 times a day  donepezil 10 mg oral tablet: 1 tab(s) orally 2 times a day  ferrous sulfate 325 mg (65 mg elemental iron) oral delayed release tablet: 1 tab(s) orally once a day  glycopyrrolate 1 mg/5 mL oral solution: 0.5 milliliter(s) orally 3 times a day As needed secretions  guaiFENesin 600 mg oral tablet, extended release: 1 tab(s) orally every 12 hours as needed for  cough  hydrocodone-homatropine 5 mg-1.5 mg/5 mL oral syrup: 5 milliliter(s) orally once a day (at bedtime) as needed for Cough  ipratropium-albuterol 0.5 mg-2.5 mg/3 mL inhalation solution: 3 milliliter(s) inhaled every 6 hours  levothyroxine 50 mcg (0.05 mg) oral capsule: 1 cap(s) orally once a day  metoprolol succinate 25 mg oral tablet, extended release: 1 tab(s) orally once a day  omeprazole 20 mg oral delayed release tablet: 1 tab(s) orally once a day  oxyCODONE 10 mg oral tablet: 1 tab(s) orally every 6 hours as needed for Severe Pain (7 - 10)  oxyCODONE 5 mg oral tablet: 1 tab(s) orally every 6 hours as needed for Moderate Pain (4 - 6)  polyethylene glycol 3350 oral powder for reconstitution: 17 gram(s) orally once a day  senna leaf extract oral tablet: 2 tab(s) orally once a day (at bedtime)

## 2023-12-16 NOTE — DISCHARGE NOTE PROVIDER - DETAILS OF MALNUTRITION DIAGNOSIS/DIAGNOSES
This patient has been assessed with a concern for Malnutrition and was treated during this hospitalization for the following Nutrition diagnosis/diagnoses:     -  11/25/2023: Severe protein-calorie malnutrition

## 2023-12-16 NOTE — DISCHARGE NOTE NURSING/CASE MANAGEMENT/SOCIAL WORK - NSDCPEFALRISK_GEN_ALL_CORE
For information on Fall & Injury Prevention, visit: [email or web address] OR  [email or web address] OR  https://www.cdc.gov/steadi/patient.html For information on Fall & Injury Prevention, visit: [email or web address] OR  [email or web address] OR  https://www.cdc.gov/steadi/patient.html

## 2023-12-16 NOTE — DISCHARGE NOTE NURSING/CASE MANAGEMENT/SOCIAL WORK - PATIENT PORTAL LINK FT
You can access the FollowMyHealth Patient Portal offered by [facility] by registering at the following website: [email or web address]. By joining [employer]’s FollowMyHealth portal, you will also be able to view your health information using other applications (apps) compatible with our system. You can access the FollowMyHealth Patient Portal offered by [facility] by registering at the following website: [email or web address]. By joining [employer]’s FollowMyHealth portal, you will also be able to view your health information using other applications (apps) compatible with our system.

## 2023-12-16 NOTE — DISCHARGE NOTE PROVIDER - CARE PROVIDER_API CALL
Michelle Roberts  [facility]  [address], [place] [address]  Phone: [phone]  Fax: [phone]  Follow Up Time:     Guillermo Ceballos  Orthopaedic Surgery  [address] [place] [id number]  Phone: [phone]  Fax: [phone]  Follow Up Time:    Michelle Roberts  [facility]  [address], [place] [address]  Phone: [phone]  Fax: [phone]  Follow Up Time:     Guillermo Ceballos  Orthopaedic Surgery  [address] [place] [address]  Phone: [phone]  Fax: [phone]  Follow Up Time:

## 2023-12-16 NOTE — DISCHARGE NOTE PROVIDER - CARE PROVIDERS DIRECT ADDRESSES
,[email or web address],DirectAddress_Unknown [email or web address],DirectAddress_Unknown

## 2023-12-21 DIAGNOSIS — D50.9 IRON DEFICIENCY ANEMIA, UNSPECIFIED: ICD-10-CM

## 2023-12-21 DIAGNOSIS — G95.29 OTHER CORD COMPRESSION: ICD-10-CM

## 2023-12-21 DIAGNOSIS — Z66 DO NOT RESUSCITATE: ICD-10-CM

## 2023-12-21 DIAGNOSIS — G20.A1 PARKINSON'S DISEASE WITHOUT DYSKINESIA, WITHOUT MENTION OF FLUCTUATIONS: ICD-10-CM

## 2023-12-21 DIAGNOSIS — Z88.8 ALLERGY STATUS TO OTHER DRUGS, MEDICAMENTS AND BIOLOGICAL SUBSTANCES: ICD-10-CM

## 2023-12-21 DIAGNOSIS — R33.9 RETENTION OF URINE, UNSPECIFIED: ICD-10-CM

## 2023-12-21 DIAGNOSIS — M84.58XA PATHOLOGICAL FRACTURE IN NEOPLASTIC DISEASE, OTHER SPECIFIED SITE, INITIAL ENCOUNTER FOR FRACTURE: ICD-10-CM

## 2023-12-21 DIAGNOSIS — F39 UNSPECIFIED MOOD [AFFECTIVE] DISORDER: ICD-10-CM

## 2023-12-21 DIAGNOSIS — I10 ESSENTIAL (PRIMARY) HYPERTENSION: ICD-10-CM

## 2023-12-21 DIAGNOSIS — J98.11 ATELECTASIS: ICD-10-CM

## 2023-12-21 DIAGNOSIS — E43 UNSPECIFIED SEVERE PROTEIN-CALORIE MALNUTRITION: ICD-10-CM

## 2023-12-21 DIAGNOSIS — C7A.1 MALIGNANT POORLY DIFFERENTIATED NEUROENDOCRINE TUMORS: ICD-10-CM

## 2023-12-21 DIAGNOSIS — Z98.84 BARIATRIC SURGERY STATUS: ICD-10-CM

## 2023-12-21 DIAGNOSIS — K21.9 GASTRO-ESOPHAGEAL REFLUX DISEASE WITHOUT ESOPHAGITIS: ICD-10-CM

## 2023-12-21 DIAGNOSIS — D63.1 ANEMIA IN CHRONIC KIDNEY DISEASE: ICD-10-CM

## 2023-12-21 DIAGNOSIS — E83.52 HYPERCALCEMIA: ICD-10-CM

## 2023-12-21 DIAGNOSIS — Z51.5 ENCOUNTER FOR PALLIATIVE CARE: ICD-10-CM

## 2023-12-21 DIAGNOSIS — D51.9 VITAMIN B12 DEFICIENCY ANEMIA, UNSPECIFIED: ICD-10-CM

## 2023-12-21 DIAGNOSIS — I95.9 HYPOTENSION, UNSPECIFIED: ICD-10-CM

## 2023-12-21 DIAGNOSIS — E78.5 HYPERLIPIDEMIA, UNSPECIFIED: ICD-10-CM

## 2023-12-21 DIAGNOSIS — E03.9 HYPOTHYROIDISM, UNSPECIFIED: ICD-10-CM

## 2023-12-21 DIAGNOSIS — Z87.891 PERSONAL HISTORY OF NICOTINE DEPENDENCE: ICD-10-CM

## 2023-12-21 DIAGNOSIS — G62.9 POLYNEUROPATHY, UNSPECIFIED: ICD-10-CM

## 2023-12-21 DIAGNOSIS — Z96.652 PRESENCE OF LEFT ARTIFICIAL KNEE JOINT: ICD-10-CM

## 2023-12-21 DIAGNOSIS — R09.02 HYPOXEMIA: ICD-10-CM

## 2023-12-21 DIAGNOSIS — J15.69 PNEUMONIA DUE TO OTHER GRAM-NEGATIVE BACTERIA: ICD-10-CM

## 2023-12-21 DIAGNOSIS — C78.7 SECONDARY MALIGNANT NEOPLASM OF LIVER AND INTRAHEPATIC BILE DUCT: ICD-10-CM

## 2023-12-21 DIAGNOSIS — C79.49 SECONDARY MALIGNANT NEOPLASM OF OTHER PARTS OF NERVOUS SYSTEM: ICD-10-CM

## 2023-12-21 DIAGNOSIS — R62.7 ADULT FAILURE TO THRIVE: ICD-10-CM

## 2023-12-21 DIAGNOSIS — Z95.818 PRESENCE OF OTHER CARDIAC IMPLANTS AND GRAFTS: ICD-10-CM

## 2023-12-21 DIAGNOSIS — Z79.899 OTHER LONG TERM (CURRENT) DRUG THERAPY: ICD-10-CM

## 2024-02-05 NOTE — CONSULT NOTE ADULT - NS ATTEND OPT1 GEN_ALL_CORE
What Is The Reason For Today's Visit?: Full Body Skin Examination with No Concerns
What Is The Reason For Today's Visit? (Being Monitored For X): concerning skin lesions on an annual basis
What Type Of Note Output Would You Prefer (Optional)?: Bullet Format
I independently performed the documented:

## 2024-02-28 NOTE — ED ADULT TRIAGE NOTE - IDEAL BODY WEIGHT(KG)
Patient with chronic respiratory failure and requires oxygen in the outpatient setting  Appears to be at baseline breathing and not in acute COPD exacerbation  Continue home medications and monitor     57

## 2024-11-11 NOTE — ED ADULT NURSE NOTE - NS ED NURSE LEVEL OF CONSCIOUSNESS AFFECT
[facility] in [place] Fajardo, Cheryle, PT    Office Visit    1 month ago Acute pain of left shoulder    [facility] [address], [place] Laurie Gilman MD    Office Visit    1 month ago     [facility] in [place] Fajardo, Sara, PT    Office Visit    1 month ago Cervical back pain with evidence of disc disease    [facility] in [place] Fajardo, Cheryle, PT    Office Visit    2 months ago Acute pain of left shoulder    [facility] [address], [place] Laurie Gilman MD    Office Visit          
Calm

## 2024-12-19 NOTE — ED ADULT TRIAGE NOTE - TEMPERATURE IN FAHRENHEIT (DEGREES F)
12/19/24 1106   Family Communication    Relationship to Patient Spouse   Family/Significant Other Update Called   Delivery Origin Nurse   Message Disposition Family present - message delivered   Update Given Yes   Family Communication   Family Update Message Surgeon working;Patient stable        98.4